# Patient Record
Sex: MALE | Race: WHITE | NOT HISPANIC OR LATINO | Employment: OTHER | URBAN - METROPOLITAN AREA
[De-identification: names, ages, dates, MRNs, and addresses within clinical notes are randomized per-mention and may not be internally consistent; named-entity substitution may affect disease eponyms.]

---

## 2017-01-05 ENCOUNTER — OFFICE VISIT (OUTPATIENT)
Dept: CARDIOLOGY | Facility: CLINIC | Age: 82
End: 2017-01-05
Attending: INTERNAL MEDICINE
Payer: MEDICARE

## 2017-01-05 VITALS
BODY MASS INDEX: 43.55 KG/M2 | WEIGHT: 271 LBS | DIASTOLIC BLOOD PRESSURE: 70 MMHG | HEIGHT: 66 IN | SYSTOLIC BLOOD PRESSURE: 118 MMHG | HEART RATE: 86 BPM

## 2017-01-05 DIAGNOSIS — I48.19 PERSISTENT ATRIAL FIBRILLATION (H): Primary | ICD-10-CM

## 2017-01-05 DIAGNOSIS — I25.10 CORONARY ARTERY DISEASE INVOLVING NATIVE CORONARY ARTERY OF NATIVE HEART WITHOUT ANGINA PECTORIS: ICD-10-CM

## 2017-01-05 DIAGNOSIS — I48.91 ATRIAL FIBRILLATION (H): Primary | ICD-10-CM

## 2017-01-05 PROBLEM — Z76.0 ENCOUNTER FOR MEDICATION REFILL: Status: ACTIVE | Noted: 2017-01-05

## 2017-01-05 PROCEDURE — 99214 OFFICE O/P EST MOD 30 MIN: CPT | Performed by: INTERNAL MEDICINE

## 2017-01-05 RX ORDER — METOPROLOL TARTRATE 25 MG/1
25 TABLET, FILM COATED ORAL 2 TIMES DAILY
Qty: 60 TABLET | Refills: 3 | Status: SHIPPED | OUTPATIENT
Start: 2017-01-05 | End: 2017-01-05

## 2017-01-05 RX ORDER — METOPROLOL TARTRATE 25 MG/1
25 TABLET, FILM COATED ORAL 2 TIMES DAILY
Qty: 180 TABLET | Refills: 3 | Status: SHIPPED | OUTPATIENT
Start: 2017-01-05 | End: 2017-01-13

## 2017-01-05 NOTE — Clinical Note
2017      Heriberto Kim MD   [facility]   [address] [place]  [address]      RE: Vasiliy Corbin   MRN: [id number]   : 1935      Dear Noah:      I had the pleasure of seeing Vasiliy Corbin in Cardiology Clinic in followup.  As you know, he has a history of atrial fibrillation that is longstanding.  He has had 2 cardioversions.  Initially, it was successful for a few weeks and then had recurrence and was put on amiodarone and did well for several years.  In April of last year, he had a laceration and came to the hospital and was noted in atrial fibrillation despite being on amiodarone.  I saw him in May and discussed rate versus rhythm control approach again and since he was feeling reasonable, we decided not to put him back on amiodarone or try cardioversion again.  He was tried on rate control approach and metoprolol was started at 12.5 mg twice daily.  With that, he has felt well except for some fatigue.  On recent evaluation of his echocardiogram, his heart rates were a bit faster.  His EF was stable at low-normal range.  In the past, he has had rate-related cardiomyopathy.      We also did a Holter which showed an average rate of 99, which is somewhat high.  Max rate was 145.  Overall, his symptoms are minimal.  He did question me whether now being in atrial fibrillation longterm will harm him.  I told him that with the AFFIRM trial the rate control approach has been reasonable and comparable to rhythm control approach, especially if he is not that symptomatic.  I did offer him a visit with the electrophysiologist to discuss that, including options of going back on amiodarone versus ablation versus rate control approach.  Given that he is minimally symptomatic, I think we should continue rate control approach but getting an opinion from an electrophysiologist sounds reasonable.  He is interested in that.  I will also refer him to Dr. MATA with that in mind.  He is on  Coumadin for stroke prophylaxis given the high CHADS vascular score.      PHYSICAL EXAMINATION:   VITAL SIGNS:  Blood pressure 118/70, pulse 86 per minute, irregular.   CARDIAC:  Regular S1, S2 with no murmurs.   CHEST:  Clear to auscultation.      IMPRESSION:   1.  Persistent atrial fibrillation, rate control can be more optimized and therefore, I will increase metoprolol to 25 mg twice daily.  The patient and I discussed at length about rate control versus rhythm control approach versus ablation.  Given he is minimally symptomatic, I think rate control approach would be reasonable, but we both decided to get opinion from my Electrophysiology to see if there is any advantage with a rhythm control approach and ablation and whether it will help him with quality of life.  I told him that his success rate for ablation may not be that high.  I told him to see Dr. Beckham to discuss that.  My anticipation is that a rate control approach may be continued and we will have to continue to do that with my nurse practitioner, Laura.  I have increased his metoprolol to 25 mg twice daily.  He will see Dr. Beckham in the next 1 or 2 weeks and then come back and see Laura in 6-8 weeks.  If rate control approach is recommended also by the electrophysiologist, I will ask my nurse practitioner to continue to work on his heart rate control so as to have a slightly better average heart rate, but without any significant fatigue or tiredness.      I would like to see him on least an annual basis.      Thank you for allowing us to participate in the care of this nice patient.      Sincerely,      Diomedes Menendez MD

## 2017-01-05 NOTE — PROGRESS NOTES
HPI and Plan:   See dictation  [id number]    Orders Placed This Encounter   Procedures     Follow-Up with Electrophysiologist     Follow-Up with Cardiac Advanced Practice Provider       Orders Placed This Encounter   Medications     metoprolol (LOPRESSOR) 25 MG tablet     Sig: Take 1 tablet (25 mg) by mouth 2 times daily     Dispense:  60 tablet     Refill:  3       Medications Discontinued During This Encounter   Medication Reason     metoprolol (LOPRESSOR) 25 MG tablet Reorder         Encounter Diagnoses   Name Primary?     Persistent atrial fibrillation (H) Yes     Coronary artery disease involving native coronary artery of native heart without angina pectoris        CURRENT MEDICATIONS:  Current Outpatient Prescriptions   Medication Sig Dispense Refill     metoprolol (LOPRESSOR) 25 MG tablet Take 1 tablet (25 mg) by mouth 2 times daily 60 tablet 3     fluocinonide (LIDEX) 0.05 % ointment as needed Daily to hand rash  0     lisinopril (PRINIVIL,ZESTRIL) 10 MG tablet TAKE 1/2 TABLET BY MOUTH TWICE DAILY 30 tablet 11     allopurinol (ZYLOPRIM) 300 MG tablet TAKE 1 TABLET BY MOUTH DAILY 90 tablet 3     furosemide (LASIX) 40 MG tablet Take 1 tablet (40 mg) by mouth daily 90 tablet 3     ASPIRIN NOT PRESCRIBED (INTENTIONAL) Antiplatelet medication not prescribed intentionally due to Current anticoagulant therapy (warfarin/enoxaparin) 0 each 0     ipratropium (ATROVENT) 0.06 % nasal spray USE 2 SPRAYS IN EACH NOSTRIL FOUR TIMES DAILY AS NEEDED FOR RHINITIS 45 mL 12     simvastatin (ZOCOR) 10 MG tablet Take 1 tablet (10 mg) by mouth At Bedtime 90 tablet 3     warfarin (COUMADIN) 2 MG tablet 1 1/2 pills per day 135 tablet 3     Multiple Vitamin (MULTI-VITAMIN) per tablet Take 1 tablet by mouth daily. 100 tablet 12     [DISCONTINUED] metoprolol (LOPRESSOR) 25 MG tablet Take 0.5 tablets (12.5 mg) by mouth 2 times daily 60 tablet 12       ALLERGIES   No Known Allergies    PAST MEDICAL HISTORY:  Past Medical History   Diagnosis  Date     HTN, goal to be determined      BPH (benign prostatic hyperplasia)      Goiter      Colon polyp 2010     Degenerative arthritis of cervical spine 2013      Rotator cuff tear 2013     Dr Llanos, massive tear     Arthritis of shoulder      Obesity (BMI 35.0-39.9 without comorbidity) (H)      ACP (advance care planning) 5-6-13     form given     SUJEY on CPAP      Renal cyst      benign left      Enlarged prostate with urinary retention 2013     him started Proscar and Flomax in December and saw      HTN (hypertension)      Atrial fibrillation (H)      became chronic afib in 2016,paroxysmal,was on amiodarone but went into persistent afib in april 2106 and amiodrone was d/cd. now on BBLK and considering cardioversion.(5/2106).In Sept 2016 plan is rate control which has been a challenge and Holter in 6 months     Bradycardia, drug induced      while on amiodarone and beta blocker     Cardiomyopathy (H)      CAD (coronary artery disease)      mild     CPAP (continuous positive airway pressure) dependence        PAST SURGICAL HISTORY:  Past Surgical History   Procedure Laterality Date     Ct release       Ulnar reroute       right      Laser ktp transurethral resection (tur) prostate N/A 4/7/2016     Procedure: LASER KTP TRANSURETHRAL RESECTION (TUR) PROSTATE;  Surgeon: Lokesh Lima MD;  Location:  OR     Cystoscopy N/A 4/7/2016     Procedure: CYSTOSCOPY;  Surgeon: Lokesh Lima MD;  Location:  OR      left heart catheterization  3/2010     Mild CAD       FAMILY HISTORY:  Family History   Problem Relation Age of Onset     HEART DISEASE Mother 92     Heart failure     HEART DISEASE Father 92     C.A.D. Brother 70     MI       SOCIAL HISTORY:  Social History     Social History     Marital Status:      Spouse Name: N/A     Number of Children: N/A     Years of Education: N/A     Social History Main Topics     Smoking status: Former Smoker -- 1.00 packs/day for 5 years     Types: Cigarettes  "    Smokeless tobacco: Former User     Quit date: 01/01/1960     Alcohol Use: No     Drug Use: No     Sexual Activity: Yes     Other Topics Concern     Parent/Sibling W/ Cabg, Mi Or Angioplasty Before 65f 55m? No     Caffeine Concern Yes     8 cups of  decaf coffee per day     Sleep Concern Yes     sleep apnea, wears CPAP     Stress Concern No     Weight Concern Yes     Weight gain     Special Diet No     Exercise No     Seat Belt Yes     Social History Narrative       Review of Systems:  Skin:  Negative       Eyes:  Positive for glasses    ENT:  Positive for hearing loss    Respiratory:  Positive for sleep apnea;CPAP     Cardiovascular:    Positive for;fatigue (much improved)    Gastroenterology: Negative      Genitourinary:  Positive for prostate problem;nocturia    Musculoskeletal:  Positive for back pain;joint pain    Neurologic:  Negative      Psychiatric:  Negative      Heme/Lymph/Imm:  Positive for easy bruising    Endocrine:  Negative        Physical Exam:  Vitals: /70 mmHg  Pulse 86  Ht 1.676 m (5' 6\")  Wt 122.925 kg (271 lb)  BMI 43.76 kg/m2    Constitutional:  cooperative obese      Skin:  warm and dry to the touch        Head:  normocephalic        Eyes:  pupils equal and round        ENT:  no pallor or cyanosis        Neck:  carotid pulses are full and equal bilaterally        Chest:  clear to auscultation          Cardiac:   irregularly irregular rhythm distant heart sounds no presence of murmur            Abdomen:  abdomen soft;BS normoactive obese      Vascular: not assessed this visit                                        Extremities and Back:      trace;1+;bilateral LE edema          Neurological:  no gross motor deficits              SHER Menendez MD   [facility]  [address]  [address]  [place] [address]                "

## 2017-01-05 NOTE — MR AVS SNAPSHOT
After Visit Summary   1/5/2017    Vasiliy Corbin    MRN: [id number]           Patient Information     Date Of Birth          1935        Visit Information        Provider Department      1/5/2017 9:45 AM Diomedes Menendez MD [facility]        Today's Diagnoses     Persistent atrial fibrillation (H)    -  1     Coronary artery disease involving native coronary artery of native heart without angina pectoris            Follow-ups after your visit        Additional Services     Follow-Up with Electrophysiologist           Follow-Up with Cardiac Advanced Practice Provider                 Your next 10 appointments already scheduled     Jan 09, 2017  9:00 AM   SHORT with Heriberto Haile MD   [facility] ([facility])    [address] [place] [address] [phone]              Future tests that were ordered for you today     Open Future Orders        Priority Expected Expires Ordered    Follow-Up with Electrophysiologist Routine 1/12/2017 1/5/2018 1/5/2017    Follow-Up with Cardiac Advanced Practice Provider Routine 2/16/2017 1/5/2018 1/5/2017            Who to contact     If you have questions or need follow up information about today's clinic visit or your schedule please contact [facility] directly at [phone].  Normal or non-critical lab and imaging results will be communicated to you by MyChart, letter or phone within 4 business days after the clinic has received the results. If you do not hear from us within 7 days, please contact the clinic through MyChart or phone. If you have a critical or abnormal lab result, we will notify you by phone as soon as possible.  Submit refill requests through [employer] or call your pharmacy and they will forward the refill request to us. Please allow 3 business days for your refill to be completed.          Additional Information  "About Your Visit        [employer] Information     [employer] lets you send messages to your doctor, view your test results, renew your prescriptions, schedule appointments and more. To sign up, go to [email or web address] . Click on \"Log in\" on the left side of the screen, which will take you to the Welcome page. Then click on \"Sign up Now\" on the right side of the page.     You will be asked to enter the access code listed below, as well as some personal information. Please follow the directions to create your username and password.     Your access code is: [email or web address]  Expires: 3/12/2017  9:26 AM     Your access code will  in 90 days. If you need help or a new code, please call your [place] clinic or [phone].        Care EveryWhere ID     This is your Care EveryWhere ID. This could be used by other organizations to access your [facility] medical records  [id number]        Your Vitals Were     Pulse Height BMI (Body Mass Index)             86 1.676 m (5' 6\") 43.76 kg/m2          Blood Pressure from Last 3 Encounters:   17 118/70   16 124/66   10/10/16 118/84    Weight from Last 3 Encounters:   17 122.925 kg (271 lb)   16 122.018 kg (269 lb)   10/10/16 118.389 kg (261 lb)              We Performed the Following     Follow-Up with Cardiologist          Today's Medication Changes          These changes are accurate as of: 17  9:54 AM.  If you have any questions, ask your nurse or doctor.               These medicines have changed or have updated prescriptions.        Dose/Directions    metoprolol 25 MG tablet   Commonly known as:  LOPRESSOR   This may have changed:  how much to take   Changed by:  Diomedes Menendez MD        Dose:  25 mg   Take 1 tablet (25 mg) by mouth 2 times daily   Quantity:  60 tablet   Refills:  3            Where to get your medicines      These medications were sent to [id number] - [place], [place] - [address]  [address] [facility] [address]     Phone:  [phone]    - metoprolol 25 MG tablet             Primary Care Provider Office Phone # Fax #    Heriberto Haile [phone]       [facility] [address]  [facility] [address]        Thank you!     Thank you for choosing [facility] [place]  for your care. Our goal is always to provide you with excellent care. Hearing back from our patients is one way we can continue to improve our services. Please take a few minutes to complete the written survey that you may receive in the mail after your visit with us. Thank you!             Your Updated Medication List - Protect others around you: Learn how to safely use, store and throw away your medicines at www.disposemymeds.org.          This list is accurate as of: 1/5/17  9:54 AM.  Always use your most recent med list.                   Brand Name Dispense Instructions for use    allopurinol 300 MG tablet    ZYLOPRIM    90 tablet    TAKE 1 TABLET BY MOUTH DAILY       ASPIRIN NOT PRESCRIBED    INTENTIONAL    0 each    Antiplatelet medication not prescribed intentionally due to {CHOOSE REASON BEFORE SIGNING!!!:823349}       fluocinonide 0.05 % ointment    LIDEX     as needed Daily to hand rash       furosemide 40 MG tablet    LASIX    90 tablet    Take 1 tablet (40 mg) by mouth daily       ipratropium 0.06 % spray    ATROVENT    45 mL    USE 2 SPRAYS IN EACH NOSTRIL FOUR TIMES DAILY AS NEEDED FOR RHINITIS       lisinopril 10 MG tablet    PRINIVIL/ZESTRIL    30 tablet    TAKE 1/2 TABLET BY MOUTH TWICE DAILY       metoprolol 25 MG tablet    LOPRESSOR    60 tablet    Take 1 tablet (25 mg) by mouth 2 times daily       Multi-vitamin Tabs tablet   Generic drug:  multivitamin, therapeutic with minerals     100 tablet    Take 1 tablet by mouth daily.       simvastatin 10 MG tablet    ZOCOR    90 tablet    Take 1 tablet (10 mg) by mouth At Bedtime        warfarin 2 MG tablet    COUMADIN    135 tablet    1 1/2 pills per day

## 2017-01-05 NOTE — PROGRESS NOTES
2017      Heriberto Kim MD   [facility]   [address] [place]  [address]      RE: Vasiliy Corbin   MRN: [id number]   : 1935      Dear Noah:      I had the pleasure of seeing Vasiliy Corbin in Cardiology Clinic in followup.  As you know, he has a history of atrial fibrillation that is longstanding.  He has had 2 cardioversions.  Initially, it was successful for a few weeks and then had recurrence and was put on amiodarone and did well for several years.  In April of last year, he had a laceration and came to the hospital and was noted in atrial fibrillation despite being on amiodarone.  I saw him in May and discussed rate versus rhythm control approach again and since he was feeling reasonable, we decided not to put him back on amiodarone or try cardioversion again.  He was tried on rate control approach and metoprolol was started at 12.5 mg twice daily.  With that, he has felt well except for some fatigue.  On recent evaluation of his echocardiogram, his heart rates were a bit faster.  His EF was stable at low-normal range.  In the past, he has had rate-related cardiomyopathy.      We also did a Holter which showed an average rate of 99, which is somewhat high.  Max rate was 145.  Overall, his symptoms are minimal.  He did question me whether now being in atrial fibrillation longterm will harm him.  I told him that with the AFFIRM trial the rate control approach has been reasonable and comparable to rhythm control approach, especially if he is not that symptomatic.  I did offer him a visit with the electrophysiologist to discuss that, including options of going back on amiodarone versus ablation versus rate control approach.  Given that he is minimally symptomatic, I think we should continue rate control approach but getting an opinion from an electrophysiologist sounds reasonable.  He is interested in that.  I will also refer him to Dr. MATA with that in mind.  He is on  Coumadin for stroke prophylaxis given the high CHADS vascular score.      PHYSICAL EXAMINATION:   VITAL SIGNS:  Blood pressure 118/70, pulse 86 per minute, irregular.   CARDIAC:  Regular S1, S2 with no murmurs.   CHEST:  Clear to auscultation.      IMPRESSION:   1.  Persistent atrial fibrillation, rate control can be more optimized and therefore, I will increase metoprolol to 25 mg twice daily.  The patient and I discussed at length about rate control versus rhythm control approach versus ablation.  Given he is minimally symptomatic, I think rate control approach would be reasonable, but we both decided to get opinion from my Electrophysiology to see if there is any advantage with a rhythm control approach and ablation and whether it will help him with quality of life.  I told him that his success rate for ablation may not be that high.  I told him to see Dr. Beckham to discuss that.  My anticipation is that a rate control approach may be continued and we will have to continue to do that with my nurse practitioner, Chana.  I have increased his metoprolol to 25 mg twice daily.  He will see Dr. Beckham in the next 1 or 2 weeks and then come back and see Chana in 6-8 weeks.  If rate control approach is recommended also by the electrophysiologist, I will ask my nurse practitioner to continue to work on his heart rate control so as to have a slightly better average heart rate, but without any significant fatigue or tiredness.      I would like to see him on least an annual basis.      Thank you for allowing us to participate in the care of this nice patient.      Sincerely,      MD SIN Silverman MD             D: 2017 10:00   T: 2017 11:51   MT: KATIA      Name:     AVERY FERRER   MRN:      [id number]        Account:      [id number]   :      1935           Service Date: 2017      Document: [id number]

## 2017-01-09 ENCOUNTER — OFFICE VISIT (OUTPATIENT)
Dept: FAMILY MEDICINE | Facility: CLINIC | Age: 82
End: 2017-01-09

## 2017-01-09 VITALS
BODY MASS INDEX: 43.63 KG/M2 | WEIGHT: 270.2 LBS | HEART RATE: 112 BPM | DIASTOLIC BLOOD PRESSURE: 76 MMHG | SYSTOLIC BLOOD PRESSURE: 122 MMHG | OXYGEN SATURATION: 97 %

## 2017-01-09 DIAGNOSIS — Z79.01 LONG TERM CURRENT USE OF ANTICOAGULANT THERAPY: ICD-10-CM

## 2017-01-09 DIAGNOSIS — I48.20 CHRONIC ATRIAL FIBRILLATION (H): ICD-10-CM

## 2017-01-09 LAB — INR PPP: 2.3 (ref 2–3)

## 2017-01-09 PROCEDURE — 99213 OFFICE O/P EST LOW 20 MIN: CPT | Performed by: FAMILY MEDICINE

## 2017-01-09 PROCEDURE — 85610 PROTHROMBIN TIME: CPT | Performed by: FAMILY MEDICINE

## 2017-01-09 PROCEDURE — 36415 COLL VENOUS BLD VENIPUNCTURE: CPT | Performed by: FAMILY MEDICINE

## 2017-01-09 NOTE — MR AVS SNAPSHOT
After Visit Summary   1/9/2017    Vasiliy Corbin    MRN: [id number]           Patient Information     Date Of Birth          1935        Visit Information        Provider Department      1/9/2017 9:00 AM Heriberto Haile MD [facility]        Today's Diagnoses     Long term current use of anticoagulant therapy         Chronic atrial fibrillation (H)           Care Instructions    [facility] Anticoagl[facility] Latest Ref Rng 1/9/2017   INR 2.0 - 3.0 2.3   ASSESS  THER   INR GOAL  2.0-3.0   WEEKLY DOSE  3 mg daily   PT INSTRUCT  same   RECHECK  4 WEEKS     ATRIAL FIB   LOCATION  Clinic   Comments             Follow-ups after your visit        Your next 10 appointments already scheduled     Jan 13, 2017  1:45 PM   EP NEW with Mariana Beckham MD   [facility])    [address] [place] [id number] [phone]            Feb 16, 2017 10:10 AM   Return Visit with KAYLEE Fischer CNP   [facility] ([facility])    [address] [place], [place] [id number]  [facility] [address] [phone]              Who to contact     If you have questions or need follow up information about today's clinic visit or your schedule please contact [facility] directly at [phone].  Normal or non-critical lab and imaging results will be communicated to you by MyChart, letter or phone within 4 business days after the clinic has received the results. If you do not hear from us within 7 days, please contact the clinic through MyChart or phone. If you have a critical or abnormal lab result, we will notify you by phone as soon as possible.  Submit refill requests through [employer] or call your pharmacy and they will forward the refill request to us. Please allow 3 business days for your refill to be completed.          Additional Information About Your Visit        [employer]  "Information     Get[employer] lets you send messages to your doctor, view your test results, renew your prescriptions, schedule appointments and more. To sign up, go to [email or web address] . Click on \"Log in\" on the left side of the screen, which will take you to the Welcome page. Then click on \"Sign up Now\" on the right side of the page.     You will be asked to enter the access code listed below, as well as some personal information. Please follow the directions to create your username and password.     Your access code is: [email or web address]  Expires: 3/12/2017  9:26 AM     Your access code will  in 90 days. If you need help or a new code, please call your [place] clinic or [phone].        Care EveryWhere ID     This is your Care EveryWhere ID. This could be used by other organizations to access your [place] medical records  [id number]        Your Vitals Were     Pulse Pulse Oximetry                112 97%           Blood Pressure from Last 3 Encounters:   17 122/76   17 118/70   16 124/66    Weight from Last 3 Encounters:   17 122.562 kg (270 lb 3.2 oz)   17 122.925 kg (271 lb)   16 122.018 kg (269 lb)              We Performed the Following     Prothrombin - INR ([facility])        Primary Care Provider Office Phone # Fax #    Heriberto Haile [phone]       [place] [address]  [facility] [address]        Thank you!     Thank you for choosing [facility]  for your care. Our goal is always to provide you with excellent care. Hearing back from our patients is one way we can continue to improve our services. Please take a few minutes to complete the written survey that you may receive in the mail after your visit with us. Thank you!             Your Updated Medication List - Protect others around you: Learn how to safely use, store and throw away your medicines at www.disposemymeds.org.          This list is accurate as of: " 1/9/17  9:39 AM.  Always use your most recent med list.                   Brand Name Dispense Instructions for use    allopurinol 300 MG tablet    ZYLOPRIM    90 tablet    TAKE 1 TABLET BY MOUTH DAILY       ASPIRIN NOT PRESCRIBED    INTENTIONAL    0 each    Antiplatelet medication not prescribed intentionally due to {CHOOSE REASON BEFORE SIGNING!!!:803364}       fluocinonide 0.05 % ointment    LIDEX     as needed Daily to hand rash       furosemide 40 MG tablet    LASIX    90 tablet    Take 1 tablet (40 mg) by mouth daily       ipratropium 0.06 % spray    ATROVENT    45 mL    USE 2 SPRAYS IN EACH NOSTRIL FOUR TIMES DAILY AS NEEDED FOR RHINITIS       lisinopril 10 MG tablet    PRINIVIL/ZESTRIL    30 tablet    TAKE 1/2 TABLET BY MOUTH TWICE DAILY       metoprolol 25 MG tablet    LOPRESSOR    180 tablet    Take 1 tablet (25 mg) by mouth 2 times daily       Multi-vitamin Tabs tablet   Generic drug:  multivitamin, therapeutic with minerals     100 tablet    Take 1 tablet by mouth daily.       simvastatin 10 MG tablet    ZOCOR    90 tablet    Take 1 tablet (10 mg) by mouth At Bedtime       warfarin 2 MG tablet    COUMADIN    135 tablet    1 1/2 pills per day

## 2017-01-09 NOTE — PATIENT INSTRUCTIONS
[facility] Anticoaglution Latest Ref Rng 1/9/2017   INR 2.0 - 3.0 2.3   ASSESS  THER   INR GOAL  2.0-3.0   WEEKLY DOSE  3 mg daily   PT INSTRUCT  same   RECHECK  4 WEEKS     ATRIAL FIB   LOCATION  Clinic   Comments

## 2017-01-09 NOTE — PROGRESS NOTES
Subjective:  Here to discuss the status of the following problem(s):(Unless noted the problem(s) is/are stable and if applicable the medicine(s) being used is/are causing no side effects or problems.)    CC: INR Followup; Tachycardia; Weight Problem; and Results        2. Chronic atrial fibrillation (H)  Heart rate fast recent Holter  rate  Echo EF 50-55% recent and in march  Metoprolol in creased recent from 25mg /day to 25mg bid     PAST HIST SYNCOPE in march 2016    ROS:  General and CV completed and negative except as noted above    Past Medical History   Diagnosis Date     HTN, goal to be determined      BPH (benign prostatic hyperplasia)      Goiter      Colon polyp 2010     Degenerative arthritis of cervical spine 2013      Rotator cuff tear 2013     Dr Llanos, massive tear     Arthritis of shoulder      Obesity (BMI 35.0-39.9 without comorbidity) (H)      ACP (advance care planning) 5-6-13     form given     SUJEY on CPAP      Renal cyst      benign left      Enlarged prostate with urinary retention 2013     him started Proscar and Flomax in December and saw      HTN (hypertension)      Atrial fibrillation (H)      became chronic afib in 2016,paroxysmal,was on amiodarone but went into persistent afib in april 2106 and amiodrone was d/cd. now on BBLK and considering cardioversion.(5/2106).In Sept 2016 plan is rate control which has been a challenge and Holter in 6 months     Bradycardia, drug induced      while on amiodarone and beta blocker     Cardiomyopathy (H)      CAD (coronary artery disease)      mild     CPAP (continuous positive airway pressure) dependence      Current Outpatient Prescriptions   Medication     metoprolol (LOPRESSOR) 25 MG tablet     fluocinonide (LIDEX) 0.05 % ointment     lisinopril (PRINIVIL,ZESTRIL) 10 MG tablet     allopurinol (ZYLOPRIM) 300 MG tablet     furosemide (LASIX) 40 MG tablet     ipratropium (ATROVENT) 0.06 % nasal spray     simvastatin (ZOCOR) 10 MG  tablet     warfarin (COUMADIN) 2 MG tablet     Multiple Vitamin (MULTI-VITAMIN) per tablet     ASPIRIN NOT PRESCRIBED (INTENTIONAL)     No current facility-administered medications for this visit.      reports that he has quit smoking. His smoking use included Cigarettes. He has a 5 pack-year smoking history. He quit smokeless tobacco use about 57 years ago. He reports that he does not drink alcohol or use illicit drugs.      EXAM:  BP: 122/76   Pulse: 112      Wt Readings from Last 2 Encounters:   01/09/17 122.562 kg (270 lb 3.2 oz)   01/05/17 122.925 kg (271 lb)       BMI= Body mass index is 43.63 kg/(m^2).    EXAM:  Obese pleasant         Assessment/Plan:  Vasiliy was seen today for inr followup, tachycardia, weight problem and results.    Diagnoses and all orders for this visit:    Chronic atrial fibrillation (H)  He will see Dr. Beckham as planned.  With his previous episode of syncope it  it will be difficult to manage this with medicines I suspect.  I look forward to Dr. Beckham's suggestions    Long term current use of anticoagulant therapy  -     Prothrombin - INR ([facility])  Recheck one month          Heriberto Haile  [facility]

## 2017-01-12 ENCOUNTER — PRE VISIT (OUTPATIENT)
Dept: CARDIOLOGY | Facility: CLINIC | Age: 82
End: 2017-01-12

## 2017-01-12 ASSESSMENT — CHADS2 SCORE
DIABETES: NO
HTN: YES
CHF: NO
STROK/TIA/THROM: NO
AGE: >74
VASCULAR DISEASE: YES
SEX: MALE

## 2017-01-13 ENCOUNTER — OFFICE VISIT (OUTPATIENT)
Dept: CARDIOLOGY | Facility: CLINIC | Age: 82
End: 2017-01-13
Attending: INTERNAL MEDICINE
Payer: MEDICARE

## 2017-01-13 VITALS
HEART RATE: 96 BPM | WEIGHT: 270.5 LBS | DIASTOLIC BLOOD PRESSURE: 70 MMHG | SYSTOLIC BLOOD PRESSURE: 126 MMHG | BODY MASS INDEX: 43.47 KG/M2 | HEIGHT: 66 IN

## 2017-01-13 DIAGNOSIS — I48.19 PERSISTENT ATRIAL FIBRILLATION (H): ICD-10-CM

## 2017-01-13 PROCEDURE — 99204 OFFICE O/P NEW MOD 45 MIN: CPT | Performed by: INTERNAL MEDICINE

## 2017-01-13 PROCEDURE — 93000 ELECTROCARDIOGRAM COMPLETE: CPT | Performed by: INTERNAL MEDICINE

## 2017-01-13 RX ORDER — DILTIAZEM HYDROCHLORIDE 300 MG/1
300 CAPSULE, COATED, EXTENDED RELEASE ORAL DAILY
Qty: 90 CAPSULE | Refills: 3 | Status: SHIPPED | OUTPATIENT
Start: 2017-01-13 | End: 2017-02-16

## 2017-01-13 NOTE — Clinical Note
2017      Heriberto Kim MD    [facility]    [address] [place]  [address]       RE: Vasiliy Corbin   MRN: [id number]   : 1935      Dear Dr. Kim:      I saw Mr. Corbin for evaluation of recurrent atrial fibrillation.  He is an 81-year-old white male with a history of hypertension, severe obesity and obstructive sleep apnea.  The patient has been struggling with recurrent atrial fibrillation for years.  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  His ejection fraction has recovered to about 50% at the present time.  The patient failed to maintain sinus rhythm on amiodarone therapy after cardioversion.  He is now in persistent atrial fibrillation.  His recent Holter monitoring showed average heart rate 99 beats per minute.  The patient complains of fatigue and shortness of breath with exertion.  With exertion, he also feels rapid heartbeat.      The patient had previous coronary angiography that showed only mild coronary stenosis in the distal branches.  He has gout, history of colon polyp and hyperlipidemia.  He is currently on CPAP for sleep apnea.      PHYSICAL EXAMINATION:   VITAL SIGNS:  Blood pressure was 126/70, heart rate 96 beats per minute, body weight 270 pounds.   HEENT:  The eyes and ENT were unremarkable.   LUNGS:  Clear.   CARDIAC:  Rhythm was irregularly irregular.  The heart sounds were normal without murmur.   ABDOMEN:  Showed no hepatomegaly.   EXTREMITIES:  There was no pedal edema.      EKG showed atrial fibrillation with a mildly fast ventricular rate.      ASSESSMENT AND RECOMMENDATIONS:  Mr. Corbin is an 81-year-old white male with severe obesity, hypertension and obstructive sleep apnea.  He has recurrent atrial fibrillation with a past history of tachycardia-induced cardiomyopathy.  He has failed amiodarone therapy and is now in persistent atrial fibrillation.  With the current  medications, he has uncontrolled ventricular rate and he complains of shortness of breath, fatigue, palpitation.  Because of his severe obesity and advanced age, I would like to attempt rate control first.  For that purpose, I have switched his lisinopril and metoprolol to Cardizem- mg once a day.  The patient will have a 24-hour Holter in about 1 week for reassessment of the ventricular rate.  I will see him in about 1 month.  If he does not do well on the above plan, I may recommend AV node ablation with pacemaker implant.      Sincerely,            Mariana Beckham MD      cc:   Diomedes Menendez MD    [facility]    [facility], [address]    [place] [address]

## 2017-01-13 NOTE — MR AVS SNAPSHOT
After Visit Summary   1/13/2017    Vasiliy Corbin    MRN: [id number]           Patient Information     Date Of Birth          1935        Visit Information        Provider Department      1/13/2017 1:45 PM Mariana Beckham MD [facility]        Today's Diagnoses     Persistent atrial fibrillation (H)            Follow-ups after your visit        Additional Services     Follow-Up with Electrophysiologist                 Your next 10 appointments already scheduled     Feb 06, 2017  9:15 AM   SHORT with Heriberto Haile MD   [facility]    [address] [place] [address]   [phone]            Feb 16, 2017 10:10 AM   Return Visit with KAYLEE Fischer CNP   [facility] ([facility])    [address] [place] [id number] [phone]              Future tests that were ordered for you today     Open Future Orders        Priority Expected Expires Ordered    Holter Monitor 24 hour - Adult Routine 1/20/2017 1/13/2018 1/13/2017    Follow-Up with Electrophysiologist Routine 3/1/2017 1/13/2018 1/13/2017            Who to contact     If you have questions or need follow up information about today's clinic visit or your schedule please contact [facility] directly at [phone].  Normal or non-critical lab and imaging results will be communicated to you by MyChart, letter or phone within 4 business days after the clinic has received the results. If you do not hear from us within 7 days, please contact the clinic through MyChart or phone. If you have a critical or abnormal lab result, we will notify you by phone as soon as possible.  Submit refill requests through [employer] or call your pharmacy and they will forward the refill request to us. Please allow 3 business days for your refill to be  "completed.          Additional Information About Your Visit        [place] [employer] Information     [employer] lets you send messages to your doctor, view your test results, renew your prescriptions, schedule appointments and more. To sign up, go to [email or web address] . Click on \"Log in\" on the left side of the screen, which will take you to the Welcome page. Then click on \"Sign up Now\" on the right side of the page.     You will be asked to enter the access code listed below, as well as some personal information. Please follow the directions to create your username and password.     Your access code is: [email or web address]  Expires: 3/12/2017  9:26 AM     Your access code will  in 90 days. If you need help or a new code, please call your [place] clinic or [phone].        Care EveryWhere ID     This is your Care EveryWhere ID. This could be used by other organizations to access your [place] medical records  [id number]        Your Vitals Were     Pulse Height BMI (Body Mass Index)             96 1.676 m (5' 6\") 43.68 kg/m2          Blood Pressure from Last 3 Encounters:   17 126/70   17 122/76   17 118/70    Weight from Last 3 Encounters:   17 122.698 kg (270 lb 8 oz)   17 122.562 kg (270 lb 3.2 oz)   17 122.925 kg (271 lb)              We Performed the Following     EKG 12-lead complete w/read - Clinics (performed today)     Follow-Up with Electrophysiologist          Today's Medication Changes          These changes are accurate as of: 17  2:14 PM.  If you have any questions, ask your nurse or doctor.               Start taking these medicines.        Dose/Directions    diltiazem 300 MG 24 hr capsule   Commonly known as:  CARDIZEM CD   Used for:  Persistent atrial fibrillation (H)   Started by:  Mariana Beckham MD        Dose:  300 mg   Take 1 capsule (300 mg) by mouth daily   Quantity:  90 capsule   Refills:  3         Stop taking these medicines if you haven't already. Please " contact your care team if you have questions.     lisinopril 10 MG tablet   Commonly known as:  PRINIVIL/ZESTRIL   Stopped by:  Mariana Beckham MD           metoprolol 25 MG tablet   Commonly known as:  LOPRESSOR   Stopped by:  Mariana Beckham MD                Where to get your medicines      These medications were sent to [facility] [id number] [place] [address] [address]  [address] [address]     Phone:  [phone]    - diltiazem 300 MG 24 hr capsule             Primary Care Provider Office Phone # Fax #    Heriberto Haile [phone]       [facility] [address] [place] [address]        Thank you!     Thank you for choosing [facility] [place]  for your care. Our goal is always to provide you with excellent care. Hearing back from our patients is one way we can continue to improve our services. Please take a few minutes to complete the written survey that you may receive in the mail after your visit with us. Thank you!             Your Updated Medication List - Protect others around you: Learn how to safely use, store and throw away your medicines at www.disposemymeds.org.          This list is accurate as of: 1/13/17  2:14 PM.  Always use your most recent med list.                   Brand Name Dispense Instructions for use    allopurinol 300 MG tablet    ZYLOPRIM    90 tablet    TAKE 1 TABLET BY MOUTH DAILY       ASPIRIN NOT PRESCRIBED    INTENTIONAL    0 each    Antiplatelet medication not prescribed intentionally due to {CHOOSE REASON BEFORE SIGNING!!!:674300}       diltiazem 300 MG 24 hr capsule    CARDIZEM CD    90 capsule    Take 1 capsule (300 mg) by mouth daily       fluocinonide 0.05 % ointment    LIDEX     as needed Daily to hand rash       furosemide 40 MG tablet    LASIX    90 tablet    Take 1 tablet (40 mg) by mouth daily       ipratropium 0.06 % spray    ATROVENT    45 mL     USE 2 SPRAYS IN EACH NOSTRIL FOUR TIMES DAILY AS NEEDED FOR RHINITIS       Multi-vitamin Tabs tablet   Generic drug:  multivitamin, therapeutic with minerals     100 tablet    Take 1 tablet by mouth daily.       simvastatin 10 MG tablet    ZOCOR    90 tablet    Take 1 tablet (10 mg) by mouth At Bedtime       warfarin 2 MG tablet    COUMADIN    135 tablet    1 1/2 pills per day

## 2017-01-13 NOTE — PROGRESS NOTES
HPI and Plan:   See dictation    Orders Placed This Encounter   Procedures     Follow-Up with Electrophysiologist     EKG 12-lead complete w/read - Clinics (performed today)     Holter Monitor 24 hour - Adult       Orders Placed This Encounter   Medications     diltiazem (CARDIZEM CD) 300 MG 24 hr capsule     Sig: Take 1 capsule (300 mg) by mouth daily     Dispense:  90 capsule     Refill:  3       Medications Discontinued During This Encounter   Medication Reason     metoprolol (LOPRESSOR) 25 MG tablet      lisinopril (PRINIVIL,ZESTRIL) 10 MG tablet          Encounter Diagnosis   Name Primary?     Persistent atrial fibrillation (H)        CURRENT MEDICATIONS:  Current Outpatient Prescriptions   Medication Sig Dispense Refill     diltiazem (CARDIZEM CD) 300 MG 24 hr capsule Take 1 capsule (300 mg) by mouth daily 90 capsule 3     allopurinol (ZYLOPRIM) 300 MG tablet TAKE 1 TABLET BY MOUTH DAILY 90 tablet 3     furosemide (LASIX) 40 MG tablet Take 1 tablet (40 mg) by mouth daily 90 tablet 3     ipratropium (ATROVENT) 0.06 % nasal spray USE 2 SPRAYS IN EACH NOSTRIL FOUR TIMES DAILY AS NEEDED FOR RHINITIS 45 mL 12     simvastatin (ZOCOR) 10 MG tablet Take 1 tablet (10 mg) by mouth At Bedtime 90 tablet 3     warfarin (COUMADIN) 2 MG tablet 1 1/2 pills per day 135 tablet 3     Multiple Vitamin (MULTI-VITAMIN) per tablet Take 1 tablet by mouth daily. 100 tablet 12     fluocinonide (LIDEX) 0.05 % ointment as needed Daily to hand rash  0     ASPIRIN NOT PRESCRIBED (INTENTIONAL) Antiplatelet medication not prescribed intentionally due to Intolerance 0 each 0       ALLERGIES   No Known Allergies    PAST MEDICAL HISTORY:  Past Medical History   Diagnosis Date     Colon polyp 2010     Obesity (BMI 35.0-39.9 without comorbidity) (H)      SUJEY on CPAP      HTN (hypertension)      Atrial fibrillation (H)      became chronic afib in 2016,paroxysmal,was on amiodarone but went into persistent afib in april 2106 and amiodrone was d/cd.  now on BBLK and considering cardioversion.(5/2106).In Sept 2016 plan is rate control which has been a challenge and Holter in 6 months     CAD (coronary artery disease)      mild stenosis per cath     Gout      Hyperlipidemia        PAST SURGICAL HISTORY:  Past Surgical History   Procedure Laterality Date     Ct release       Ulnar reroute       right      Laser ktp transurethral resection (tur) prostate N/A 4/7/2016     Procedure: LASER KTP TRANSURETHRAL RESECTION (TUR) PROSTATE;  Surgeon: Lokesh Lima MD;  Location: [facility] OR     Cystoscopy N/A 4/7/2016     Procedure: CYSTOSCOPY;  Surgeon: Lokesh Lima MD;  Location:  OR     Hc left heart catheterization  3/2010     Mild CAD       FAMILY HISTORY:  Family History   Problem Relation Age of Onset     HEART DISEASE Mother 92     Heart failure     HEART DISEASE Father 92     C.A.D. Brother 70     MI       SOCIAL HISTORY:  Social History     Social History     Marital Status:      Spouse Name: N/A     Number of Children: N/A     Years of Education: N/A     Social History Main Topics     Smoking status: Former Smoker -- 1.00 packs/day for 5 years     Types: Cigarettes     Smokeless tobacco: Former User     Quit date: 01/01/1960     Alcohol Use: No     Drug Use: No     Sexual Activity: Yes     Other Topics Concern     Parent/Sibling W/ Cabg, Mi Or Angioplasty Before 65f 55m? No     Caffeine Concern Yes     8 cups of  decaf coffee per day     Sleep Concern Yes     sleep apnea, wears CPAP     Stress Concern No     Weight Concern Yes     Weight gain     Special Diet No     Exercise No     Seat Belt Yes     Social History Narrative       Review of Systems:  Skin:  Negative       Eyes:  Positive for glasses    ENT:  Positive for hearing loss    Respiratory:  Positive for sleep apnea;CPAP     Cardiovascular:    Positive for;edema;fatigue    Gastroenterology: Negative      Genitourinary:  Positive for prostate problem;nocturia    Musculoskeletal:  Negative     "  Neurologic:  Negative      Psychiatric:  Negative      Heme/Lymph/Imm:  Positive for easy bruising    Endocrine:  Negative        Physical Exam:  Vitals: /70 mmHg  Pulse 96  Ht 1.676 m (5' 6\")  Wt 122.698 kg (270 lb 8 oz)  BMI 43.68 kg/m2    Constitutional:  cooperative obese      Skin:  warm and dry to the touch        Head:  normocephalic        Eyes:  pupils equal and round        ENT:  no pallor or cyanosis        Neck:  carotid pulses are full and equal bilaterally        Chest:  clear to auscultation          Cardiac:   irregularly irregular rhythm distant heart sounds no presence of murmur            Abdomen:  abdomen soft;BS normoactive obese      Vascular: not assessed this visit                                        Extremities and Back:      trace;1+;bilateral LE edema          Neurological:  no gross motor deficits              CC  Diomedes Menendez MD   [facility]  [address]  [place] [address]                "

## 2017-01-13 NOTE — PROGRESS NOTES
HPI and Plan:   See dictation    Orders Placed This Encounter   Procedures     Follow-Up with Electrophysiologist     EKG 12-lead complete w/read - Clinics (performed today)     Holter Monitor 24 hour - Adult       Orders Placed This Encounter   Medications     diltiazem (CARDIZEM CD) 300 MG 24 hr capsule     Sig: Take 1 capsule (300 mg) by mouth daily     Dispense:  90 capsule     Refill:  3       Medications Discontinued During This Encounter   Medication Reason     metoprolol (LOPRESSOR) 25 MG tablet      lisinopril (PRINIVIL,ZESTRIL) 10 MG tablet          Encounter Diagnosis   Name Primary?     Persistent atrial fibrillation (H)        CURRENT MEDICATIONS:  Current Outpatient Prescriptions   Medication Sig Dispense Refill     diltiazem (CARDIZEM CD) 300 MG 24 hr capsule Take 1 capsule (300 mg) by mouth daily 90 capsule 3     allopurinol (ZYLOPRIM) 300 MG tablet TAKE 1 TABLET BY MOUTH DAILY 90 tablet 3     furosemide (LASIX) 40 MG tablet Take 1 tablet (40 mg) by mouth daily 90 tablet 3     ipratropium (ATROVENT) 0.06 % nasal spray USE 2 SPRAYS IN EACH NOSTRIL FOUR TIMES DAILY AS NEEDED FOR RHINITIS 45 mL 12     simvastatin (ZOCOR) 10 MG tablet Take 1 tablet (10 mg) by mouth At Bedtime 90 tablet 3     warfarin (COUMADIN) 2 MG tablet 1 1/2 pills per day 135 tablet 3     Multiple Vitamin (MULTI-VITAMIN) per tablet Take 1 tablet by mouth daily. 100 tablet 12     fluocinonide (LIDEX) 0.05 % ointment as needed Daily to hand rash  0     ASPIRIN NOT PRESCRIBED (INTENTIONAL) Antiplatelet medication not prescribed intentionally due to  0 each 0       ALLERGIES   No Known Allergies    PAST MEDICAL HISTORY:  Past Medical History   Diagnosis Date     Colon polyp 2010     Obesity (BMI 35.0-39.9 without comorbidity) (H)      SUJEY on CPAP      HTN (hypertension)      Atrial fibrillation (H)      became chronic afib in 2016,paroxysmal,was on amiodarone but went into persistent afib in april 2106 and amiodrone was d/cd. now on BBLK  and considering cardioversion.(5/2106).In Sept 2016 plan is rate control which has been a challenge and Holter in 6 months     CAD (coronary artery disease)      mild stenosis per cath     Gout      Hyperlipidemia        PAST SURGICAL HISTORY:  Past Surgical History   Procedure Laterality Date     Ct release       Ulnar reroute       right      Laser ktp transurethral resection (tur) prostate N/A 4/7/2016     Procedure: LASER KTP TRANSURETHRAL RESECTION (TUR) PROSTATE;  Surgeon: Lokesh Lima MD;  Location: [facility] OR     Cystoscopy N/A 4/7/2016     Procedure: CYSTOSCOPY;  Surgeon: Lokesh Lima MD;  Location:  OR     Hc left heart catheterization  3/2010     Mild CAD       FAMILY HISTORY:  Family History   Problem Relation Age of Onset     HEART DISEASE Mother 92     Heart failure     HEART DISEASE Father 92     C.A.D. Brother 70     MI       SOCIAL HISTORY:  Social History     Social History     Marital Status:      Spouse Name: N/A     Number of Children: N/A     Years of Education: N/A     Social History Main Topics     Smoking status: Former Smoker -- 1.00 packs/day for 5 years     Types: Cigarettes     Smokeless tobacco: Former User     Quit date: 01/01/1960     Alcohol Use: No     Drug Use: No     Sexual Activity: Yes     Other Topics Concern     Parent/Sibling W/ Cabg, Mi Or Angioplasty Before 65f 55m? No     Caffeine Concern Yes     8 cups of  decaf coffee per day     Sleep Concern Yes     sleep apnea, wears CPAP     Stress Concern No     Weight Concern Yes     Weight gain     Special Diet No     Exercise No     Seat Belt Yes     Social History Narrative       Review of Systems:  Skin:  Negative       Eyes:  Positive for glasses    ENT:  Positive for hearing loss    Respiratory:  Positive for sleep apnea;CPAP     Cardiovascular:    Positive for;edema;fatigue    Gastroenterology: Negative      Genitourinary:  Positive for prostate problem;nocturia    Musculoskeletal:  Negative      Neurologic:   "Negative      Psychiatric:  Negative      Heme/Lymph/Imm:  Positive for easy bruising    Endocrine:  Negative        Physical Exam:  Vitals: /70 mmHg  Pulse 96  Ht 1.676 m (5' 6\")  Wt 122.698 kg (270 lb 8 oz)  BMI 43.68 kg/m2    Constitutional:  cooperative obese      Skin:  warm and dry to the touch        Head:  normocephalic        Eyes:  pupils equal and round        ENT:  no pallor or cyanosis        Neck:  carotid pulses are full and equal bilaterally        Chest:  clear to auscultation          Cardiac:   irregularly irregular rhythm distant heart sounds no presence of murmur            Abdomen:  abdomen soft;BS normoactive obese      Vascular: not assessed this visit                                        Extremities and Back:      trace;1+;bilateral LE edema          Neurological:  no gross motor deficits              CC  Diomedes Menendez MD   [facility]  [address]  [address]  [place],  "

## 2017-01-14 NOTE — PROGRESS NOTES
2017      Heriberto Kim MD    [facility]    [address] [place]  [address]       RE: Vasiliy Corbin   MRN: [id number]   : 1935      Dear Dr. Kim:      I saw Mr. Corbin for evaluation of recurrent atrial fibrillation.  He is an 81-year-old white male with a history of hypertension, severe obesity and obstructive sleep apnea.  The patient has been struggling with recurrent atrial fibrillation for years.  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  His ejection fraction has recovered to about 50% at the present time.  The patient failed to maintain sinus rhythm on amiodarone therapy after cardioversion.  He is now in persistent atrial fibrillation.  His recent Holter monitoring showed average heart rate 99 beats per minute.  The patient complains of fatigue and shortness of breath with exertion.  With exertion, he also feels rapid heartbeat.      The patient had previous coronary angiography that showed only mild coronary stenosis in the distal branches.  He has gout, history of colon polyp and hyperlipidemia.  He is currently on CPAP for sleep apnea.      PHYSICAL EXAMINATION:   VITAL SIGNS:  Blood pressure was 126/70, heart rate 96 beats per minute, body weight 270 pounds.   HEENT:  The eyes and ENT were unremarkable.   LUNGS:  Clear.   CARDIAC:  Rhythm was irregularly irregular.  The heart sounds were normal without murmur.   ABDOMEN:  Showed no hepatomegaly.   EXTREMITIES:  There was no pedal edema.      EKG showed atrial fibrillation with a mildly fast ventricular rate.      ASSESSMENT AND RECOMMENDATIONS:  Mr. Corbin is an 81-year-old white male with severe obesity, hypertension and obstructive sleep apnea.  He has recurrent atrial fibrillation with a past history of tachycardia-induced cardiomyopathy.  He has failed amiodarone therapy and is now in persistent atrial fibrillation.  With the current  medications, he has uncontrolled ventricular rate and he complains of shortness of breath, fatigue, palpitation.  Because of his severe obesity and advanced age, I would like to attempt rate control first.  For that purpose, I have switched his lisinopril and metoprolol to Cardizem- mg once a day.  The patient will have a 24-hour Holter in about 1 week for reassessment of the ventricular rate.  I will see him in about 1 month.  If he does not do well on the above plan, I may recommend AV node ablation with pacemaker implant.      Sincerely,            Ananda Beckham MD      cc:   Diomedes Menendez MD    [facility]    [address] [place] [address], [address]    [place] [address]         ANANDA BECKHAM MD             D: 2017 14:20   T: 2017 23:40   MT: ISABEL      Name:     AVERY FERRER   MRN:      [id number]        Account:      [id number]   :      1935           Service Date: 2017      Document: [id number]

## 2017-01-20 ENCOUNTER — HOSPITAL ENCOUNTER (OUTPATIENT)
Dept: CARDIOLOGY | Facility: CLINIC | Age: 82
Discharge: HOME | End: 2017-01-20
Attending: INTERNAL MEDICINE | Admitting: INTERNAL MEDICINE
Payer: MEDICARE

## 2017-01-20 DIAGNOSIS — I48.19 PERSISTENT ATRIAL FIBRILLATION (H): ICD-10-CM

## 2017-01-20 PROCEDURE — 93227 XTRNL ECG REC<48 HR R&I: CPT | Performed by: INTERNAL MEDICINE

## 2017-01-20 PROCEDURE — 93225 XTRNL ECG REC<48 HRS REC: CPT

## 2017-02-06 ENCOUNTER — OFFICE VISIT (OUTPATIENT)
Dept: FAMILY MEDICINE | Facility: CLINIC | Age: 82
End: 2017-02-06

## 2017-02-06 VITALS
BODY MASS INDEX: 43.96 KG/M2 | SYSTOLIC BLOOD PRESSURE: 140 MMHG | DIASTOLIC BLOOD PRESSURE: 78 MMHG | WEIGHT: 272.2 LBS | RESPIRATION RATE: 16 BRPM | HEART RATE: 60 BPM | OXYGEN SATURATION: 97 %

## 2017-02-06 DIAGNOSIS — Z79.01 LONG TERM CURRENT USE OF ANTICOAGULANT THERAPY: ICD-10-CM

## 2017-02-06 DIAGNOSIS — I48.20 CHRONIC ATRIAL FIBRILLATION (H): ICD-10-CM

## 2017-02-06 DIAGNOSIS — E66.9 OBESITY (BMI 35.0-39.9 WITHOUT COMORBIDITY): ICD-10-CM

## 2017-02-06 DIAGNOSIS — I48.19 PERSISTENT ATRIAL FIBRILLATION (H): Primary | ICD-10-CM

## 2017-02-06 DIAGNOSIS — R60.0 LOCALIZED EDEMA: ICD-10-CM

## 2017-02-06 DIAGNOSIS — I42.9 CARDIOMYOPATHY (H): ICD-10-CM

## 2017-02-06 DIAGNOSIS — I10 ESSENTIAL HYPERTENSION: ICD-10-CM

## 2017-02-06 LAB — INR PPP: 1.8 (ref 2–3)

## 2017-02-06 PROCEDURE — 85610 PROTHROMBIN TIME: CPT | Performed by: FAMILY MEDICINE

## 2017-02-06 PROCEDURE — 36415 COLL VENOUS BLD VENIPUNCTURE: CPT | Performed by: FAMILY MEDICINE

## 2017-02-06 PROCEDURE — 99214 OFFICE O/P EST MOD 30 MIN: CPT | Performed by: FAMILY MEDICINE

## 2017-02-06 RX ORDER — FUROSEMIDE 40 MG
60 TABLET ORAL DAILY
Qty: 135 TABLET | Refills: 3 | Status: SHIPPED | OUTPATIENT
Start: 2017-02-06 | End: 2017-02-16

## 2017-02-06 RX ORDER — WARFARIN SODIUM 2 MG/1
TABLET ORAL
Qty: 135 TABLET | Refills: 3 | Status: SHIPPED | OUTPATIENT
Start: 2017-02-06 | End: 2017-12-06

## 2017-02-06 NOTE — PATIENT INSTRUCTIONS
[facility] Latest Ref Rng 2/6/2017   INR 2.0 - 3.0 1.8 (A)   ASSESS  SUB   INR GOAL  2.0-3.0   WEEKLY DOSE  3mg daily   PT INSTRUCT  take 6 mg today then take 3 mg daily   RECHECK  2 WEEKS     ATRIAL FIB   LOCATION  Clinic   Comments

## 2017-02-06 NOTE — MR AVS SNAPSHOT
After Visit Summary   2/6/2017    Vasiliy Corbin    MRN: [id number]           Patient Information     Date Of Birth          1935        Visit Information        Provider Department      2/6/2017 9:15 AM Heriberto Haile MD [facility]        Today's Diagnoses     Persistent atrial fibrillation (H)    -  1     Long term current use of anticoagulant therapy         Obesity (BMI 35.0-39.9 without comorbidity) (H)         Cardiomyopathy (H)         Essential hypertension         Localized edema         Chronic atrial fibrillation (H)           Care Instructions    [facility] Latest Ref Rng 2/6/2017   INR 2.0 - 3.0 1.8 (A)   ASSESS  SUB   INR GOAL  2.0-3.0   WEEKLY DOSE  3mg daily   PT INSTRUCT  take 6 mg today then take 3 mg daily   RECHECK  2 WEEKS     ATRIAL FIB   LOCATION  Clinic   Comments             Follow-ups after your visit        Your next 10 appointments already scheduled     Feb 16, 2017 10:10 AM   Return Visit with KAYLEE Fischer CNP   [facility] [place] ([facility])    [id number]  [facility] [id number] [phone]            Mar 15, 2017  8:45 AM   [facility] EP RETURN with Mariana Beckham MD   [facility] [place] ([facility])    [id number]  [facility] [id number] [phone]              Who to contact     If you have questions or need follow up information about today's clinic visit or your schedule please contact [facility] directly at [phone].  Normal or non-critical lab and imaging results will be communicated to you by MyChart, letter or phone within 4 business days after the clinic has received the results. If you do not hear from us within 7 days, please contact the clinic through MyChart or phone. If you have a critical or abnormal lab result, we will notify you by phone as soon as possible.  Submit refill  "requests through [employer] or call your pharmacy and they will forward the refill request to us. Please allow 3 business days for your refill to be completed.          Additional Information About Your Visit        [employer] Information     [employer] lets you send messages to your doctor, view your test results, renew your prescriptions, schedule appointments and more. To sign up, go to [email or web address] . Click on \"Log in\" on the left side of the screen, which will take you to the Welcome page. Then click on \"Sign up Now\" on the right side of the page.     You will be asked to enter the access code listed below, as well as some personal information. Please follow the directions to create your username and password.     Your access code is: [email or web address]  Expires: 3/12/2017  9:26 AM     Your access code will  in 90 days. If you need help or a new code, please call your [place] clinic or [phone].        Care EveryWhere ID     This is your Care EveryWhere ID. This could be used by other organizations to access your [place] medical records  [id number]        Your Vitals Were     Pulse Respirations Pulse Oximetry             60 16 97%          Blood Pressure from Last 3 Encounters:   17 140/78   17 126/70   17 122/76    Weight from Last 3 Encounters:   17 123.469 kg (272 lb 3.2 oz)   17 122.698 kg (270 lb 8 oz)   17 122.562 kg (270 lb 3.2 oz)              We Performed the Following     Prothrombin - INR ([facility])          Today's Medication Changes          These changes are accurate as of: 17  9:41 AM.  If you have any questions, ask your nurse or doctor.               These medicines have changed or have updated prescriptions.        Dose/Directions    furosemide 40 MG tablet   Commonly known as:  LASIX   This may have changed:  how much to take   Used for:  Localized edema   Changed by:  Heriberto Haile MD        Dose:  60 mg   Take 1.5 tablets (60 mg) by mouth daily "   Quantity:  135 tablet   Refills:  3            Where to get your medicines      These medications were sent to [id number] - [place], [place] - [address]  AT [address] [place] [address] & [address]  [address] [facility] [address]     Phone:  [phone]    - furosemide 40 MG tablet  - warfarin 2 MG tablet             Primary Care Provider Office Phone # Fax #    Heriberto Haile [phone]       [facility] [address]  [facility] [address]        Thank you!     Thank you for choosing [facility]  for your care. Our goal is always to provide you with excellent care. Hearing back from our patients is one way we can continue to improve our services. Please take a few minutes to complete the written survey that you may receive in the mail after your visit with us. Thank you!             Your Updated Medication List - Protect others around you: Learn how to safely use, store and throw away your medicines at www.disposemymeds.org.          This list is accurate as of: 2/6/17  9:41 AM.  Always use your most recent med list.                   Brand Name Dispense Instructions for use    allopurinol 300 MG tablet    ZYLOPRIM    90 tablet    TAKE 1 TABLET BY MOUTH DAILY       ASPIRIN NOT PRESCRIBED    INTENTIONAL    0 each    Antiplatelet medication not prescribed intentionally due to {CHOOSE REASON BEFORE SIGNING!!!:481895}       diltiazem 300 MG 24 hr capsule    CARDIZEM CD    90 capsule    Take 1 capsule (300 mg) by mouth daily       fluocinonide 0.05 % ointment    LIDEX     as needed Daily to hand rash       furosemide 40 MG tablet    LASIX    135 tablet    Take 1.5 tablets (60 mg) by mouth daily       ipratropium 0.06 % spray    ATROVENT    45 mL    USE 2 SPRAYS IN EACH NOSTRIL FOUR TIMES DAILY AS NEEDED FOR RHINITIS       Multi-vitamin Tabs tablet   Generic drug:  multivitamin, therapeutic with minerals     100 tablet    Take 1 tablet by mouth daily.        simvastatin 10 MG tablet    ZOCOR    90 tablet    Take 1 tablet (10 mg) by mouth At Bedtime       warfarin 2 MG tablet    COUMADIN    135 tablet    1 1/2 pills per day

## 2017-02-07 NOTE — PROGRESS NOTES
Subjective:  Here to discuss the status of the following problem(s):(Unless noted the problem(s) is/are stable and if applicable the medicine(s) being used is/are causing no side effects or problems.)    CC: INR Followup and Recheck Medication      1. Persistent atrial fibrillation (H)  Medication adjustments recently included discontinuation of lisinopril and metoprolol and starting Cardizem 300 mg daily.  Dr. Beckham suggested these changes.  Average heart rate on both of his previous Holter monitor was about 95   2. Long term current use of anticoagulant therapy  On warfarin, no recent bleeding or bruising    3. Obesity (BMI 35.0-39.9 without comorbidity) (H)  He denies excessive eating    4. Cardiomyopathy (H)  Last echo December 2016 ejection fraction 50-55%  Etiology of this  5. Essential hypertension  BP Readings from Last 3 Encounters:   02/06/17 140/78   01/13/17 126/70   01/09/17 122/76         6. Localized edema  Leg swelling is worse. Weight is up       ROS:  Gen. weight is up over the last six months and since last visit.  COR denies chest pressure  Pulmonary no recent cough positive shortness of breath with exertion  Past Medical History   Diagnosis Date     Colon polyp 2010     Obesity (BMI 35.0-39.9 without comorbidity) (H)      SUJEY on CPAP      HTN (hypertension)      Atrial fibrillation (H)      became chronic afib in 2016,paroxysmal,was on amiodarone but went into persistent afib in april 2106 and amiodrone was d/cd. now on BBLK and considering cardioversion.(5/2106).In Sept 2016 plan is rate control which has been a challenge and Holter in 6 months     CAD (coronary artery disease)      mild stenosis per cath     Gout      Hyperlipidemia      Current Outpatient Prescriptions   Medication     furosemide (LASIX) 40 MG tablet     warfarin (COUMADIN) 2 MG tablet     diltiazem (CARDIZEM CD) 300 MG 24 hr capsule     fluocinonide (LIDEX) 0.05 % ointment     allopurinol (ZYLOPRIM) 300 MG tablet      ipratropium (ATROVENT) 0.06 % nasal spray     simvastatin (ZOCOR) 10 MG tablet     Multiple Vitamin (MULTI-VITAMIN) per tablet     [DISCONTINUED] furosemide (LASIX) 40 MG tablet     ASPIRIN NOT PRESCRIBED (INTENTIONAL)     [DISCONTINUED] warfarin (COUMADIN) 2 MG tablet     No current facility-administered medications for this visit.      reports that he has quit smoking. His smoking use included Cigarettes. He has a 5 pack-year smoking history. He quit smokeless tobacco use about 57 years ago. He reports that he does not drink alcohol or use illicit drugs.      EXAM:  BP: 140/78   Pulse: 60      Wt Readings from Last 2 Encounters:   02/06/17 123.469 kg (272 lb 3.2 oz)   01/13/17 122.698 kg (270 lb 8 oz)       BMI= Body mass index is 43.96 kg/(m^2).    EXAM:  Obese centrally quite rotund, pleasant alert in no distress  Lungs are clear to auscultation cardiac exam shows irregular rhythm rate is reasonable  about 60  Extremities show +2-3 pretibial edema    Assessment/Plan:  Vasiliy was seen today for inr followup and recheck medication.    Diagnoses and all orders for this visit:    Persistent atrial fibrillation (H)  -     Prothrombin - INR ([facility])    Long term current use of anticoagulant therapy  -     Prothrombin - INR ([facility])  Refill warfarin  Obesity (BMI 35.0-39.9 without comorbidity) (H)  Work hard on decreasing caloric intake ,try to increase exercise    Cardiomyopathy (H)  He seems to be accumulating fluid I'm going to have him take 60 mg of Lasix every day recheck here in four weeks    Essential hypertension  Blood pressure seems adequate at this time continue to monitor    Localized edema  -     furosemide (LASIX) 40 MG tablet; Take 1.5 tablets (60 mg) by mouth daily  I encouraged him to cut back on the soups that he's been eating which probably have a high salt load            Heriberto Haile  [facility]

## 2017-02-10 ENCOUNTER — TELEPHONE (OUTPATIENT)
Dept: CARDIOLOGY | Facility: CLINIC | Age: 82
End: 2017-02-10

## 2017-02-10 DIAGNOSIS — I42.9 CARDIOMYOPATHY, UNSPECIFIED (H): Primary | ICD-10-CM

## 2017-02-10 NOTE — TELEPHONE ENCOUNTER
----- Message from KAYLEE Fischer CNP sent at 2/10/2017  1:23 PM CST -----  Regarding: RE: OV 2/16/17  Yes please  ----- Message -----     From: Ellyn Glez RN     Sent: 2/10/2017   9:41 AM       To: KAYLEE Fischer CNP  Subject: OV 2/16/17                                       Chana,     You are seeing this pt on Thursdya 2/16 per Dr. Menendez for 1 month f/u. Last OV 1/5 with Dr. Menendez- Metoprolol increased for rate control. Pt then saw Dr. Beckham on 1/13- that visit he stopped Lisinopril and Metoprolol, started pt on Cardizem instead for rate controll. Holter 1/23 in [facility]- average HR 95 bpm. PMD note 2/6 in [facility]- wt up, increased edema- Lasix increased from 40 to 60mg daily. Most recent BMP 12/12/16. Not scheduled to see PMD again until March. Do you want a BMP prior to your visit?     Thanks!

## 2017-02-16 ENCOUNTER — OFFICE VISIT (OUTPATIENT)
Dept: CARDIOLOGY | Facility: CLINIC | Age: 82
End: 2017-02-16
Attending: INTERNAL MEDICINE
Payer: MEDICARE

## 2017-02-16 VITALS
HEIGHT: 66 IN | DIASTOLIC BLOOD PRESSURE: 72 MMHG | BODY MASS INDEX: 42.83 KG/M2 | SYSTOLIC BLOOD PRESSURE: 130 MMHG | WEIGHT: 266.5 LBS | HEART RATE: 87 BPM

## 2017-02-16 DIAGNOSIS — I42.9 CARDIOMYOPATHY, UNSPECIFIED (H): ICD-10-CM

## 2017-02-16 DIAGNOSIS — I10 ESSENTIAL HYPERTENSION: ICD-10-CM

## 2017-02-16 DIAGNOSIS — I25.10 CORONARY ARTERY DISEASE INVOLVING NATIVE CORONARY ARTERY OF NATIVE HEART WITHOUT ANGINA PECTORIS: ICD-10-CM

## 2017-02-16 DIAGNOSIS — E78.5 HYPERLIPIDEMIA WITH TARGET LDL LESS THAN 100: ICD-10-CM

## 2017-02-16 DIAGNOSIS — I48.19 PERSISTENT ATRIAL FIBRILLATION (H): ICD-10-CM

## 2017-02-16 DIAGNOSIS — I42.9 CARDIOMYOPATHY (H): ICD-10-CM

## 2017-02-16 DIAGNOSIS — I48.20 CHRONIC ATRIAL FIBRILLATION (H): Primary | ICD-10-CM

## 2017-02-16 DIAGNOSIS — R60.0 LOCALIZED EDEMA: ICD-10-CM

## 2017-02-16 LAB
ANION GAP SERPL CALCULATED.3IONS-SCNC: 12 MMOL/L (ref 6–17)
BUN SERPL-MCNC: 19 MG/DL (ref 7–30)
CALCIUM SERPL-MCNC: 9.4 MG/DL (ref 8.5–10.5)
CHLORIDE SERPL-SCNC: 104 MMOL/L (ref 98–107)
CO2 SERPL-SCNC: 30 MMOL/L (ref 23–29)
CREAT SERPL-MCNC: 1.14 MG/DL (ref 0.7–1.3)
GFR SERPL CREATININE-BSD FRML MDRD: 62 ML/MIN/1.7M2
GLUCOSE SERPL-MCNC: 102 MG/DL (ref 70–105)
POTASSIUM SERPL-SCNC: 4 MMOL/L (ref 3.5–5.1)
SODIUM SERPL-SCNC: 142 MMOL/L (ref 136–145)

## 2017-02-16 PROCEDURE — 36415 COLL VENOUS BLD VENIPUNCTURE: CPT | Performed by: NURSE PRACTITIONER

## 2017-02-16 PROCEDURE — 99214 OFFICE O/P EST MOD 30 MIN: CPT | Performed by: NURSE PRACTITIONER

## 2017-02-16 PROCEDURE — 80048 BASIC METABOLIC PNL TOTAL CA: CPT | Performed by: NURSE PRACTITIONER

## 2017-02-16 RX ORDER — FUROSEMIDE 40 MG
40 TABLET ORAL DAILY
Qty: 135 TABLET | Refills: 3 | COMMUNITY
Start: 2017-02-16 | End: 2017-04-19

## 2017-02-16 RX ORDER — METOPROLOL TARTRATE 50 MG
50 TABLET ORAL 2 TIMES DAILY
Qty: 60 TABLET | Refills: 3 | Status: SHIPPED | OUTPATIENT
Start: 2017-02-16 | End: 2017-02-17

## 2017-02-16 RX ORDER — LISINOPRIL 10 MG/1
10 TABLET ORAL DAILY
Qty: 90 TABLET | Refills: 1 | COMMUNITY
Start: 2017-02-16 | End: 2017-02-16

## 2017-02-16 RX ORDER — LISINOPRIL 10 MG/1
5 TABLET ORAL 2 TIMES DAILY
Qty: 90 TABLET | Refills: 1 | COMMUNITY
Start: 2017-02-16 | End: 2017-07-11

## 2017-02-16 NOTE — LETTER
2/16/2017    Heriberto Haile MD  [facility]   [address]  [facility] [address]    RE: Vasiliy Corbin       Dear Colleague,    I had the pleasure of seeing Vasiliy Corbin in the [facility].    Vasiliy Corbin is a delightful 81-year-old gentleman followed here by Dr. Menendez.  He recently saw Dr. Burt Beckham from our Electrophysiology Service and recently saw his primary care physician, Dr. Heriberto Haile.  Vasiliy is a delightful gentleman who has a history of mild coronary artery disease.  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  However, he failed amiodarone therapy and now we are attempting rate control.  His last echocardiogram was performed in 12/2016 showing that the ejection fraction was 50%-55% and stable.  He was in rapid atrial fibrillation at the time.  He has reported on previous occasions that he was fatigued on metoprolol.  He used to take the lisinopril as well and Lasix at 40 mg per day.  Based on a Holter monitor revealing average heart rates of 95, maximum of 156, Dr. Menendez sent the patient to Dr. Burt Beckham for an electrophysiology opinion.      When Dr. Beckham saw him, he again felt the patient was fatigued on metoprolol, although the patient denies ever feeling like that to me today.  I note that he was switched to Cardizem 300 mg a day at that visit and since that time has gradually gained some weight and developed lower extremity edema.  The patient describes his edema as better in the morning, worse at night.  His primary care physician increased his furosemide to 60 mg per day which caused him to lose 6 pounds and his edema has improved.  He is wearing support hose.      Unfortunately, a Holter was done in January ordered by Dr. Beckham on Cardizem 300mg daily.  I am not certain where those results were routed to, but nevertheless his heart rates look worse to me.  His  average is 98 and he is as fast as 170.  During waking hours he is typically over 100 beats per minute.  In fact, sitting in front of me today, his heart rate is 104 beats per minute and he is at rest.      He denies any shortness of breath, orthopnea, PND or activity limitation; however, I am concerned about these heart rates given his history of tachycardia-mediated cardiomyopathy.  His blood pressure today is reasonably controlled.  His basic metabolic panel is normal and he remains on warfarin for stroke prophylaxis.      He has a dyslipidemia and takes simvastatin 10 mg per day with most recent LDL of 63, HDL 42, triglycerides slightly high at 209 as of 12/2016.  He has sleep apnea which I believe is treated with a CPAP mask.  His lungs today are clear.  He has no other complaints.     Outpatient Encounter Prescriptions as of 2/16/2017   Medication Sig Dispense Refill     lisinopril (PRINIVIL/ZESTRIL) 10 MG tablet Take 0.5 tablets (5 mg) by mouth 2 times daily 90 tablet 1     furosemide (LASIX) 40 MG tablet Take 1 tablet (40 mg) by mouth daily 135 tablet 3     [DISCONTINUED] metoprolol (LOPRESSOR) 50 MG tablet Take 1 tablet (50 mg) by mouth 2 times daily 60 tablet 3     simvastatin (ZOCOR) 10 MG tablet TAKE 1 TABLET(10 MG) BY MOUTH AT BEDTIME 90 tablet 3     warfarin (COUMADIN) 2 MG tablet 1 1/2 pills per day 135 tablet 3     allopurinol (ZYLOPRIM) 300 MG tablet TAKE 1 TABLET BY MOUTH DAILY 90 tablet 3     ipratropium (ATROVENT) 0.06 % nasal spray USE 2 SPRAYS IN EACH NOSTRIL FOUR TIMES DAILY AS NEEDED FOR RHINITIS 45 mL 12     Multiple Vitamin (MULTI-VITAMIN) per tablet Take 1 tablet by mouth daily. 100 tablet 12     [DISCONTINUED] lisinopril (PRINIVIL/ZESTRIL) 10 MG tablet Take 1 tablet (10 mg) by mouth daily 90 tablet 1     [DISCONTINUED] furosemide (LASIX) 40 MG tablet Take 1.5 tablets (60 mg) by mouth daily 135 tablet 3     [DISCONTINUED] diltiazem (CARDIZEM CD) 300 MG 24 hr capsule Take 1 capsule (300 mg)  by mouth daily 90 capsule 3     [DISCONTINUED] fluocinonide (LIDEX) 0.05 % ointment as needed Daily to hand rash  0     ASPIRIN NOT PRESCRIBED (INTENTIONAL) Reported on 3/6/2017 0 each 0     No facility-administered encounter medications on file as of 2017.       IMPRESSION:     1.  Permanent atrial fibrillation with suboptimal rate control.  I believe his edema is in part from his high dose Cardizem and he denies a history of fatigue on metoprolol to me today.  I will stop Cardizem.  Tomorrow, start metoprolol 50 mg twice daily, which is an interval dose increased from his old dose.  Restart lisinopril 5 mg in the morning and evening.  Reduce Lasix to 40 mg once a day.  I will do a Holter monitor the last week in February and see him back in early March to review the results.  If his heart rate is still uncontrolled we will try to titrate meds, if he is tolerating the metoprolol. He sees Dr. Beckham for followup mid March.  I will do a basic metabolic panel on return as well with addition of lisinopril.  I have talked at length with the patient about AV node ablation and pacer.  He is quite nervous to do this as he had a brother who was apparently having some type of arterial surgery and it sounds like dissected or hemorrhaged and  in the operating room.  I told Vasiliy this is a lesser invasive procedure and we went through the details, but nevertheless he is not anxious to proceed.   2.  Sleep apnea, treated.   3.  Dyslipidemia, treated to goal.      I have also messaged Dr. Kim with an update and I will see Vasiliy back at the end of the month.  As always, it has been a delight seeing this jeannette gentleman.  All the medication changes were reviewed in detail and typed up for the patient.  Greater than 50% of this 30-minute visit was counseling.     Again, thank you for allowing me to participate in the care of your patient.      Sincerely,    KAYLEE Fischer [facility]  Heart Care

## 2017-02-16 NOTE — MR AVS SNAPSHOT
After Visit Summary   2/16/2017    Vasiliy Corbin    MRN: [id number]           Patient Information     Date Of Birth          1935        Visit Information        Provider Department      2/16/2017 10:10 AM Laura Wooten APRN CNP [facility] AT [place]        Today's Diagnoses     Chronic atrial fibrillation (H)    -  1    Persistent atrial fibrillation (H)        Coronary artery disease involving native coronary artery of native heart without angina pectoris        Hyperlipidemia with target LDL less than 100        Cardiomyopathy (H)        Essential hypertension        Localized edema          Care Instructions    Starting today--stop Cardizem  Starting tomorrow: start Metoprolol 50mg twice daily  Restart Lisinopril 10mg--1/2 pill am and 1/2 pill pm  Reduce your Furosemide(lasix) to one pill per day    Call me if your prescription of Lisinopril tablets is NOT 10mg    Watch out for fatigue    Holter monitor in 2 weeks and see me in 3 weeks with nonfasting labs    Daily weights first thing in am to watch for fluid retention--call if your are up 2-3 pounds overnight or 5 pounds gradually over a week    My number is [phone]        Follow-ups after your visit        Your next 10 appointments already scheduled     Feb 20, 2017  8:45 AM CST   SHORT with Heriberto Haile MD   [facility]    [address] [place] [address] [phone]            Mar 15, 2017  8:45 AM CDT   P EP RETURN with Mariana Beckham MD   [facility] HEART [facility] ([facility])    [address] [place] [address] [phone]              Who to contact     If you have questions or need follow up information about today's clinic visit or your schedule please contact [facility] directly at [phone].  Normal or non-critical lab and  "imaging results will be communicated to you by MyChart, letter or phone within 4 business days after the clinic has received the results. If you do not hear from us within 7 days, please contact the clinic through [employer] or phone. If you have a critical or abnormal lab result, we will notify you by phone as soon as possible.  Submit refill requests through "[employer]" or call your pharmacy and they will forward the refill request to us. Please allow 3 business days for your refill to be completed.          Additional Information About Your Visit        [employer] Information     "[employer] lets you send messages to your doctor, view your test results, renew your prescriptions, schedule appointments and more. To sign up, go to [email or web address]" . Click on \"Log in\" on the left side of the screen, which will take you to the Welcome page. Then click on \"Sign up Now\" on the right side of the page.     You will be asked to enter the access code listed below, as well as some personal information. Please follow the directions to create your username and password.     Your access code is: [email or web address]  Expires: 3/12/2017  9:26 AM     Your access code will  in 90 days. If you need help or a new code, please call your [place] clinic or [phone].        Care EveryWhere ID     This is your Care EveryWhere ID. This could be used by other organizations to access your [place] medical records  [id number]        Your Vitals Were     Pulse Height BMI (Body Mass Index)             87 1.676 m (5' 6\") 43.01 kg/m2          Blood Pressure from Last 3 Encounters:   17 130/72   17 140/78   17 126/70    Weight from Last 3 Encounters:   17 120.9 kg (266 lb 8 oz)   17 123.5 kg (272 lb 3.2 oz)   17 122.7 kg (270 lb 8 oz)              We Performed the Following     Follow-Up with Cardiac Advanced Practice Provider          Today's Medication Changes          These changes are accurate as of: 17 " 11:06 AM.  If you have any questions, ask your nurse or doctor.               Start taking these medicines.        Dose/Directions    metoprolol 50 MG tablet   Commonly known as:  LOPRESSOR   Used for:  Chronic atrial fibrillation (H)   Started by:  Laura Wooten APRN CNP        Dose:  50 mg   Take 1 tablet (50 mg) by mouth 2 times daily   Quantity:  60 tablet   Refills:  3         These medicines have changed or have updated prescriptions.        Dose/Directions    furosemide 40 MG tablet   Commonly known as:  LASIX   This may have changed:  how much to take   Used for:  Localized edema   Changed by:  Laura Wooten APRN CNP        Dose:  40 mg   Take 1 tablet (40 mg) by mouth daily   Quantity:  135 tablet   Refills:  3       lisinopril 10 MG tablet   Commonly known as:  PRINIVIL/ZESTRIL   This may have changed:    - how much to take  - when to take this   Used for:  Cardiomyopathy (H)   Changed by:  Laura Wooten APRN CNP        Dose:  5 mg   Take 0.5 tablets (5 mg) by mouth 2 times daily   Quantity:  90 tablet   Refills:  1         Stop taking these medicines if you haven't already. Please contact your care team if you have questions.     diltiazem 300 MG 24 hr capsule   Commonly known as:  CARDIZEM CD   Stopped by:  Laura Wooten APRN CNP                Where to get your medicines      [email or web address] [address] [place] [address] & [address], [place] [address]     Phone:  [phone]     metoprolol 50 MG tablet                Primary Care Provider Office Phone # Fax #    Heriberto Haile [phone]       [facility] [address] [place] [address]        Thank you!     Thank you for choosing [facility] AT [place]  for your care. Our goal is always to provide you with excellent care. Hearing back from our patients is  one way we can continue to improve our services. Please take a few minutes to complete the written survey that you may receive in the mail after your visit with us. Thank you!             Your Updated Medication List - Protect others around you: Learn how to safely use, store and throw away your medicines at www.disposemymeds.org.          This list is accurate as of: 2/16/17 11:06 AM.  Always use your most recent med list.                   Brand Name Dispense Instructions for use    allopurinol 300 MG tablet    ZYLOPRIM    90 tablet    TAKE 1 TABLET BY MOUTH DAILY       ASPIRIN NOT PRESCRIBED    INTENTIONAL    0 each    Antiplatelet medication not prescribed intentionally due to {CHOOSE REASON BEFORE SIGNING!!!:015428}       furosemide 40 MG tablet    LASIX    135 tablet    Take 1 tablet (40 mg) by mouth daily       ipratropium 0.06 % spray    ATROVENT    45 mL    USE 2 SPRAYS IN EACH NOSTRIL FOUR TIMES DAILY AS NEEDED FOR RHINITIS       lisinopril 10 MG tablet    PRINIVIL/ZESTRIL    90 tablet    Take 0.5 tablets (5 mg) by mouth 2 times daily       metoprolol 50 MG tablet    LOPRESSOR    60 tablet    Take 1 tablet (50 mg) by mouth 2 times daily       Multi-vitamin Tabs tablet   Generic drug:  multivitamin, therapeutic with minerals     100 tablet    Take 1 tablet by mouth daily.       simvastatin 10 MG tablet    ZOCOR    90 tablet    TAKE 1 TABLET(10 MG) BY MOUTH AT BEDTIME       warfarin 2 MG tablet    COUMADIN    135 tablet    1 1/2 pills per day

## 2017-02-16 NOTE — PATIENT INSTRUCTIONS
Starting today--stop Cardizem  Starting tomorrow: start Metoprolol 50mg twice daily  Restart Lisinopril 10mg--1/2 pill am and 1/2 pill pm  Reduce your Furosemide(lasix) to one pill per day    Call me if your prescription of Lisinopril tablets is NOT 10mg    Watch out for fatigue    Holter monitor in 2 weeks and see me in 3 weeks with nonfasting labs    Daily weights first thing in am to watch for fluid retention--call if your are up 2-3 pounds overnight or 5 pounds gradually over a week    My number is [phone]

## 2017-02-16 NOTE — PROGRESS NOTES
HISTORY OF PRESENT ILLNESS:  Vasiliy Corbin is a delightful 81-year-old gentleman followed here by Dr. Menendez.  He recently saw Dr. Burt Beckham from our Electrophysiology Service and recently saw his primary care physician, Dr. Heriberto Kim.  Vasiliy is a delightful gentleman who has a history of mild coronary artery disease.  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  However, he failed amiodarone therapy and now we are attempting rate control.  His last echocardiogram was performed in 12/2016 showing that the ejection fraction was 50%-55% and stable.  He was in rapid atrial fibrillation at the time.  He has reported on previous occasions that he was fatigued on metoprolol.  He used to take the lisinopril as well and Lasix at 40 mg per day.  Based on a Holter monitor revealing average heart rates of 95, maximum of 156, Dr. Menendez sent the patient to Dr. Burt Beckham for an electrophysiology opinion.      When Dr. Beckham saw him, he again felt the patient was fatigued on metoprolol, although the patient denies ever feeling like that to me today.  I note that he was switched to Cardizem 300 mg a day at that visit and since that time has gradually gained some weight and developed lower extremity edema.  The patient describes his edema as better in the morning, worse at night.  His primary care physician increased his furosemide to 60 mg per day which caused him to lose 6 pounds and his edema has improved.  He is wearing support hose.      Unfortunately, a Holter was done in January ordered by Dr. Beckham on Cardizem 300mg daily.  I am not certain where those results were routed to, but nevertheless his heart rates look worse to me.  His average is 98 and he is as fast as 170.  During waking hours he is typically over 100 beats per minute.  In fact, sitting in front of me today, his heart rate is 104 beats per minute and he is at rest.      He denies any shortness of  breath, orthopnea, PND or activity limitation; however, I am concerned about these heart rates given his history of tachycardia-mediated cardiomyopathy.  His blood pressure today is reasonably controlled.  His basic metabolic panel is normal and he remains on warfarin for stroke prophylaxis.      He has a dyslipidemia and takes simvastatin 10 mg per day with most recent LDL of 63, HDL 42, triglycerides slightly high at 209 as of 2016.  He has sleep apnea which I believe is treated with a CPAP mask.  His lungs today are clear.  He has no other complaints.      IMPRESSION:     1.  Permanent atrial fibrillation with suboptimal rate control.  I believe his edema is in part from his high dose Cardizem and he denies a history of fatigue on metoprolol to me today.  I will stop Cardizem.  Tomorrow, start metoprolol 50 mg twice daily, which is an interval dose increased from his old dose.  Restart lisinopril 5 mg in the morning and evening.  Reduce Lasix to 40 mg once a day.  I will do a Holter monitor the last week in February and see him back in early March to review the results.  If his heart rate is still uncontrolled we will try to titrate meds, if he is tolerating the metoprolol. He sees Dr. Beckham for followup mid March.  I will do a basic metabolic panel on return as well with addition of lisinopril.  I have talked at length with the patient about AV node ablation and pacer.  He is quite nervous to do this as he had a brother who was apparently having some type of arterial surgery and it sounds like dissected or hemorrhaged and  in the operating room.  I told Vasiliy this is a lesser invasive procedure and we went through the details, but nevertheless he is not anxious to proceed.   2.  Sleep apnea, treated.   3.  Dyslipidemia, treated to goal.      I have also messaged Dr. Kim with an update and I will see Vasiliy back at the end of the month.  As always, it has been a delight seeing this jeannette gentleman.  All the  medication changes were reviewed in detail and typed up for the patient.  Greater than 50% of this 30-minute visit was counseling.         AZ BROWNING NP             D: 2017 12:07   T: 2017 14:58   MT: LINDSEY      Name:     AVERY FERRER   MRN:      [id number]        Account:      [id number]   :      1935           Service Date: 2017      Document: [id number]

## 2017-02-16 NOTE — PROGRESS NOTES
HPI and Plan:   See dictation  [id number]    Orders Placed This Encounter   Procedures     Basic metabolic panel     Follow-Up with Cardiac Advanced Practice Provider     Holter Monitor 24 hour - Adult       Orders Placed This Encounter   Medications     metoprolol (LOPRESSOR) 50 MG tablet     Sig: Take 1 tablet (50 mg) by mouth 2 times daily     Dispense:  60 tablet     Refill:  3     DISCONTD: lisinopril (PRINIVIL/ZESTRIL) 10 MG tablet     Sig: Take 1 tablet (10 mg) by mouth daily     Dispense:  90 tablet     Refill:  1     lisinopril (PRINIVIL/ZESTRIL) 10 MG tablet     Sig: Take 0.5 tablets (5 mg) by mouth 2 times daily     Dispense:  90 tablet     Refill:  1     furosemide (LASIX) 40 MG tablet     Sig: Take 1 tablet (40 mg) by mouth daily     Dispense:  135 tablet     Refill:  3       Medications Discontinued During This Encounter   Medication Reason     fluocinonide (LIDEX) 0.05 % ointment Discontinued by another Health Care Provider     diltiazem (CARDIZEM CD) 300 MG 24 hr capsule      lisinopril (PRINIVIL/ZESTRIL) 10 MG tablet Reorder     furosemide (LASIX) 40 MG tablet Reorder         Encounter Diagnoses   Name Primary?     Persistent atrial fibrillation (H)      Chronic atrial fibrillation (H) Yes     Coronary artery disease involving native coronary artery of native heart without angina pectoris      Hyperlipidemia with target LDL less than 100      Cardiomyopathy (H)      Essential hypertension      Localized edema        CURRENT MEDICATIONS:    Current Outpatient Prescriptions:      lisinopril (PRINIVIL/ZESTRIL) 10 MG tablet, Take 0.5 tablets (5 mg) by mouth 2 times daily, Disp: 90 tablet, Rfl: 1     furosemide (LASIX) 40 MG tablet, Take 1 tablet (40 mg) by mouth daily, Disp: 135 tablet, Rfl: 3     simvastatin (ZOCOR) 10 MG tablet, TAKE 1 TABLET(10 MG) BY MOUTH AT BEDTIME, Disp: 90 tablet, Rfl: 3     warfarin (COUMADIN) 2 MG tablet, 1 1/2 pills per day, Disp: 135 tablet, Rfl: 3     allopurinol (ZYLOPRIM) 300  MG tablet, TAKE 1 TABLET BY MOUTH DAILY, Disp: 90 tablet, Rfl: 3     ipratropium (ATROVENT) 0.06 % nasal spray, USE 2 SPRAYS IN EACH NOSTRIL FOUR TIMES DAILY AS NEEDED FOR RHINITIS, Disp: 45 mL, Rfl: 12     Multiple Vitamin (MULTI-VITAMIN) per tablet, Take 1 tablet by mouth daily., Disp: 100 tablet, Rfl: 12     metoprolol (LOPRESSOR) 50 MG tablet, Take 1.5 tablets (75 mg) by mouth 2 times daily, Disp: 270 tablet, Rfl: 3     [DISCONTINUED] metoprolol (LOPRESSOR) 50 MG tablet, Take 1 tablet (50 mg) by mouth 2 times daily, Disp: 180 tablet, Rfl: 3     ASPIRIN NOT PRESCRIBED (INTENTIONAL), Reported on 2/20/2017, Disp: 0 each, Rfl: 0    ALLERGIES     Allergies   Allergen Reactions     Cardizem [Diltiazem]      Swelling and poor rate control       PAST MEDICAL HISTORY:  Past Medical History   Diagnosis Date     Atrial fibrillation (H)      became chronic afib in 2016,paroxysmal,was on amiodarone but went into persistent afib in april 2106 and amiodrone was d/cd. now on BBLK and considering cardioversion.(5/2106).In Sept 2016 plan is rate control which has been a challenge and Holter in 6 months     CAD (coronary artery disease)      mild stenosis per cath     Cardiomyopathy (H)      Colon polyp 2010     Gout      HTN (hypertension)      Hyperlipidemia      Obesity (BMI 35.0-39.9 without comorbidity) (H)      SUJEY on CPAP        PAST SURGICAL HISTORY:  Past Surgical History   Procedure Laterality Date     Ct release       Ulnar reroute       right      Laser ktp transurethral resection (tur) prostate N/A 4/7/2016     Procedure: LASER KTP TRANSURETHRAL RESECTION (TUR) PROSTATE;  Surgeon: Lokesh Lima MD;  Location:  OR     Cystoscopy N/A 4/7/2016     Procedure: CYSTOSCOPY;  Surgeon: Lokesh Lima MD;  Location:  OR      left heart catheterization  3/2010     Mild CAD       FAMILY HISTORY:  Family History   Problem Relation Age of Onset     HEART DISEASE Mother 92     Heart failure     HEART DISEASE Father 92      "FELIX Brother 70     MI       SOCIAL HISTORY:  Social History     Social History     Marital status:      Spouse name: N/A     Number of children: N/A     Years of education: N/A     Social History Main Topics     Smoking status: Former Smoker     Packs/day: 1.00     Years: 5.00     Types: Cigarettes     Smokeless tobacco: Former User     Quit date: 1/1/1960     Alcohol use No     Drug use: No     Sexual activity: Yes     Other Topics Concern     Parent/Sibling W/ Cabg, Mi Or Angioplasty Before 65f 55m? No     Caffeine Concern Yes     8 cups of  decaf coffee per day     Sleep Concern Yes     sleep apnea, wears CPAP     Stress Concern No     Weight Concern Yes     Weight gain     Special Diet No     Exercise No     Seat Belt Yes     Social History Narrative       Review of Systems:  Skin:  Negative       Eyes:  Positive for glasses    ENT:  Negative      Respiratory:  Positive for sleep apnea;CPAP     Cardiovascular:  Negative      Gastroenterology: Negative      Genitourinary:  not assessed      Musculoskeletal:  Positive for back pain    Neurologic:  Negative      Psychiatric:  Negative      Heme/Lymph/Imm:  Negative      Endocrine:  Negative        Physical Exam:  Vitals: /72  Pulse 87  Ht 1.676 m (5' 6\")  Wt 120.9 kg (266 lb 8 oz)  BMI 43.01 kg/m2    Constitutional:  cooperative obese      Skin:  warm and dry to the touch        Head:  normocephalic        Eyes:  pupils equal and round        ENT:  no pallor or cyanosis        Neck:  carotid pulses are full and equal bilaterally        Chest:  clear to auscultation          Cardiac:   irregularly irregular rhythm;tachycardic distant heart sounds no presence of murmur       heart rate at rest 104    Abdomen:  abdomen soft;BS normoactive obese      Vascular: not assessed this visit                                        Extremities and Back:      1+;bilateral LE edema;trace          Neurological:  no gross motor deficits              SHER Hercules" Lester Menendez MD   [facility]  [address]  [address]  [place] [address]

## 2017-02-17 DIAGNOSIS — I48.20 CHRONIC ATRIAL FIBRILLATION (H): ICD-10-CM

## 2017-02-17 RX ORDER — METOPROLOL TARTRATE 50 MG
50 TABLET ORAL 2 TIMES DAILY
Qty: 180 TABLET | Refills: 3 | Status: SHIPPED | OUTPATIENT
Start: 2017-02-17 | End: 2017-02-20

## 2017-02-20 ENCOUNTER — OFFICE VISIT (OUTPATIENT)
Dept: FAMILY MEDICINE | Facility: CLINIC | Age: 82
End: 2017-02-20

## 2017-02-20 VITALS
BODY MASS INDEX: 43.19 KG/M2 | WEIGHT: 267.6 LBS | OXYGEN SATURATION: 97 % | HEART RATE: 88 BPM | RESPIRATION RATE: 16 BRPM | DIASTOLIC BLOOD PRESSURE: 74 MMHG | SYSTOLIC BLOOD PRESSURE: 118 MMHG

## 2017-02-20 DIAGNOSIS — Z79.01 LONG TERM CURRENT USE OF ANTICOAGULANT THERAPY: ICD-10-CM

## 2017-02-20 DIAGNOSIS — I42.9 CARDIOMYOPATHY (H): ICD-10-CM

## 2017-02-20 DIAGNOSIS — I48.20 CHRONIC ATRIAL FIBRILLATION (H): Primary | ICD-10-CM

## 2017-02-20 LAB — INR PPP: 1.6 (ref 2–3)

## 2017-02-20 PROCEDURE — 85610 PROTHROMBIN TIME: CPT | Performed by: FAMILY MEDICINE

## 2017-02-20 PROCEDURE — 36415 COLL VENOUS BLD VENIPUNCTURE: CPT | Performed by: FAMILY MEDICINE

## 2017-02-20 PROCEDURE — 99213 OFFICE O/P EST LOW 20 MIN: CPT | Performed by: FAMILY MEDICINE

## 2017-02-20 RX ORDER — METOPROLOL TARTRATE 50 MG
75 TABLET ORAL 2 TIMES DAILY
Qty: 270 TABLET | Refills: 3 | Status: SHIPPED | OUTPATIENT
Start: 2017-02-20 | End: 2017-02-27

## 2017-02-20 NOTE — PATIENT INSTRUCTIONS
[facility] Antico[facility] Latest Ref Rng & Units 2/20/2017   INR 2.0 - 3.0 1.6 (A)   ASSESS  SUB   INR GOAL  2.0-3.0   WEEKLY DOSE  took 6mg on 2/6 then take 3 mg daily   PT INSTRUCT  6 mg 2/20 and 2/21 then take 3 mg 5 days per week and 6 mg on thursday and sundays   RECHECK  1 WEEK     ATRIAL FIB   LOCATION  Clinic   Comments

## 2017-02-20 NOTE — PROGRESS NOTES
Subjective:  Here to discuss the status of the following problem(s):(Unless noted the problem(s) is/are stable and if applicable the medicine(s) being used is/are causing no side effects or problems.)    CC: INR Followup      1. Chronic atrial fibrillation (H)  Here to follow-up INR as well asCardiomyopathy.  2. Cardiomyopathy (H)  Recently seen in cardiology by his nurse practitioner.  Medication adjustments included discontinuation of Cardizem initiation of metoprolol 50 mg twice a day and addition of  5 mg of lisinopril.  Vasiliy feels that he is on a little better with respect to the swelling in his legs he doesn't feel any more less short of breath than he did before    3. Long term current use of anticoagulant therapy    ROS:  General and CV completed and negative except as noted above    Past Medical History   Diagnosis Date     Atrial fibrillation (H)      became chronic afib in 2016,paroxysmal,was on amiodarone but went into persistent afib in april 2106 and amiodrone was d/cd. now on BBLK and considering cardioversion.(5/2106).In Sept 2016 plan is rate control which has been a challenge and Holter in 6 months     CAD (coronary artery disease)      mild stenosis per cath     Cardiomyopathy (H)      Colon polyp 2010     Gout      HTN (hypertension)      Hyperlipidemia      Obesity (BMI 35.0-39.9 without comorbidity) (H)      SUJEY on CPAP      Current Outpatient Prescriptions   Medication     metoprolol (LOPRESSOR) 50 MG tablet     lisinopril (PRINIVIL/ZESTRIL) 10 MG tablet     furosemide (LASIX) 40 MG tablet     simvastatin (ZOCOR) 10 MG tablet     warfarin (COUMADIN) 2 MG tablet     allopurinol (ZYLOPRIM) 300 MG tablet     ipratropium (ATROVENT) 0.06 % nasal spray     Multiple Vitamin (MULTI-VITAMIN) per tablet     [DISCONTINUED] metoprolol (LOPRESSOR) 50 MG tablet     ASPIRIN NOT PRESCRIBED (INTENTIONAL)     No current facility-administered medications for this visit.       reports that he has quit smoking.  His smoking use included Cigarettes. He has a 5.00 pack-year smoking history. He quit smokeless tobacco use about 57 years ago. He reports that he does not drink alcohol or use illicit drugs.      EXAM:  BP: 118/74   Pulse: 88      Wt Readings from Last 2 Encounters:   02/20/17 121.4 kg (267 lb 9.6 oz)   02/16/17 120.9 kg (266 lb 8 oz)       BMI= Body mass index is 43.19 kg/(m^2).    EXAM:   APPEARANCE: = Nrl  Conj/Eyelids = Nrl  Ears/Nose = Nrl  Neck = Nrl  Resp effort = Nrl  Breath Sounds = Nrl  Heart Rate, Rythym, & sounds (no Murm)  = Nrl  Ext (edema) =  1+ bilat  Mood/Affect = Nrl    Assessment/Plan:  Vasiliy was seen today for inr followup.    Diagnoses and all orders for this visit:    Chronic atrial fibrillation (H)  Fast heart rate - increase the Metoprolol xr to 75 mg bidd    -     Prothrombin - INR ([facility])  -     metoprolol (LOPRESSOR) 50 MG tablet; Take 1.5 tablets (75 mg) by mouth 2 times daily    Cardiomyopathy (H)  Follow weight\    Long term current use of anticoagulant therapy  -     Prothrombin - INR ([facility])    Bumped up may be a bit high monday      Noah Haile  [facility]

## 2017-02-20 NOTE — MR AVS SNAPSHOT
After Visit Summary   2/20/2017    Vasiliy Corbin    MRN: [id number]           Patient Information     Date Of Birth          1935        Visit Information        Provider Department      2/20/2017 8:45 AM Heriberto Haile MD [facility]        Today's Diagnoses     Long term current use of anticoagulant therapy        Chronic atrial fibrillation (H)          Care Instructions    [facility] Anticoaglution Latest Ref Rng & Units 2/20/2017   INR 2.0 - 3.0 1.6 (A)   ASSESS  SUB   INR GOAL  2.0-3.0   WEEKLY DOSE  took 6mg on 2/6 then take 3 mg daily   PT INSTRUCT  6 mg 2/20 and 2/21 then take 3 mg 5 days per week and 6 mg on thursday and sundays   RECHECK  1 WEEK     ATRIAL FIB   LOCATION  Clinic   Comments             Follow-ups after your visit        Your next 10 appointments already scheduled     Feb 27, 2017  9:00 AM CST   Holter Monitor with MAXIMUS   [facility] - [facility] ([facility])    [address]  [facility] [address] [phone]            Mar 03, 2017 11:20 AM CST   LAB with [facility]   [facility] ([facility])    [address]  [facility] [address] [phone]           Patient must bring picture ID.  Patient should be prepared to give a urine specimen  Please do not eat 10-12 hours before your appointment if you are coming in fasting for labs on lipids, cholesterol, or glucose (sugar).  Pregnant women should follow their Care Team instructions. Water with medications is okay. Do not drink coffee or other fluids.   If you have concerns about taking  your medications, please ask at office or if scheduling via ByRead, send a message by clicking on Secure Messaging, Message Your Care Team.            Mar 03, 2017 12:30 PM CST   Return Visit with KAYLEE Fischer CNP   [facility] ([facility])     "[address]  [facility] [address]   [phone]            Mar 15, 2017  8:45 AM CDT   [facility] EP RETURN with Mariana Beckham MD   [facility] AT [place] ([facility])    [address]  [facility] [address]   [phone]              Who to contact     If you have questions or need follow up information about today's clinic visit or your schedule please contact [facility] directly at [phone].  Normal or non-critical lab and imaging results will be communicated to you by deviantARThart, letter or phone within 4 business days after the clinic has received the results. If you do not hear from us within 7 days, please contact the clinic through deviantARThart or phone. If you have a critical or abnormal lab result, we will notify you by phone as soon as possible.  Submit refill requests through [employer] or call your pharmacy and they will forward the refill request to us. Please allow 3 business days for your refill to be completed.          Additional Information About Your Visit        MyChart Information     [employer] lets you send messages to your doctor, view your test results, renew your prescriptions, schedule appointments and more. To sign up, go to [email or web address] . Click on \"Log in\" on the left side of the screen, which will take you to the Welcome page. Then click on \"Sign up Now\" on the right side of the page.     You will be asked to enter the access code listed below, as well as some personal information. Please follow the directions to create your username and password.     Your access code is: [email or web address]  Expires: 3/12/2017  9:26 AM     Your access code will  in 90 days. If you need help or a new code, please call your [place] clinic or [phone].        Care EveryWhere ID     This is your Care EveryWhere ID. This could be used by other organizations to access your [place] medical records  [id number]      "   Your Vitals Were     Pulse Respirations Pulse Oximetry BMI (Body Mass Index)          88 16 97% 43.19 kg/m2         Blood Pressure from Last 3 Encounters:   02/20/17 118/74   02/16/17 130/72   02/06/17 140/78    Weight from Last 3 Encounters:   02/20/17 121.4 kg (267 lb 9.6 oz)   02/16/17 120.9 kg (266 lb 8 oz)   02/06/17 123.5 kg (272 lb 3.2 oz)              We Performed the Following     Prothrombin - INR ([facility])          Today's Medication Changes          These changes are accurate as of: 2/20/17  8:58 AM.  If you have any questions, ask your nurse or doctor.               These medicines have changed or have updated prescriptions.        Dose/Directions    metoprolol 50 MG tablet   Commonly known as:  LOPRESSOR   This may have changed:  how much to take   Used for:  Chronic atrial fibrillation (H)   Changed by:  Heriberto Haile MD        Dose:  75 mg   Take 1.5 tablets (75 mg) by mouth 2 times daily   Quantity:  270 tablet   Refills:  3            Where to get your medicines      [facility] [address] [address] & [address], [place] [address]     Phone:  [phone]     metoprolol 50 MG tablet                Primary Care Provider Office Phone # Fax #    Heriberto Haile [phone]       [facility] [address] [place] [address]        Thank you!     Thank you for choosing [facility]  for your care. Our goal is always to provide you with excellent care. Hearing back from our patients is one way we can continue to improve our services. Please take a few minutes to complete the written survey that you may receive in the mail after your visit with us. Thank you!             Your Updated Medication List - Protect others around you: Learn how to safely use, store and throw away your medicines at www.disposemymeds.org.          This list is accurate as of: 2/20/17   8:58 AM.  Always use your most recent med list.                   Brand Name Dispense Instructions for use    allopurinol 300 MG tablet    ZYLOPRIM    90 tablet    TAKE 1 TABLET BY MOUTH DAILY       ASPIRIN NOT PRESCRIBED    INTENTIONAL    0 each    Reported on 2/20/2017       furosemide 40 MG tablet    LASIX    135 tablet    Take 1 tablet (40 mg) by mouth daily       ipratropium 0.06 % spray    ATROVENT    45 mL    USE 2 SPRAYS IN EACH NOSTRIL FOUR TIMES DAILY AS NEEDED FOR RHINITIS       lisinopril 10 MG tablet    PRINIVIL/ZESTRIL    90 tablet    Take 0.5 tablets (5 mg) by mouth 2 times daily       metoprolol 50 MG tablet    LOPRESSOR    270 tablet    Take 1.5 tablets (75 mg) by mouth 2 times daily       Multi-vitamin Tabs tablet   Generic drug:  multivitamin, therapeutic with minerals     100 tablet    Take 1 tablet by mouth daily.       simvastatin 10 MG tablet    ZOCOR    90 tablet    TAKE 1 TABLET(10 MG) BY MOUTH AT BEDTIME       warfarin 2 MG tablet    COUMADIN    135 tablet    1 1/2 pills per day

## 2017-02-27 ENCOUNTER — HOSPITAL ENCOUNTER (OUTPATIENT)
Dept: CARDIOLOGY | Facility: CLINIC | Age: 82
Discharge: HOME | End: 2017-02-27
Attending: NURSE PRACTITIONER | Admitting: NURSE PRACTITIONER
Payer: MEDICARE

## 2017-02-27 ENCOUNTER — OFFICE VISIT (OUTPATIENT)
Dept: FAMILY MEDICINE | Facility: CLINIC | Age: 82
End: 2017-02-27

## 2017-02-27 VITALS
WEIGHT: 271.8 LBS | RESPIRATION RATE: 16 BRPM | OXYGEN SATURATION: 92 % | SYSTOLIC BLOOD PRESSURE: 134 MMHG | DIASTOLIC BLOOD PRESSURE: 78 MMHG | HEART RATE: 96 BPM | BODY MASS INDEX: 43.87 KG/M2

## 2017-02-27 DIAGNOSIS — Z79.01 LONG TERM CURRENT USE OF ANTICOAGULANT THERAPY: ICD-10-CM

## 2017-02-27 DIAGNOSIS — I42.9 CARDIOMYOPATHY (H): Primary | ICD-10-CM

## 2017-02-27 DIAGNOSIS — I48.20 CHRONIC ATRIAL FIBRILLATION (H): ICD-10-CM

## 2017-02-27 DIAGNOSIS — R00.0 TACHYCARDIA: ICD-10-CM

## 2017-02-27 LAB — Lab: 3.1 (ref 2–3)

## 2017-02-27 PROCEDURE — 93227 XTRNL ECG REC<48 HR R&I: CPT | Performed by: INTERNAL MEDICINE

## 2017-02-27 PROCEDURE — 99213 OFFICE O/P EST LOW 20 MIN: CPT | Performed by: FAMILY MEDICINE

## 2017-02-27 PROCEDURE — 85610 PROTHROMBIN TIME: CPT | Performed by: FAMILY MEDICINE

## 2017-02-27 PROCEDURE — 93225 XTRNL ECG REC<48 HRS REC: CPT

## 2017-02-27 PROCEDURE — 36415 COLL VENOUS BLD VENIPUNCTURE: CPT | Performed by: FAMILY MEDICINE

## 2017-02-27 RX ORDER — METOPROLOL TARTRATE 50 MG
100 TABLET ORAL 2 TIMES DAILY
Qty: 360 TABLET | Refills: 3 | Status: SHIPPED | OUTPATIENT
Start: 2017-02-27 | End: 2017-12-06

## 2017-02-27 NOTE — PATIENT INSTRUCTIONS
[facility] Anticoaglution Latest Ref Rng & Units 2/27/2017   INR 2.0 - 3.0    ASSESS  SUPRA   INR GOAL  2.0-3.0   WEEKLY DOSE  6mg on 2/201&2/21  then 3mg 5 day /wk , 6mg on thur , sundays   PT INSTRUCT  take 4.5 mg on sunday and thursday and 3 mg other days   RECHECK  1 WEEK     ATRIAL FIB   LOCATION  Clinic   Comments

## 2017-02-27 NOTE — MR AVS SNAPSHOT
After Visit Summary   2/27/2017    Vasiliy Corbin    MRN: [id number]           Patient Information     Date Of Birth          1935        Visit Information        Provider Department      2/27/2017 1:45 PM Heriberto Haile MD [facility]        Today's Diagnoses     Cardiomyopathy (H)    -  1    Long term current use of anticoagulant therapy        Chronic atrial fibrillation (H)        Tachycardia          Care Instructions    [facility] Anticoaglution Latest Ref Rng & Units 2/27/2017   INR 2.0 - 3.0    ASSESS  SUPRA   INR GOAL  2.0-3.0   WEEKLY DOSE  6mg on 2/201&2/21  then 3mg 5 day /wk , 6mg on thur , sundays   PT INSTRUCT  take 4.5 mg on sunday and thursday and 3 mg other days   RECHECK  1 WEEK     ATRIAL FIB   LOCATION  Clinic   Comments             Follow-ups after your visit        Your next 10 appointments already scheduled     Mar 03, 2017 11:20 AM CST   LAB with [facility]   [facility] ([facility])    [address] [place] [address]   [phone]           Patient must bring picture ID.  Patient should be prepared to give a urine specimen  Please do not eat 10-12 hours before your appointment if you are coming in fasting for labs on lipids, cholesterol, or glucose (sugar).  Pregnant women should follow their Care Team instructions. Water with medications is okay. Do not drink coffee or other fluids.   If you have concerns about taking  your medications, please ask at office or if scheduling via Tradegeckohart, send a message by clicking on Secure Messaging, Message Your Care Team.            Mar 03, 2017 12:30 PM CST   Return Visit with KAYLEE Fischer [facility] AT [place] ([facility])    [address] [place] [address]   [phone]            Mar 15, 2017  8:45 AM CDT   [facility] EP RETURN with Mariana Beckham MD   [facility]  "[facility] AT [place] ([facility])    [address] [place] [address] [phone]              Who to contact     If you have questions or need follow up information about today's clinic visit or your schedule please contact [facility] directly at [phone].  Normal or non-critical lab and imaging results will be communicated to you by MyChart, letter or phone within 4 business days after the clinic has received the results. If you do not hear from us within 7 days, please contact the clinic through MyChart or phone. If you have a critical or abnormal lab result, we will notify you by phone as soon as possible.  Submit refill requests through [employer] or call your pharmacy and they will forward the refill request to us. Please allow 3 business days for your refill to be completed.          Additional Information About Your Visit        OptisortharBranded Reality Information     [employer] lets you send messages to your doctor, view your test results, renew your prescriptions, schedule appointments and more. To sign up, go to [email or web address] . Click on \"Log in\" on the left side of the screen, which will take you to the Welcome page. Then click on \"Sign up Now\" on the right side of the page.     You will be asked to enter the access code listed below, as well as some personal information. Please follow the directions to create your username and password.     Your access code is: [email or web address]  Expires: 3/12/2017  9:26 AM     Your access code will  in 90 days. If you need help or a new code, please call your [place] clinic or [phone].        Care EveryWhere ID     This is your Care EveryWhere ID. This could be used by other organizations to access your [place] medical records  [id number]        Your Vitals Were     Pulse Respirations Pulse Oximetry BMI (Body Mass Index)          96 16 92% 43.87 kg/m2         Blood Pressure from Last 3 Encounters:   17 134/78 "   02/20/17 118/74   02/16/17 130/72    Weight from Last 3 Encounters:   02/27/17 123.3 kg (271 lb 12.8 oz)   02/20/17 121.4 kg (267 lb 9.6 oz)   02/16/17 120.9 kg (266 lb 8 oz)              We Performed the Following     Prothrombin - INR ([facility])          Today's Medication Changes          These changes are accurate as of: 2/27/17  2:08 PM.  If you have any questions, ask your nurse or doctor.               These medicines have changed or have updated prescriptions.        Dose/Directions    metoprolol 50 MG tablet   Commonly known as:  LOPRESSOR   This may have changed:  how much to take   Used for:  Chronic atrial fibrillation (H)        Dose:  100 mg   Take 2 tablets (100 mg) by mouth 2 times daily   Quantity:  360 tablet   Refills:  3            Where to get your medicines      These medications were sent to [facility] [address] [place] [address] & [address], [place] [address]     Phone:  [phone]     metoprolol 50 MG tablet                Primary Care Provider Office Phone # Fax #    Heriberto Haile [phone]       [facility] [address] [place] [address]        Thank you!     Thank you for choosing [facility]  for your care. Our goal is always to provide you with excellent care. Hearing back from our patients is one way we can continue to improve our services. Please take a few minutes to complete the written survey that you may receive in the mail after your visit with us. Thank you!             Your Updated Medication List - Protect others around you: Learn how to safely use, store and throw away your medicines at www.disposemymeds.org.          This list is accurate as of: 2/27/17  2:08 PM.  Always use your most recent med list.                   Brand Name Dispense Instructions for use    allopurinol 300 MG tablet    ZYLOPRIM    90 tablet    TAKE 1 TABLET BY MOUTH DAILY       ASPIRIN NOT  PRESCRIBED    INTENTIONAL    0 each    Reported on 2/27/2017       furosemide 40 MG tablet    LASIX    135 tablet    Take 1 tablet (40 mg) by mouth daily       ipratropium 0.06 % spray    ATROVENT    45 mL    USE 2 SPRAYS IN EACH NOSTRIL FOUR TIMES DAILY AS NEEDED FOR RHINITIS       lisinopril 10 MG tablet    PRINIVIL/ZESTRIL    90 tablet    Take 0.5 tablets (5 mg) by mouth 2 times daily       metoprolol 50 MG tablet    LOPRESSOR    360 tablet    Take 2 tablets (100 mg) by mouth 2 times daily       Multi-vitamin Tabs tablet   Generic drug:  multivitamin, therapeutic with minerals     100 tablet    Take 1 tablet by mouth daily.       simvastatin 10 MG tablet    ZOCOR    90 tablet    TAKE 1 TABLET(10 MG) BY MOUTH AT BEDTIME       warfarin 2 MG tablet    COUMADIN    135 tablet    1 1/2 pills per day

## 2017-02-28 NOTE — PROGRESS NOTES
Subjective:  Here to discuss the status of the following problem(s):(Unless noted the problem(s) is/are stable and if applicable the medicine(s) being used is/are causing no side effects or problems.)    CC: INR Followup and Edema      1. Long term current use of anticoagulant therapy  Needs INR    2. Chronic atrial fibrillation (H)  Attempting rate control    3. Tachycardia mediated cardiomyopathy (H)  Last echo EF 50%  Edema in legs is the same    4. Tachycardia  Had increase in BBLK to 75 mg  Bid  No dizziness no increase sob       ROS:  General and CV completed and negative except as noted above    Past Medical History   Diagnosis Date     Atrial fibrillation (H)      became chronic afib in 2016,paroxysmal,was on amiodarone but went into persistent afib in april 2106 and amiodrone was d/cd. now on BBLK and considering cardioversion.(5/2106).In Sept 2016 plan is rate control which has been a challenge and Holter in 6 months     CAD (coronary artery disease)      mild stenosis per cath     Cardiomyopathy (H)      Colon polyp 2010     Gout      HTN (hypertension)      Hyperlipidemia      Obesity (BMI 35.0-39.9 without comorbidity) (H)      SUJEY on CPAP      Current Outpatient Prescriptions   Medication     metoprolol (LOPRESSOR) 50 MG tablet     lisinopril (PRINIVIL/ZESTRIL) 10 MG tablet     furosemide (LASIX) 40 MG tablet     simvastatin (ZOCOR) 10 MG tablet     warfarin (COUMADIN) 2 MG tablet     allopurinol (ZYLOPRIM) 300 MG tablet     ipratropium (ATROVENT) 0.06 % nasal spray     Multiple Vitamin (MULTI-VITAMIN) per tablet     [DISCONTINUED] metoprolol (LOPRESSOR) 50 MG tablet     ASPIRIN NOT PRESCRIBED (INTENTIONAL)     No current facility-administered medications for this visit.       reports that he has quit smoking. His smoking use included Cigarettes. He has a 5.00 pack-year smoking history. He quit smokeless tobacco use about 57 years ago. He reports that he does not drink alcohol or use illicit  drugs.      EXAM:  BP: 134/78   Pulse: 96[slight irregular - done by hand[      Wt Readings from Last 2 Encounters:   02/27/17 123.3 kg (271 lb 12.8 oz)   02/20/17 121.4 kg (267 lb 9.6 oz)       BMI= Body mass index is 43.87 kg/(m^2).    EXAM:   APPEARANCE: = alert, no distress and over weight  Conj/Eyelids = Nrl  Ears/Nose = Nrl  Neck = Nrl  Resp effort = Nrl  Breath Sounds = Nrl  Heart Rate, Rythym, & sounds (no Murm)  = Nrl  SKIN absent significant rashes or lesions = Nrl  Ext (edema) =  1+mid tib- 2+ankles   Recent/Remote Memory = Nrl  Mood/Affect = Nrl    Assessment/Plan:  Vasiliy was seen today for inr followup and edema.    Diagnoses and all orders for this visit:    Tachycardia mediated cardiomyopathy (H)  Echo in Dec  EF 50%      Long term current use of anticoagulant therapy  -     Prothrombin - INR ([facility])    Chronic atrial fibrillation (H)  -     Prothrombin - INR ([facility])  INCREASE METOPROLOL 100 mg BID   -     metoprolol (LOPRESSOR) 50 MG tablet; Take 2 tablets (100 mg) by mouth 2 times daily    Tachycardia  Still fast   He will be seeing Laura , cardiology this week  Has holter on today   ?role for dig here         Heriberto Haile  [facility]

## 2017-03-03 ENCOUNTER — OFFICE VISIT (OUTPATIENT)
Dept: CARDIOLOGY | Facility: CLINIC | Age: 82
End: 2017-03-03
Attending: NURSE PRACTITIONER
Payer: MEDICARE

## 2017-03-03 VITALS
SYSTOLIC BLOOD PRESSURE: 114 MMHG | DIASTOLIC BLOOD PRESSURE: 76 MMHG | HEART RATE: 104 BPM | WEIGHT: 268 LBS | BODY MASS INDEX: 43.07 KG/M2 | HEIGHT: 66 IN

## 2017-03-03 DIAGNOSIS — I48.20 CHRONIC ATRIAL FIBRILLATION (H): ICD-10-CM

## 2017-03-03 DIAGNOSIS — I48.20 CHRONIC ATRIAL FIBRILLATION (H): Primary | ICD-10-CM

## 2017-03-03 LAB
ANION GAP SERPL CALCULATED.3IONS-SCNC: 11.4 MMOL/L (ref 6–17)
BUN SERPL-MCNC: 24 MG/DL (ref 7–30)
CALCIUM SERPL-MCNC: 9.9 MG/DL (ref 8.5–10.5)
CHLORIDE SERPL-SCNC: 103 MMOL/L (ref 98–107)
CO2 SERPL-SCNC: 28 MMOL/L (ref 23–29)
CREAT SERPL-MCNC: 1.21 MG/DL (ref 0.7–1.3)
GFR SERPL CREATININE-BSD FRML MDRD: 58 ML/MIN/1.7M2
GLUCOSE SERPL-MCNC: 109 MG/DL (ref 70–105)
POTASSIUM SERPL-SCNC: 4.4 MMOL/L (ref 3.5–5.1)
SODIUM SERPL-SCNC: 138 MMOL/L (ref 136–145)

## 2017-03-03 PROCEDURE — 80048 BASIC METABOLIC PNL TOTAL CA: CPT | Performed by: NURSE PRACTITIONER

## 2017-03-03 PROCEDURE — 36415 COLL VENOUS BLD VENIPUNCTURE: CPT | Performed by: NURSE PRACTITIONER

## 2017-03-03 PROCEDURE — 99214 OFFICE O/P EST MOD 30 MIN: CPT | Performed by: NURSE PRACTITIONER

## 2017-03-03 RX ORDER — DIGOXIN 125 MCG
125 TABLET ORAL DAILY
Qty: 90 TABLET | Refills: 3 | Status: SHIPPED | OUTPATIENT
Start: 2017-03-03 | End: 2018-02-22

## 2017-03-03 NOTE — MR AVS SNAPSHOT
After Visit Summary   3/3/2017    Vasiliy Corbin    MRN: [id number]           Patient Information     Date Of Birth          1935        Visit Information        Provider Department      3/3/2017 12:30 PM Laura Wooten APRN CNP [facility] [place]        Today's Diagnoses     Chronic atrial fibrillation (H)    -  1      Care Instructions    Continue Metoprolol 100mg twice daily    Add Lanoxin(digoxin or digitek) 0.125mg daily-any time of the day    Check a lanoxin level in 6 weeks and DO NOT TAKE this med before your labs that day; you may take your other meds that day      see me back in 6 weeks--check kidney tests then to--no fasting for labs          Follow-ups after your visit        Additional Services     Follow-Up with Cardiac Advanced Practice Provider                 Your next 10 appointments already scheduled     Mar 06, 2017 11:30 AM CST   SHORT with Heriberto Haile MD   [facility] ([facility])    [address] [place] [address] [phone]              Future tests that were ordered for you today     Open Future Orders        Priority Expected Expires Ordered    Digoxin level Routine 4/18/2017 3/3/2018 3/3/2017    Basic metabolic panel Routine 4/18/2017 3/3/2018 3/3/2017    Follow-Up with Cardiac Advanced Practice Provider Routine 4/18/2017 3/3/2018 3/3/2017            Who to contact     If you have questions or need follow up information about today's clinic visit or your schedule please contact [facility] directly at [phone].  Normal or non-critical lab and imaging results will be communicated to you by MyChart, letter or phone within 4 business days after the clinic has received the results. If you do not hear from us within 7 days, please contact the clinic through MyChart or phone. If you have a critical or abnormal lab result, we will notify  "you by phone as soon as possible.  Submit refill requests through [employer] or call your pharmacy and they will forward the refill request to us. Please allow 3 business days for your refill to be completed.          Additional Information About Your Visit        [employer] Information     [employer] lets you send messages to your doctor, view your test results, renew your prescriptions, schedule appointments and more. To sign up, go to [email or web address] . Click on \"Log in\" on the left side of the screen, which will take you to the Welcome page. Then click on \"Sign up Now\" on the right side of the page.     You will be asked to enter the access code listed below, as well as some personal information. Please follow the directions to create your username and password.     Your access code is: [email or web address]  Expires: 3/12/2017  9:26 AM     Your access code will  in 90 days. If you need help or a new code, please call your [facility] clinic or [phone].        Care EveryWhere ID     This is your Care EveryWhere ID. This could be used by other organizations to access your [facility] medical records  [id number]        Your Vitals Were     Pulse Height BMI (Body Mass Index)             104 1.676 m (5' 6\") 43.26 kg/m2          Blood Pressure from Last 3 Encounters:   17 114/76   17 134/78   17 118/74    Weight from Last 3 Encounters:   17 121.6 kg (268 lb)   17 123.3 kg (271 lb 12.8 oz)   17 121.4 kg (267 lb 9.6 oz)              We Performed the Following     Follow-Up with Cardiac Advanced Practice Provider          Today's Medication Changes          These changes are accurate as of: 3/3/17  1:12 PM.  If you have any questions, ask your nurse or doctor.               Start taking these medicines.        Dose/Directions    digoxin 125 MCG tablet   Commonly known as:  LANOXIN   Used for:  Chronic atrial fibrillation (H)   Started by:  Laura Wooten APRN CNP        Dose:  " 125 mcg   Take 1 tablet (125 mcg) by mouth daily   Quantity:  90 tablet   Refills:  3            Where to get your medicines      [facility] [address] [facility]ly [facility] [address] AT [address] [place] [address] & [address]  [address] [address]     Phone:  [phone]     digoxin 125 MCG tablet                Primary Care Provider Office Phone # Fax #    Heriberto Haile [phone]       [facility] [address]  [facility] [address]        Thank you!     Thank you for choosing [facility] [place]  for your care. Our goal is always to provide you with excellent care. Hearing back from our patients is one way we can continue to improve our services. Please take a few minutes to complete the written survey that you may receive in the mail after your visit with us. Thank you!             Your Updated Medication List - Protect others around you: Learn how to safely use, store and throw away your medicines at www.disposemymeds.org.          This list is accurate as of: 3/3/17  1:12 PM.  Always use your most recent med list.                   Brand Name Dispense Instructions for use    allopurinol 300 MG tablet    ZYLOPRIM    90 tablet    TAKE 1 TABLET BY MOUTH DAILY       ASPIRIN NOT PRESCRIBED    INTENTIONAL    0 each    Reported on 2/27/2017       digoxin 125 MCG tablet    LANOXIN    90 tablet    Take 1 tablet (125 mcg) by mouth daily       furosemide 40 MG tablet    LASIX    135 tablet    Take 1 tablet (40 mg) by mouth daily       ipratropium 0.06 % spray    ATROVENT    45 mL    USE 2 SPRAYS IN EACH NOSTRIL FOUR TIMES DAILY AS NEEDED FOR RHINITIS       lisinopril 10 MG tablet    PRINIVIL/ZESTRIL    90 tablet    Take 0.5 tablets (5 mg) by mouth 2 times daily       metoprolol 50 MG tablet    LOPRESSOR    360 tablet    Take 2 tablets (100 mg) by mouth 2 times daily       Multi-vitamin Tabs tablet   Generic  drug:  multivitamin, therapeutic with minerals     100 tablet    Take 1 tablet by mouth daily.       simvastatin 10 MG tablet    ZOCOR    90 tablet    TAKE 1 TABLET(10 MG) BY MOUTH AT BEDTIME       warfarin 2 MG tablet    COUMADIN    135 tablet    1 1/2 pills per day

## 2017-03-03 NOTE — PROGRESS NOTES
HPI and Plan:   See dictation  [id number]    Orders Placed This Encounter   Procedures     Basic metabolic panel     Digoxin level     Follow-Up with Cardiac Advanced Practice Provider       Orders Placed This Encounter   Medications     digoxin (LANOXIN) 125 MCG tablet     Sig: Take 1 tablet (125 mcg) by mouth daily     Dispense:  90 tablet     Refill:  3       There are no discontinued medications.      Encounter Diagnosis   Name Primary?     Chronic atrial fibrillation (H) Yes       CURRENT MEDICATIONS:  Current Outpatient Prescriptions   Medication Sig Dispense Refill     digoxin (LANOXIN) 125 MCG tablet Take 1 tablet (125 mcg) by mouth daily 90 tablet 3     metoprolol (LOPRESSOR) 50 MG tablet Take 2 tablets (100 mg) by mouth 2 times daily 360 tablet 3     lisinopril (PRINIVIL/ZESTRIL) 10 MG tablet Take 0.5 tablets (5 mg) by mouth 2 times daily 90 tablet 1     furosemide (LASIX) 40 MG tablet Take 1 tablet (40 mg) by mouth daily 135 tablet 3     simvastatin (ZOCOR) 10 MG tablet TAKE 1 TABLET(10 MG) BY MOUTH AT BEDTIME 90 tablet 3     warfarin (COUMADIN) 2 MG tablet 1 1/2 pills per day 135 tablet 3     allopurinol (ZYLOPRIM) 300 MG tablet TAKE 1 TABLET BY MOUTH DAILY 90 tablet 3     ASPIRIN NOT PRESCRIBED (INTENTIONAL) Reported on 2/27/2017 0 each 0     ipratropium (ATROVENT) 0.06 % nasal spray USE 2 SPRAYS IN EACH NOSTRIL FOUR TIMES DAILY AS NEEDED FOR RHINITIS 45 mL 12     Multiple Vitamin (MULTI-VITAMIN) per tablet Take 1 tablet by mouth daily. 100 tablet 12       ALLERGIES     Allergies   Allergen Reactions     Cardizem [Diltiazem]      Swelling and poor rate control       PAST MEDICAL HISTORY:  Past Medical History   Diagnosis Date     Atrial fibrillation (H)      became chronic afib in 2016,paroxysmal,was on amiodarone but went into persistent afib in april 2106 and amiodrone was d/cd. now on BBLK and considering cardioversion.(5/2106).In Sept 2016 plan is rate control which has been a challenge and Holter in 6  months     CAD (coronary artery disease)      mild stenosis per cath     Cardiomyopathy (H)      Colon polyp 2010     Gout      HTN (hypertension)      Hyperlipidemia      Obesity (BMI 35.0-39.9 without comorbidity) (H)      SUJEY on CPAP        PAST SURGICAL HISTORY:  Past Surgical History   Procedure Laterality Date     Ct release       Ulnar reroute       right      Laser ktp transurethral resection (tur) prostate N/A 4/7/2016     Procedure: LASER KTP TRANSURETHRAL RESECTION (TUR) PROSTATE;  Surgeon: Lokesh Lima MD;  Location: [facility] OR     Cystoscopy N/A 4/7/2016     Procedure: CYSTOSCOPY;  Surgeon: Lokesh Lima MD;  Location:  OR     Hc left heart catheterization  3/2010     Mild CAD       FAMILY HISTORY:  Family History   Problem Relation Age of Onset     HEART DISEASE Mother 92     Heart failure     HEART DISEASE Father 92     C.A.D. Brother 70     MI       SOCIAL HISTORY:  Social History     Social History     Marital status:      Spouse name: N/A     Number of children: N/A     Years of education: N/A     Social History Main Topics     Smoking status: Former Smoker     Packs/day: 1.00     Years: 5.00     Types: Cigarettes     Smokeless tobacco: Former User     Quit date: 1/1/1960     Alcohol use No     Drug use: No     Sexual activity: Yes     Other Topics Concern     Parent/Sibling W/ Cabg, Mi Or Angioplasty Before 65f 55m? No     Caffeine Concern Yes     8 cups of  decaf coffee per day     Sleep Concern Yes     sleep apnea, wears CPAP     Stress Concern No     Weight Concern Yes     Weight gain     Special Diet No     Exercise No     Seat Belt Yes     Social History Narrative       Review of Systems:  Skin:  Negative       Eyes:  Positive for glasses    ENT:  Negative      Respiratory:  Positive for sleep apnea;CPAP     Cardiovascular:  Negative      Gastroenterology: Negative      Genitourinary:  Negative      Musculoskeletal:  Positive for back pain    Neurologic:  Negative      Psychiatric:   "Negative      Heme/Lymph/Imm:  Negative      Endocrine:  Negative        Physical Exam:  Vitals: /76  Pulse 104  Ht 1.676 m (5' 6\")  Wt 121.6 kg (268 lb)  BMI 43.26 kg/m2    Constitutional:  cooperative obese      Skin:  warm and dry to the touch        Head:  normocephalic        Eyes:  pupils equal and round        ENT:  no pallor or cyanosis        Neck:  carotid pulses are full and equal bilaterally        Chest:  clear to auscultation          Cardiac: regular rhythm;normal S1 and S2 irregularly irregular rhythm;tachycardic distant heart sounds no presence of murmur       resting heart rate 82; hallway walk 104    Abdomen:  abdomen soft;BS normoactive obese      Vascular: not assessed this visit                                        Extremities and Back:      bilateral LE edema;trace          Neurological:  no gross motor deficits              SHER Wooten, APRN CNP   [facility]  [address]  [place] [id number]              "

## 2017-03-03 NOTE — LETTER
3/3/2017    Heriberto Haile MD  [facility]   [address]  [facility] [address]    RE: Vasiliy Corbin       Dear Colleague,    I had the pleasure of seeing Vasiliy Corbin in the [facility].    Vasiliy Corbin is a very pleasant 81-year-old gentleman who looks younger than his stated age.  He follows here with Dr. Menendez and recently saw Dr. Beckham from our Electrophysiology Service.  He follows closely with his primary care physician, Dr. Heriberto Haile.      Vasiliy is a delightful gentleman who has a history of mild coronary artery disease.  He has a history of tachycardia-mediated cardiomyopathy which improved following converting him back from paroxysmal atrial fibrillation to sinus rhythm.  He has now failed amiodarone therapy, and we are once again attempting rate control.  Last echocardiogram in 12/2016 revealed an ejection fraction of 50%-55% when he was in rapid atrial fibrillation at the time.  He has previously reported some fatigue on metoprolol according to the chart, but he denies that to me.  He recently saw Dr. Beckham after a Holter monitor result revealed maximum heart rates as high as 156 on his metoprolol.  At that point, his lisinopril and metoprolol were discontinued, and he was put on Cardizem 300 mg a day.  Unfortunately, he went into mild congestive heart failure with this, requiring an increase in his furosemide dosing.  This has resolved now with a relatively stable renal function.  I have put him back on lisinopril and metoprolol.  Between myself and his primary care, we have titrated the metoprolol to 100 mg twice daily, and he seems to be tolerating this dose well without fatigue at the current time.      A repeat Holter monitor was just performed prior to the increase to 100 mg twice daily in metoprolol.  His average heart rates are 94, his maximum is down to 116 with a low of 58.  Today in the clinic, his heart rate is in the low 80s with  resting; with a hallway walk, as high as 104, which represents reasonable control.      His blood pressure is well controlled.  His baseline creatinines typically run 1.0-1.14.  1.21 is the result today, and I will follow this closely.  He remains on warfarin for stroke prophylaxis.      Since stopping amiodarone his INR does look like it has drifted down a bit.      He is on simvastatin 10 mg a day for dyslipidemia.  His most recent LDL is 63, HDL 42.  He has treated sleep apnea with a CPAP mask.  He states that he is active in his garage and is living a reasonable lifestyle, and he notes no limitation of shortness breath, orthopnea or PND.     Outpatient Encounter Prescriptions as of 3/3/2017   Medication Sig Dispense Refill     digoxin (LANOXIN) 125 MCG tablet Take 1 tablet (125 mcg) by mouth daily 90 tablet 3     metoprolol (LOPRESSOR) 50 MG tablet Take 2 tablets (100 mg) by mouth 2 times daily 360 tablet 3     lisinopril (PRINIVIL/ZESTRIL) 10 MG tablet Take 0.5 tablets (5 mg) by mouth 2 times daily 90 tablet 1     furosemide (LASIX) 40 MG tablet Take 1 tablet (40 mg) by mouth daily 135 tablet 3     simvastatin (ZOCOR) 10 MG tablet TAKE 1 TABLET(10 MG) BY MOUTH AT BEDTIME 90 tablet 3     warfarin (COUMADIN) 2 MG tablet 1 1/2 pills per day 135 tablet 3     allopurinol (ZYLOPRIM) 300 MG tablet TAKE 1 TABLET BY MOUTH DAILY 90 tablet 3     ASPIRIN NOT PRESCRIBED (INTENTIONAL) Reported on 3/6/2017 0 each 0     [DISCONTINUED] ipratropium (ATROVENT) 0.06 % nasal spray USE 2 SPRAYS IN EACH NOSTRIL FOUR TIMES DAILY AS NEEDED FOR RHINITIS 45 mL 12     Multiple Vitamin (MULTI-VITAMIN) per tablet Take 1 tablet by mouth daily. 100 tablet 12     No facility-administered encounter medications on file as of 3/3/2017.       IMPRESSION AND PLAN:   1.  Permanent atrial fibrillation with improved rate control.  His last Holter was done on 75 mg of metoprolol twice a day.  He is now on 100 mg twice per day.  I have talked with   Gemma, as Dr. Kim had a suggestion of adding digoxin to this regimen.  We will go ahead and do that.  We will start 0.125 mg daily and have him see me back in 6 weeks with a digoxin level.  I have instructed him to hold the digoxin the day of the lab draw.  I will also repeat his basic metabolic panel at that point.  I have canceled the appointment with Dr. Beckham, as the patient seems to be responding to our attempts at rate control.  He is not anxious to pursue an AV node ablation and pacemaker anyway, as he had a brother who apparently had some type of arterial surgery and it sounds like either dissected or hemorrhaged in the operating room and did not survive.  We certainly could refer back to EP if it is necessary.   2.  Sleep apnea, treated.   3.  Hypertension, at goal.   4.  History of tachycardia-mediated cardiomyopathy.  Echo ejection fraction was 50%-55% last this past December.   5.  Dyslipidemia, treated to goal.        I will see him back in approximately 6 weeks' time.  He will be seeing Dr. Kim in the interim.  As always, it has been delightful seeing Mr. Corbin today.  I have messaged Dr. Kim with an update as well.        Greater than 50% of this 30-minute visit today was spent in counseling and collaboration.      Again, thank you for allowing me to participate in the care of your patient.      Sincerely,    KAYLEE Fischer CNP     [facility]

## 2017-03-03 NOTE — PATIENT INSTRUCTIONS
Continue Metoprolol 100mg twice daily    Add Lanoxin(digoxin or digitek) 0.125mg daily-any time of the day    Check a lanoxin level in 6 weeks and DO NOT TAKE this med before your labs that day; you may take your other meds that day      see me back in 6 weeks--check kidney tests then to--no fasting for labs

## 2017-03-04 NOTE — PROGRESS NOTES
HISTORY OF PRESENT ILLNESS:  Vasiliy Corbin is a very pleasant 81-year-old gentleman who looks younger than his stated age.  He follows here with Dr. Menendez and recently saw Dr. Beckham from our Electrophysiology Service.  He follows closely with his primary care physician, Dr. Heriberto Kim.      Vasiliy is a delightful gentleman who has a history of mild coronary artery disease.  He has a history of tachycardia-mediated cardiomyopathy which improved following converting him back from paroxysmal atrial fibrillation to sinus rhythm.  He has now failed amiodarone therapy, and we are once again attempting rate control.  Last echocardiogram in 12/2016 revealed an ejection fraction of 50%-55% when he was in rapid atrial fibrillation at the time.  He has previously reported some fatigue on metoprolol according to the chart, but he denies that to me.  He recently saw Dr. Beckham after a Holter monitor result revealed maximum heart rates as high as 156 on his metoprolol.  At that point, his lisinopril and metoprolol were discontinued, and he was put on Cardizem 300 mg a day.  Unfortunately, he went into mild congestive heart failure with this, requiring an increase in his furosemide dosing.  This has resolved now with a relatively stable renal function.  I have put him back on lisinopril and metoprolol.  Between myself and his primary care, we have titrated the metoprolol to 100 mg twice daily, and he seems to be tolerating this dose well without fatigue at the current time.      A repeat Holter monitor was just performed prior to the increase to 100 mg twice daily in metoprolol.  His average heart rates are 94, his maximum is down to 116 with a low of 58.  Today in the clinic, his heart rate is in the low 80s with resting; with a hallway walk, as high as 104, which represents reasonable control.      His blood pressure is well controlled.  His baseline creatinines typically run 1.0-1.14.  1.21 is the result today, and I will follow this  closely.  He remains on warfarin for stroke prophylaxis.      Since stopping amiodarone his INR does look like it has drifted down a bit.      He is on simvastatin 10 mg a day for dyslipidemia.  His most recent LDL is 63, HDL 42.  He has treated sleep apnea with a CPAP mask.  He states that he is active in his garage and is living a reasonable lifestyle, and he notes no limitation of shortness breath, orthopnea or PND.      IMPRESSION AND PLAN:   1.  Permanent atrial fibrillation with improved rate control.  His last Holter was done on 75 mg of metoprolol twice a day.  He is now on 100 mg twice per day.  I have talked with Dr. Menendez, as Dr. Kim had a suggestion of adding digoxin to this regimen.  We will go ahead and do that.  We will start 0.125 mg daily and have him see me back in 6 weeks with a digoxin level.  I have instructed him to hold the digoxin the day of the lab draw.  I will also repeat his basic metabolic panel at that point.  I have canceled the appointment with Dr. Beckham, as the patient seems to be responding to our attempts at rate control.  He is not anxious to pursue an AV node ablation and pacemaker anyway, as he had a brother who apparently had some type of arterial surgery and it sounds like either dissected or hemorrhaged in the operating room and did not survive.  We certainly could refer back to EP if it is necessary.   2.  Sleep apnea, treated.   3.  Hypertension, at goal.   4.  History of tachycardia-mediated cardiomyopathy.  Echo ejection fraction was 50%-55% last this past December.   5.  Dyslipidemia, treated to goal.        I will see him back in approximately 6 weeks' time.  He will be seeing Dr. Kim in the interim.  As always, it has been delightful seeing Mr. Corbin today.  I have messaged Dr. Kim with an update as well.        Greater than 50% of this 30-minute visit today was spent in counseling and collaboration.         AZ BROWNING NP             D: 03/03/2017  13:20   T: 2017 23:31   MT: ISABEL      Name:     AVERY FERRER   MRN:      [id number]        Account:      [id number]   :      1935           Service Date: 2017      Document: [id number]

## 2017-03-06 ENCOUNTER — OFFICE VISIT (OUTPATIENT)
Dept: FAMILY MEDICINE | Facility: CLINIC | Age: 82
End: 2017-03-06

## 2017-03-06 VITALS
RESPIRATION RATE: 16 BRPM | OXYGEN SATURATION: 97 % | SYSTOLIC BLOOD PRESSURE: 112 MMHG | DIASTOLIC BLOOD PRESSURE: 76 MMHG | BODY MASS INDEX: 43.48 KG/M2 | HEART RATE: 74 BPM | WEIGHT: 269.4 LBS

## 2017-03-06 DIAGNOSIS — I10 ESSENTIAL HYPERTENSION: ICD-10-CM

## 2017-03-06 DIAGNOSIS — I42.9 CARDIOMYOPATHY (H): ICD-10-CM

## 2017-03-06 DIAGNOSIS — Z79.01 LONG TERM CURRENT USE OF ANTICOAGULANT THERAPY: Primary | ICD-10-CM

## 2017-03-06 DIAGNOSIS — I48.20 CHRONIC ATRIAL FIBRILLATION (H): ICD-10-CM

## 2017-03-06 PROBLEM — I48.19 PERSISTENT ATRIAL FIBRILLATION (H): Status: RESOLVED | Noted: 2017-01-05 | Resolved: 2017-03-06

## 2017-03-06 LAB — INR PPP: 3.3 (ref 2–3)

## 2017-03-06 PROCEDURE — 85610 PROTHROMBIN TIME: CPT | Performed by: FAMILY MEDICINE

## 2017-03-06 PROCEDURE — 99213 OFFICE O/P EST LOW 20 MIN: CPT | Performed by: FAMILY MEDICINE

## 2017-03-06 PROCEDURE — 36415 COLL VENOUS BLD VENIPUNCTURE: CPT | Performed by: FAMILY MEDICINE

## 2017-03-06 NOTE — PATIENT INSTRUCTIONS
[facility] Anticoagl[facility] Latest Ref Rng & Units 3/6/2017   INR 2.0 - 3.0 3.3 (A)   ASSESS  SUPRA   INR GOAL  2.0-3.0   WEEKLY DOSE  4.5mg Sundays & Thursdays, 3 mg other days   PT INSTRUCT  skip 3/6 dose then take 4.5 mg sunday and 3mg on all other days   RECHECK  2 WEEKS     ATRIAL FIB   LOCATION  Clinic   Comments  new on digoxin started 3/2/17

## 2017-03-06 NOTE — MR AVS SNAPSHOT
After Visit Summary   3/6/2017    Vasiliy Corbin    MRN: [id number]           Patient Information     Date Of Birth          1935        Visit Information        Provider Department      3/6/2017 11:30 AM Heriberto Haile MD [facility]        Today's Diagnoses     Long term current use of anticoagulant therapy        Chronic atrial fibrillation (H)          Care Instructions    [facility] Anticoaglution Latest Ref Rng & Units 3/6/2017   INR 2.0 - 3.0 3.3 (A)   ASSESS  SUPRA   INR GOAL  2.0-3.0   WEEKLY DOSE  4.5mg Sundays & Thursdays, 3 mg other days   PT INSTRUCT  skip 3/6 dose then take 4.5 mg sunday and 3mg on all other days   RECHECK  2 WEEKS     ATRIAL FIB   LOCATION  Clinic   Comments  new on digoxin started 3/2/17           Follow-ups after your visit        Your next 10 appointments already scheduled     Apr 19, 2017 12:10 PM CDT   LAB with [facility]   [facility] ([facility])    [address] [place] [address] [address]  [facility] [id number] [phone]           Patient must bring picture ID.  Patient should be prepared to give a urine specimen  Please do not eat 10-12 hours before your appointment if you are coming in fasting for labs on lipids, cholesterol, or glucose (sugar).  Pregnant women should follow their Care Team instructions. Water with medications is okay. Do not drink coffee or other fluids.   If you have concerns about taking  your medications, please ask at office or if scheduling via Acrisurehart, send a message by clicking on Secure Messaging, Message Your Care Team.            Apr 19, 2017  1:10 PM CDT   Return Visit with KAYLEE Fischer CNP   [facility] ([facility])    [address] [place], [place] [id number]  [facility] [id number] [phone]              Who to contact     If you have questions or need follow up information about today's clinic visit or your  "schedule please contact [facility] directly at [phone].  Normal or non-critical lab and imaging results will be communicated to you by MyChart, letter or phone within 4 business days after the clinic has received the results. If you do not hear from us within 7 days, please contact the clinic through Wobeekhart or phone. If you have a critical or abnormal lab result, we will notify you by phone as soon as possible.  Submit refill requests through [email or web address] or call your pharmacy and they will forward the refill request to us. Please allow 3 business days for your refill to be completed.          Additional Information About Your Visit        WobeekharAM[employer]ICAN PET [employer]ORT Information     EyeSee360 lets you send messages to your doctor, view your test results, renew your prescriptions, schedule appointments and more. To sign up, go to [email or web address] . Click on \"Log in\" on the left side of the screen, which will take you to the Welcome page. Then click on \"Sign up Now\" on the right side of the page.     You will be asked to enter the access code listed below, as well as some personal information. Please follow the directions to create your username and password.     Your access code is: [email or web address]  Expires: 3/12/2017  9:26 AM     Your access code will  in 90 days. If you need help or a new code, please call your [place] clinic or [phone].        Care EveryWhere ID     This is your Care EveryWhere ID. This could be used by other organizations to access your [place] medical records  [id number]        Your Vitals Were     Pulse Respirations Pulse Oximetry BMI (Body Mass Index)          74 16 97% 43.48 kg/m2         Blood Pressure from Last 3 Encounters:   17 112/76   17 114/76   17 134/78    Weight from Last 3 Encounters:   17 122.2 kg (269 lb 6.4 oz)   17 121.6 kg (268 lb)   17 123.3 kg (271 lb 12.8 oz)              We Performed the Following     Prothrombin - INR ([facility])  "       Primary Care Provider Office Phone # Fax #    Heriberto Haile [phone]       [facility] [address]  [facility] [id number]        Thank you!     Thank you for choosing [facility]  for your care. Our goal is always to provide you with excellent care. Hearing back from our patients is one way we can continue to improve our services. Please take a few minutes to complete the written survey that you may receive in the mail after your visit with us. Thank you!             Your Updated Medication List - Protect others around you: Learn how to safely use, store and throw away your medicines at www.disposemymeds.org.          This list is accurate as of: 3/6/17 12:02 PM.  Always use your most recent med list.                   Brand Name Dispense Instructions for use    allopurinol 300 MG tablet    ZYLOPRIM    90 tablet    TAKE 1 TABLET BY MOUTH DAILY       ASPIRIN NOT PRESCRIBED    INTENTIONAL    0 each    Reported on 3/6/2017       digoxin 125 MCG tablet    LANOXIN    90 tablet    Take 1 tablet (125 mcg) by mouth daily       furosemide 40 MG tablet    LASIX    135 tablet    Take 1 tablet (40 mg) by mouth daily       ipratropium 0.06 % spray    ATROVENT    45 mL    USE 2 SPRAYS IN EACH NOSTRIL FOUR TIMES DAILY AS NEEDED FOR RHINITIS       lisinopril 10 MG tablet    PRINIVIL/ZESTRIL    90 tablet    Take 0.5 tablets (5 mg) by mouth 2 times daily       metoprolol 50 MG tablet    LOPRESSOR    360 tablet    Take 2 tablets (100 mg) by mouth 2 times daily       Multi-vitamin Tabs tablet   Generic drug:  multivitamin, therapeutic with minerals     100 tablet    Take 1 tablet by mouth daily.       simvastatin 10 MG tablet    ZOCOR    90 tablet    TAKE 1 TABLET(10 MG) BY MOUTH AT BEDTIME       warfarin 2 MG tablet    COUMADIN    135 tablet    1 1/2 pills per day

## 2017-03-06 NOTE — PROGRESS NOTES
Subjective:  Here to discuss the status of the following problem(s):(Unless noted the problem(s) is/are stable and if applicable the medicine(s) being used is/are causing no side effects or problems.)    CC: INR Followup      1. Long term current use of anticoagulant therapy    2. Chronic atrial fibrillation (H)      3. Cardiomyopathy (H)  Edema seems better to him  Weight down  Denies SOB    4. Essential hypertension  No problems with dizziness.      Tachycardia   now on digoxin And metoprolol is 100 mg twice a day    ROS:  General and CV completed and negative except as noted above 10 point ROS completed and negative except noted above, including Gen, HEENT, CV, Resp, GI, , MS, Neurologic, Psych    Past Medical History   Diagnosis Date     Atrial fibrillation (H)      became chronic afib in 2016,paroxysmal,was on amiodarone but went into persistent afib in april 2106 and amiodrone was d/cd. now on BBLK and considering cardioversion.(5/2106).In Sept 2016 plan is rate control which has been a challenge and Holter in 6 months     CAD (coronary artery disease)      mild stenosis per cath     Cardiomyopathy (H)      Colon polyp 2010     Gout      HTN (hypertension)      Hyperlipidemia      Obesity (BMI 35.0-39.9 without comorbidity) (H)      SUJEY on CPAP      Current Outpatient Prescriptions   Medication     digoxin (LANOXIN) 125 MCG tablet     metoprolol (LOPRESSOR) 50 MG tablet     lisinopril (PRINIVIL/ZESTRIL) 10 MG tablet     furosemide (LASIX) 40 MG tablet     simvastatin (ZOCOR) 10 MG tablet     warfarin (COUMADIN) 2 MG tablet     allopurinol (ZYLOPRIM) 300 MG tablet     ipratropium (ATROVENT) 0.06 % nasal spray     Multiple Vitamin (MULTI-VITAMIN) per tablet     ASPIRIN NOT PRESCRIBED (INTENTIONAL)     No current facility-administered medications for this visit.       reports that he has quit smoking. His smoking use included Cigarettes. He has a 5.00 pack-year smoking history. He quit smokeless tobacco use  about 57 years ago. He reports that he does not drink alcohol or use illicit drugs.      EXAM:  BP: 112/76   Pulse: 74      Wt Readings from Last 2 Encounters:   03/06/17 122.2 kg (269 lb 6.4 oz)   03/03/17 121.6 kg (268 lb)       BMI= Body mass index is 43.48 kg/(m^2).    EXAM:   APPEARANCE: = alert, no distress and over weight  Conj/Eyelids = Nrl  Ears/Nose = Nrl  Neck = Nrl  Resp effort = Nrl  Breath Sounds = Nrl  Heart Rate, Rythym, & sounds (no Murm)  = Nrl  Ext (edema) =  1+ jobst mid tib  Mood/Affect = Nrl    Assessment/Plan:  Vasiliy was seen today for inr followup.    Diagnoses and all orders for this visit:    Long term current use of anticoagulant therapy  -     Prothrombin - INR ([facility])    Chronic atrial fibrillation (H) with tachycardia. Improved on metoprolol and dig   Dig level at Card in 4 weeks or so  -     Prothrombin - INR ([facility])    Cardiomyopathy (H)  Recheck bmp in 2 weeks, edema is better    Essential hypertension  theresa Haile  [place] [facility]

## 2017-03-20 ENCOUNTER — OFFICE VISIT (OUTPATIENT)
Dept: FAMILY MEDICINE | Facility: CLINIC | Age: 82
End: 2017-03-20

## 2017-03-20 VITALS
DIASTOLIC BLOOD PRESSURE: 74 MMHG | RESPIRATION RATE: 16 BRPM | HEART RATE: 65 BPM | OXYGEN SATURATION: 99 % | BODY MASS INDEX: 44.13 KG/M2 | SYSTOLIC BLOOD PRESSURE: 118 MMHG | WEIGHT: 273.4 LBS

## 2017-03-20 DIAGNOSIS — I42.9 CARDIOMYOPATHY (H): Primary | ICD-10-CM

## 2017-03-20 DIAGNOSIS — R00.0 TACHYCARDIA: ICD-10-CM

## 2017-03-20 DIAGNOSIS — I48.20 CHRONIC ATRIAL FIBRILLATION (H): ICD-10-CM

## 2017-03-20 DIAGNOSIS — Z79.01 LONG TERM CURRENT USE OF ANTICOAGULANT THERAPY: ICD-10-CM

## 2017-03-20 LAB — INR PPP: 2.8 (ref 2–3)

## 2017-03-20 PROCEDURE — 85610 PROTHROMBIN TIME: CPT | Performed by: FAMILY MEDICINE

## 2017-03-20 PROCEDURE — 99213 OFFICE O/P EST LOW 20 MIN: CPT | Performed by: FAMILY MEDICINE

## 2017-03-20 PROCEDURE — 36415 COLL VENOUS BLD VENIPUNCTURE: CPT | Performed by: FAMILY MEDICINE

## 2017-03-20 NOTE — MR AVS SNAPSHOT
After Visit Summary   3/20/2017    Vasiliy Corbin    MRN: [id number]           Patient Information     Date Of Birth          1935        Visit Information        Provider Department      3/20/2017 10:30 AM Heriberto Haile MD [facility]        Today's Diagnoses     Long term current use of anticoagulant therapy        Chronic atrial fibrillation (H)          Care Instructions    [facility] Antico[facility] Latest Ref Rng & Units 3/20/2017   INR 2.0 - 3.0 2.8   ASSESS  THER   INR GOAL  2.0-3.0   WEEKLY DOSE  held 3/6, then 4.5mg Sun and 3mg all other days   PT INSTRUCT  4.5 mg on monday then 3 mg all other days   RECHECK  2 WEEKS     ATRIAL FIB   LOCATION  Clinic   Comments             Follow-ups after your visit        Your next 10 appointments already scheduled     Apr 19, 2017 12:10 PM CDT   LAB with [facility]   [facility] ([facility])    [address] [place], [place] [address]  [facility] [id number] [phone]           Patient must bring picture ID.  Patient should be prepared to give a urine specimen  Please do not eat 10-12 hours before your appointment if you are coming in fasting for labs on lipids, cholesterol, or glucose (sugar).  Pregnant women should follow their Care Team instructions. Water with medications is okay. Do not drink coffee or other fluids.   If you have concerns about taking  your medications, please ask at office or if scheduling via [employer], send a message by clicking on Secure Messaging, Message Your Care Team.            Apr 19, 2017  1:10 PM CDT   Return Visit with KAYLEE Fischer CNP   [facility] ([facility])    [address] [place], [place] [address]  [facility] [id number] [phone]              Who to contact     If you have questions or need follow up information about today's clinic visit or your schedule please contact [facility]  "directly at [phone].  Normal or non-critical lab and imaging results will be communicated to you by QingKehart, letter or phone within 4 business days after the clinic has received the results. If you do not hear from us within 7 days, please contact the clinic through QingKehart or phone. If you have a critical or abnormal lab result, we will notify you by phone as soon as possible.  Submit refill requests through MyScienceWork or call your pharmacy and they will forward the refill request to us. Please allow 3 business days for your refill to be completed.          Additional Information About Your Visit        QingKeharGamervision Information     MyScienceWork lets you send messages to your doctor, view your test results, renew your prescriptions, schedule appointments and more. To sign up, go to [email or web address] . Click on \"Log in\" on the left side of the screen, which will take you to the Welcome page. Then click on \"Sign up Now\" on the right side of the page.     You will be asked to enter the access code listed below, as well as some personal information. Please follow the directions to create your username and password.     Your access code is: [email or web address]  Expires: 2017 10:42 AM     Your access code will  in 90 days. If you need help or a new code, please call your [place] clinic or [phone].        Care EveryWhere ID     This is your Care EveryWhere ID. This could be used by other organizations to access your [place] medical records  [id number]        Your Vitals Were     Pulse Respirations Pulse Oximetry BMI (Body Mass Index)          65 16 99% 44.13 kg/m2         Blood Pressure from Last 3 Encounters:   17 118/74   17 112/76   17 114/76    Weight from Last 3 Encounters:   17 124 kg (273 lb 6.4 oz)   17 122.2 kg (269 lb 6.4 oz)   17 121.6 kg (268 lb)              We Performed the Following     Prothrombin - INR ([facility])        Primary Care Provider Office Phone # Fax #    " Heriberto Haile -[phone]       [facility] [address]  [facility] [address]        Thank you!     Thank you for choosing [facility]  for your care. Our goal is always to provide you with excellent care. Hearing back from our patients is one way we can continue to improve our services. Please take a few minutes to complete the written survey that you may receive in the mail after your visit with us. Thank you!             Your Updated Medication List - Protect others around you: Learn how to safely use, store and throw away your medicines at www.disposemymeds.org.          This list is accurate as of: 3/20/17 10:48 AM.  Always use your most recent med list.                   Brand Name Dispense Instructions for use    allopurinol 300 MG tablet    ZYLOPRIM    90 tablet    TAKE 1 TABLET BY MOUTH DAILY       ASPIRIN NOT PRESCRIBED    INTENTIONAL    0 each    Reported on 3/6/2017       digoxin 125 MCG tablet    LANOXIN    90 tablet    Take 1 tablet (125 mcg) by mouth daily       furosemide 40 MG tablet    LASIX    135 tablet    Take 1 tablet (40 mg) by mouth daily       ipratropium 0.06 % spray    ATROVENT    45 mL    USE 2 SPRAYS IN EACH NOSTRIL FOUR TIMES DAILY AS NEEDED FOR RHINITIS       lisinopril 10 MG tablet    PRINIVIL/ZESTRIL    90 tablet    Take 0.5 tablets (5 mg) by mouth 2 times daily       metoprolol 50 MG tablet    LOPRESSOR    360 tablet    Take 2 tablets (100 mg) by mouth 2 times daily       Multi-vitamin Tabs tablet   Generic drug:  multivitamin, therapeutic with minerals     100 tablet    Take 1 tablet by mouth daily.       simvastatin 10 MG tablet    ZOCOR    90 tablet    TAKE 1 TABLET(10 MG) BY MOUTH AT BEDTIME       warfarin 2 MG tablet    COUMADIN    135 tablet    1 1/2 pills per day

## 2017-03-20 NOTE — PATIENT INSTRUCTIONS
[facility] Antico[facility] Latest Ref Rng & Units 3/20/2017   INR 2.0 - 3.0 2.8   ASSESS  THER   INR GOAL  2.0-3.0   WEEKLY DOSE  held 3/6, then 4.5mg Sun and 3mg all other days   PT INSTRUCT  4.5 mg on monday then 3 mg all other days   RECHECK  2 WEEKS     ATRIAL FIB   LOCATION  Clinic   Comments

## 2017-03-21 NOTE — PROGRESS NOTES
Subjective:  Here to discuss the status of the following problem(s):(Unless noted the problem(s) is/are stable and if applicable the medicine(s) being used is/are causing no side effects or problems.)    CC: INR Followup      1. Cardiomyopathy (H)  No new edema or sob    2. Tachycardia  Now on DIG and and BBLK  Rate is slower and notices less palpitation    3. Chronic atrial fibrillation (H)    4. Long term current use of anticoagulant therapy  Needs INR    ROS:  General and CV completed and negative except as noted above 5 poinR    Past Medical History   Diagnosis Date     Atrial fibrillation (H)      became chronic afib in 2016,paroxysmal,was on amiodarone but went into persistent afib in april 2106 and amiodrone was d/cd. now on BBLK and considering cardioversion.(5/2106).In Sept 2016 plan is rate control which has been a challenge and Holter in 6 months     CAD (coronary artery disease)      mild stenosis per cath     Cardiomyopathy (H)      Colon polyp 2010     Gout      HTN (hypertension)      Hyperlipidemia      Obesity (BMI 35.0-39.9 without comorbidity) (H)      SUJEY on CPAP      Current Outpatient Prescriptions   Medication     digoxin (LANOXIN) 125 MCG tablet     metoprolol (LOPRESSOR) 50 MG tablet     lisinopril (PRINIVIL/ZESTRIL) 10 MG tablet     furosemide (LASIX) 40 MG tablet     simvastatin (ZOCOR) 10 MG tablet     warfarin (COUMADIN) 2 MG tablet     allopurinol (ZYLOPRIM) 300 MG tablet     ASPIRIN NOT PRESCRIBED (INTENTIONAL)     ipratropium (ATROVENT) 0.06 % nasal spray     Multiple Vitamin (MULTI-VITAMIN) per tablet     No current facility-administered medications for this visit.       reports that he has quit smoking. His smoking use included Cigarettes. He has a 5.00 pack-year smoking history. He quit smokeless tobacco use about 57 years ago. He reports that he does not drink alcohol or use illicit drugs.      EXAM:  BP: 118/74   Pulse: 65      Wt Readings from Last 2 Encounters:   03/20/17 124  kg (273 lb 6.4 oz)   03/06/17 122.2 kg (269 lb 6.4 oz)       BMI= Body mass index is 44.13 kg/(m^2).    EXAM:    VS noted   EYES = NL  NECK = NL  LUNGS=NL  COR=NL  EXT= +1 edema  MOOD AFFECT =NL      Assessment/Plan:  Vasiliy was seen today for inr followup.    Diagnoses and all orders for this visit:    Cardiomyopathy (H)  Stable at present    Tachycardia  Dig, and BBLK is better    Chronic atrial fibrillation (H)  -     Prothrombin - INR ([facility])    Long term current use of anticoagulant therapy  -     Prothrombin - INR ([facility])      Need BMP in the future  Dig is to be checked at cards      Heriberto Haile  [facility]

## 2017-03-31 DIAGNOSIS — Z76.0 ENCOUNTER FOR MEDICATION REFILL: ICD-10-CM

## 2017-03-31 DIAGNOSIS — J30.0 VMR (VASOMOTOR RHINITIS): Primary | ICD-10-CM

## 2017-04-01 RX ORDER — IPRATROPIUM BROMIDE 42 UG/1
SPRAY, METERED NASAL
Qty: 45 ML | Refills: 0 | Status: SHIPPED | OUTPATIENT
Start: 2017-04-01 | End: 2017-05-31

## 2017-04-03 ENCOUNTER — OFFICE VISIT (OUTPATIENT)
Dept: FAMILY MEDICINE | Facility: CLINIC | Age: 82
End: 2017-04-03

## 2017-04-03 VITALS
WEIGHT: 272.8 LBS | RESPIRATION RATE: 16 BRPM | SYSTOLIC BLOOD PRESSURE: 122 MMHG | OXYGEN SATURATION: 96 % | DIASTOLIC BLOOD PRESSURE: 76 MMHG | HEART RATE: 80 BPM | BODY MASS INDEX: 44.03 KG/M2

## 2017-04-03 DIAGNOSIS — Z79.01 LONG TERM CURRENT USE OF ANTICOAGULANT THERAPY: ICD-10-CM

## 2017-04-03 DIAGNOSIS — I48.20 CHRONIC ATRIAL FIBRILLATION (H): ICD-10-CM

## 2017-04-03 DIAGNOSIS — I10 BENIGN ESSENTIAL HYPERTENSION: Primary | ICD-10-CM

## 2017-04-03 LAB — INR PPP: 2.7 (ref 2–3)

## 2017-04-03 PROCEDURE — 99213 OFFICE O/P EST LOW 20 MIN: CPT | Performed by: FAMILY MEDICINE

## 2017-04-03 PROCEDURE — 85610 PROTHROMBIN TIME: CPT | Performed by: FAMILY MEDICINE

## 2017-04-03 PROCEDURE — 36415 COLL VENOUS BLD VENIPUNCTURE: CPT | Performed by: FAMILY MEDICINE

## 2017-04-03 NOTE — PROGRESS NOTES
Subjective:  Here to discuss the status of the following problem(s):(Unless noted the problem(s) is/are stable and if applicable the medicine(s) being used is/are causing no side effects or problems.)    CC: INR Followup and Heart Problem      1. Long term current use of anticoagulant therapy  Needs INR+  No bleeding or bruising    2. Chronic atrial fibrillation (H)  RATE CONTROL BETTER NOW  His weight is gradually up he denies increase sob  Cough or CP       ROS:  General and CV completed and negative except as noted above    Past Medical History:   Diagnosis Date     Atrial fibrillation (H)     became chronic afib in 2016,paroxysmal,was on amiodarone but went into persistent afib in april 2106 and amiodrone was d/cd. now on BBLK and considering cardioversion.(5/2106).In Sept 2016 plan is rate control which has been a challenge and Holter in 6 months     CAD (coronary artery disease)     mild stenosis per cath     Cardiomyopathy (H)      Colon polyp 2010     Gout      HTN (hypertension)      Hyperlipidemia      Obesity (BMI 35.0-39.9 without comorbidity) (H)      SUJEY on CPAP      Current Outpatient Prescriptions   Medication     ipratropium (ATROVENT) 0.06 % spray     digoxin (LANOXIN) 125 MCG tablet     metoprolol (LOPRESSOR) 50 MG tablet     lisinopril (PRINIVIL/ZESTRIL) 10 MG tablet     furosemide (LASIX) 40 MG tablet     simvastatin (ZOCOR) 10 MG tablet     warfarin (COUMADIN) 2 MG tablet     allopurinol (ZYLOPRIM) 300 MG tablet     ASPIRIN NOT PRESCRIBED (INTENTIONAL)     Multiple Vitamin (MULTI-VITAMIN) per tablet     No current facility-administered medications for this visit.       reports that he quit smoking about 40 years ago. His smoking use included Cigarettes. He has a 5.00 pack-year smoking history. He quit smokeless tobacco use about 57 years ago. He reports that he does not drink alcohol or use illicit drugs.      EXAM:  BP: 122/76   Pulse: 80      Wt Readings from Last 2 Encounters:   04/03/17  123.7 kg (272 lb 12.8 oz)   03/20/17 124 kg (273 lb 6.4 oz)       BMI= Body mass index is 44.03 kg/(m^2).    EXAM:  Obese man who appears his usual pleasant self.  Lungs are clear with slightly decreased breath sounds no rhonchi rales or wheezing.  Cardiac exam is r irregular without any significant murmur heard.  Extremities show +1 edema with Jobst stockings on.      Assessment/Plan:  Vasiliy was seen today for inr followup and heart problem.    Diagnoses and all orders for this visit:    Long term current use of anticoagulant therapy  -     Prothrombin - INR ([facility])  GOOD RTC 4 weeks    Chronic atrial fibrillation (H)  RATE IS BETTER  Will see Chana at Cards and get Dig level and I will have her check his BMP at that visit as well    -     Prothrombin - INR ([facility])              Heriberto Haile  [facility]

## 2017-04-03 NOTE — MR AVS SNAPSHOT
After Visit Summary   4/3/2017    Vasiliy Corbin    MRN: [id number]           Patient Information     Date Of Birth          1935        Visit Information        Provider Department      4/3/2017 10:15 AM Heriberto Haile MD [facility]        Today's Diagnoses     Long term current use of anticoagulant therapy        Chronic atrial fibrillation (H)          Care Instructions    [facility] Antico[facility] Latest Ref Rng & Units 4/3/2017   INR 2.0 - 3.0 2.7   ASSESS  THER   INR GOAL  2.0-3.0   WEEKLY DOSE  4mg Sundays, then 3 mg other days   PT INSTRUCT  same   RECHECK  4 WEEKS     ATRIAL FIB   LOCATION  Clinic   Comments             Follow-ups after your visit        Your next 10 appointments already scheduled     Apr 19, 2017 12:10 PM CDT   LAB with [facility]   [facility] ([facility])    [address] [place] [id number] [phone]           Patient must bring picture ID.  Patient should be prepared to give a urine specimen  Please do not eat 10-12 hours before your appointment if you are coming in fasting for labs on lipids, cholesterol, or glucose (sugar).  Pregnant women should follow their Care Team instructions. Water with medications is okay. Do not drink coffee or other fluids.   If you have concerns about taking  your medications, please ask at office or if scheduling via [employer], send a message by clicking on Secure Messaging, Message Your Care Team.            Apr 19, 2017  1:10 PM CDT   Return Visit with KAYLEE Fischer CNP   [facility] ([facility])    [address] [place] [phone]              Who to contact     If you have questions or need follow up information about today's clinic visit or your schedule please contact [facility] directly at [phone].  Normal or non-critical lab and  "imaging results will be communicated to you by MyChart, letter or phone within 4 business days after the clinic has received the results. If you do not hear from us within 7 days, please contact the clinic through [employer] or phone. If you have a critical or abnormal lab result, we will notify you by phone as soon as possible.  Submit refill requests through [employer] or call your pharmacy and they will forward the refill request to us. Please allow 3 business days for your refill to be completed.          Additional Information About Your Visit        [employer] Information     [employer] lets you send messages to your doctor, view your test results, renew your prescriptions, schedule appointments and more. To sign up, go to [email or web address] . Click on \"Log in\" on the left side of the screen, which will take you to the Welcome page. Then click on \"Sign up Now\" on the right side of the page.     You will be asked to enter the access code listed below, as well as some personal information. Please follow the directions to create your username and password.     Your access code is: [email or web address]  Expires: 2017 10:42 AM     Your access code will  in 90 days. If you need help or a new code, please call your [facility] clinic or [phone].        Care EveryWhere ID     This is your Care EveryWhere ID. This could be used by other organizations to access your [facility] medical records  [id number]        Your Vitals Were     Pulse Respirations Pulse Oximetry BMI (Body Mass Index)          80 16 96% 44.03 kg/m2         Blood Pressure from Last 3 Encounters:   17 122/76   17 118/74   17 112/76    Weight from Last 3 Encounters:   17 123.7 kg (272 lb 12.8 oz)   17 124 kg (273 lb 6.4 oz)   17 122.2 kg (269 lb 6.4 oz)              We Performed the Following     Prothrombin - INR ([facility])        Primary Care Provider Office Phone # Fax #    Heriberto Haile [phone]       " [facility] [address]  [facility] [phone]        Thank you!     Thank you for choosing [facility]  for your care. Our goal is always to provide you with excellent care. Hearing back from our patients is one way we can continue to improve our services. Please take a few minutes to complete the written survey that you may receive in the mail after your visit with us. Thank you!             Your Updated Medication List - Protect others around you: Learn how to safely use, store and throw away your medicines at www.disposemymeds.org.          This list is accurate as of: 4/3/17 10:32 AM.  Always use your most recent med list.                   Brand Name Dispense Instructions for use    allopurinol 300 MG tablet    ZYLOPRIM    90 tablet    TAKE 1 TABLET BY MOUTH DAILY       ASPIRIN NOT PRESCRIBED    INTENTIONAL    0 each    Reported on 3/6/2017       digoxin 125 MCG tablet    LANOXIN    90 tablet    Take 1 tablet (125 mcg) by mouth daily       furosemide 40 MG tablet    LASIX    135 tablet    Take 1 tablet (40 mg) by mouth daily       ipratropium 0.06 % spray    ATROVENT    45 mL    USE 2 SPRAYS IN EACH NOSTRIL FOUR TIMES DAILY AS NEEDED FOR RHINITIS       lisinopril 10 MG tablet    PRINIVIL/ZESTRIL    90 tablet    Take 0.5 tablets (5 mg) by mouth 2 times daily       metoprolol 50 MG tablet    LOPRESSOR    360 tablet    Take 2 tablets (100 mg) by mouth 2 times daily       Multi-vitamin Tabs tablet   Generic drug:  multivitamin, therapeutic with minerals     100 tablet    Take 1 tablet by mouth daily.       simvastatin 10 MG tablet    ZOCOR    90 tablet    TAKE 1 TABLET(10 MG) BY MOUTH AT BEDTIME       warfarin 2 MG tablet    COUMADIN    135 tablet    1 1/2 pills per day

## 2017-04-03 NOTE — PATIENT INSTRUCTIONS
[facility] Antico[facility] Latest Ref Rng & Units 4/3/2017   INR 2.0 - 3.0 2.7   ASSESS  THER   INR GOAL  2.0-3.0   WEEKLY DOSE  4mg Sundays, then 3 mg other days   PT INSTRUCT  same   RECHECK  4 WEEKS     ATRIAL FIB   LOCATION  Clinic   Comments

## 2017-04-19 ENCOUNTER — OFFICE VISIT (OUTPATIENT)
Dept: CARDIOLOGY | Facility: CLINIC | Age: 82
End: 2017-04-19
Attending: NURSE PRACTITIONER
Payer: MEDICARE

## 2017-04-19 VITALS
DIASTOLIC BLOOD PRESSURE: 76 MMHG | SYSTOLIC BLOOD PRESSURE: 140 MMHG | HEIGHT: 66 IN | WEIGHT: 275 LBS | BODY MASS INDEX: 44.2 KG/M2 | HEART RATE: 64 BPM

## 2017-04-19 DIAGNOSIS — I48.20 CHRONIC ATRIAL FIBRILLATION (H): ICD-10-CM

## 2017-04-19 DIAGNOSIS — R60.0 LOCALIZED EDEMA: ICD-10-CM

## 2017-04-19 DIAGNOSIS — I10 BENIGN ESSENTIAL HYPERTENSION: ICD-10-CM

## 2017-04-19 DIAGNOSIS — I48.20 CHRONIC ATRIAL FIBRILLATION (H): Primary | ICD-10-CM

## 2017-04-19 LAB
ANION GAP SERPL CALCULATED.3IONS-SCNC: 11.3 MMOL/L (ref 6–17)
BUN SERPL-MCNC: 21 MG/DL (ref 7–30)
CALCIUM SERPL-MCNC: 9.8 MG/DL (ref 8.5–10.5)
CHLORIDE SERPL-SCNC: 103 MMOL/L (ref 98–107)
CO2 SERPL-SCNC: 28 MMOL/L (ref 23–29)
CREAT SERPL-MCNC: 1.01 MG/DL (ref 0.7–1.3)
DIGOXIN SERPL-MCNC: 0.4 UG/L (ref 0.5–2)
GFR SERPL CREATININE-BSD FRML MDRD: 71 ML/MIN/1.7M2
GLUCOSE SERPL-MCNC: 115 MG/DL (ref 70–105)
POTASSIUM SERPL-SCNC: 4.3 MMOL/L (ref 3.5–5.1)
SODIUM SERPL-SCNC: 138 MMOL/L (ref 136–145)

## 2017-04-19 PROCEDURE — 80162 ASSAY OF DIGOXIN TOTAL: CPT | Performed by: NURSE PRACTITIONER

## 2017-04-19 PROCEDURE — 36415 COLL VENOUS BLD VENIPUNCTURE: CPT | Performed by: NURSE PRACTITIONER

## 2017-04-19 PROCEDURE — 99215 OFFICE O/P EST HI 40 MIN: CPT | Performed by: NURSE PRACTITIONER

## 2017-04-19 PROCEDURE — 80048 BASIC METABOLIC PNL TOTAL CA: CPT | Performed by: NURSE PRACTITIONER

## 2017-04-19 RX ORDER — FUROSEMIDE 40 MG
TABLET ORAL
Qty: 135 TABLET | Refills: 3 | Status: SHIPPED | OUTPATIENT
Start: 2017-04-19 | End: 2017-04-27

## 2017-04-19 NOTE — PATIENT INSTRUCTIONS
Increase Furosemide to 40mg in am--full pill and 1/2 pill=20mg at noon    Schedule an echocardiogram and follow up with labs with me    Set up appointment with Dr. Herbert if we need it

## 2017-04-19 NOTE — LETTER
4/19/2017    Heriberto Haile MD  [facility]   [address]  [facility] [address]    RE: Vasiliy Corbin       Dear Colleague,    I had the pleasure of seeing Vasiliy Corbin in the [facility].    Vasiliy Corbin is a very pleasant 82-year-old gentleman who sees Dr. Menendez.  He is followed by Dr. Haile for primary care and has been seen here with Dr. Beckham from our Electrophysiology Service.  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  With conversion to sinus rhythm and amiodarone therapy his LV function rebounded to 60%-65%.  He has now reverted out of rhythm on amiodarone therapy.  He has been seeing the EP Service as well as myself.  We have tried to maximize rate control medications and once again he is feeling more fatigued on high-dose metoprolol.  We have added digoxin.  He tried Cardizem before, suggested by Dr. Beckham and his lisinopril was stopped at that time.  He had a decompensation with fluid weight gain requiring increasing his furosemide at that time.  He had a Holter monitor done on 50 mg twice daily of metoprolol and at that point his heart rates were averaging 94, maximum 116  (previous 156) with a low of 58.  I increased the metoprolol further to 100 mg twice daily and added digoxin after that.      Today, Vasiliy returns for followup.  His wife states she notes more dyspnea when he walks.  His weight continues to climb and now is up approximately 8-10 pounds in the past 2 months.  He has very thick legs but does have more edema today than I have seen before.  His JVP is higher.  He denies orthopnea or PND.  He wears his CPAP routinely.  His creatinine today is stable on Lasix 40 mg per day.  Dr. Menendez had increased his Lasix in February for some weight gain and he did lose 6  pounds when taking 60 mg daily.  Vasiliy remains on warfarin for his atrial fibrillation.  He has no chest discomfort.      I feel that he at times downplays his symptoms but he is very clear that he is more tired and falls asleep quite easily when he comes in from his garage and sits in a chair which is quite new for him.      He is on simvastatin for dyslipidemia at 10 mg a day.  His most recent LDL 63, HDL 42.      PHYSICAL EXAM:  On exam today, he has at least 1+ edema bilaterally.  His JVP is 12, if not slightly higher with positive HJR.  Lungs are diminished but clear.     Outpatient Encounter Prescriptions as of 4/19/2017   Medication Sig Dispense Refill     [DISCONTINUED] furosemide (LASIX) 40 MG tablet One tablet in am; 1/2 tablet at noon 135 tablet 3     [DISCONTINUED] ipratropium (ATROVENT) 0.06 % spray USE 2 SPRAYS IN EACH NOSTRIL FOUR TIMES DAILY AS NEEDED FOR RHINITIS 45 mL 0     digoxin (LANOXIN) 125 MCG tablet Take 1 tablet (125 mcg) by mouth daily 90 tablet 3     metoprolol (LOPRESSOR) 50 MG tablet Take 2 tablets (100 mg) by mouth 2 times daily 360 tablet 3     [DISCONTINUED] lisinopril (PRINIVIL/ZESTRIL) 10 MG tablet Take 0.5 tablets (5 mg) by mouth 2 times daily 90 tablet 1     warfarin (COUMADIN) 2 MG tablet 1 1/2 pills per day (Patient taking differently: Take 2 mg by mouth See Admin Instructions ) 135 tablet 3     allopurinol (ZYLOPRIM) 300 MG tablet TAKE 1 TABLET BY MOUTH DAILY 90 tablet 3     Multiple Vitamin (MULTI-VITAMIN) per tablet Take 1 tablet by mouth daily. 100 tablet 12     [DISCONTINUED] furosemide (LASIX) 40 MG tablet Take 1 tablet (40 mg) by mouth daily 135 tablet 3     simvastatin (ZOCOR) 10 MG tablet TAKE 1 TABLET(10 MG) BY MOUTH AT BEDTIME 90 tablet 3     ASPIRIN NOT PRESCRIBED (INTENTIONAL) Reported on 4/19/2017  On warfarin 0 each 0     No facility-administered encounter medications on file as of 4/19/2017.       IMPRESSION AND PLAN:   1.  Persistent atrial fibrillation with  improved rate control but I believe he has diastolic dysfunction and may not be tolerating this rhythm well in spite of rate improvement.  His weight is up and he has edema.  His digoxin level is pending.  His blood pressure is controlled but after exercise it is up to 140 just walking in the oliveros.  He remains on warfarin for stroke prophylaxis.   2.  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  Last echo was 50%-55% which was still down somewhat from his previous 60%-65% when he was in sinus rhythm.  I have reviewed this with Dr. Menendez and contacted Dr. Kim.  We will check an echocardiogram in the next few days to assess his LV function.  I will increase his furosemide to 60 mg per day, do a basic metabolic panel and see him back next week.  His labs today are very stable with creatinine at 1.01.  If his LV function has changed and/or he is still having functional impairment; I will schedule a Dr. Herbert visit to review other options which have been in briefly discussed including AV node ablation and pacemaker or rhythm restoration.   3.  Sleep apnea, treated.   4.  No evidence of flow-limiting coronary artery disease on an angiogram in 2010.   5.  Hypertension, at goal.   6.  Dyslipidemia, treated to goal.            It has been a pleasure as always seeing this jeannette gentleman.  Greater than 50% of this 45-minute visit today was spent in counseling and collaboration.      Sincerely,    KAYLEE Fischer CNP     [facility]

## 2017-04-20 PROBLEM — I51.89 DIASTOLIC DYSFUNCTION: Status: ACTIVE | Noted: 2017-04-20

## 2017-04-20 NOTE — PROGRESS NOTES
HISTORY OF PRESENT ILLNESS:  Vasiliy Corbin is a very pleasant 82-year-old gentleman who sees Dr. Menendez.  He is followed by Dr. Kim for primary care and has been seen here with Dr. Beckham from our Electrophysiology Service.  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  With conversion to sinus rhythm and amiodarone therapy his LV function rebounded to 60%-65%.  He has now reverted out of rhythm on amiodarone therapy.  He has been seeing the EP Service as well as myself.  We have tried to maximize rate control medications and once again he is feeling more fatigued on high-dose metoprolol.  We have added digoxin.  He tried Cardizem before, suggested by Dr. Beckham and his lisinopril was stopped at that time.  He had a decompensation with fluid weight gain requiring increasing his furosemide at that time.  He had a Holter monitor done on 50 mg twice daily of metoprolol and at that point his heart rates were averaging 94, maximum 116  (previous 156) with a low of 58.  I increased the metoprolol further to 100 mg twice daily and added digoxin after that.      Today, Vasiliy returns for followup.  His wife states she notes more dyspnea when he walks.  His weight continues to climb and now is up approximately 8-10 pounds in the past 2 months.  He has very thick legs but does have more edema today than I have seen before.  His JVP is higher.  He denies orthopnea or PND.  He wears his CPAP routinely.  His creatinine today is stable on Lasix 40 mg per day.  Dr. Menendez had increased his Lasix in February for some weight gain and he did lose 6 pounds when taking 60 mg daily.  Vasiliy remains on warfarin for his atrial fibrillation.  He has no chest discomfort.      I feel that he at times downplays his symptoms but he is very clear that he is more tired and falls asleep  quite easily when he comes in from his garage and sits in a chair which is quite new for him.      He is on simvastatin for dyslipidemia at 10 mg a day.  His most recent LDL 63, HDL 42.      PHYSICAL EXAM:  On exam today, he has at least 1+ edema bilaterally.  His JVP is 12, if not slightly higher with positive HJR.  Lungs are diminished but clear.      IMPRESSION AND PLAN:   1.  Persistent atrial fibrillation with improved rate control but I believe he has diastolic dysfunction and may not be tolerating this rhythm well in spite of rate improvement.  His weight is up and he has edema.  His digoxin level is pending.  His blood pressure is controlled but after exercise it is up to 140 just walking in the oliveros.  He remains on warfarin for stroke prophylaxis.   2.  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  Last echo was 50%-55% which was still down somewhat from his previous 60%-65% when he was in sinus rhythm.  I have reviewed this with Dr. Menendez and contacted Dr. Kim.  We will check an echocardiogram in the next few days to assess his LV function.  I will increase his furosemide to 60 mg per day, do a basic metabolic panel and see him back next week.  His labs today are very stable with creatinine at 1.01.  If his LV function has changed and/or he is still having functional impairment; I will schedule a Dr. Herbert visit to review other options which have been in briefly discussed including AV node ablation and pacemaker or rhythm restoration.   3.  Sleep apnea, treated.   4.  No evidence of flow-limiting coronary artery disease on an angiogram in 2010.   5.  Hypertension, at goal.   6.  Dyslipidemia, treated to goal.      It has been a pleasure as always seeing this jeannette gentleman.  Greater than 50% of this 45-minute visit today was spent in counseling and collaboration.         AZ BROWNING NP             D: 04/19/2017 14:10   T: 04/20/2017 07:57   MT: ALEKSANDR      Name:      AVERY FERRER   MRN:      [id number]        Account:      [id number]   :      1935           Service Date: 2017      Document: [id number]

## 2017-04-25 ENCOUNTER — HOSPITAL ENCOUNTER (OUTPATIENT)
Dept: CARDIOLOGY | Facility: CLINIC | Age: 82
Discharge: HOME | End: 2017-04-25
Attending: NURSE PRACTITIONER | Admitting: NURSE PRACTITIONER
Payer: MEDICARE

## 2017-04-25 DIAGNOSIS — I48.20 CHRONIC ATRIAL FIBRILLATION (H): ICD-10-CM

## 2017-04-25 PROCEDURE — 25500064 ZZH RX 255 OP 636: Performed by: NURSE PRACTITIONER

## 2017-04-25 PROCEDURE — 93306 TTE W/DOPPLER COMPLETE: CPT | Mod: 26 | Performed by: INTERNAL MEDICINE

## 2017-04-25 PROCEDURE — 40000264 ECHO COMPLETE WITH LUMASON

## 2017-04-25 RX ADMIN — SULFUR HEXAFLUORIDE 5 ML: KIT at 11:00

## 2017-04-26 ENCOUNTER — OFFICE VISIT (OUTPATIENT)
Dept: CARDIOLOGY | Facility: CLINIC | Age: 82
End: 2017-04-26
Attending: NURSE PRACTITIONER
Payer: MEDICARE

## 2017-04-26 VITALS
DIASTOLIC BLOOD PRESSURE: 66 MMHG | BODY MASS INDEX: 44.29 KG/M2 | HEART RATE: 74 BPM | HEIGHT: 66 IN | WEIGHT: 275.6 LBS | SYSTOLIC BLOOD PRESSURE: 110 MMHG

## 2017-04-26 DIAGNOSIS — I48.20 CHRONIC ATRIAL FIBRILLATION (H): ICD-10-CM

## 2017-04-26 DIAGNOSIS — R60.0 LOCALIZED EDEMA: ICD-10-CM

## 2017-04-26 DIAGNOSIS — I42.9 CARDIOMYOPATHY, UNSPECIFIED (H): ICD-10-CM

## 2017-04-26 DIAGNOSIS — I50.30 DIASTOLIC CONGESTIVE HEART FAILURE, UNSPECIFIED CONGESTIVE HEART FAILURE CHRONICITY: ICD-10-CM

## 2017-04-26 DIAGNOSIS — I50.30 DIASTOLIC CONGESTIVE HEART FAILURE, UNSPECIFIED CONGESTIVE HEART FAILURE CHRONICITY: Primary | ICD-10-CM

## 2017-04-26 LAB
ANION GAP SERPL CALCULATED.3IONS-SCNC: 4 MMOL/L (ref 6–17)
BUN SERPL-MCNC: 25 MG/DL (ref 7–30)
CALCIUM SERPL-MCNC: 9.8 MG/DL (ref 8.5–10.5)
CHLORIDE SERPL-SCNC: 103 MMOL/L (ref 98–107)
CO2 SERPL-SCNC: 33 MMOL/L (ref 23–29)
CREAT SERPL-MCNC: 1.2 MG/DL (ref 0.7–1.3)
GFR SERPL CREATININE-BSD FRML MDRD: 58 ML/MIN/1.7M2
GLUCOSE SERPL-MCNC: 133 MG/DL (ref 70–105)
NT-PROBNP SERPL-MCNC: 2477 PG/ML (ref 0–1800)
POTASSIUM SERPL-SCNC: 4 MMOL/L (ref 3.5–5.1)
SODIUM SERPL-SCNC: 142 MMOL/L (ref 133–144)

## 2017-04-26 PROCEDURE — 80048 BASIC METABOLIC PNL TOTAL CA: CPT | Performed by: NURSE PRACTITIONER

## 2017-04-26 PROCEDURE — 99214 OFFICE O/P EST MOD 30 MIN: CPT | Performed by: NURSE PRACTITIONER

## 2017-04-26 PROCEDURE — 36415 COLL VENOUS BLD VENIPUNCTURE: CPT | Performed by: NURSE PRACTITIONER

## 2017-04-26 PROCEDURE — 83880 ASSAY OF NATRIURETIC PEPTIDE: CPT | Performed by: NURSE PRACTITIONER

## 2017-04-26 NOTE — MR AVS SNAPSHOT
After Visit Summary   4/26/2017    Vasiliy Corbin    MRN: [id number]           Patient Information     Date Of Birth          1935        Visit Information        Provider Department      4/26/2017 9:30 AM Laura Wooten APRN CNP [facility] [place]        Today's Diagnoses     Chronic atrial fibrillation (H)    -  1    Localized edema        Cardiomyopathy, unspecified (H)        Diastolic congestive heart failure, unspecified congestive heart failure chronicity (H)           Care Instructions    For now, keep furosemide at same dose    I am going to check another lab test and will let you know that result and if we are going to change any meds    See Dr. Herbert in May as scheduled        Follow-ups after your visit        Additional Services     Follow-Up with Cardiologist                 Your next 10 appointments already scheduled     May 01, 2017 10:15 AM CDT   LAB with RF LAB   [facility] ([facility])    [address] [place] [address] [phone]           Patient must bring picture ID.  Patient should be prepared to give a urine specimen  Please do not eat 10-12 hours before your appointment if you are coming in fasting for labs on lipids, cholesterol, or glucose (sugar).  Pregnant women should follow their Care Team instructions. Water with medications is okay. Do not drink coffee or other fluids.   If you have concerns about taking  your medications, please ask at office or if scheduling via [employer], send a message by clicking on Secure Messaging, Message Your Care Team.            May 10, 2017  2:45 PM CDT   [facility] EP RETURN with Neo Herbert MD   [facility] ([facility])    [address] [place] [id number] [phone]              Future tests that were ordered for you today     Open Future Orders        Priority  "Expected Expires Ordered    Follow-Up with Cardiologist Routine 2017    Basic metabolic panel Routine 5/10/2017 2018 2017    N terminal pro BNP outpatient Routine 2017    Echo Complete with Lumason Routine 2017            Who to contact     If you have questions or need follow up information about today's clinic visit or your schedule please contact [facility] [place] directly at [phone].  Normal or non-critical lab and imaging results will be communicated to you by [employer], letter or phone within 4 business days after the clinic has received the results. If you do not hear from us within 7 days, please contact the clinic through [employer] or phone. If you have a critical or abnormal lab result, we will notify you by phone as soon as possible.  Submit refill requests through [employer] or call your pharmacy and they will forward the refill request to us. Please allow 3 business days for your refill to be completed.          Additional Information About Your Visit        MyChart Information     [employer] lets you send messages to your doctor, view your test results, renew your prescriptions, schedule appointments and more. To sign up, go to [email or web address] . Click on \"Log in\" on the left side of the screen, which will take you to the Welcome page. Then click on \"Sign up Now\" on the right side of the page.     You will be asked to enter the access code listed below, as well as some personal information. Please follow the directions to create your username and password.     Your access code is: [email or web address]  Expires: 2017 10:42 AM     Your access code will  in 90 days. If you need help or a new code, please call your [place] clinic or [phone].        Care EveryWhere ID     This is your Care EveryWhere ID. This could be used by other organizations to access your [place] medical " "records  [id number]        Your Vitals Were     Pulse Height BMI (Body Mass Index)             74 1.676 m (5' 6\") 44.48 kg/m2          Blood Pressure from Last 3 Encounters:   04/26/17 110/66   04/19/17 140/76   04/03/17 122/76    Weight from Last 3 Encounters:   04/26/17 125 kg (275 lb 9.6 oz)   04/19/17 124.7 kg (275 lb)   04/03/17 123.7 kg (272 lb 12.8 oz)              We Performed the Following     Follow-Up with Cardiac Advanced Practice Provider        Primary Care Provider Office Phone # Fax #    Heriberto Haile [phone]       [facility] [address]  [facility] [address]        Thank you!     Thank you for choosing [facility] [place]  for your care. Our goal is always to provide you with excellent care. Hearing back from our patients is one way we can continue to improve our services. Please take a few minutes to complete the written survey that you may receive in the mail after your visit with us. Thank you!             Your Updated Medication List - Protect others around you: Learn how to safely use, store and throw away your medicines at www.disposemymeds.org.          This list is accurate as of: 4/26/17 10:14 AM.  Always use your most recent med list.                   Brand Name Dispense Instructions for use    allopurinol 300 MG tablet    ZYLOPRIM    90 tablet    TAKE 1 TABLET BY MOUTH DAILY       ASPIRIN NOT PRESCRIBED    INTENTIONAL    0 each    Reported on 4/19/2017       digoxin 125 MCG tablet    LANOXIN    90 tablet    Take 1 tablet (125 mcg) by mouth daily       furosemide 40 MG tablet    LASIX    135 tablet    One tablet in am; 1/2 tablet at noon       ipratropium 0.06 % spray    ATROVENT    45 mL    USE 2 SPRAYS IN EACH NOSTRIL FOUR TIMES DAILY AS NEEDED FOR RHINITIS       lisinopril 10 MG tablet    PRINIVIL/ZESTRIL    90 tablet    Take 0.5 tablets (5 mg) by mouth 2 times daily       metoprolol 50 MG tablet    " LOPRESSOR    360 tablet    Take 2 tablets (100 mg) by mouth 2 times daily       Multi-vitamin Tabs tablet   Generic drug:  multivitamin, therapeutic with minerals     100 tablet    Take 1 tablet by mouth daily.       simvastatin 10 MG tablet    ZOCOR    90 tablet    TAKE 1 TABLET(10 MG) BY MOUTH AT BEDTIME       warfarin 2 MG tablet    COUMADIN    135 tablet    1 1/2 pills per day

## 2017-04-26 NOTE — PATIENT INSTRUCTIONS
For now, keep furosemide at same dose    I am going to check another lab test and will let you know that result and if we are going to change any meds    See Dr. Herbert in May as scheduled

## 2017-04-26 NOTE — PROGRESS NOTES
HPI and Plan:   See dictation  [id number]  Greater than 50% of this 30 minute visit was spent in counseling    Orders Placed This Encounter   Procedures     US Venous Competency Bilateral     N terminal pro BNP outpatient     Basic metabolic panel     Follow-Up with Cardiologist       No orders of the defined types were placed in this encounter.      There are no discontinued medications.      Encounter Diagnoses   Name Primary?     Localized edema      Chronic atrial fibrillation (H) Yes     Cardiomyopathy, unspecified (H)      Diastolic congestive heart failure, unspecified congestive heart failure chronicity (H)         CURRENT MEDICATIONS:  Current Outpatient Prescriptions   Medication Sig Dispense Refill     furosemide (LASIX) 40 MG tablet One tablet in am; 1/2 tablet at noon 135 tablet 3     ipratropium (ATROVENT) 0.06 % spray USE 2 SPRAYS IN EACH NOSTRIL FOUR TIMES DAILY AS NEEDED FOR RHINITIS 45 mL 0     digoxin (LANOXIN) 125 MCG tablet Take 1 tablet (125 mcg) by mouth daily 90 tablet 3     metoprolol (LOPRESSOR) 50 MG tablet Take 2 tablets (100 mg) by mouth 2 times daily 360 tablet 3     lisinopril (PRINIVIL/ZESTRIL) 10 MG tablet Take 0.5 tablets (5 mg) by mouth 2 times daily 90 tablet 1     simvastatin (ZOCOR) 10 MG tablet TAKE 1 TABLET(10 MG) BY MOUTH AT BEDTIME 90 tablet 3     warfarin (COUMADIN) 2 MG tablet 1 1/2 pills per day 135 tablet 3     allopurinol (ZYLOPRIM) 300 MG tablet TAKE 1 TABLET BY MOUTH DAILY 90 tablet 3     Multiple Vitamin (MULTI-VITAMIN) per tablet Take 1 tablet by mouth daily. 100 tablet 12     ASPIRIN NOT PRESCRIBED (INTENTIONAL) Reported on 4/19/2017 0 each 0       ALLERGIES     Allergies   Allergen Reactions     Cardizem [Diltiazem]      Swelling and poor rate control       PAST MEDICAL HISTORY:  Past Medical History:   Diagnosis Date     Atrial fibrillation (H)     became chronic afib in 2016,paroxysmal,was on amiodarone but went into persistent afib in april 2106 and amiodrone was  d/cd. now on BBLK and considering cardioversion.(5/2106).In Sept 2016 plan is rate control which has been a challenge and Holter in 6 months     CAD (coronary artery disease)     mild stenosis per cath     Cardiomyopathy (H)      Colon polyp 2010     Gout      HTN (hypertension)      Hyperlipidemia      Obesity (BMI 35.0-39.9 without comorbidity) (H)      SUJEY on CPAP        PAST SURGICAL HISTORY:  Past Surgical History:   Procedure Laterality Date     CT release       CYSTOSCOPY N/A 4/7/2016    Procedure: CYSTOSCOPY;  Surgeon: Lokesh Lima MD;  Location:  OR     HC LEFT HEART CATHETERIZATION  3/2010    Mild CAD     LASER KTP TRANSURETHRAL RESECTION (TUR) PROSTATE N/A 4/7/2016    Procedure: LASER KTP TRANSURETHRAL RESECTION (TUR) PROSTATE;  Surgeon: Lokesh Lima MD;  Location: [facility] OR     ulnar reroute      right        FAMILY HISTORY:  Family History   Problem Relation Age of Onset     HEART DISEASE Mother 92     Heart failure     HEART DISEASE Father 92     C.A.D. Brother 70     MI       SOCIAL HISTORY:  Social History     Social History     Marital status:      Spouse name: N/A     Number of children: N/A     Years of education: N/A     Social History Main Topics     Smoking status: Former Smoker     Packs/day: 1.00     Years: 5.00     Types: Cigarettes     Quit date: 4/3/1977     Smokeless tobacco: Former User     Quit date: 1/1/1960     Alcohol use No     Drug use: No     Sexual activity: Yes     Other Topics Concern     Parent/Sibling W/ Cabg, Mi Or Angioplasty Before 65f 55m? No     Caffeine Concern Yes     8 cups of  decaf coffee per day     Sleep Concern Yes     sleep apnea, wears CPAP     Stress Concern No     Weight Concern Yes     Weight gain     Special Diet No     Exercise No     Seat Belt Yes     Social History Narrative       Review of Systems:  Skin:  Negative       Eyes:  Positive for glasses    ENT:  Negative      Respiratory:  Positive for sleep apnea;CPAP     Cardiovascular:   "Negative      Gastroenterology: Negative      Genitourinary:  not assessed      Musculoskeletal:  Positive for arthritis;back pain    Neurologic:  Negative      Psychiatric:  Negative      Heme/Lymph/Imm:  Negative      Endocrine:  Negative        Physical Exam:  Vitals: /66  Pulse 74  Ht 1.676 m (5' 6\")  Wt 125 kg (275 lb 9.6 oz)  BMI 44.48 kg/m2    Constitutional:  cooperative obese      Skin:  warm and dry to the touch        Head:  normocephalic        Eyes:  pupils equal and round        ENT:  no pallor or cyanosis        Neck:  carotid pulses are full and equal bilaterally JVP 10-12      Chest:  clear to auscultation          Cardiac: regular rhythm;normal S1 and S2 irregularly irregular rhythm;tachycardic distant heart sounds no presence of murmur       heart rate 78 with resting; 110- hallway walk    Abdomen:  abdomen soft;BS normoactive obese      Vascular: not assessed this visit                                        Extremities and Back:        1+;RLE edema;pitting LLE edema;1+;pitting      Neurological:  no gross motor deficits              CC  Laura Wooten, APRN CNP   [facility]  [address]  [place] [address]              "

## 2017-04-26 NOTE — LETTER
4/26/2017    Heriberto Haile MD  [facility]   [address]  [facility] [address]    RE: Vasiliy Corbin       Dear Colleague,    I had the pleasure of seeing Vasiliy Corbin in the [facility].    Vasiliy Corbin is a delightful 82-year-old gentleman followed by Dr. Menendez.  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      With rhythm restoration and the use of amiodarone therapy, his LV function rebounded to 60%-65%.  Over this past year, he has reverted out of rhythm on amiodarone therapy, and we have been attempting rate control.  He saw Dr. Beckham from our Electrophysiology Service, who stopped the beta blockers and lisinopril and put him on Cardizem.  Unfortunately, he had a decompensation with fluid weight gain, requiring increased furosemide.      I put him back on metoprolol and increased the dose to 50 mg twice a day; a Holter monitor at that point showed heart rates that averaged 94, maximum 116 (previous 156), with a low of 58.  I increased his metoprolol further to 100 mg twice daily and added digoxin.  In the past, he has had some level of fatigue on high-dose metoprolol, but he seems to be tolerating this reasonably well.      He has had an 8-10 pound weight gain in the past couple of months.  He has very thick legs with some edema.  I trialed an increased dose of furosemide from 40 to 60 mg a day since his last office visit, and he actually had no reduction in his weight.  His edema does seem a bit better.  I will draw a BNP today and see how that is looking and a venous ultrasound to assess for venous insufficiency.  He does wear support hose.      He had an echocardiogram done to reassess for a tachycardia-mediated cardiomyopathy, and his ejection fraction is 50%-55%, which is stable, comparatively.  His  right heart looks slightly more dilated.  He is wearing his CPAP every night all night long.  He has no significant valvular disease; however, Dr. Menendez did feel that the right heart was a bit worse.  He remains on warfarin for stroke prophylaxis.  Today, his heart rate at rest is 74 with a hallway walk around 110.  He would like another opinion from an electrophysiologist, as the visit with Dr. Beckham was not optimal for him.  He has a visit set up with Dr. Herbert 05/10 for further discussion.      Vasiliy thinks today that some of his weight gain is inactivity.  He is limited in walking and being active by low back pain which radiates into his hips.  For a time, he was getting injections done, which helped but now he has exceeded the limit of the number of those that he can do.  He has a dyslipidemia treated with simvastatin.  LDL 63, HDL 42.      He denies orthopnea, PND or chest pain.      PHYSICAL EXAMINATION:  On exam today, he has trace or 1+ edema bilaterally.  His JVP is down slightly to 10.  His creatinine is up to 1.2, but still within normal limits, as is his BUN and his potassium is normal.  Lungs are diminished but clear.     Outpatient Encounter Prescriptions as of 4/26/2017   Medication Sig Dispense Refill     [DISCONTINUED] furosemide (LASIX) 40 MG tablet One tablet in am; 1/2 tablet at noon 135 tablet 3     [DISCONTINUED] ipratropium (ATROVENT) 0.06 % spray USE 2 SPRAYS IN EACH NOSTRIL FOUR TIMES DAILY AS NEEDED FOR RHINITIS 45 mL 0     digoxin (LANOXIN) 125 MCG tablet Take 1 tablet (125 mcg) by mouth daily 90 tablet 3     metoprolol (LOPRESSOR) 50 MG tablet Take 2 tablets (100 mg) by mouth 2 times daily 360 tablet 3     [DISCONTINUED] lisinopril (PRINIVIL/ZESTRIL) 10 MG tablet Take 0.5 tablets (5 mg) by mouth 2 times daily 90 tablet 1     simvastatin (ZOCOR) 10 MG tablet TAKE 1 TABLET(10 MG) BY MOUTH AT BEDTIME 90 tablet 3     warfarin (COUMADIN) 2 MG tablet 1 1/2 pills per day (Patient taking differently:  Take 2 mg by mouth See Admin Instructions ) 135 tablet 3     allopurinol (ZYLOPRIM) 300 MG tablet TAKE 1 TABLET BY MOUTH DAILY 90 tablet 3     Multiple Vitamin (MULTI-VITAMIN) per tablet Take 1 tablet by mouth daily. 100 tablet 12     ASPIRIN NOT PRESCRIBED (INTENTIONAL) Reported on 4/19/2017  On warfarin 0 each 0     No facility-administered encounter medications on file as of 4/26/2017.       IMPRESSION AND PLAN:   1.  Persistent, if not permanent atrial fibrillation.  His rate control has improved.  He likely has diastolic dysfunction and he seems to downplay symptoms a bit.  In spite of heart rates being controlled, he still has some level of dyspnea on exertion that his wife notices periodically, but heart rates appear controlled.  We will send him to Electrophysiology for a final opinion.  He will remain on warfarin for stroke prophylaxis.  He seems to tolerate the higher dose of metoprolol now without as much fatigue as in the past.   2.  Last visit, he seemed to have mild congestive heart failure, likely from diastolic dysfunction.  His echo still reveals some level of LV dysfunction, perhaps down 10%-15% from his best when he was in rhythm.  His right heart is a little worse as well.  I will do a BNP today.  Depending on that result, I may leave his furosemide at the higher dose or reduce it.  He will have a BMP when he returns on 05/10.  I will also do a venous ultrasound to assess her venous insufficiency as a cause for his edema.   3.  Sleep apnea, treated.   4.  No flow-limiting coronary artery disease on angiogram in 2010.   5.  Hypertension, treated to goal.   6.  Dyslipidemia, treated to goal.      It has been a pleasure seeing him in followup today.  Again, I reminded him to limit the sodium in his diet.  We will check his lab work on 05/10 when he sees Dr. Herbert, the venous ultrasound and he will see Dr. Menendez back in July.      BNP mildly elevated; IVC reactive on echo--reduce furosemide to 40mg  daily-pt notified. dtk  D: 04/26/2017 10:25   T: 04/26/2017 23:06   MT: ISABEL      Again, thank you for allowing me to participate in the care of your patient.      Sincerely,    KAYLEE Fischer [facility]

## 2017-04-27 ENCOUNTER — DOCUMENTATION ONLY (OUTPATIENT)
Dept: CARDIOLOGY | Facility: CLINIC | Age: 82
End: 2017-04-27

## 2017-04-27 DIAGNOSIS — R60.0 LOCALIZED EDEMA: ICD-10-CM

## 2017-04-27 RX ORDER — FUROSEMIDE 40 MG
TABLET ORAL
Qty: 135 TABLET | Refills: 3 | COMMUNITY
Start: 2017-04-27 | End: 2017-06-06

## 2017-04-27 NOTE — PROGRESS NOTES
Called patient to tell him to reduce furosemide to 40mg daily as BNP is only slightly high--not home  Will have nurses call him later to discuss    He sees Keon soon to discuss if his afib is adequately controlled    We set up venous ultrasound of legs

## 2017-04-27 NOTE — PROGRESS NOTES
Called pt, reviewed BNP results only slightly high, reviewed recommendation to decrease furosemide to 40mg daily. Reviewed to keep f/u with Dr. Herbert and venous US of legs. Pt stated understanding. ELHAM Ogden

## 2017-04-27 NOTE — PROGRESS NOTES
HISTORY OF PRESENT ILLNESS:  Vasiliy Corbin is a delightful 82-year-old gentleman followed by Dr. Menendez.  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      With rhythm restoration and the use of amiodarone therapy, his LV function rebounded to 60%-65%.  Over this past year, he has reverted out of rhythm on amiodarone therapy, and we have been attempting rate control.  He saw Dr. Beckham from our Electrophysiology Service, who stopped the beta blockers and lisinopril and put him on Cardizem.  Unfortunately, he had a decompensation with fluid weight gain, requiring increased furosemide.      I put him back on metoprolol and increased the dose to 50 mg twice a day; a Holter monitor at that point showed heart rates that averaged 94, maximum 116 (previous 156), with a low of 58.  I increased his metoprolol further to 100 mg twice daily and added digoxin.  In the past, he has had some level of fatigue on high-dose metoprolol, but he seems to be tolerating this reasonably well.      He has had an 8-10 pound weight gain in the past couple of months.  He has very thick legs with some edema.  I trialed an increased dose of furosemide from 40 to 60 mg a day since his last office visit, and he actually had no reduction in his weight.  His edema does seem a bit better.  I will draw a BNP today and see how that is looking and a venous ultrasound to assess for venous insufficiency.  He does wear support hose.      He had an echocardiogram done to reassess for a tachycardia-mediated cardiomyopathy, and his ejection fraction is 50%-55%, which is stable, comparatively.  His right heart looks slightly more dilated.  He is wearing his CPAP every night all night long.  He has no significant valvular disease; however, Dr. Menendez did feel that the right heart was a bit worse.  He remains on warfarin for  stroke prophylaxis.  Today, his heart rate at rest is 74 with a hallway walk around 110.  He would like another opinion from an electrophysiologist, as the visit with Dr. Beckham was not optimal for him.  He has a visit set up with Dr. Herbert 05/10 for further discussion.      Vasiliy thinks today that some of his weight gain is inactivity.  He is limited in walking and being active by low back pain which radiates into his hips.  For a time, he was getting injections done, which helped but now he has exceeded the limit of the number of those that he can do.  He has a dyslipidemia treated with simvastatin.  LDL 63, HDL 42.      He denies orthopnea, PND or chest pain.      PHYSICAL EXAMINATION:  On exam today, he has trace or 1+ edema bilaterally.  His JVP is down slightly to 10.  His creatinine is up to 1.2, but still within normal limits, as is his BUN and his potassium is normal.  Lungs are diminished but clear.      IMPRESSION AND PLAN:   1.  Persistent, if not permanent atrial fibrillation.  His rate control has improved.  He likely has diastolic dysfunction and he seems to downplay symptoms a bit.  In spite of heart rates being controlled, he still has some level of dyspnea on exertion that his wife notices periodically, but heart rates appear controlled.  We will send him to Electrophysiology for a final opinion.  He will remain on warfarin for stroke prophylaxis.  He seems to tolerate the higher dose of metoprolol now without as much fatigue as in the past.   2.  Last visit, he seemed to have mild congestive heart failure, likely from diastolic dysfunction.  His echo still reveals some level of LV dysfunction, perhaps down 10%-15% from his best when he was in rhythm.  His right heart is a little worse as well.  I will do a BNP today.  Depending on that result, I may leave his furosemide at the higher dose or reduce it.  He will have a BMP when he returns on 05/10.  I will also do a venous ultrasound to assess her  venous insufficiency as a cause for his edema.   3.  Sleep apnea, treated.   4.  No flow-limiting coronary artery disease on angiogram in .   5.  Hypertension, treated to goal.   6.  Dyslipidemia, treated to goal.      It has been a pleasure seeing him in followup today.  Again, I reminded him to limit the sodium in his diet.  We will check his lab work on 05/10 when he sees Dr. Herbert, the venous ultrasound and he will see Dr. Menendez back in July.         AZ BROWNING, DK           BNP mildly elevated; IVC reactive on echo--reduce furosemide to 40mg daily-pt notified. stephanie  D: 2017 10:25   T: 2017 23:06   MT: ISABEL      Name:     AVERY FERRER   MRN:      [id number]        Account:      [id number]   :      1935           Service Date: 2017      Document: [id number]

## 2017-05-01 ENCOUNTER — RADIANT APPOINTMENT (OUTPATIENT)
Dept: VASCULAR ULTRASOUND | Facility: CLINIC | Age: 82
End: 2017-05-01
Attending: NURSE PRACTITIONER
Payer: MEDICARE

## 2017-05-01 DIAGNOSIS — Z79.01 LONG TERM CURRENT USE OF ANTICOAGULANT THERAPY: ICD-10-CM

## 2017-05-01 DIAGNOSIS — I48.20 CHRONIC ATRIAL FIBRILLATION (H): ICD-10-CM

## 2017-05-01 DIAGNOSIS — R60.0 LOCALIZED EDEMA: ICD-10-CM

## 2017-05-01 LAB — INR PPP: 2.6

## 2017-05-01 PROCEDURE — 93970 EXTREMITY STUDY: CPT | Performed by: INTERNAL MEDICINE

## 2017-05-01 PROCEDURE — 36415 COLL VENOUS BLD VENIPUNCTURE: CPT | Performed by: FAMILY MEDICINE

## 2017-05-01 PROCEDURE — 85610 PROTHROMBIN TIME: CPT | Performed by: FAMILY MEDICINE

## 2017-05-15 ENCOUNTER — CARE COORDINATION (OUTPATIENT)
Dept: CARDIOLOGY | Facility: CLINIC | Age: 82
End: 2017-05-15

## 2017-05-15 DIAGNOSIS — I87.2 VENOUS (PERIPHERAL) INSUFFICIENCY: Primary | ICD-10-CM

## 2017-05-15 NOTE — PROGRESS NOTES
Received return call from pt.  Reviewed results of LE venous US with pt and DTK's recommendations to see Dr. Reyes, if interested.  Pt reports he would like to see Dr. Reyes.  Reviewed with pt that referral to Vein Clinic will be placed and scheduling will call pt to get appt scheduled.  Pt verbalized understanding and agrees with this plan.  ELHAM Chong 11:30 AM 5/15/2017

## 2017-05-22 ENCOUNTER — OFFICE VISIT (OUTPATIENT)
Dept: CARDIOLOGY | Facility: CLINIC | Age: 82
End: 2017-05-22
Attending: NURSE PRACTITIONER
Payer: MEDICARE

## 2017-05-22 VITALS
WEIGHT: 277 LBS | BODY MASS INDEX: 44.52 KG/M2 | HEART RATE: 84 BPM | DIASTOLIC BLOOD PRESSURE: 80 MMHG | SYSTOLIC BLOOD PRESSURE: 122 MMHG | HEIGHT: 66 IN

## 2017-05-22 DIAGNOSIS — I50.30 DIASTOLIC CONGESTIVE HEART FAILURE, UNSPECIFIED CONGESTIVE HEART FAILURE CHRONICITY: Primary | ICD-10-CM

## 2017-05-22 DIAGNOSIS — I48.20 CHRONIC ATRIAL FIBRILLATION (H): ICD-10-CM

## 2017-05-22 DIAGNOSIS — R60.0 LOCALIZED EDEMA: ICD-10-CM

## 2017-05-22 DIAGNOSIS — I42.9 CARDIOMYOPATHY (H): ICD-10-CM

## 2017-05-22 LAB
ANION GAP SERPL CALCULATED.3IONS-SCNC: 11.3 MMOL/L (ref 6–17)
BUN SERPL-MCNC: 19 MG/DL (ref 7–30)
CALCIUM SERPL-MCNC: 9.4 MG/DL (ref 8.5–10.5)
CHLORIDE SERPL-SCNC: 104 MMOL/L (ref 98–107)
CO2 SERPL-SCNC: 29 MMOL/L (ref 23–29)
CREAT SERPL-MCNC: 1.18 MG/DL (ref 0.7–1.3)
GFR SERPL CREATININE-BSD FRML MDRD: 59 ML/MIN/1.7M2
GLUCOSE SERPL-MCNC: 112 MG/DL (ref 70–105)
POTASSIUM SERPL-SCNC: 4.4 MMOL/L (ref 3.4–5.3)
SODIUM SERPL-SCNC: 140 MMOL/L (ref 136–145)

## 2017-05-22 PROCEDURE — 80048 BASIC METABOLIC PNL TOTAL CA: CPT | Performed by: NURSE PRACTITIONER

## 2017-05-22 PROCEDURE — 93000 ELECTROCARDIOGRAM COMPLETE: CPT | Performed by: INTERNAL MEDICINE

## 2017-05-22 PROCEDURE — 99214 OFFICE O/P EST MOD 30 MIN: CPT | Mod: 25 | Performed by: INTERNAL MEDICINE

## 2017-05-22 PROCEDURE — 36415 COLL VENOUS BLD VENIPUNCTURE: CPT | Performed by: NURSE PRACTITIONER

## 2017-05-22 NOTE — MR AVS SNAPSHOT
After Visit Summary   5/22/2017    Vasiliy Corbin    MRN: [id number]           Patient Information     Date Of Birth          1935        Visit Information        Provider Department      5/22/2017 1:30 PM Neo Herbert MD [facility]        Today's Diagnoses     Diastolic congestive heart failure, unspecified congestive heart failure chronicity (H)     -  1    Chronic atrial fibrillation (H)        Cardiomyopathy (H)           Follow-ups after your visit        Your next 10 appointments already scheduled     May 30, 2017  9:15 AM CDT   LAB with RF LAB   [facility] ([facility])    [address] [place] [address] [phone]           Patient must bring picture ID.  Patient should be prepared to give a urine specimen  Please do not eat 10-12 hours before your appointment if you are coming in fasting for labs on lipids, cholesterol, or glucose (sugar).  Pregnant women should follow their Care Team instructions. Water with medications is okay. Do not drink coffee or other fluids.   If you have concerns about taking  your medications, please ask at office or if scheduling via [employer], send a message by clicking on Secure Messaging, Message Your Care Team.            May 30, 2017  3:50 PM CDT   Return Visit with KAYLEE Kraus CNP   [facility] ([facility])    [address] [place] [id number] [phone]            Jul 11, 2017  9:15 AM CDT   Return Visit with Diomedes Menendez MD   [facility] ([facility])    [address] [place] [address] [phone]              Who to contact     If you have questions or need follow up information about today's clinic visit or your schedule please contact [facility]  "directly at [phone].  Normal or non-critical lab and imaging results will be communicated to you by [employer]anner[employer], letter or phone within 4 business days after the clinic has received the results. If you do not hear from us within 7 days, please contact the clinic through [employer] or phone. If you have a critical or abnormal lab result, we will notify you by phone as soon as possible.  Submit refill requests through [employer] or call your pharmacy and they will forward the refill request to us. Please allow 3 business days for your refill to be completed.          Additional Information About Your Visit        [employer] Information     [employer]la lets you send messages to your doctor, view your test results, renew your prescriptions, schedule appointments and more. To sign up, go to [email or web address] . Click on \"Log in\" on the left side of the screen, which will take you to the Welcome page. Then click on \"Sign up Now\" on the right side of the page.     You will be asked to enter the access code listed below, as well as some personal information. Please follow the directions to create your username and password.     Your access code is: [email or web address]  Expires: 2017 10:42 AM     Your access code will  in 90 days. If you need help or a new code, please call your [facility] clinic or [phone].        Care EveryWhere ID     This is your Care EveryWhere ID. This could be used by other organizations to access your [facility] medical records  [id number]        Your Vitals Were     Pulse Height BMI (Body Mass Index)             84 1.676 m (5' 6\") 44.71 kg/m2          Blood Pressure from Last 3 Encounters:   17 122/80   17 110/66   17 140/76    Weight from Last 3 Encounters:   17 125.6 kg (277 lb)   17 125 kg (275 lb 9.6 oz)   17 124.7 kg (275 lb)              We Performed the Following     EKG 12-lead complete w/read - Clinics (performed today)     Follow-Up with " Electrophysiologist        Primary Care Provider Office Phone # Fax #    Heriberto Haile [phone]       [facility] [address]  [facility] [address]        Thank you!     Thank you for choosing [facility] [place]  for your care. Our goal is always to provide you with excellent care. Hearing back from our patients is one way we can continue to improve our services. Please take a few minutes to complete the written survey that you may receive in the mail after your visit with us. Thank you!             Your Updated Medication List - Protect others around you: Learn how to safely use, store and throw away your medicines at www.disposemymeds.org.          This list is accurate as of: 5/22/17  2:10 PM.  Always use your most recent med list.                   Brand Name Dispense Instructions for use    allopurinol 300 MG tablet    ZYLOPRIM    90 tablet    TAKE 1 TABLET BY MOUTH DAILY       ASPIRIN NOT PRESCRIBED    INTENTIONAL    0 each    Reported on 4/19/2017       digoxin 125 MCG tablet    LANOXIN    90 tablet    Take 1 tablet (125 mcg) by mouth daily       furosemide 40 MG tablet    LASIX    135 tablet    One tablet in am       ipratropium 0.06 % spray    ATROVENT    45 mL    USE 2 SPRAYS IN EACH NOSTRIL FOUR TIMES DAILY AS NEEDED FOR RHINITIS       lisinopril 10 MG tablet    PRINIVIL/ZESTRIL    90 tablet    Take 0.5 tablets (5 mg) by mouth 2 times daily       metoprolol 50 MG tablet    LOPRESSOR    360 tablet    Take 2 tablets (100 mg) by mouth 2 times daily       Multi-vitamin Tabs tablet   Generic drug:  multivitamin, therapeutic with minerals     100 tablet    Take 1 tablet by mouth daily.       simvastatin 10 MG tablet    ZOCOR    90 tablet    TAKE 1 TABLET(10 MG) BY MOUTH AT BEDTIME       warfarin 2 MG tablet    COUMADIN    135 tablet    1 1/2 pills per day

## 2017-05-22 NOTE — LETTER
5/22/2017    Heriberto Haile MD  [facility]   [address]  [facility] [address]    RE: Vasiliy Corbin       Dear Colleague,    It was my pleasure seeing . Vasiliy Corbin, a very pleasant 82-year-old male, who has been referred by Dr. Menendez and Laura Wooten for followup of atrial fibrillation.  The patient has previously seen my colleague, Dr. Beckham.  The patient has a history of mild coronary artery disease and tachycardia-induced cardiomyopathy with AF as low as 40% when first diagnosed with AF in 2010.      The patient was initially treated with rhythm control strategy using amiodarone, and his ejection fraction normalized.  About a year ago he developed atrial fibrillation despite amiodarone, and at that time, Dr. Beckham discontinued the amiodarone and focused on rate control.  On treatment with diltiazem, the patient developed significant worsening of his chronic lower extremity edema.  The patient has done better with metoprolol and digoxin as adjusted by Laura.      In coming in today, Mr. Corbin states he feels quite well.  He is unaware of any heart rate irregularity.  A recent Holter monitor showed good heart rate control of his atrial fibrillation.  He is on warfarin for anticoagulation.  He does have some breathlessness with exertion, but this is chronic.  No chest pain.      PHYSICAL EXAMINATION:   VITAL SIGNS:  Blood pressure 122/80, pulse 84 and irregular, weight 125 kg, height 167 cm.    GENERAL:  He is a significantly overweight, pleasant man in no distress.  He is accompanied by his wife.   NECK:  Thick, supple, no bruits.   LUNGS:  Distant breath sounds.  No apparent crackles or wheezes.   CARDIOVASCULAR:  Irregular rhythm, no apparent gallop or murmur.  Heart sounds are distant.   ABDOMEN:  Very obese, soft, nontender.   EXTREMITIES:  At least 1+ pitting edema bilaterally.   SKIN:  No rash.   BACK:  No CVA tenderness.      DIAGNOSTIC STUDIES:  Most recent  echocardiogram on 04/25/2017 showed EF of 50%-55% with no regional wall motion abnormalities.  Mild RV dilatation.  Mild AI, mild biatrial enlargement.  Estimated RVSP was 26 mmHg plus right atrial pressure.      The patient had a recent lower extremity venous ultrasound that showed no evidence of DVT and significant bilateral great saphenous vein reflux.     Outpatient Encounter Prescriptions as of 5/22/2017   Medication Sig Dispense Refill     furosemide (LASIX) 40 MG tablet One tablet in am 135 tablet 3     ipratropium (ATROVENT) 0.06 % spray USE 2 SPRAYS IN EACH NOSTRIL FOUR TIMES DAILY AS NEEDED FOR RHINITIS 45 mL 0     digoxin (LANOXIN) 125 MCG tablet Take 1 tablet (125 mcg) by mouth daily 90 tablet 3     metoprolol (LOPRESSOR) 50 MG tablet Take 2 tablets (100 mg) by mouth 2 times daily 360 tablet 3     lisinopril (PRINIVIL/ZESTRIL) 10 MG tablet Take 0.5 tablets (5 mg) by mouth 2 times daily 90 tablet 1     simvastatin (ZOCOR) 10 MG tablet TAKE 1 TABLET(10 MG) BY MOUTH AT BEDTIME 90 tablet 3     warfarin (COUMADIN) 2 MG tablet 1 1/2 pills per day 135 tablet 3     allopurinol (ZYLOPRIM) 300 MG tablet TAKE 1 TABLET BY MOUTH DAILY 90 tablet 3     ASPIRIN NOT PRESCRIBED (INTENTIONAL) Reported on 4/19/2017 0 each 0     Multiple Vitamin (MULTI-VITAMIN) per tablet Take 1 tablet by mouth daily. 100 tablet 12     No facility-administered encounter medications on file as of 5/22/2017.       IMPRESSION:   1.  Permanent atrial fibrillation.  Mr. Corbin is currently doing well in atrial fibrillation.  His rate is well controlled on digoxin and metoprolol.  He is on warfarin.  His ECG today showed controlled ventricular rate.  There has been no reason to make changes.   2.  Low normal LV function.   3.  Lower extremity edema with bilateral great saphenous vein reflux.  He has been referred to Vascular, and I will expedite an appointment with one of our vascular cardiologists.      RECOMMENDATIONS:   A.  See Dr. Menendez as  scheduled in July.   B.  I do not see a reason to follow up with EP, but we will be happy to see him again in the future should the need arise.   C.  Appointment with a vascular cardiologist.      Thank you for the opportunity to be involved in Mr. Corbin's care.  Time spent was 25 minutes, with greater than 50% of the time spent in discussion.      Sincerely,    Neo Herbert MD     [facility]

## 2017-05-23 NOTE — PROGRESS NOTES
HISTORY OF PRESENT ILLNESS:    It was my pleasure seeing Mr. Vasiliy Corbin, a very pleasant 82-year-old male, who has been referred by Dr. Menendez and Laura Wooten for atrial fibrillation.  The patient has previously seen Dr. Beckham.  The patient had mild coronary artery disease and tachycardia-induced cardiomyopathy with EF of 40% when first diagnosed with AF in 2010.      The patient was initially treated with rhythm control strategy using amiodarone.  His ejection fraction normalized.  About one year ago he developed recurrent atrial fibrillation despite amiodarone.  At that time, Dr. Beckham discontinued the amiodarone and focused on rate control with diltiazem.  On diltiazem, the patient developed significant worsening of his chronic lower extremity edema.  The patient has done better with metoprolol and digoxin as adjusted by Laura.      In coming in today, Mr. Corbin states he feels quite well.  He is unaware of heart rate irregularity.  A recent Holter monitor showed good ventricular rate control.  He is on warfarin for anticoagulation.  He does have breathlessness with exertion, but this is chronic.  No chest pain.      PHYSICAL EXAMINATION:   VITAL SIGNS:  Blood pressure 122/80, pulse 84 and irregular, weight 125 kg, height 167 cm.    GENERAL:  He is a significantly overweight, pleasant man in no distress.  He is accompanied by his wife.   NECK:  Thick, supple, no bruits.   LUNGS:  Distant breath sounds.  No apparent crackles or wheezes.   CARDIOVASCULAR:  Irregular rhythm, no apparent gallop or murmur.  Heart sounds are distant.   ABDOMEN:  Very obese, soft, nontender.   EXTREMITIES:  At least 1+ pitting edema bilaterally.   SKIN:  No rash.   BACK:  No CVA tenderness.      DIAGNOSTIC STUDIES:    Most recent echocardiogram on 04/25/2017 showed EF of 50%-55% with no regional wall motion abnormalities.  Mild RV dilatation.  Mild AI, mild biatrial enlargement.  Estimated RVSP was 26 mmHg plus right atrial  pressure.      The patient had a recent lower extremity venous ultrasound that showed no evidence of DVT and significant bilateral great saphenous vein reflux.      IMPRESSION:   1.  Permanent atrial fibrillation.  Mr. Corbin is currently doing well in atrial fibrillation.  His rate is well controlled on digoxin and metoprolol.  He is on warfarin.  His ECG today showed controlled ventricular rate.  There is no reason to make changes.   2.  Low normal LV function.   3.  Lower extremity edema with bilateral great saphenous vein reflux.  I will try to expedite an appointment to see one of our vascular cardiologists for his symptomatic venous disease.      RECOMMENDATIONS:   A.  See Dr. Menendez as scheduled in July.   B.  Follow-up with EP as needed.   C.  Appointment with a vascular cardiologist.      Thank you for the opportunity to be involved in Mr. Corbin's care.  Time was 25 minutes, with >50% spent in discussion.         MARIUSZ BLANKENSHIP MD, FACC         cc:   Heriberto Kim MD    [facility] [address]  [address]          D: 2017 14:08   T: 2017 00:53   MT: SIABEL      Name:     AVERY CORBIN   MRN:      [id number]        Account:      [id number]   :      1935           Service Date: 2017      Document: [id number]

## 2017-05-30 ENCOUNTER — OFFICE VISIT (OUTPATIENT)
Dept: CARDIOLOGY | Facility: CLINIC | Age: 82
End: 2017-05-30
Attending: NURSE PRACTITIONER
Payer: MEDICARE

## 2017-05-30 VITALS
DIASTOLIC BLOOD PRESSURE: 83 MMHG | BODY MASS INDEX: 44.36 KG/M2 | HEIGHT: 66 IN | SYSTOLIC BLOOD PRESSURE: 123 MMHG | WEIGHT: 276 LBS | HEART RATE: 89 BPM

## 2017-05-30 DIAGNOSIS — I48.20 CHRONIC ATRIAL FIBRILLATION (H): ICD-10-CM

## 2017-05-30 DIAGNOSIS — Z79.01 LONG TERM CURRENT USE OF ANTICOAGULANT THERAPY: ICD-10-CM

## 2017-05-30 DIAGNOSIS — I87.2 VENOUS (PERIPHERAL) INSUFFICIENCY: ICD-10-CM

## 2017-05-30 LAB — INR PPP: 2.3

## 2017-05-30 PROCEDURE — 85610 PROTHROMBIN TIME: CPT | Performed by: FAMILY MEDICINE

## 2017-05-30 PROCEDURE — 99213 OFFICE O/P EST LOW 20 MIN: CPT | Performed by: NURSE PRACTITIONER

## 2017-05-30 PROCEDURE — 36415 COLL VENOUS BLD VENIPUNCTURE: CPT | Performed by: FAMILY MEDICINE

## 2017-05-30 NOTE — MR AVS SNAPSHOT
After Visit Summary   5/30/2017    Vasiliy Corbin    MRN: [id number]           Patient Information     Date Of Birth          1935        Visit Information        Provider Department      5/30/2017 3:50 PM Katlin Lara APRN CNP [facility] [place]        Today's Diagnoses     Venous (peripheral) insufficiency          Care Instructions    Follow up as needed with vein clinic          Follow-ups after your visit        Your next 10 appointments already scheduled     Jun 26, 2017  9:00 AM CDT   LAB with RF LAB   [facility]    [address] [place] [address] [phone]           Patient must bring picture ID.  Patient should be prepared to give a urine specimen  Please do not eat 10-12 hours before your appointment if you are coming in fasting for labs on lipids, cholesterol, or glucose (sugar).  Pregnant women should follow their Care Team instructions. Water with medications is okay. Do not drink coffee or other fluids.   If you have concerns about taking  your medications, please ask at office or if scheduling via [employer], send a message by clicking on Secure Messaging, Message Your Care Team.            Jul 11, 2017  9:15 AM CDT   Return Visit with Diomedes Menendez MD   [facility] [place] ([facility])    [address] [place] [address] [phone]              Who to contact     If you have questions or need follow up information about today's clinic visit or your schedule please contact [facility] directly at [phone].  Normal or non-critical lab and imaging results will be communicated to you by MyChart, letter or phone within 4 business days after the clinic has received the results. If you do not hear from us within 7 days, please contact the clinic through MyChart or phone. If  "you have a critical or abnormal lab result, we will notify you by phone as soon as possible.  Submit refill requests through [employer] or call your pharmacy and they will forward the refill request to us. Please allow 3 business days for your refill to be completed.          Additional Information About Your Visit        [employer] Services[employer] Information     [employer] lets you send messages to your doctor, view your test results, renew your prescriptions, schedule appointments and more. To sign up, go to [email or web address] . Click on \"Log in\" on the left side of the screen, which will take you to the Welcome page. Then click on \"Sign up Now\" on the right side of the page.     You will be asked to enter the access code listed below, as well as some personal information. Please follow the directions to create your username and password.     Your access code is: [email or web address]  Expires: 2017 10:42 AM     Your access code will  in 90 days. If you need help or a new code, please call your [facility] clinic or [phone].        Care EveryWhere ID     This is your Care EveryWhere ID. This could be used by other organizations to access your [facility] medical records  [id number]        Your Vitals Were     Pulse Height BMI (Body Mass Index)             89 1.676 m (5' 6\") 44.55 kg/m2          Blood Pressure from Last 3 Encounters:   17 123/83   17 122/80   17 110/66    Weight from Last 3 Encounters:   17 125.2 kg (276 lb)   17 125.6 kg (277 lb)   17 125 kg (275 lb 9.6 oz)              We Performed the Following     Follow-Up with Vascular Cardiologist        Primary Care Provider Office Phone # Fax #    Heriberto Haile [phone]       [facility] [address]  [facility] [address]        Thank you!     Thank you for choosing [facility] [place]  for your care. Our goal is always to provide you with excellent care. Hearing " back from our patients is one way we can continue to improve our services. Please take a few minutes to complete the written survey that you may receive in the mail after your visit with us. Thank you!             Your Updated Medication List - Protect others around you: Learn how to safely use, store and throw away your medicines at www.disposemymeds.org.          This list is accurate as of: 5/30/17  4:10 PM.  Always use your most recent med list.                   Brand Name Dispense Instructions for use    allopurinol 300 MG tablet    ZYLOPRIM    90 tablet    TAKE 1 TABLET BY MOUTH DAILY       ASPIRIN NOT PRESCRIBED    INTENTIONAL    0 each    Reported on 4/19/2017       digoxin 125 MCG tablet    LANOXIN    90 tablet    Take 1 tablet (125 mcg) by mouth daily       furosemide 40 MG tablet    LASIX    135 tablet    One tablet in am       ipratropium 0.06 % spray    ATROVENT    45 mL    USE 2 SPRAYS IN EACH NOSTRIL FOUR TIMES DAILY AS NEEDED FOR RHINITIS       lisinopril 10 MG tablet    PRINIVIL/ZESTRIL    90 tablet    Take 0.5 tablets (5 mg) by mouth 2 times daily       metoprolol 50 MG tablet    LOPRESSOR    360 tablet    Take 2 tablets (100 mg) by mouth 2 times daily       Multi-vitamin Tabs tablet   Generic drug:  multivitamin, therapeutic with minerals     100 tablet    Take 1 tablet by mouth daily.       simvastatin 10 MG tablet    ZOCOR    90 tablet    TAKE 1 TABLET(10 MG) BY MOUTH AT BEDTIME       warfarin 2 MG tablet    COUMADIN    135 tablet    1 1/2 pills per day

## 2017-05-30 NOTE — LETTER
5/30/2017    Heriberto Haile MD  [facility]   [address]  [facility] [address]    RE: Vasiliy Corbin       Dear Colleague,    I had the pleasure of seeing Vasiliy Corbin in the [facility].    Vein and Edema Clinic Progress Note  Vasiliy Corbin MRN# [id number]   YOB: 1935 Age: 82 year old     Reason for visit: Venous insufficiency           Assessment and Plan:     1. Venous insufficiency    Significant reflux in bilateral great saphenous veins right great than left with right GSV amenable to ablation    Patient currently on diuretic therapy and compliant with compression stockings    Will continue supportive care at this time as patient not interested in venous ablation    If becomes increasingly symptomatic or develops ulcerations would consider ablation           History of Presenting Illness:    Vasiliy Corbin is a very pleasant 82 year old patient of Dr. Menendez and Laura Wooten who presents today for a follow up to review a venous competency study that revealed significant bilateral GSV reflux. Reviewed study with Dr. Zapata and the right GSV would be amenable to ablation. Left GSV reflux is near the sapheno-femoral junction and likely will not improve symptoms. He has a past medical history significant for atrial fibrillation and mild coronary artery disease with tachycardia induced cardiomyopathy with and EF of 40%.     Patient wear compression stockings daily and takes Lasix 40 mg PO daily. He wears suspenders while at home. He does not have venous ulcerations or visible varicosities. Discussed venous ablation with patient and his wife, but at this time he would like to continue conservative measures.           This note was completed in part using Dragon voice recognition software. Although reviewed after completion, some word and grammatical errors may occur       Review of Systems:   Review of Systems:  Skin:   "Negative     Eyes:  Positive for glasses  ENT:  Negative    Respiratory:  Positive for sleep apnea;CPAP;cough  Cardiovascular:  Negative    Gastroenterology: Negative    Genitourinary:  Negative    Musculoskeletal:  Positive for arthritis  Neurologic:  Negative    Psychiatric:  Negative    Heme/Lymph/Imm:  Negative    Endocrine:  Negative                Physical Exam:     Vitals: /83  Pulse 89  Ht 1.676 m (5' 6\")  Wt 125.2 kg (276 lb)  BMI 44.55 kg/m2  Constitutional:  cooperative obese      Skin:  warm and dry to the touch        Head:  normocephalic        Eyes:  pupils equal and round        ENT:  no pallor or cyanosis        Neck:           Chest:  clear to auscultation        Cardiac: regular rhythm;normal S1 and S2 irregularly irregular rhythm;tachycardic distant heart sounds no presence of murmur            Abdomen:    obese      Vascular:                                        Extremities and Back:      No venous stasis changes, telangiectasia or ulcerations    Neurological:  no gross motor deficits;affect appropriate        CEAP classification for chronic venous disorders   Clinical classification   C0 No visible or palpable signs of venous disease   C1 Telangiectasias, reticular veins, malleolar flares   C2 Varicose veins   C3 Edema without skin changes   C4 Skin changes ascribed to venous disease (eg, pigmentation, venous eczema, lipodermatosclerosis)   C4a Pigmentation or eczema   C4b Lipodermatosclerosis or atrophie venu   C5 Skin changes as defined above with healed ulceration   C6 Skin changes as defined above with active ulceration   S Symptomatic, including ache, pain, tightness, skin irritation, heaviness, and muscle cramps, and other complaints attributable to venous dysfunction   A Asymptomatic   Etiologic classification   Ec Congenital   Ep Primary   Es Secondary (postthrombotic)   En No venous cause identified   Anatomic classification   As Superficial veins   Ap  veins "   Ad Deep veins   An No venous location identified   Pathophysiologic classification   Pr Reflux   Po Obstruction   Pr,o Reflux and obstruction   Pn No venous pathophysiology identifiable          Medications:     Current Outpatient Prescriptions   Medication Sig Dispense Refill     furosemide (LASIX) 40 MG tablet One tablet in am 135 tablet 3     ipratropium (ATROVENT) 0.06 % spray USE 2 SPRAYS IN EACH NOSTRIL FOUR TIMES DAILY AS NEEDED FOR RHINITIS 45 mL 0     digoxin (LANOXIN) 125 MCG tablet Take 1 tablet (125 mcg) by mouth daily 90 tablet 3     metoprolol (LOPRESSOR) 50 MG tablet Take 2 tablets (100 mg) by mouth 2 times daily 360 tablet 3     lisinopril (PRINIVIL/ZESTRIL) 10 MG tablet Take 0.5 tablets (5 mg) by mouth 2 times daily 90 tablet 1     simvastatin (ZOCOR) 10 MG tablet TAKE 1 TABLET(10 MG) BY MOUTH AT BEDTIME 90 tablet 3     warfarin (COUMADIN) 2 MG tablet 1 1/2 pills per day 135 tablet 3     allopurinol (ZYLOPRIM) 300 MG tablet TAKE 1 TABLET BY MOUTH DAILY 90 tablet 3     ASPIRIN NOT PRESCRIBED (INTENTIONAL) Reported on 4/19/2017 0 each 0     Multiple Vitamin (MULTI-VITAMIN) per tablet Take 1 tablet by mouth daily. 100 tablet 12           Family History   Problem Relation Age of Onset     HEART DISEASE Mother 92     Heart failure     HEART DISEASE Father 92     C.A.D. Brother 70     MI     Myocardial Infarction Brother      Family History Negative Sister      Family History Negative Brother      Family History Negative Brother      Family History Negative Brother      Family History Negative Sister        Social History     Social History     Marital status:      Spouse name: N/A     Number of children: N/A     Years of education: N/A     Occupational History     Not on file.     Social History Main Topics     Smoking status: Former Smoker     Packs/day: 1.00     Years: 5.00     Types: Cigarettes     Quit date: 4/3/1977     Smokeless tobacco: Former User     Quit date: 1/1/1960     Alcohol use  No     Drug use: No     Sexual activity: Yes     Other Topics Concern     Parent/Sibling W/ Cabg, Mi Or Angioplasty Before 65f 55m? No     Caffeine Concern Yes     8 cups of  decaf coffee per day     Sleep Concern Yes     sleep apnea, wears CPAP     Stress Concern No     Weight Concern Yes     Weight gain     Special Diet No     Exercise No     Seat Belt Yes     Social History Narrative            Past Medical History:     Past Medical History:   Diagnosis Date     Atrial fibrillation (H)     became chronic afib in 2016,paroxysmal,was on amiodarone but went into persistent afib in april 2106 and amiodrone was d/cd. now on BBLK and considering cardioversion.(5/2106).In Sept 2016 plan is rate control which has been a challenge and Holter in 6 months     CAD (coronary artery disease)     mild stenosis per cath     Cardiomyopathy (H)      Colon polyp 2010     Gout      HTN (hypertension)      Hyperlipidemia      Obesity (BMI 35.0-39.9 without comorbidity) (H)      SUJEY on CPAP               Past Surgical History:     Past Surgical History:   Procedure Laterality Date     CT release       CYSTOSCOPY N/A 4/7/2016    Procedure: CYSTOSCOPY;  Surgeon: Lokesh Lima MD;  Location:  OR     HC LEFT HEART CATHETERIZATION  3/2010    Mild CAD     LASER KTP TRANSURETHRAL RESECTION (TUR) PROSTATE N/A 4/7/2016    Procedure: LASER KTP TRANSURETHRAL RESECTION (TUR) PROSTATE;  Surgeon: Lokesh Lima MD;  Location: [facility] OR     ulnar reroute      right               Allergies:   Cardizem [diltiazem]       Data:   All laboratory data reviewed:    LAST CHOLESTEROL:  Lab Results   Component Value Date    CHOL 147 12/28/2016     Lab Results   Component Value Date    HDL 42 12/28/2016     Lab Results   Component Value Date    LDL 63 12/28/2016     Lab Results   Component Value Date    TRIG 209 12/28/2016     Lab Results   Component Value Date    CHOLHDLRATIO 3.4 02/01/2012       LAST BMP:  Lab Results   Component Value Date      05/22/2017      Lab Results   Component Value Date    POTASSIUM 4.4 05/22/2017     Lab Results   Component Value Date    CHLORIDE 104 05/22/2017     Lab Results   Component Value Date    EDUARD 9.4 05/22/2017     Lab Results   Component Value Date    CO2 29 05/22/2017     Lab Results   Component Value Date    BUN 19 05/22/2017     Lab Results   Component Value Date    CR 1.18 05/22/2017     Lab Results   Component Value Date     05/22/2017       LAST CBC:  Lab Results   Component Value Date    WBC 6.5 12/12/2016    WBC 10.8 03/27/2016     Lab Results   Component Value Date    RBC 4.96 12/12/2016    RBC 4.26 03/27/2016     Lab Results   Component Value Date    HGB 15.6 12/12/2016     Lab Results   Component Value Date    HCT 47.7 12/12/2016     Lab Results   Component Value Date    MCV 96.2 12/12/2016     Lab Results   Component Value Date    MCH 31.4 12/12/2016     Lab Results   Component Value Date    MCHC 32.7 12/12/2016     Lab Results   Component Value Date    RDW 14.6 03/27/2016     Lab Results   Component Value Date     12/12/2016     Thank you for allowing me to participate in the care of your patient.    Sincerely,     KAYLEE ROBERTS [facility]

## 2017-05-30 NOTE — PROGRESS NOTES
Vein and Edema Clinic Progress Note  Vasiliy Corbin MRN# [id number]   YOB: 1935 Age: 82 year old     Reason for visit: Venous insufficiency           Assessment and Plan:     1. Venous insufficiency    Significant reflux in bilateral great saphenous veins right great than left with right GSV amenable to ablation    Patient currently on diuretic therapy and compliant with compression stockings    Will continue supportive care at this time as patient not interested in venous ablation    If becomes increasingly symptomatic or develops ulcerations would consider ablation           History of Presenting Illness:    Vasiliy Corbin is a very pleasant 82 year old patient of Dr. Menendez and Laura Wooten who presents today for a follow up to review a venous competency study that revealed significant bilateral GSV reflux. Reviewed study with Dr. Zapata and the right GSV would be amenable to ablation. Left GSV reflux is near the sapheno-femoral junction and likely will not improve symptoms. He has a past medical history significant for atrial fibrillation and mild coronary artery disease with tachycardia induced cardiomyopathy with and EF of 40%.     Patient wear compression stockings daily and takes Lasix 40 mg PO daily. He wears suspenders while at home. He does not have venous ulcerations or visible varicosities. Discussed venous ablation with patient and his wife, but at this time he would like to continue conservative measures.           This note was completed in part using Dragon voice recognition software. Although reviewed after completion, some word and grammatical errors may occur       Review of Systems:   Review of Systems:  Skin:  Negative     Eyes:  Positive for glasses  ENT:  Negative    Respiratory:  Positive for sleep apnea;CPAP;cough  Cardiovascular:  Negative    Gastroenterology: Negative    Genitourinary:  Negative    Musculoskeletal:  Positive for arthritis  Neurologic:  Negative  "   Psychiatric:  Negative    Heme/Lymph/Imm:  Negative    Endocrine:  Negative                Physical Exam:     Vitals: /83  Pulse 89  Ht 1.676 m (5' 6\")  Wt 125.2 kg (276 lb)  BMI 44.55 kg/m2  Constitutional:  cooperative obese      Skin:  warm and dry to the touch        Head:  normocephalic        Eyes:  pupils equal and round        ENT:  no pallor or cyanosis        Neck:           Chest:  clear to auscultation        Cardiac: regular rhythm;normal S1 and S2 irregularly irregular rhythm;tachycardic distant heart sounds no presence of murmur            Abdomen:    obese      Vascular:                                        Extremities and Back:      No venous stasis changes, telangiectasia or ulcerations    Neurological:  no gross motor deficits;affect appropriate        CEAP classification for chronic venous disorders   Clinical classification   C0 No visible or palpable signs of venous disease   C1 Telangiectasias, reticular veins, malleolar flares   C2 Varicose veins   C3 Edema without skin changes   C4 Skin changes ascribed to venous disease (eg, pigmentation, venous eczema, lipodermatosclerosis)   C4a Pigmentation or eczema   C4b Lipodermatosclerosis or atrophie venu   C5 Skin changes as defined above with healed ulceration   C6 Skin changes as defined above with active ulceration   S Symptomatic, including ache, pain, tightness, skin irritation, heaviness, and muscle cramps, and other complaints attributable to venous dysfunction   A Asymptomatic   Etiologic classification   Ec Congenital   Ep Primary   Es Secondary (postthrombotic)   En No venous cause identified   Anatomic classification   As Superficial veins   Ap  veins   Ad Deep veins   An No venous location identified   Pathophysiologic classification   Pr Reflux   Po Obstruction   Pr,o Reflux and obstruction   Pn No venous pathophysiology identifiable          Medications:     Current Outpatient Prescriptions   Medication Sig " Dispense Refill     furosemide (LASIX) 40 MG tablet One tablet in am 135 tablet 3     ipratropium (ATROVENT) 0.06 % spray USE 2 SPRAYS IN EACH NOSTRIL FOUR TIMES DAILY AS NEEDED FOR RHINITIS 45 mL 0     digoxin (LANOXIN) 125 MCG tablet Take 1 tablet (125 mcg) by mouth daily 90 tablet 3     metoprolol (LOPRESSOR) 50 MG tablet Take 2 tablets (100 mg) by mouth 2 times daily 360 tablet 3     lisinopril (PRINIVIL/ZESTRIL) 10 MG tablet Take 0.5 tablets (5 mg) by mouth 2 times daily 90 tablet 1     simvastatin (ZOCOR) 10 MG tablet TAKE 1 TABLET(10 MG) BY MOUTH AT BEDTIME 90 tablet 3     warfarin (COUMADIN) 2 MG tablet 1 1/2 pills per day 135 tablet 3     allopurinol (ZYLOPRIM) 300 MG tablet TAKE 1 TABLET BY MOUTH DAILY 90 tablet 3     ASPIRIN NOT PRESCRIBED (INTENTIONAL) Reported on 4/19/2017 0 each 0     Multiple Vitamin (MULTI-VITAMIN) per tablet Take 1 tablet by mouth daily. 100 tablet 12           Family History   Problem Relation Age of Onset     HEART DISEASE Mother 92     Heart failure     HEART DISEASE Father 92     C.A.D. Brother 70     MI     Myocardial Infarction Brother      Family History Negative Sister      Family History Negative Brother      Family History Negative Brother      Family History Negative Brother      Family History Negative Sister        Social History     Social History     Marital status:      Spouse name: N/A     Number of children: N/A     Years of education: N/A     Occupational History     Not on file.     Social History Main Topics     Smoking status: Former Smoker     Packs/day: 1.00     Years: 5.00     Types: Cigarettes     Quit date: 4/3/1977     Smokeless tobacco: Former User     Quit date: 1/1/1960     Alcohol use No     Drug use: No     Sexual activity: Yes     Other Topics Concern     Parent/Sibling W/ Cabg, Mi Or Angioplasty Before 65f 55m? No     Caffeine Concern Yes     8 cups of  decaf coffee per day     Sleep Concern Yes     sleep apnea, wears CPAP     Stress Concern  No     Weight Concern Yes     Weight gain     Special Diet No     Exercise No     Seat Belt Yes     Social History Narrative            Past Medical History:     Past Medical History:   Diagnosis Date     Atrial fibrillation (H)     became chronic afib in 2016,paroxysmal,was on amiodarone but went into persistent afib in april 2106 and amiodrone was d/cd. now on BBLK and considering cardioversion.(5/2106).In Sept 2016 plan is rate control which has been a challenge and Holter in 6 months     CAD (coronary artery disease)     mild stenosis per cath     Cardiomyopathy (H)      Colon polyp 2010     Gout      HTN (hypertension)      Hyperlipidemia      Obesity (BMI 35.0-39.9 without comorbidity) (H)      SUJEY on CPAP               Past Surgical History:     Past Surgical History:   Procedure Laterality Date     CT release       CYSTOSCOPY N/A 4/7/2016    Procedure: CYSTOSCOPY;  Surgeon: Lokesh Lima MD;  Location:  OR     HC LEFT HEART CATHETERIZATION  3/2010    Mild CAD     LASER KTP TRANSURETHRAL RESECTION (TUR) PROSTATE N/A 4/7/2016    Procedure: LASER KTP TRANSURETHRAL RESECTION (TUR) PROSTATE;  Surgeon: Lokesh Lima MD;  Location:  OR     ulnar reroute      right               Allergies:   Cardizem [diltiazem]       Data:   All laboratory data reviewed:    LAST CHOLESTEROL:  Lab Results   Component Value Date    CHOL 147 12/28/2016     Lab Results   Component Value Date    HDL 42 12/28/2016     Lab Results   Component Value Date    LDL 63 12/28/2016     Lab Results   Component Value Date    TRIG 209 12/28/2016     Lab Results   Component Value Date    CHOLHDLRATIO 3.4 02/01/2012       LAST BMP:  Lab Results   Component Value Date     05/22/2017      Lab Results   Component Value Date    POTASSIUM 4.4 05/22/2017     Lab Results   Component Value Date    CHLORIDE 104 05/22/2017     Lab Results   Component Value Date    EDUARD 9.4 05/22/2017     Lab Results   Component Value Date    CO2 29 05/22/2017      Lab Results   Component Value Date    BUN 19 05/22/2017     Lab Results   Component Value Date    CR 1.18 05/22/2017     Lab Results   Component Value Date     05/22/2017       LAST CBC:  Lab Results   Component Value Date    WBC 6.5 12/12/2016    WBC 10.8 03/27/2016     Lab Results   Component Value Date    RBC 4.96 12/12/2016    RBC 4.26 03/27/2016     Lab Results   Component Value Date    HGB 15.6 12/12/2016     Lab Results   Component Value Date    HCT 47.7 12/12/2016     Lab Results   Component Value Date    MCV 96.2 12/12/2016     Lab Results   Component Value Date    MCH 31.4 12/12/2016     Lab Results   Component Value Date    MCHC 32.7 12/12/2016     Lab Results   Component Value Date    RDW 14.6 03/27/2016     Lab Results   Component Value Date     12/12/2016         MALGORZATA BUSTILLO, APRN, CNP

## 2017-06-06 ENCOUNTER — OFFICE VISIT (OUTPATIENT)
Dept: FAMILY MEDICINE | Facility: CLINIC | Age: 82
End: 2017-06-06

## 2017-06-06 ENCOUNTER — TRANSFERRED RECORDS (OUTPATIENT)
Dept: FAMILY MEDICINE | Facility: CLINIC | Age: 82
End: 2017-06-06

## 2017-06-06 VITALS
DIASTOLIC BLOOD PRESSURE: 70 MMHG | WEIGHT: 275 LBS | HEIGHT: 66 IN | OXYGEN SATURATION: 97 % | HEART RATE: 69 BPM | SYSTOLIC BLOOD PRESSURE: 115 MMHG | TEMPERATURE: 98.1 F | BODY MASS INDEX: 44.2 KG/M2

## 2017-06-06 DIAGNOSIS — I25.10 CORONARY ARTERY DISEASE INVOLVING NATIVE CORONARY ARTERY OF NATIVE HEART WITHOUT ANGINA PECTORIS: ICD-10-CM

## 2017-06-06 DIAGNOSIS — R05.9 COUGH: Primary | ICD-10-CM

## 2017-06-06 DIAGNOSIS — I51.89 DIASTOLIC DYSFUNCTION: ICD-10-CM

## 2017-06-06 DIAGNOSIS — I42.9 CARDIOMYOPATHY (H): ICD-10-CM

## 2017-06-06 DIAGNOSIS — R60.0 LOCALIZED EDEMA: ICD-10-CM

## 2017-06-06 PROCEDURE — 99214 OFFICE O/P EST MOD 30 MIN: CPT | Performed by: FAMILY MEDICINE

## 2017-06-06 RX ORDER — FUROSEMIDE 40 MG
60 TABLET ORAL DAILY
Qty: 135 TABLET | Refills: 3 | Status: ON HOLD | OUTPATIENT
Start: 2017-06-06 | End: 2018-01-16

## 2017-06-06 NOTE — MR AVS SNAPSHOT
After Visit Summary   6/6/2017    Vasiliy Corbin    MRN: [id number]           Patient Information     Date Of Birth          1935        Visit Information        Provider Department      6/6/2017 10:15 AM Heriberto Haile MD [facility]        Today's Diagnoses     Cough    -  1    Cardiomyopathy (H)        Diastolic dysfunction        Coronary artery disease involving native coronary artery of native heart without angina pectoris        Localized edema           Follow-ups after your visit        Additional Services     Referral to [facility]       Referral to [facility]  Phone: [phone]  Fax: [phone]  Reason for referral: needs CXR PA and Lat                  Your next 10 appointments already scheduled     Jun 26, 2017  9:00 AM CDT   LAB with RF LAB   [facility] ([facility])    [address] [place] [address] [phone]           Patient must bring picture ID.  Patient should be prepared to give a urine specimen  Please do not eat 10-12 hours before your appointment if you are coming in fasting for labs on lipids, cholesterol, or glucose (sugar).  Pregnant women should follow their Care Team instructions. Water with medications is okay. Do not drink coffee or other fluids.   If you have concerns about taking  your medications, please ask at office or if scheduling via [employer], send a message by clicking on Secure Messaging, Message Your Care Team.            Jul 11, 2017  9:15 AM CDT   Return Visit with Diomedes Menendez MD   [facility] AT [place] ([facility])    [address] [place] [address] [phone]              Who to contact     If you have questions or need follow up information about today's clinic visit or your schedule please contact [facility] directly at [phone].  Normal or non-critical lab and imaging results will be  "communicated to you by MyChart, letter or phone within 4 business days after the clinic has received the results. If you do not hear from us within 7 days, please contact the clinic through "[employer] or phone. If you have a critical or abnormal lab result, we will notify you by phone as soon as possible.  Submit refill requests through "[employer] or call your pharmacy and they will forward the refill request to us. Please allow 3 business days for your refill to be completed.          Additional Information About Your Visit        "[employer] Information     "[employer] lets you send messages to your doctor, view your test results, renew your prescriptions, schedule appointments and more. To sign up, go to [email or web address] . Click on \"Log in\" on the left side of the screen, which will take you to the Welcome page. Then click on \"Sign up Now\" on the right side of the page.     You will be asked to enter the access code listed below, as well as some personal information. Please follow the directions to create your username and password.     Your access code is: [email or web address]  Expires: 2017 10:42 AM     Your access code will  in 90 days. If you need help or a new code, please call your [place] clinic or [phone].        Care EveryWhere ID     This is your Care EveryWhere ID. This could be used by other organizations to access your [place] medical records  [id number]        Your Vitals Were     Pulse Temperature Height Pulse Oximetry BMI (Body Mass Index)       69 98.1  F (36.7  C) (Oral) 1.676 m (5' 6\") 97% 44.39 kg/m2        Blood Pressure from Last 3 Encounters:   17 115/70   17 123/83   17 122/80    Weight from Last 3 Encounters:   17 124.7 kg (275 lb)   17 125.2 kg (276 lb)   17 125.6 kg (277 lb)              We Performed the Following     [facility]          Today's Medication Changes          These changes are accurate as of: 17 10:54 AM.  If " you have any questions, ask your nurse or doctor.               These medicines have changed or have updated prescriptions.        Dose/Directions    furosemide 40 MG tablet   Commonly known as:  LASIX   This may have changed:    - how much to take  - how to take this  - when to take this   Used for:  Localized edema   Changed by:  Heriberto Haile MD        Dose:  60 mg   Take 1.5 tablets (60 mg) by mouth daily One tablet in am   Quantity:  135 tablet   Refills:  3            Where to get your medicines      These medications were sent to [facility] [address] [place] [address] & [address], [place] [address]     Phone:  [phone]     furosemide 40 MG tablet                Primary Care Provider Office Phone # Fax #    Heriberto Haile [phone]       [facility] [address] [place] [address]        Thank you!     Thank you for choosing [facility]  for your care. Our goal is always to provide you with excellent care. Hearing back from our patients is one way we can continue to improve our services. Please take a few minutes to complete the written survey that you may receive in the mail after your visit with us. Thank you!             Your Updated Medication List - Protect others around you: Learn how to safely use, store and throw away your medicines at www.disposemymeds.org.          This list is accurate as of: 6/6/17 10:54 AM.  Always use your most recent med list.                   Brand Name Dispense Instructions for use    allopurinol 300 MG tablet    ZYLOPRIM    90 tablet    TAKE 1 TABLET BY MOUTH DAILY       ASPIRIN NOT PRESCRIBED    INTENTIONAL    0 each    Reported on 4/19/2017       digoxin 125 MCG tablet    LANOXIN    90 tablet    Take 1 tablet (125 mcg) by mouth daily       furosemide 40 MG tablet    LASIX    135 tablet    Take 1.5 tablets (60 mg) by mouth daily One tablet in am        ipratropium 0.06 % spray    ATROVENT    45 mL    USE 2 SPRAYS IN EACH NOSTRIL FOUR TIMES DAILY AS NEEDED FOR RHINITIS       lisinopril 10 MG tablet    PRINIVIL/ZESTRIL    90 tablet    Take 0.5 tablets (5 mg) by mouth 2 times daily       metoprolol 50 MG tablet    LOPRESSOR    360 tablet    Take 2 tablets (100 mg) by mouth 2 times daily       Multi-vitamin Tabs tablet   Generic drug:  multivitamin, therapeutic with minerals     100 tablet    Take 1 tablet by mouth daily.       simvastatin 10 MG tablet    ZOCOR    90 tablet    TAKE 1 TABLET(10 MG) BY MOUTH AT BEDTIME       warfarin 2 MG tablet    COUMADIN    135 tablet    1 1/2 pills per day

## 2017-06-06 NOTE — PROGRESS NOTES
"    Venous insuff deep veins   in legs, he is not interested in treatment      Cough   1 month  Non productive but feels mucous  Cough at night is worse  Not sob   Mild webb with long walk , \" 2 blks\"  gerd not noted  No noted PNDrip    Maybe had cold 1 month ago    SUJEY on CPAP mask   Last check 1 year or more   New mask 2 months go    Ex smoker only 4 years   50 years ago         Past Medical History:   Diagnosis Date     Atrial fibrillation (H)     became chronic afib in 2016,paroxysmal,was on amiodarone but went into persistent afib in april 2106 and amiodrone was d/cd. now on BBLK and considering cardioversion.(5/2106).In Sept 2016 plan is rate control which has been a challenge and Holter in 6 months     CAD (coronary artery disease)     mild stenosis per cath     Cardiomyopathy (H)      Colon polyp 2010     Gout      HTN (hypertension)      Hyperlipidemia      Obesity (BMI 35.0-39.9 without comorbidity) (H)      SUJEY on CPAP      Past Surgical History:   Procedure Laterality Date     CT release       CYSTOSCOPY N/A 4/7/2016    Procedure: CYSTOSCOPY;  Surgeon: Lokesh Lima MD;  Location:  OR     HC LEFT HEART CATHETERIZATION  3/2010    Mild CAD     LASER KTP TRANSURETHRAL RESECTION (TUR) PROSTATE N/A 4/7/2016    Procedure: LASER KTP TRANSURETHRAL RESECTION (TUR) PROSTATE;  Surgeon: Lokesh Lima MD;  Location:  OR     ulnar reroute      right      Current Outpatient Prescriptions   Medication     furosemide (LASIX) 40 MG tablet     ipratropium (ATROVENT) 0.06 % spray     digoxin (LANOXIN) 125 MCG tablet     metoprolol (LOPRESSOR) 50 MG tablet     lisinopril (PRINIVIL/ZESTRIL) 10 MG tablet     simvastatin (ZOCOR) 10 MG tablet     warfarin (COUMADIN) 2 MG tablet     allopurinol (ZYLOPRIM) 300 MG tablet     ASPIRIN NOT PRESCRIBED (INTENTIONAL)     Multiple Vitamin (MULTI-VITAMIN) per tablet     [DISCONTINUED] furosemide (LASIX) 40 MG tablet     No current facility-administered medications for this visit.  " "    Ros   weigh up over the last 6 months quite a bit   Sob with exertion    /70  Pulse 69  Temp 98.1  F (36.7  C) (Oral)  Ht 1.676 m (5' 6\")  Wt 124.7 kg (275 lb)  SpO2 97%  BMI 44.39 kg/m2  Vasiliy is obese and in NAD  Lungs decrease BS mild  Cor reg  Edema in legs +2 with stockings    cxr ordered and pending      ASSESSMENT / PLAN  (R05) Cough  (primary encounter diagnosis)  Needs CXR given his size xray at [facility] will be helpful   [facility]: [facility]    He had PFTS in past that were normal  Consider emperic abx ? X 1    (I42.9) Cardiomyopathy (H)  Last ef 50- 55% April 2017  Increase lasix to 60 mg /day    Diastolic DYS      (I25.10) Coronary artery disease involving native coronary artery of native heart without angina pectoris  No CP    (R60.0) Localized edema  Plan: furosemide (LASIX) 40 MG tablet  He has venous insuff that may benefit from treatment     rtc 1 week   Noah Kim MD                  "

## 2017-06-12 ENCOUNTER — OFFICE VISIT (OUTPATIENT)
Dept: FAMILY MEDICINE | Facility: CLINIC | Age: 82
End: 2017-06-12

## 2017-06-12 VITALS
SYSTOLIC BLOOD PRESSURE: 124 MMHG | HEART RATE: 72 BPM | RESPIRATION RATE: 16 BRPM | OXYGEN SATURATION: 98 % | BODY MASS INDEX: 44.26 KG/M2 | DIASTOLIC BLOOD PRESSURE: 72 MMHG | WEIGHT: 274.2 LBS

## 2017-06-12 DIAGNOSIS — R05.9 COUGH: Primary | ICD-10-CM

## 2017-06-12 DIAGNOSIS — I48.20 CHRONIC ATRIAL FIBRILLATION (H): ICD-10-CM

## 2017-06-12 DIAGNOSIS — I42.9 CARDIOMYOPATHY (H): ICD-10-CM

## 2017-06-12 DIAGNOSIS — I10 ESSENTIAL HYPERTENSION: ICD-10-CM

## 2017-06-12 DIAGNOSIS — Z79.01 LONG TERM CURRENT USE OF ANTICOAGULANT THERAPY: ICD-10-CM

## 2017-06-12 DIAGNOSIS — E66.01 MORBID OBESITY DUE TO EXCESS CALORIES (H): ICD-10-CM

## 2017-06-12 LAB — INR PPP: 2.3 (ref 2–3)

## 2017-06-12 PROCEDURE — 99213 OFFICE O/P EST LOW 20 MIN: CPT | Performed by: FAMILY MEDICINE

## 2017-06-12 PROCEDURE — 36415 COLL VENOUS BLD VENIPUNCTURE: CPT | Performed by: FAMILY MEDICINE

## 2017-06-12 PROCEDURE — 85610 PROTHROMBIN TIME: CPT | Performed by: FAMILY MEDICINE

## 2017-06-12 NOTE — MR AVS SNAPSHOT
After Visit Summary   6/12/2017    Vasiliy Corbin    MRN: [id number]           Patient Information     Date Of Birth          1935        Visit Information        Provider Department      6/12/2017 1:30 PM Heriberto Haile MD [facility]        Today's Diagnoses     Cough    -  1    Essential hypertension        Long term current use of anticoagulant therapy        Chronic atrial fibrillation (H)        Morbid obesity due to excess calories (H)        Cardiomyopathy (H)           Follow-ups after your visit        Your next 10 appointments already scheduled     Jun 26, 2017  9:00 AM CDT   LAB with RF LAB   [facility]    [address] [place] [address] [phone]           Patient must bring picture ID.  Patient should be prepared to give a urine specimen  Please do not eat 10-12 hours before your appointment if you are coming in fasting for labs on lipids, cholesterol, or glucose (sugar).  Pregnant women should follow their Care Team instructions. Water with medications is okay. Do not drink coffee or other fluids.   If you have concerns about taking  your medications, please ask at office or if scheduling via [employer], send a message by clicking on Secure Messaging, Message Your Care Team.            Jul 11, 2017  9:15 AM CDT   Return Visit with Diomedes Menendez MD   [facility] [place] ([facility])    [address] [place] [address] [phone]              Who to contact     If you have questions or need follow up information about today's clinic visit or your schedule please contact [facility] directly at [phone].  Normal or non-critical lab and imaging results will be communicated to you by MyChart, letter or phone within 4 business days after the clinic has received the results. If you do not hear from us within 7 days, please contact  "the clinic through [employer] or phone. If you have a critical or abnormal lab result, we will notify you by phone as soon as possible.  Submit refill requests through [employer] or call your pharmacy and they will forward the refill request to us. Please allow 3 business days for your refill to be completed.          Additional Information About Your Visit        [employer] Information     [employer] lets you send messages to your doctor, view your test results, renew your prescriptions, schedule appointments and more. To sign up, go to [email or web address] . Click on \"Log in\" on the left side of the screen, which will take you to the Welcome page. Then click on \"Sign up Now\" on the right side of the page.     You will be asked to enter the access code listed below, as well as some personal information. Please follow the directions to create your username and password.     Your access code is: [email or web address]  Expires: 2017 10:42 AM     Your access code will  in 90 days. If you need help or a new code, please call your [place] clinic or [phone].        Care EveryWhere ID     This is your Care EveryWhere ID. This could be used by other organizations to access your [place] medical records  [id number]        Your Vitals Were     Pulse Respirations Pulse Oximetry BMI (Body Mass Index)          72 16 98% 44.26 kg/m2         Blood Pressure from Last 3 Encounters:   17 124/72   17 115/70   17 123/83    Weight from Last 3 Encounters:   17 124.4 kg (274 lb 3.2 oz)   17 124.7 kg (275 lb)   17 125.2 kg (276 lb)              We Performed the Following     Prothrombin - INR ([facility])        Primary Care Provider Office Phone # Fax #    Heriberto Haile [phone]       [facility] [address]  [facility] [address]        Thank you!     Thank you for choosing [facility]  for your care. Our goal is always to provide you with excellent care. " Hearing back from our patients is one way we can continue to improve our services. Please take a few minutes to complete the written survey that you may receive in the mail after your visit with us. Thank you!             Your Updated Medication List - Protect others around you: Learn how to safely use, store and throw away your medicines at www.disposemymeds.org.          This list is accurate as of: 6/12/17  8:29 PM.  Always use your most recent med list.                   Brand Name Dispense Instructions for use    allopurinol 300 MG tablet    ZYLOPRIM    90 tablet    TAKE 1 TABLET BY MOUTH DAILY       ASPIRIN NOT PRESCRIBED    INTENTIONAL    0 each    Reported on 4/19/2017       digoxin 125 MCG tablet    LANOXIN    90 tablet    Take 1 tablet (125 mcg) by mouth daily       furosemide 40 MG tablet    LASIX    135 tablet    Take 1.5 tablets (60 mg) by mouth daily One tablet in am       ipratropium 0.06 % spray    ATROVENT    45 mL    USE 2 SPRAYS IN EACH NOSTRIL FOUR TIMES DAILY AS NEEDED FOR RHINITIS       lisinopril 10 MG tablet    PRINIVIL/ZESTRIL    90 tablet    Take 0.5 tablets (5 mg) by mouth 2 times daily       metoprolol 50 MG tablet    LOPRESSOR    360 tablet    Take 2 tablets (100 mg) by mouth 2 times daily       Multi-vitamin Tabs tablet   Generic drug:  multivitamin, therapeutic with minerals     100 tablet    Take 1 tablet by mouth daily.       simvastatin 10 MG tablet    ZOCOR    90 tablet    TAKE 1 TABLET(10 MG) BY MOUTH AT BEDTIME       warfarin 2 MG tablet    COUMADIN    135 tablet    1 1/2 pills per day

## 2017-06-13 NOTE — PROGRESS NOTES
Subjective:  Here to discuss the status of the following problem(s):(Unless noted the problem(s) is/are stable and if applicable the medicine(s) being used is/are causing no side effects or problems.)    CC: INR Followup and Recheck Medication (increase of furosemide)    \    Cough- CXR at last visit = normal     1. Essential hypertension  BP Readings from Last 3 Encounters:   06/12/17 124/72   06/06/17 115/70   05/30/17 123/83       2. Long term current use of anticoagulant therapy  No bruising    3. Chronic atrial fibrillation (H)  Rate okay , is not feeling palpitations    4. Morbid obesity due to excess calories (H)  He is aware he is severely over weight and is not interested in consultation with dietician    5. Cardiomyopathy (H)  Low normal EF  Most recent echocardiogram on 04/25/2017 showed EF of 50%-55% with no regional wall motion abnormalities.  Mild RV dilatation.  Mild AI, mild biatrial enlargement.  Estimated RVSP was 26 mmHg plus right atrial pressure.      EDEMA - has venosus insuff ,using stockings  I increased his lasix to 60mg with no change in edema or webb.     ROS:  General:  NEGATIVE for fever, chills, change in weight CV:  Edema the same    Past Medical History:   Diagnosis Date     Atrial fibrillation (H)     became chronic afib in 2016,paroxysmal,was on amiodarone but went into persistent afib in april 2106 and amiodrone was d/cd. now on BBLK and considering cardioversion.(5/2106).In Sept 2016 plan is rate control which has been a challenge and Holter in 6 months     CAD (coronary artery disease)     mild stenosis per cath     Cardiomyopathy (H)      Colon polyp 2010     Gout      HTN (hypertension)      Hyperlipidemia      Obesity (BMI 35.0-39.9 without comorbidity) (H)      SUJEY on CPAP      Current Outpatient Prescriptions   Medication     furosemide (LASIX) 40 MG tablet     ipratropium (ATROVENT) 0.06 % spray     digoxin (LANOXIN) 125 MCG tablet     metoprolol (LOPRESSOR) 50 MG tablet      lisinopril (PRINIVIL/ZESTRIL) 10 MG tablet     simvastatin (ZOCOR) 10 MG tablet     warfarin (COUMADIN) 2 MG tablet     allopurinol (ZYLOPRIM) 300 MG tablet     ASPIRIN NOT PRESCRIBED (INTENTIONAL)     Multiple Vitamin (MULTI-VITAMIN) per tablet     No current facility-administered medications for this visit.       reports that he quit smoking about 40 years ago. His smoking use included Cigarettes. He has a 5.00 pack-year smoking history. He quit smokeless tobacco use about 57 years ago. He reports that he does not drink alcohol or use illicit drugs.      EXAM:  BP: 124/72   Pulse: 72      Wt Readings from Last 2 Encounters:   06/12/17 124.4 kg (274 lb 3.2 oz)   06/06/17 124.7 kg (275 lb)       BMI= Body mass index is 44.26 kg/(m^2).    EXAM:  Vasiliy is obese and in NAD  Lungs decrease BS mild  Cor irreg   Edema in legs +2 with stockings     Assessment/Plan:  Vasiliy was seen today for inr followup and recheck medication.    Diagnoses and all orders for this visit:  Cough- seems unchanged , cxr okay not really bothering at this time.      Essential hypertension  BP OK  Long term current use of anticoagulant therapy  -     Prothrombin - INR ([facility])    Chronic atrial fibrillation (H)  -     Prothrombin - INR ([facility])    Morbid obesity due to excess calories (H)  Really nothing to do unless Vasiliy is willing to change and he is not.  Cardiomyopathy (H)/  Return lasix to 40 mg as increase no help            Heriberto Haile  [facility]

## 2017-06-26 ENCOUNTER — OFFICE VISIT (OUTPATIENT)
Dept: FAMILY MEDICINE | Facility: CLINIC | Age: 82
End: 2017-06-26

## 2017-06-26 VITALS
DIASTOLIC BLOOD PRESSURE: 62 MMHG | WEIGHT: 270.8 LBS | RESPIRATION RATE: 16 BRPM | TEMPERATURE: 97.8 F | OXYGEN SATURATION: 99 % | SYSTOLIC BLOOD PRESSURE: 112 MMHG | HEART RATE: 66 BPM | BODY MASS INDEX: 43.71 KG/M2

## 2017-06-26 DIAGNOSIS — I42.9 CARDIOMYOPATHY, UNSPECIFIED TYPE (H): ICD-10-CM

## 2017-06-26 DIAGNOSIS — K21.9 GASTROESOPHAGEAL REFLUX DISEASE, ESOPHAGITIS PRESENCE NOT SPECIFIED: ICD-10-CM

## 2017-06-26 DIAGNOSIS — R05.9 COUGH: Primary | ICD-10-CM

## 2017-06-26 PROCEDURE — 99214 OFFICE O/P EST MOD 30 MIN: CPT | Performed by: FAMILY MEDICINE

## 2017-06-26 PROCEDURE — 36415 COLL VENOUS BLD VENIPUNCTURE: CPT | Performed by: FAMILY MEDICINE

## 2017-06-26 RX ORDER — LEVOFLOXACIN 500 MG/1
500 TABLET, FILM COATED ORAL DAILY
Qty: 10 TABLET | Refills: 0 | Status: SHIPPED | OUTPATIENT
Start: 2017-06-26 | End: 2017-07-06

## 2017-06-26 NOTE — PROGRESS NOTES
Cough-   CXR at last visit = normal    2 months  Has wheezing at times  Has rtattling  has  webb   Cough at night   Had PFT on amiodarone= nl   No reflux  Had abx in march and April     1. Essential hypertension  BP Readings from Last 3 Encounters:   06/26/17 112/62   06/12/17 124/72   06/06/17 115/70       2. Long term current use of anticoagulant therapy  No bruising     3. Chronic atrial fibrillation (H)  Rate okay , is not feeling palpitations     5. Cardiomyopathy (H)  Low normal EF  Most recent echocardiogram on 04/25/2017 showed EF of 50%-55% with no regional wall motion abnormalities.  Mild RV dilatation.  Mild AI, mild biatrial enlargement.  Estimated RVSP was 26 mmHg plus right atrial pressure.       EDEMA - now back on 40 mg lasix no change in edema     Ex smker only three years    Past Medical History:   Diagnosis Date     Atrial fibrillation (H)     became chronic afib in 2016,paroxysmal,was on amiodarone but went into persistent afib in april 2106 and amiodrone was d/cd. now on BBLK and considering cardioversion.(5/2106).In Sept 2016 plan is rate control which has been a challenge and Holter in 6 months     CAD (coronary artery disease)     mild stenosis per cath     Cardiomyopathy (H)      Colon polyp 2010     Gout      HTN (hypertension)      Hyperlipidemia      Obesity (BMI 35.0-39.9 without comorbidity)      SUJEY on CPAP      Past Surgical History:   Procedure Laterality Date     CT release       CYSTOSCOPY N/A 4/7/2016    Procedure: CYSTOSCOPY;  Surgeon: Lokesh Lima MD;  Location:  OR      LEFT HEART CATHETERIZATION  3/2010    Mild CAD     LASER KTP TRANSURETHRAL RESECTION (TUR) PROSTATE N/A 4/7/2016    Procedure: LASER KTP TRANSURETHRAL RESECTION (TUR) PROSTATE;  Surgeon: Lokesh Lima MD;  Location:  OR     ulnar reroute      right      Current Outpatient Prescriptions   Medication     omeprazole (PRILOSEC) 20 MG CR capsule     umeclidinium-vilanterol (ANORO ELLIPTA) 62.5-25 MCG/INH  oral inhaler     levofloxacin (LEVAQUIN) 500 MG tablet     furosemide (LASIX) 40 MG tablet     ipratropium (ATROVENT) 0.06 % spray     digoxin (LANOXIN) 125 MCG tablet     metoprolol (LOPRESSOR) 50 MG tablet     lisinopril (PRINIVIL/ZESTRIL) 10 MG tablet     simvastatin (ZOCOR) 10 MG tablet     warfarin (COUMADIN) 2 MG tablet     allopurinol (ZYLOPRIM) 300 MG tablet     Multiple Vitamin (MULTI-VITAMIN) per tablet     ASPIRIN NOT PRESCRIBED (INTENTIONAL)     No current facility-administered medications for this visit.      Ros  pulm as above  Cor - no noted palitations  Edema same    /62  Pulse 66  Temp 97.8  F (36.6  C) (Oral)  Resp 16  Wt 122.8 kg (270 lb 12.8 oz)  SpO2 99%  BMI 43.71 kg/m2    VS noted   Morbid obese  Has freq minimal cough  EYES = NL  EARS= NL  MOUTH =NL  NECK = NL  LUNGS=clear, no wheezing  COR=NL  ABD=NL  EXT=NL   MOOD AFFECT =NL      ASSESSMENT / PLAN  (R05) Cough  (primary encounter diagnosis)  Comment:   Several months, with abx x2   cxr okay  Denies reflux or PND  Prior PFTS NL    Plan: trial of Omeprazole 20 mg /d  B-Type Natriuretic Peptide ([facility]),   Levofloxacin (LEVAQUIN) 500 MG tablet,      For 10 days.  If persists with cough see pulm vs ct chest    (K21.9) Gastroesophageal reflux disease ?, esophagitis presence not specified  Plan: omeprazole (PRILOSEC) 20 MG CR capsule, VENOUS         COLLECTION      (I42.9) Cardiomyopathy, unspecified type (H)  Check BNP     Noah Kim MD

## 2017-06-26 NOTE — MR AVS SNAPSHOT
After Visit Summary   6/26/2017    Vasiliy Corbin    MRN: [id number]           Patient Information     Date Of Birth          1935        Visit Information        Provider Department      6/26/2017 1:45 PM Heriberto Haile MD [facility]        Today's Diagnoses     Gastroesophageal reflux disease, esophagitis presence not specified    -  1    Cough           Follow-ups after your visit        Your next 10 appointments already scheduled     Jul 11, 2017  9:15 AM CDT   Return Visit with Diomedes Menendez MD   [facility] ([facility])    [address] [place] [address] [address]  [facility] [id number] [phone]            Jul 17, 2017 10:30 AM CDT   LAB with RF LAB   [facility] ([facility])    [address] [place] [address] [phone]           Patient must bring picture ID.  Patient should be prepared to give a urine specimen  Please do not eat 10-12 hours before your appointment if you are coming in fasting for labs on lipids, cholesterol, or glucose (sugar).  Pregnant women should follow their Care Team instructions. Water with medications is okay. Do not drink coffee or other fluids.   If you have concerns about taking  your medications, please ask at office or if scheduling via StreetfaireHD, send a message by clicking on Secure Messaging, Message Your Care Team.              Who to contact     If you have questions or need follow up information about today's clinic visit or your schedule please contact [facility] directly at [phone].  Normal or non-critical lab and imaging results will be communicated to you by MyChart, letter or phone within 4 business days after the clinic has received the results. If you do not hear from us within 7 days, please contact the clinic through MyChart or phone. If you have a critical or abnormal lab result, we will notify you by phone as soon as  "possible.  Submit refill requests through ImmunotEGG or call your pharmacy and they will forward the refill request to us. Please allow 3 business days for your refill to be completed.          Additional Information About Your Visit        ImmunotEGG Information     ImmunotEGG lets you send messages to your doctor, view your test results, renew your prescriptions, schedule appointments and more. To sign up, go to [email or web address] . Click on \"Log in\" on the left side of the screen, which will take you to the Welcome page. Then click on \"Sign up Now\" on the right side of the page.     You will be asked to enter the access code listed below, as well as some personal information. Please follow the directions to create your username and password.     Your access code is: [email or web address]  Expires: 2017  2:07 PM     Your access code will  in 90 days. If you need help or a new code, please call your [facility] clinic or [phone].        Care EveryWhere ID     This is your Care EveryWhere ID. This could be used by other organizations to access your [facility] medical records  [id number]        Your Vitals Were     Pulse Temperature Respirations Pulse Oximetry BMI (Body Mass Index)       66 97.8  F (36.6  C) (Oral) 16 99% 43.71 kg/m2        Blood Pressure from Last 3 Encounters:   17 112/62   17 124/72   17 115/70    Weight from Last 3 Encounters:   17 122.8 kg (270 lb 12.8 oz)   17 124.4 kg (274 lb 3.2 oz)   17 124.7 kg (275 lb)              We Performed the Following     B-Type Natriuretic Peptide ([facility])          Today's Medication Changes          These changes are accurate as of: 17  2:07 PM.  If you have any questions, ask your nurse or doctor.               Start taking these medicines.        Dose/Directions    levofloxacin 500 MG tablet   Commonly known as:  LEVAQUIN   Used for:  Cough   Started by:  Heriberto Haile MD        Dose:  500 mg   Take 1 tablet (500 mg) " by mouth daily for 10 days   Quantity:  10 tablet   Refills:  0       omeprazole 20 MG CR capsule   Commonly known as:  priLOSEC   Used for:  Gastroesophageal reflux disease, esophagitis presence not specified   Started by:  Heriberto Haile MD        Dose:  20 mg   Take 1 capsule (20 mg) by mouth daily   Quantity:  90 capsule   Refills:  1            Where to get your medicines      [facility] [address] [place] [address] & [address], [place] [address]     Phone:  [phone]     levofloxacin 500 MG tablet    omeprazole 20 MG CR capsule                Primary Care Provider Office Phone # Fax #    Heriberto Haile [phone]       [facility] [address] [place] [address]        Equal Access to Services     [facility]: Hadii abimbola wallace hadasho Soomaali, waaxda luqadaha, qaybta kaalmada adeegyada, sol sommersin hayaan flavio dejesus . So [facility] [phone].    ATENCIÓN: Si habla español, tiene a camacho disposición servicios gratuitos de asistencia lingüística. Llame al [phone].    We comply with applicable federal civil rights laws and [place] laws. We do not discriminate on the basis of race, color, national origin, age, disability sex, sexual orientation or gender identity.            Thank you!     Thank you for choosing [facility]  for your care. Our goal is always to provide you with excellent care. Hearing back from our patients is one way we can continue to improve our services. Please take a few minutes to complete the written survey that you may receive in the mail after your visit with us. Thank you!             Your Updated Medication List - Protect others around you: Learn how to safely use, store and throw away your medicines at www.disposemymeds.org.          This list is accurate as of: 6/26/17  2:07 PM.  Always use your most recent med list.                    Brand Name Dispense Instructions for use Diagnosis    allopurinol 300 MG tablet    ZYLOPRIM    90 tablet    TAKE 1 TABLET BY MOUTH DAILY    Encounter for medication refill       ANORO ELLIPTA 62.5-25 MCG/INH oral inhaler   Generic drug:  umeclidinium-vilanterol      Inhale 1 puff into the lungs daily        ASPIRIN NOT PRESCRIBED    INTENTIONAL    0 each    Reported on 4/19/2017    Chronic atrial fibrillation (H)       digoxin 125 MCG tablet    LANOXIN    90 tablet    Take 1 tablet (125 mcg) by mouth daily    Chronic atrial fibrillation (H)       furosemide 40 MG tablet    LASIX    135 tablet    Take 1.5 tablets (60 mg) by mouth daily One tablet in am    Localized edema       ipratropium 0.06 % spray    ATROVENT    45 mL    USE 2 SPRAYS IN EACH NOSTRIL FOUR TIMES DAILY AS NEEDED FOR RHINITIS    Encounter for medication refill, VMR (vasomotor rhinitis)       levofloxacin 500 MG tablet    LEVAQUIN    10 tablet    Take 1 tablet (500 mg) by mouth daily for 10 days    Cough       lisinopril 10 MG tablet    PRINIVIL/ZESTRIL    90 tablet    Take 0.5 tablets (5 mg) by mouth 2 times daily    Cardiomyopathy (H)       metoprolol 50 MG tablet    LOPRESSOR    360 tablet    Take 2 tablets (100 mg) by mouth 2 times daily    Chronic atrial fibrillation (H)       Multi-vitamin Tabs tablet   Generic drug:  multivitamin, therapeutic with minerals     100 tablet    Take 1 tablet by mouth daily.        omeprazole 20 MG CR capsule    priLOSEC    90 capsule    Take 1 capsule (20 mg) by mouth daily    Gastroesophageal reflux disease, esophagitis presence not specified       simvastatin 10 MG tablet    ZOCOR    90 tablet    TAKE 1 TABLET(10 MG) BY MOUTH AT BEDTIME    Encounter for medication refill       warfarin 2 MG tablet    COUMADIN    135 tablet    1 1/2 pills per day    Chronic atrial fibrillation (H)

## 2017-06-27 LAB — BNP SERPL-MCNC: NORMAL PG/ML

## 2017-06-28 DIAGNOSIS — R05.9 COUGH: Primary | ICD-10-CM

## 2017-06-28 NOTE — LETTER
[facility]  [address] [place]  [address]  Phone: [phone]    June 29, 2017      Vasiliy Corbin  [address] [facility] [address]              Dear Vasiliy,    The results from your recent visit showed blood test is mildly abnormal but not a problem.   Please  see me next week as planned.      Sincerely,     Heriberto Kim M.D.    Results for orders placed or performed in visit on 06/28/17   B-Type Natriuretic Peptide ([facility])   Result Value Ref Range    B-Type Natriuretic Peptide 238.7 (H) 0.0 - 100.0 pg/mL    Narrative    Performed at:  01 - [facility] [address], [place], [place]  [id number]  : Blayne Colin MD, Phone:  [phone]

## 2017-06-29 ENCOUNTER — TELEPHONE (OUTPATIENT)
Dept: FAMILY MEDICINE | Facility: CLINIC | Age: 82
End: 2017-06-29

## 2017-06-29 LAB — BNP SERPL-MCNC: 238.7 PG/ML (ref 0–100)

## 2017-06-29 NOTE — TELEPHONE ENCOUNTER
please call Vasiliy.tell him the blood test is mildly abnormal but not a problem. Have him see me next week as planned. Noah

## 2017-06-29 NOTE — TELEPHONE ENCOUNTER
Patient informed and agrees  He will keep his appt to f/up next wk 7/5/17  Sweetie Diaz MA June 29, 2017 5:38 PM

## 2017-07-03 ENCOUNTER — OFFICE VISIT (OUTPATIENT)
Dept: FAMILY MEDICINE | Facility: CLINIC | Age: 82
End: 2017-07-03

## 2017-07-03 VITALS
OXYGEN SATURATION: 97 % | SYSTOLIC BLOOD PRESSURE: 102 MMHG | DIASTOLIC BLOOD PRESSURE: 54 MMHG | WEIGHT: 270.2 LBS | TEMPERATURE: 98.3 F | BODY MASS INDEX: 43.61 KG/M2 | HEART RATE: 89 BPM | RESPIRATION RATE: 16 BRPM

## 2017-07-03 DIAGNOSIS — I48.20 CHRONIC ATRIAL FIBRILLATION (H): ICD-10-CM

## 2017-07-03 DIAGNOSIS — Z79.01 LONG TERM CURRENT USE OF ANTICOAGULANT THERAPY: ICD-10-CM

## 2017-07-03 LAB — INR PPP: 2.2 (ref 2–3)

## 2017-07-03 PROCEDURE — 99213 OFFICE O/P EST LOW 20 MIN: CPT | Performed by: FAMILY MEDICINE

## 2017-07-03 PROCEDURE — 36415 COLL VENOUS BLD VENIPUNCTURE: CPT | Performed by: FAMILY MEDICINE

## 2017-07-03 PROCEDURE — 85610 PROTHROMBIN TIME: CPT | Performed by: FAMILY MEDICINE

## 2017-07-03 NOTE — PROGRESS NOTES
Cough-   CXR at last visit = normal    2 months  Has wheezing at times  Has rtattling  has  webb   Cough at night   Had PFT on amiodarone= nl   No reflux  Had abx in march and April   Third dose of antibiotic given at his last visit with an Levaquin.  And additionally he was given proton pump inhibitors 20 mg of omeprazole to take daily.  At this time he feels that his cough is less he can lay down and sleep before he had to sit in a chair he denies any fever he's not had significant mucus production.  There is no change in the swelling in his legs    BNP was borderline abnormal probably not significant    5. Cardiomyopathy (H)  Low normal EF  Most recent echocardiogram on 04/25/2017 showed EF of 50%-55% with no regional wall motion abnormalities.  Mild RV dilatation.  Mild AI, mild biatrial enlargement.  Estimated RVSP was 26 mmHg plus right atrial pressure.     Past Medical History:   Diagnosis Date     Atrial fibrillation (H)     became chronic afib in 2016,paroxysmal,was on amiodarone but went into persistent afib in april 2106 and amiodrone was d/cd. now on BBLK and considering cardioversion.(5/2106).In Sept 2016 plan is rate control which has been a challenge and Holter in 6 months     CAD (coronary artery disease)     mild stenosis per cath     Cardiomyopathy (H)      Colon polyp 2010     Gout      HTN (hypertension)      Hyperlipidemia      Obesity (BMI 35.0-39.9 without comorbidity)      SUJEY on CPAP      Past Surgical History:   Procedure Laterality Date     CT release       CYSTOSCOPY N/A 4/7/2016    Procedure: CYSTOSCOPY;  Surgeon: Lokesh Lima MD;  Location:  OR     HC LEFT HEART CATHETERIZATION  3/2010    Mild CAD     LASER KTP TRANSURETHRAL RESECTION (TUR) PROSTATE N/A 4/7/2016    Procedure: LASER KTP TRANSURETHRAL RESECTION (TUR) PROSTATE;  Surgeon: Lokesh Lima MD;  Location:  OR     ulnar reroute      right      Current Outpatient Prescriptions   Medication     omeprazole (PRILOSEC) 20 MG  CR capsule     umeclidinium-vilanterol (ANORO ELLIPTA) 62.5-25 MCG/INH oral inhaler     levofloxacin (LEVAQUIN) 500 MG tablet     furosemide (LASIX) 40 MG tablet     ipratropium (ATROVENT) 0.06 % spray     digoxin (LANOXIN) 125 MCG tablet     metoprolol (LOPRESSOR) 50 MG tablet     lisinopril (PRINIVIL/ZESTRIL) 10 MG tablet     simvastatin (ZOCOR) 10 MG tablet     warfarin (COUMADIN) 2 MG tablet     allopurinol (ZYLOPRIM) 300 MG tablet     Multiple Vitamin (MULTI-VITAMIN) per tablet     ASPIRIN NOT PRESCRIBED (INTENTIONAL)     No current facility-administered medications for this visit.        VS noted   morbuid obesity  EYES = NL  EARS= NL  MOUTH =NL  NECK = NL  LUNGS=NL  COR=NL  ABD=NL  EXT= firm edema +1-2 mid tib and +1 prox   MOOD AFFECT =NL      ASSESSMENT / PLAN      (R05) Cough  (primary encounter diagnosis)  Comment:   Several months, with abx x3  cxr okay  Denies reflux or PND  Prior PFTS NL     Seems to be slightly better on Omeprazole 20 mg daily.  He will remain on this  If persists with cough see pulm vs ct chest     (K21.9) Gastroesophageal reflux disease ????  Seems better CPM     INR ADJUSTED    RTC in 2 weeks if cough returns in 4 weeks if continues to be better

## 2017-07-03 NOTE — MR AVS SNAPSHOT
After Visit Summary   7/3/2017    Vasiliy Corbin    MRN: [id number]           Patient Information     Date Of Birth          1935        Visit Information        Provider Department      7/3/2017 2:00 PM Heriberto Haile MD [facility]        Today's Diagnoses     Long term current use of anticoagulant therapy        Chronic atrial fibrillation (H)          Care Instructions    [facility] An[facility] Latest Ref Rng & Units 7/3/2017   INR 2.0 - 3.0 2.2   ASSESS  THER   INR GOAL  2.0-3.0   WEEKLY DOSE  4mg Sun, 3 mg otherwise   PT INSTRUCT  same   RECHECK  4 WEEKS     ATRIAL FIB   LOCATION  Clinic   Comments               Follow-ups after your visit        Your next 10 appointments already scheduled     Jul 11, 2017  9:15 AM CDT   Return Visit with Diomedes Menendez MD   [facility] PHYSICIANS [facility] [place] ([facility])    [address] [place] [address] [phone]              Who to contact     If you have questions or need follow up information about today's clinic visit or your schedule please contact [facility] directly at [phone].  Normal or non-critical lab and imaging results will be communicated to you by SportsManiashart, letter or phone within 4 business days after the clinic has received the results. If you do not hear from us within 7 days, please contact the clinic through SOMA Barcelonat or phone. If you have a critical or abnormal lab result, we will notify you by phone as soon as possible.  Submit refill requests through Zaplee or call your pharmacy and they will forward the refill request to us. Please allow 3 business days for your refill to be completed.          Additional Information About Your Visit        MyChart Information     Zaplee lets you send messages to your doctor, view your test results, renew your prescriptions, schedule appointments and more. To sign up, go to [email or web address] . Click on  "\"Log in\" on the left side of the screen, which will take you to the Welcome page. Then click on \"Sign up Now\" on the right side of the page.     You will be asked to enter the access code listed below, as well as some personal information. Please follow the directions to create your username and password.     Your access code is: [email or web address]  Expires: 2017  2:07 PM     Your access code will  in 90 days. If you need help or a new code, please call your [facility] clinic or [phone].        Care EveryWhere ID     This is your Care EveryWhere ID. This could be used by other organizations to access your [facility] medical records  [id number]        Your Vitals Were     Pulse Temperature Respirations Pulse Oximetry BMI (Body Mass Index)       89 98.3  F (36.8  C) (Oral) 16 97% 43.61 kg/m2        Blood Pressure from Last 3 Encounters:   17 102/54   17 112/62   17 124/72    Weight from Last 3 Encounters:   17 122.6 kg (270 lb 3.2 oz)   17 122.8 kg (270 lb 12.8 oz)   17 124.4 kg (274 lb 3.2 oz)              We Performed the Following     Prothrombin - INR ([facility])          Today's Medication Changes          These changes are accurate as of: 7/3/17  2:30 PM.  If you have any questions, ask your nurse or doctor.               These medicines have changed or have updated prescriptions.        Dose/Directions    warfarin 2 MG tablet   Commonly known as:  COUMADIN   This may have changed:    - how much to take  - how to take this  - when to take this  - additional instructions   Used for:  Chronic atrial fibrillation (H)        1 1/2 pills per day   Quantity:  135 tablet   Refills:  3                Primary Care Provider Office Phone # Fax #    Heriberto Haile [phone]       [place] [facility] [address]  [facility] [address]        Equal Access to Services     ABIOLA DOMINGUEZ AH: Daniel Younger, makayla bowman, jean-claude koch, " sol samuels charity bermudez'aan ah. So [facility] [phone].    ATENCIÓN: Si christinala america, tiene a camacho disposición servicios gratuitos de asistencia lingüística. Noel mishra [phone].    We comply with applicable federal civil rights laws and [place] laws. We do not discriminate on the basis of race, color, national origin, age, disability sex, sexual orientation or gender identity.            Thank you!     Thank you for choosing [facility]  for your care. Our goal is always to provide you with excellent care. Hearing back from our patients is one way we can continue to improve our services. Please take a few minutes to complete the written survey that you may receive in the mail after your visit with us. Thank you!             Your Updated Medication List - Protect others around you: Learn how to safely use, store and throw away your medicines at www.disposemymeds.org.          This list is accurate as of: 7/3/17  2:30 PM.  Always use your most recent med list.                   Brand Name Dispense Instructions for use Diagnosis    allopurinol 300 MG tablet    ZYLOPRIM    90 tablet    TAKE 1 TABLET BY MOUTH DAILY    Encounter for medication refill       ANORO ELLIPTA 62.5-25 MCG/INH oral inhaler   Generic drug:  umeclidinium-vilanterol      Inhale 1 puff into the lungs daily        ASPIRIN NOT PRESCRIBED    INTENTIONAL    0 each    Reported on 4/19/2017 On warfarin    Chronic atrial fibrillation (H)       digoxin 125 MCG tablet    LANOXIN    90 tablet    Take 1 tablet (125 mcg) by mouth daily    Chronic atrial fibrillation (H)       furosemide 40 MG tablet    LASIX    135 tablet    Take 1.5 tablets (60 mg) by mouth daily One tablet in am    Localized edema       ipratropium 0.06 % spray    ATROVENT    45 mL    USE 2 SPRAYS IN EACH NOSTRIL FOUR TIMES DAILY AS NEEDED FOR RHINITIS    Encounter for medication refill, VMR (vasomotor rhinitis)       levofloxacin 500 MG tablet    LEVAQUIN    10 tablet     Take 1 tablet (500 mg) by mouth daily for 10 days    Cough       lisinopril 10 MG tablet    PRINIVIL/ZESTRIL    90 tablet    Take 0.5 tablets (5 mg) by mouth 2 times daily    Cardiomyopathy (H)       metoprolol 50 MG tablet    LOPRESSOR    360 tablet    Take 2 tablets (100 mg) by mouth 2 times daily    Chronic atrial fibrillation (H)       Multi-vitamin Tabs tablet   Generic drug:  multivitamin, therapeutic with minerals     100 tablet    Take 1 tablet by mouth daily.        omeprazole 20 MG CR capsule    priLOSEC    90 capsule    Take 1 capsule (20 mg) by mouth daily    Gastroesophageal reflux disease, esophagitis presence not specified       simvastatin 10 MG tablet    ZOCOR    90 tablet    TAKE 1 TABLET(10 MG) BY MOUTH AT BEDTIME    Encounter for medication refill       warfarin 2 MG tablet    COUMADIN    135 tablet    1 1/2 pills per day    Chronic atrial fibrillation (H)

## 2017-07-03 NOTE — PATIENT INSTRUCTIONS
[facility] Antico[facility] Latest Ref Rng & Units 7/3/2017   INR 2.0 - 3.0 2.2   ASSESS  THER   INR GOAL  2.0-3.0   WEEKLY DOSE  4mg Sun, 3 mg otherwise   PT INSTRUCT  same   RECHECK  4 WEEKS     ATRIAL FIB   LOCATION  Clinic   Comments

## 2017-07-11 ENCOUNTER — OFFICE VISIT (OUTPATIENT)
Dept: CARDIOLOGY | Facility: CLINIC | Age: 82
End: 2017-07-11
Attending: NURSE PRACTITIONER
Payer: MEDICARE

## 2017-07-11 VITALS
WEIGHT: 269 LBS | HEART RATE: 84 BPM | SYSTOLIC BLOOD PRESSURE: 106 MMHG | DIASTOLIC BLOOD PRESSURE: 74 MMHG | BODY MASS INDEX: 43.23 KG/M2 | HEIGHT: 66 IN

## 2017-07-11 DIAGNOSIS — E78.5 HYPERLIPIDEMIA WITH TARGET LDL LESS THAN 100: ICD-10-CM

## 2017-07-11 DIAGNOSIS — I87.2 VENOUS (PERIPHERAL) INSUFFICIENCY: ICD-10-CM

## 2017-07-11 DIAGNOSIS — I42.9 CARDIOMYOPATHY, UNSPECIFIED (H): ICD-10-CM

## 2017-07-11 DIAGNOSIS — I25.10 CORONARY ARTERY DISEASE INVOLVING NATIVE CORONARY ARTERY OF NATIVE HEART WITHOUT ANGINA PECTORIS: Primary | ICD-10-CM

## 2017-07-11 DIAGNOSIS — I48.20 CHRONIC ATRIAL FIBRILLATION (H): ICD-10-CM

## 2017-07-11 DIAGNOSIS — I10 ESSENTIAL HYPERTENSION: ICD-10-CM

## 2017-07-11 PROCEDURE — 99214 OFFICE O/P EST MOD 30 MIN: CPT | Performed by: INTERNAL MEDICINE

## 2017-07-11 RX ORDER — LOSARTAN POTASSIUM 50 MG/1
50 TABLET ORAL DAILY
Qty: 90 TABLET | Refills: 3 | Status: SHIPPED | OUTPATIENT
Start: 2017-07-11 | End: 2018-07-10

## 2017-07-11 NOTE — MR AVS SNAPSHOT
After Visit Summary   7/11/2017    Vasiliy Cobrin    MRN: [id number]           Patient Information     Date Of Birth          1935        Visit Information        Provider Department      7/11/2017 9:15 AM Diomedes Menendez MD [facility] [place]        Today's Diagnoses     Coronary artery disease involving native coronary artery of native heart without angina pectoris    -  1    Cardiomyopathy, unspecified (H)        Essential hypertension        Hyperlipidemia with target LDL less than 100        Chronic atrial fibrillation (H)        Venous (peripheral) insufficiency           Follow-ups after your visit        Additional Services     Follow-Up with Vascular Cardiologist           Follow-Up with Cardiac Advanced Practice Provider                 Your next 10 appointments already scheduled     Aug 07, 2017  9:00 AM CDT   SHORT with Heriberto Haile MD   [facility] ([facility]    [address] [place] [address] [phone]              Future tests that were ordered for you today     Open Future Orders        Priority Expected Expires Ordered    Echocardiogram Routine 1/7/2018 7/11/2018 7/11/2017    Follow-Up with Cardiac Advanced Practice Provider Routine 1/7/2018 7/11/2018 7/11/2017    Follow-Up with Vascular Cardiologist Routine 8/11/2017 7/11/2018 7/11/2017            Who to contact     If you have questions or need follow up information about today's clinic visit or your schedule please contact [facility] directly at [phone].  Normal or non-critical lab and imaging results will be communicated to you by MyChart, letter or phone within 4 business days after the clinic has received the results. If you do not hear from us within 7 days, please contact the clinic through MyChart or phone. If you have a critical or abnormal lab result, we will notify you by phone as  "soon as possible.  Submit refill requests through [employer] or call your pharmacy and they will forward the refill request to us. Please allow 3 business days for your refill to be completed.          Additional Information About Your Visit        [employer] Information     [employer] lets you send messages to your doctor, view your test results, renew your prescriptions, schedule appointments and more. To sign up, go to [email or web address] . Click on \"Log in\" on the left side of the screen, which will take you to the Welcome page. Then click on \"Sign up Now\" on the right side of the page.     You will be asked to enter the access code listed below, as well as some personal information. Please follow the directions to create your username and password.     Your access code is: [email or web address]  Expires: 2017  2:07 PM     Your access code will  in 90 days. If you need help or a new code, please call your [place] clinic or [phone].        Care EveryWhere ID     This is your Care EveryWhere ID. This could be used by other organizations to access your [place] medical records  [id number]        Your Vitals Were     Pulse Height BMI (Body Mass Index)             84 1.676 m (5' 6\") 43.42 kg/m2          Blood Pressure from Last 3 Encounters:   17 106/74   17 102/54   17 112/62    Weight from Last 3 Encounters:   17 122 kg (269 lb)   17 122.6 kg (270 lb 3.2 oz)   17 122.8 kg (270 lb 12.8 oz)              We Performed the Following     Follow-Up with Cardiologist          Today's Medication Changes          These changes are accurate as of: 17  9:35 AM.  If you have any questions, ask your nurse or doctor.               Start taking these medicines.        Dose/Directions    losartan 50 MG tablet   Commonly known as:  COZAAR   Used for:  Essential hypertension   Started by:  Diomedes Menendez MD        Dose:  50 mg   Take 1 tablet (50 mg) by mouth daily   Quantity:  90 " tablet   Refills:  3         These medicines have changed or have updated prescriptions.        Dose/Directions    warfarin 2 MG tablet   Commonly known as:  COUMADIN   This may have changed:    - how much to take  - how to take this  - when to take this  - additional instructions   Used for:  Chronic atrial fibrillation (H)        1 1/2 pills per day   Quantity:  135 tablet   Refills:  3         Stop taking these medicines if you haven't already. Please contact your care team if you have questions.     lisinopril 10 MG tablet   Commonly known as:  PRINIVIL/ZESTRIL   Stopped by:  Diomedes Menendez MD                Where to get your medicines      These medications were sent to [facility] [address] [place] [address] & [address], [place] [address]     Phone:  [phone]     losartan 50 MG tablet                Primary Care Provider Office Phone # Fax #    Heriberto Haile [phone]       [facility] [address] [place] [address]        Equal Access to Services     [facility]: Hadii aad ku hadasho Soomaali, waaxda luqadaha, qaybta kaalmada adeegyada, waxlucita dejesus . So [facility] [phone].    ATENCIÓN: Si habla español, tiene a camacho disposición servicios gratuitos de asistencia lingüística. [facility] [phone].    We comply with applicable federal civil rights laws and [place] laws. We do not discriminate on the basis of race, color, national origin, age, disability sex, sexual orientation or gender identity.            Thank you!     Thank you for choosing [facility] [place]  for your care. Our goal is always to provide you with excellent care. Hearing back from our patients is one way we can continue to improve our services. Please take a few minutes to complete the written survey that you may receive in the mail after your visit with us. Thank  you!             Your Updated Medication List - Protect others around you: Learn how to safely use, store and throw away your medicines at www.disposemymeds.org.          This list is accurate as of: 7/11/17  9:35 AM.  Always use your most recent med list.                   Brand Name Dispense Instructions for use Diagnosis    allopurinol 300 MG tablet    ZYLOPRIM    90 tablet    TAKE 1 TABLET BY MOUTH DAILY    Encounter for medication refill       ASPIRIN NOT PRESCRIBED    INTENTIONAL    0 each    Reported on 4/19/2017 On warfarin    Chronic atrial fibrillation (H)       digoxin 125 MCG tablet    LANOXIN    90 tablet    Take 1 tablet (125 mcg) by mouth daily    Chronic atrial fibrillation (H)       furosemide 40 MG tablet    LASIX    135 tablet    Take 1.5 tablets (60 mg) by mouth daily One tablet in am    Localized edema       ipratropium 0.06 % spray    ATROVENT    45 mL    USE 2 SPRAYS IN EACH NOSTRIL FOUR TIMES DAILY AS NEEDED FOR RHINITIS    Encounter for medication refill, VMR (vasomotor rhinitis)       losartan 50 MG tablet    COZAAR    90 tablet    Take 1 tablet (50 mg) by mouth daily    Essential hypertension       metoprolol 50 MG tablet    LOPRESSOR    360 tablet    Take 2 tablets (100 mg) by mouth 2 times daily    Chronic atrial fibrillation (H)       Multi-vitamin Tabs tablet   Generic drug:  multivitamin, therapeutic with minerals     100 tablet    Take 1 tablet by mouth daily.        omeprazole 20 MG CR capsule    priLOSEC    90 capsule    Take 1 capsule (20 mg) by mouth daily    Gastroesophageal reflux disease, esophagitis presence not specified       simvastatin 10 MG tablet    ZOCOR    90 tablet    TAKE 1 TABLET(10 MG) BY MOUTH AT BEDTIME    Encounter for medication refill       warfarin 2 MG tablet    COUMADIN    135 tablet    1 1/2 pills per day    Chronic atrial fibrillation (H)

## 2017-07-11 NOTE — LETTER
7/11/2017    Heriberto Haile MD  [facility]   [address]  [facility] [address]    RE: Vasiliy Corbin       Dear Colleague,    I had the pleasure of seeing Vasiliy Corbin in the [facility].    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       With rhythm restoration and the use of amiodarone therapy, his LV function rebounded to 60%-65%.  Over this past one and half year, he has reverted out of rhythm on amiodarone therapy, and we have been attempting rate control.       He is on metoprolol 50 mg twice a day; a Holter monitor at that point showed heart rates that averaged 94, maximum 116 (previous 156), with a low of 58.  We increased his metoprolol further to 100 mg twice daily and added digoxin.  In the past, he has had some level of fatigue on high-dose metoprolol, but he seems to be tolerating this reasonably well.       He has lost some weight since May of this year. He remains on 60 mg of oral furosemide daily. Since we last saw him, he has been tested for venous insufficiency which has shown reflux particularly in the right greater saphenous vein. He was seen in the venous clinic by the nurse practitioner and compression stockings have been continued. He still has some leg edema and some discoloration of the skin and is interested in ablation and therefore I will refer him to the vascular cardiologist.         His last echocardiogram has shown ejection fraction is 50%-55%, which is stable, comparatively.  His right heart looks slightly more dilated.  He is wearing his CPAP every night all night long.  He has no significant valvular disease;       He he had a visit with Dr.'s Herbert in  for second opinion  and rate control was advised for his persistent atrial fibrillation.          He has a dyslipidemia treated with simvastatin.  LDL 63, HDL 42.       He denies orthopnea, PND or chest pain. His main concern this month has been dry cough which has been bothering him. He was started on omeprazole for possible reflux disease but that has not seemed to help. Is also concerned about the reports on omeprazole that can cause increased depth as it was published recently. Omeprazole started by Dr. Kim. He still on lisinopril 5 mgs twice daily and has been taking it for several years but it can occasionally cause dry cough late.      PHYSICAL EXAMINATION:  On exam today, he has trace or 1+ edema bilaterally.  His JVP is down slightly to 10.  His creatinine is up to 1.2, but still within normal limits, as is his BUN and his potassium is normal.  Lungs are diminished but clear.       IMPRESSION AND PLAN:   1.  Persistent, if not permanent atrial fibrillation.     Rate control is adequate. He was seen by electrophysiology and rate control was advised again. He remains on warfarin on stroke prophylaxis. We'll continue metoprolol and digoxin at the same dose.      2.   Congestive heart failure, diastolic dysfunction   On Lasix 60 mg per day. Part of the edema is related to venous insufficiency that'll be addressed as noted above.     3.  Sleep apnea, treated.   Continue C Pap    4. Dry cough, could be related to lisinopril. I will stop lisinopril and switched to losartan 50 mg per day. If cough improves, omeprazole can be discontinued.      5.  No flow-limiting coronary artery disease on angiogram in 2010.     6.  Hypertension, treated to goal.     7.  Dyslipidemia, treated to goal.       8. Venous insufficiency seen on venous ultrasound   Patient is interested in ablation and will be referred to Dr. Zapata.     It has been a pleasure seeing him in followup today.   He will see my nurse practitioner in follow-up in 6 months with a  repeat echo by them prior to visit with her.   Orders Placed This Encounter   Procedures     Follow-Up with Vascular Cardiologist     Follow-Up with Cardiac Advanced Practice Provider     Echocardiogram       Orders Placed This Encounter   Medications     losartan (COZAAR) 50 MG tablet     Sig: Take 1 tablet (50 mg) by mouth daily     Dispense:  90 tablet     Refill:  3       Medications Discontinued During This Encounter   Medication Reason     umeclidinium-vilanterol (ANORO ELLIPTA) 62.5-25 MCG/INH oral inhaler Therapy completed     lisinopril (PRINIVIL/ZESTRIL) 10 MG tablet          Encounter Diagnoses   Name Primary?     Cardiomyopathy, unspecified (H)      Coronary artery disease involving native coronary artery of native heart without angina pectoris Yes     Essential hypertension      Hyperlipidemia with target LDL less than 100      Chronic atrial fibrillation (H)      Venous (peripheral) insufficiency        CURRENT MEDICATIONS:  Current Outpatient Prescriptions   Medication Sig Dispense Refill     losartan (COZAAR) 50 MG tablet Take 1 tablet (50 mg) by mouth daily 90 tablet 3     omeprazole (PRILOSEC) 20 MG CR capsule Take 1 capsule (20 mg) by mouth daily 90 capsule 1     furosemide (LASIX) 40 MG tablet Take 1.5 tablets (60 mg) by mouth daily One tablet in am 135 tablet 3     ipratropium (ATROVENT) 0.06 % spray USE 2 SPRAYS IN EACH NOSTRIL FOUR TIMES DAILY AS NEEDED FOR RHINITIS 45 mL 3     digoxin (LANOXIN) 125 MCG tablet Take 1 tablet (125 mcg) by mouth daily 90 tablet 3     metoprolol (LOPRESSOR) 50 MG tablet Take 2 tablets (100 mg) by mouth 2 times daily 360 tablet 3     simvastatin (ZOCOR) 10 MG tablet TAKE 1 TABLET(10 MG) BY MOUTH AT BEDTIME 90 tablet 3     warfarin (COUMADIN) 2 MG tablet 1 1/2 pills per day (Patient taking differently: Take 2 mg by mouth See Admin Instructions ) 135 tablet 3     allopurinol (ZYLOPRIM) 300 MG tablet TAKE 1 TABLET BY MOUTH DAILY 90 tablet 3     Multiple Vitamin  (MULTI-VITAMIN) per tablet Take 1 tablet by mouth daily. 100 tablet 12     ASPIRIN NOT PRESCRIBED (INTENTIONAL) Reported on 4/19/2017  On warfarin 0 each 0       ALLERGIES     Allergies   Allergen Reactions     Cardizem [Diltiazem]      Swelling and poor rate control       PAST MEDICAL HISTORY:  Past Medical History:   Diagnosis Date     Atrial fibrillation (H)     became chronic afib in 2016,paroxysmal,was on amiodarone but went into persistent afib in april 2106 and amiodrone was d/cd. now on BBLK and considering cardioversion.(5/2106).In Sept 2016 plan is rate control which has been a challenge and Holter in 6 months     CAD (coronary artery disease)     mild stenosis per cath     Cardiomyopathy (H)      Colon polyp 2010     Gout      HTN (hypertension)      Hyperlipidemia      Obesity (BMI 35.0-39.9 without comorbidity)      SUJEY on CPAP        PAST SURGICAL HISTORY:  Past Surgical History:   Procedure Laterality Date     CT release       CYSTOSCOPY N/A 4/7/2016    Procedure: CYSTOSCOPY;  Surgeon: Lokesh Lima MD;  Location: [facility] OR     HC LEFT HEART CATHETERIZATION  3/2010    Mild CAD     LASER KTP TRANSURETHRAL RESECTION (TUR) PROSTATE N/A 4/7/2016    Procedure: LASER KTP TRANSURETHRAL RESECTION (TUR) PROSTATE;  Surgeon: Lokesh Lima MD;  Location: [facility] OR     ulnar reroute      right        FAMILY HISTORY:  Family History   Problem Relation Age of Onset     HEART DISEASE Mother 92     Heart failure     HEART DISEASE Father 92     C.A.D. Brother 70     MI     Myocardial Infarction Brother      Family History Negative Sister      Family History Negative Brother      Family History Negative Brother      Family History Negative Brother      Family History Negative Sister        SOCIAL HISTORY:  Social History     Social History     Marital status:      Spouse name: N/A     Number of children: N/A     Years of education: N/A     Social History Main Topics     Smoking status: Former Smoker     Packs/day:  "1.00     Years: 5.00     Types: Cigarettes     Quit date: 4/3/1977     Smokeless tobacco: Former User     Quit date: 1/1/1960     Alcohol use No     Drug use: No     Sexual activity: Yes     Other Topics Concern     Parent/Sibling W/ Cabg, Mi Or Angioplasty Before 65f 55m? No     Caffeine Concern Yes     8 cups of  decaf coffee per day     Sleep Concern Yes     sleep apnea, wears CPAP     Stress Concern No     Weight Concern Yes     Weight gain     Special Diet No     Exercise No     Seat Belt Yes     Social History Narrative       Review of Systems:  Skin:  Negative       Eyes:  Positive for glasses    ENT:  Positive for hearing loss    Respiratory:  Positive for sleep apnea;CPAP;cough     Cardiovascular:  Negative;palpitations;chest pain;syncope or near-syncope;cyanosis;lightheadedness;dizziness Positive for;fatigue;exercise intolerance energy level down, edema in ankles during the day  Gastroenterology: Negative      Genitourinary:  Negative      Musculoskeletal:  Positive for back pain;joint pain    Neurologic:  Negative      Psychiatric:  Negative      Heme/Lymph/Imm:  Negative      Endocrine:  Negative        Physical Exam:  Vitals: /74 (BP Location: Right arm, Cuff Size: Adult Large)  Pulse 84  Ht 1.676 m (5' 6\")  Wt 122 kg (269 lb)  BMI 43.42 kg/m2    Constitutional:  cooperative obese      Skin:  warm and dry to the touch        Head:  normocephalic        Eyes:  pupils equal and round        ENT:  no pallor or cyanosis        Neck:           Chest:  clear to auscultation          Cardiac: regular rhythm;normal S1 and S2 irregularly irregular rhythm;tachycardic distant heart sounds no presence of murmur            Abdomen:    obese      Vascular:                                          Extremities and Back:        RLE edema;pitting;1+ LLE edema;pitting;1+ No venous stasis changes, telangiectasia or ulcerations    Neurological:  no gross motor deficits;affect appropriate        Thank you for " allowing me to participate in the care of your patient.    Sincerely,     Diomedes Menendez MD     [facility]

## 2017-07-11 NOTE — PROGRESS NOTES
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       With rhythm restoration and the use of amiodarone therapy, his LV function rebounded to 60%-65%.  Over this past one and half year, he has reverted out of rhythm on amiodarone therapy, and we have been attempting rate control.       He is on metoprolol 50 mg twice a day; a Holter monitor at that point showed heart rates that averaged 94, maximum 116 (previous 156), with a low of 58.  We increased his metoprolol further to 100 mg twice daily and added digoxin.  In the past, he has had some level of fatigue on high-dose metoprolol, but he seems to be tolerating this reasonably well.       He has lost some weight since May of this year. He remains on 60 mg of oral furosemide daily. Since we last saw him, he has been tested for venous insufficiency which has shown reflux particularly in the right greater saphenous vein. He was seen in the venous clinic by the nurse practitioner and compression stockings have been continued. He still has some leg edema and some discoloration of the skin and is interested in ablation and therefore I will refer him to the vascular cardiologist.         His last echocardiogram has shown ejection fraction is 50%-55%, which is stable, comparatively.  His right heart looks slightly more dilated.  He is wearing his CPAP every night all night long.  He has no significant valvular disease;       Hehe had a visit with Dr.'s Herbert in  for second opinion and rate control was advised for his persistent atrial fibrillation.          He has a dyslipidemia treated with simvastatin.  LDL 63, HDL 42.       He denies orthopnea, PND or chest pain. His main concern this month has been dry cough which has been  bothering him. He was started on omeprazole for possible reflux disease but that has not seemed to help. Is also concerned about the reports on omeprazole that can cause increased depth as it was published recently. Omeprazole started by Dr. Kim. He still on lisinopril 5 mgs twice daily and has been taking it for several years but it can occasionally cause dry cough late.      PHYSICAL EXAMINATION:  On exam today, he has trace or 1+ edema bilaterally.  His JVP is down slightly to 10.  His creatinine is up to 1.2, but still within normal limits, as is his BUN and his potassium is normal.  Lungs are diminished but clear.       IMPRESSION AND PLAN:   1.  Persistent, if not permanent atrial fibrillation.     Rate control is adequate. He was seen by electrophysiology and rate control was advised again. He remains on warfarin on stroke prophylaxis. We'll continue metoprolol and digoxin at the same dose.      2.   Congestive heart failure, diastolic dysfunction   On Lasix 60 mg per day. Part of the edema is related to venous insufficiency that'll be addressed as noted above.     3.  Sleep apnea, treated.   Continue C Pap    4. Dry cough, could be related to lisinopril. I will stop lisinopril and switched to losartan 50 mg per day. If cough improves, omeprazole can be discontinued.      5.  No flow-limiting coronary artery disease on angiogram in 2010.     6.  Hypertension, treated to goal.     7.  Dyslipidemia, treated to goal.       8. Venous insufficiency seen on venous ultrasound   Patient is interested in ablation and will be referred to Dr. Zapata.     It has been a pleasure seeing him in followup today.   He will see my nurse practitioner in follow-up in 6 months with a repeat echo by them prior to visit with her.   Orders Placed This Encounter   Procedures     Follow-Up with Vascular Cardiologist     Follow-Up with Cardiac Advanced Practice Provider     Echocardiogram       Orders Placed This Encounter   Medications      losartan (COZAAR) 50 MG tablet     Sig: Take 1 tablet (50 mg) by mouth daily     Dispense:  90 tablet     Refill:  3       Medications Discontinued During This Encounter   Medication Reason     umeclidinium-vilanterol (ANORO ELLIPTA) 62.5-25 MCG/INH oral inhaler Therapy completed     lisinopril (PRINIVIL/ZESTRIL) 10 MG tablet          Encounter Diagnoses   Name Primary?     Cardiomyopathy, unspecified (H)      Coronary artery disease involving native coronary artery of native heart without angina pectoris Yes     Essential hypertension      Hyperlipidemia with target LDL less than 100      Chronic atrial fibrillation (H)      Venous (peripheral) insufficiency        CURRENT MEDICATIONS:  Current Outpatient Prescriptions   Medication Sig Dispense Refill     losartan (COZAAR) 50 MG tablet Take 1 tablet (50 mg) by mouth daily 90 tablet 3     omeprazole (PRILOSEC) 20 MG CR capsule Take 1 capsule (20 mg) by mouth daily 90 capsule 1     furosemide (LASIX) 40 MG tablet Take 1.5 tablets (60 mg) by mouth daily One tablet in am 135 tablet 3     ipratropium (ATROVENT) 0.06 % spray USE 2 SPRAYS IN EACH NOSTRIL FOUR TIMES DAILY AS NEEDED FOR RHINITIS 45 mL 3     digoxin (LANOXIN) 125 MCG tablet Take 1 tablet (125 mcg) by mouth daily 90 tablet 3     metoprolol (LOPRESSOR) 50 MG tablet Take 2 tablets (100 mg) by mouth 2 times daily 360 tablet 3     simvastatin (ZOCOR) 10 MG tablet TAKE 1 TABLET(10 MG) BY MOUTH AT BEDTIME 90 tablet 3     warfarin (COUMADIN) 2 MG tablet 1 1/2 pills per day (Patient taking differently: Take 2 mg by mouth See Admin Instructions ) 135 tablet 3     allopurinol (ZYLOPRIM) 300 MG tablet TAKE 1 TABLET BY MOUTH DAILY 90 tablet 3     Multiple Vitamin (MULTI-VITAMIN) per tablet Take 1 tablet by mouth daily. 100 tablet 12     ASPIRIN NOT PRESCRIBED (INTENTIONAL) Reported on 4/19/2017  On warfarin 0 each 0       ALLERGIES     Allergies   Allergen Reactions     Cardizem [Diltiazem]      Swelling and poor  rate control       PAST MEDICAL HISTORY:  Past Medical History:   Diagnosis Date     Atrial fibrillation (H)     became chronic afib in 2016,paroxysmal,was on amiodarone but went into persistent afib in april 2106 and amiodrone was d/cd. now on BBLK and considering cardioversion.(5/2106).In Sept 2016 plan is rate control which has been a challenge and Holter in 6 months     CAD (coronary artery disease)     mild stenosis per cath     Cardiomyopathy (H)      Colon polyp 2010     Gout      HTN (hypertension)      Hyperlipidemia      Obesity (BMI 35.0-39.9 without comorbidity)      SUJEY on CPAP        PAST SURGICAL HISTORY:  Past Surgical History:   Procedure Laterality Date     CT release       CYSTOSCOPY N/A 4/7/2016    Procedure: CYSTOSCOPY;  Surgeon: Lokesh Lima MD;  Location: [facility] OR     HC LEFT HEART CATHETERIZATION  3/2010    Mild CAD     LASER KTP TRANSURETHRAL RESECTION (TUR) PROSTATE N/A 4/7/2016    Procedure: LASER KTP TRANSURETHRAL RESECTION (TUR) PROSTATE;  Surgeon: Lokesh Lima MD;  Location: [facility] OR     ulnar reroute      right        FAMILY HISTORY:  Family History   Problem Relation Age of Onset     HEART DISEASE Mother 92     Heart failure     HEART DISEASE Father 92     C.A.D. Brother 70     MI     Myocardial Infarction Brother      Family History Negative Sister      Family History Negative Brother      Family History Negative Brother      Family History Negative Brother      Family History Negative Sister        SOCIAL HISTORY:  Social History     Social History     Marital status:      Spouse name: N/A     Number of children: N/A     Years of education: N/A     Social History Main Topics     Smoking status: Former Smoker     Packs/day: 1.00     Years: 5.00     Types: Cigarettes     Quit date: 4/3/1977     Smokeless tobacco: Former User     Quit date: 1/1/1960     Alcohol use No     Drug use: No     Sexual activity: Yes     Other Topics Concern     Parent/Sibling W/ Cabg, Mi Or Angioplasty  "Before 65f 55m? No     Caffeine Concern Yes     8 cups of  decaf coffee per day     Sleep Concern Yes     sleep apnea, wears CPAP     Stress Concern No     Weight Concern Yes     Weight gain     Special Diet No     Exercise No     Seat Belt Yes     Social History Narrative       Review of Systems:  Skin:  Negative       Eyes:  Positive for glasses    ENT:  Positive for hearing loss    Respiratory:  Positive for sleep apnea;CPAP;cough     Cardiovascular:  Negative;palpitations;chest pain;syncope or near-syncope;cyanosis;lightheadedness;dizziness Positive for;fatigue;exercise intolerance energy level down, edema in ankles during the day  Gastroenterology: Negative      Genitourinary:  Negative      Musculoskeletal:  Positive for back pain;joint pain    Neurologic:  Negative      Psychiatric:  Negative      Heme/Lymph/Imm:  Negative      Endocrine:  Negative        Physical Exam:  Vitals: /74 (BP Location: Right arm, Cuff Size: Adult Large)  Pulse 84  Ht 1.676 m (5' 6\")  Wt 122 kg (269 lb)  BMI 43.42 kg/m2    Constitutional:  cooperative obese      Skin:  warm and dry to the touch        Head:  normocephalic        Eyes:  pupils equal and round        ENT:  no pallor or cyanosis        Neck:           Chest:  clear to auscultation          Cardiac: regular rhythm;normal S1 and S2 irregularly irregular rhythm;tachycardic distant heart sounds no presence of murmur            Abdomen:    obese      Vascular:                                          Extremities and Back:        RLE edema;pitting;1+ LLE edema;pitting;1+ No venous stasis changes, telangiectasia or ulcerations    Neurological:  no gross motor deficits;affect appropriate              CC  Laura Wooten, APRN CNP   [facility]  [address]  [place] [id number]              "

## 2017-08-07 ENCOUNTER — OFFICE VISIT (OUTPATIENT)
Dept: FAMILY MEDICINE | Facility: CLINIC | Age: 82
End: 2017-08-07

## 2017-08-07 VITALS
OXYGEN SATURATION: 97 % | SYSTOLIC BLOOD PRESSURE: 114 MMHG | BODY MASS INDEX: 43.64 KG/M2 | RESPIRATION RATE: 20 BRPM | WEIGHT: 270.4 LBS | DIASTOLIC BLOOD PRESSURE: 60 MMHG | HEART RATE: 64 BPM

## 2017-08-07 DIAGNOSIS — R05.9 COUGH: ICD-10-CM

## 2017-08-07 DIAGNOSIS — I10 ESSENTIAL HYPERTENSION: ICD-10-CM

## 2017-08-07 DIAGNOSIS — E66.01 MORBID OBESITY DUE TO EXCESS CALORIES (H): ICD-10-CM

## 2017-08-07 DIAGNOSIS — I48.20 CHRONIC ATRIAL FIBRILLATION (H): ICD-10-CM

## 2017-08-07 DIAGNOSIS — E66.9 OBESITY (BMI 35.0-39.9 WITHOUT COMORBIDITY): ICD-10-CM

## 2017-08-07 DIAGNOSIS — I42.9 CARDIOMYOPATHY, UNSPECIFIED TYPE (H): ICD-10-CM

## 2017-08-07 DIAGNOSIS — Z79.01 LONG TERM CURRENT USE OF ANTICOAGULANT THERAPY: Primary | ICD-10-CM

## 2017-08-07 LAB — INR PPP: 2.3 (ref 2–3)

## 2017-08-07 PROCEDURE — 36415 COLL VENOUS BLD VENIPUNCTURE: CPT | Performed by: FAMILY MEDICINE

## 2017-08-07 PROCEDURE — 99213 OFFICE O/P EST LOW 20 MIN: CPT | Performed by: FAMILY MEDICINE

## 2017-08-07 PROCEDURE — 85610 PROTHROMBIN TIME: CPT | Performed by: FAMILY MEDICINE

## 2017-08-07 NOTE — PROGRESS NOTES
Subjective:  Here to discuss the status of the following problem(s):(Unless noted the problem(s) is/are stable and if applicable the medicine(s) being used is/are causing no side effects or problems.)    CC: INR Followup; Hypertension; and Recheck Medication      1. Long term current use of anticoagulant therapy  Here for INR     3. Cough  Has  been for > 10 years he thinks  Saw Dr Menendez who switched to Losartan , still coughing  Bothers him quite a bit    4. Chronic atrial fibrillation (H)  - Prothrombin - INR ([facility])     5. Essential hypertension  BP Readings from Last 3 Encounters:   08/07/17 114/60   07/11/17 106/74   07/03/17 102/54         7. Morbid obesity due to excess calories (H)  Not working hard       ROS:  5 point ROS noted above, including Gen, CV,, GI, MS  Cough lisin - losart no change   Phlegm  Has more than he feels he should have    Smoked for 5 years   Cough for 10 years - is irritating     Past Medical History:   Diagnosis Date     Atrial fibrillation (H)     became chronic afib in 2016,paroxysmal,was on amiodarone but went into persistent afib in april 2106 and amiodrone was d/cd. now on BBLK and considering cardioversion.(5/2106).In Sept 2016 plan is rate control which has been a challenge and Holter in 6 months     CAD (coronary artery disease)     mild stenosis per cath     Cardiomyopathy (H)      Colon polyp 2010     Gout      HTN (hypertension)      Hyperlipidemia      Obesity (BMI 35.0-39.9 without comorbidity)      SUJEY on CPAP      Current Outpatient Prescriptions   Medication     losartan (COZAAR) 50 MG tablet     omeprazole (PRILOSEC) 20 MG CR capsule     furosemide (LASIX) 40 MG tablet     ipratropium (ATROVENT) 0.06 % spray     digoxin (LANOXIN) 125 MCG tablet     metoprolol (LOPRESSOR) 50 MG tablet     simvastatin (ZOCOR) 10 MG tablet     warfarin (COUMADIN) 2 MG tablet     allopurinol (ZYLOPRIM) 300 MG tablet     Multiple Vitamin (MULTI-VITAMIN) per tablet     ASPIRIN NOT  PRESCRIBED (INTENTIONAL)     No current facility-administered medications for this visit.       reports that he quit smoking about 40 years ago. His smoking use included Cigarettes. He has a 5.00 pack-year smoking history. He quit smokeless tobacco use about 57 years ago. He reports that he does not drink alcohol or use illicit drugs.      EXAM:  BP: 114/60   Pulse: 64[slightly irregular - manual[      Wt Readings from Last 2 Encounters:   08/07/17 122.7 kg (270 lb 6.4 oz)   07/11/17 122 kg (269 lb)       BMI= Body mass index is 43.64 kg/(m^2).    EXAM:   APPEARANCE: = alert, no distress and over weight  Conj/Eyelids = Nrl  Ears/Nose = Nrl  Neck = Nrl  Resp effort = Nrl  Breath Sounds = Nrl  Heart Rate, Rythym, & sounds (no Murm)  = Nrl  Carotid Art's = Nrl  Digits and Nails =Nrl  SKIN absent significant rashes or lesions = Nrl  Ext (edema) =  1+ mid tibia and 2+ankle   Recent/Remote Memory = Nrl  Mood/Affect = Nrl    Assessment/Plan:  Vasiliy was seen today for inr followup, hypertension and recheck medication.    Diagnoses and all orders for this visit:    Long term current use of anticoagulant therapy  -     Prothrombin - INR ([facility])    Cardiomyopathy, unspecified type (H)  Seems stable seeing cards    Cough  -     PULMONARY MEDICINE REFERRAL  Long standing issue PFTs were OK on amiodarone  CXR are OK   Ask Dr Moon or partner to see    Chronic atrial fibrillation (H)  -     Prothrombin - INR ([facility])    Essential hypertension  okay      Morbid obesity due to excess calories (H)              Heriberto Haile  [facility]

## 2017-08-07 NOTE — MR AVS SNAPSHOT
After Visit Summary   8/7/2017    Vasiliy Corbin    MRN: [id number]           Patient Information     Date Of Birth          1935        Visit Information        Provider Department      8/7/2017 9:00 AM Heriberto Haile MD [facility]        Today's Diagnoses     Cardiomyopathy, unspecified type (H)    -  1    Long term current use of anticoagulant therapy        Chronic atrial fibrillation (H)        Essential hypertension        Cough           Follow-ups after your visit        Additional Services     PULMONARY MEDICINE REFERRAL       Your provider has referred you to: OTHER PROVIDERS:Dr Moon or partner at [facility]     Please be aware that coverage of these services is subject to the terms and limitations of your health insurance plan.  Call member services at your health plan with any benefit or coverage questions.      Please bring the following with you to your appointment:    (1) Any X-Rays, CTs or MRIs which have been performed.  Contact the facility where they were done to arrange for  prior to your scheduled appointment.    (2) List of current medications   (3) This referral request   (4) Any documents/labs given to you for this referral                  Your next 10 appointments already scheduled     Sep 29, 2017 10:15 AM CDT   New Vascular Visit with Real Zapata MD   [facility] [place] ([facility])    [address] [place] [id number] [phone]              Who to contact     If you have questions or need follow up information about today's clinic visit or your schedule please contact [facility] directly at [phone].  Normal or non-critical lab and imaging results will be communicated to you by MyChart, letter or phone within 4 business days after the clinic has received the results. If you do not hear from us within 7 days, please contact the clinic  "through [employer] or phone. If you have a critical or abnormal lab result, we will notify you by phone as soon as possible.  Submit refill requests through [employer] or call your pharmacy and they will forward the refill request to us. Please allow 3 business days for your refill to be completed.          Additional Information About Your Visit        [employer] Information     [employer] lets you send messages to your doctor, view your test results, renew your prescriptions, schedule appointments and more. To sign up, go to [email or web address] . Click on \"Log in\" on the left side of the screen, which will take you to the Welcome page. Then click on \"Sign up Now\" on the right side of the page.     You will be asked to enter the access code listed below, as well as some personal information. Please follow the directions to create your username and password.     Your access code is: [email or web address]  Expires: 2017  2:07 PM     Your access code will  in 90 days. If you need help or a new code, please call your [place] clinic or [phone].        Care EveryWhere ID     This is your Care EveryWhere ID. This could be used by other organizations to access your [place] medical records  [id number]        Your Vitals Were     Pulse Respirations Pulse Oximetry BMI (Body Mass Index)          64 20 97% 43.64 kg/m2         Blood Pressure from Last 3 Encounters:   17 114/60   17 106/74   17 102/54    Weight from Last 3 Encounters:   17 122.7 kg (270 lb 6.4 oz)   17 122 kg (269 lb)   17 122.6 kg (270 lb 3.2 oz)              We Performed the Following     Prothrombin - INR ([facility])     PULMONARY MEDICINE REFERRAL          Today's Medication Changes          These changes are accurate as of: 17  9:33 AM.  If you have any questions, ask your nurse or doctor.               These medicines have changed or have updated prescriptions.        Dose/Directions    furosemide 40 MG tablet   Commonly " known as:  LASIX   This may have changed:    - how much to take  - additional instructions   Used for:  Localized edema        Dose:  60 mg   Take 1.5 tablets (60 mg) by mouth daily One tablet in am   Quantity:  135 tablet   Refills:  3       warfarin 2 MG tablet   Commonly known as:  COUMADIN   This may have changed:    - how much to take  - how to take this  - when to take this  - additional instructions   Used for:  Chronic atrial fibrillation (H)        1 1/2 pills per day   Quantity:  135 tablet   Refills:  3                Primary Care Provider Office Phone # Fax #    Heriberto Haile [phone]       [facility] [address]  [facility] [address]        Equal Access to Services     ABIOLA DOMINGUEZ : Hadii abimbola wallace hadagustíno [place], waaxda luqadaha, qaybta kaalmada adeegyada, sol dejesus . So [facility] [phone].    ATENCIÓN: Si habla español, tiene a camacho disposición servicios gratuitos de asistencia lingüística. Llame al [phone].    We comply with applicable federal civil rights laws and [place] laws. We do not discriminate on the basis of race, color, national origin, age, disability sex, sexual orientation or gender identity.            Thank you!     Thank you for choosing [facility]  for your care. Our goal is always to provide you with excellent care. Hearing back from our patients is one way we can continue to improve our services. Please take a few minutes to complete the written survey that you may receive in the mail after your visit with us. Thank you!             Your Updated Medication List - Protect others around you: Learn how to safely use, store and throw away your medicines at www.disposemymeds.org.          This list is accurate as of: 8/7/17  9:33 AM.  Always use your most recent med list.                   Brand Name Dispense Instructions for use Diagnosis    allopurinol 300 MG tablet    ZYLOPRIM    90 tablet     TAKE 1 TABLET BY MOUTH DAILY    Encounter for medication refill       ASPIRIN NOT PRESCRIBED    INTENTIONAL    0 each    Reported on 4/19/2017 On warfarin    Chronic atrial fibrillation (H)       digoxin 125 MCG tablet    LANOXIN    90 tablet    Take 1 tablet (125 mcg) by mouth daily    Chronic atrial fibrillation (H)       furosemide 40 MG tablet    LASIX    135 tablet    Take 1.5 tablets (60 mg) by mouth daily One tablet in am    Localized edema       ipratropium 0.06 % spray    ATROVENT    45 mL    USE 2 SPRAYS IN EACH NOSTRIL FOUR TIMES DAILY AS NEEDED FOR RHINITIS    Encounter for medication refill, VMR (vasomotor rhinitis)       losartan 50 MG tablet    COZAAR    90 tablet    Take 1 tablet (50 mg) by mouth daily    Essential hypertension       metoprolol 50 MG tablet    LOPRESSOR    360 tablet    Take 2 tablets (100 mg) by mouth 2 times daily    Chronic atrial fibrillation (H)       Multi-vitamin Tabs tablet   Generic drug:  multivitamin, therapeutic with minerals     100 tablet    Take 1 tablet by mouth daily.        omeprazole 20 MG CR capsule    priLOSEC    90 capsule    Take 1 capsule (20 mg) by mouth daily    Gastroesophageal reflux disease, esophagitis presence not specified       simvastatin 10 MG tablet    ZOCOR    90 tablet    TAKE 1 TABLET(10 MG) BY MOUTH AT BEDTIME    Encounter for medication refill       warfarin 2 MG tablet    COUMADIN    135 tablet    1 1/2 pills per day    Chronic atrial fibrillation (H)

## 2017-09-05 ENCOUNTER — OFFICE VISIT (OUTPATIENT)
Dept: FAMILY MEDICINE | Facility: CLINIC | Age: 82
End: 2017-09-05

## 2017-09-05 VITALS
HEART RATE: 60 BPM | DIASTOLIC BLOOD PRESSURE: 70 MMHG | SYSTOLIC BLOOD PRESSURE: 118 MMHG | BODY MASS INDEX: 43 KG/M2 | OXYGEN SATURATION: 97 % | RESPIRATION RATE: 18 BRPM | WEIGHT: 266.4 LBS

## 2017-09-05 DIAGNOSIS — R06.00 DYSPNEA, UNSPECIFIED TYPE: ICD-10-CM

## 2017-09-05 DIAGNOSIS — I51.89 DIASTOLIC DYSFUNCTION: ICD-10-CM

## 2017-09-05 DIAGNOSIS — I48.20 CHRONIC ATRIAL FIBRILLATION (H): Primary | ICD-10-CM

## 2017-09-05 DIAGNOSIS — Z79.01 LONG TERM CURRENT USE OF ANTICOAGULANT THERAPY: ICD-10-CM

## 2017-09-05 DIAGNOSIS — E66.01 MORBID OBESITY DUE TO EXCESS CALORIES (H): ICD-10-CM

## 2017-09-05 LAB — INR PPP: 2.6 (ref 2–3)

## 2017-09-05 PROCEDURE — 99214 OFFICE O/P EST MOD 30 MIN: CPT | Performed by: FAMILY MEDICINE

## 2017-09-05 PROCEDURE — 36415 COLL VENOUS BLD VENIPUNCTURE: CPT | Performed by: FAMILY MEDICINE

## 2017-09-05 PROCEDURE — 85610 PROTHROMBIN TIME: CPT | Performed by: FAMILY MEDICINE

## 2017-09-05 NOTE — MR AVS SNAPSHOT
"              After Visit Summary   9/5/2017    Vasiliy Corbin    MRN: [id number]           Patient Information     Date Of Birth          1935        Visit Information        Provider Department      9/5/2017 12:15 PM Heriberto Haile MD [facility]        Today's Diagnoses     Long term current use of anticoagulant therapy        Chronic atrial fibrillation (H)          Care Instructions    [facility] Anticoaglution Latest Ref Rng & Units 9/5/2017   ASSESS  THER   INR GOAL  2.0-3.0   WEEKLY DOSE  4mg Sun, 3 mg otherwise   PT INSTRUCT  same   RECHECK  4 WEEKS     ATRIAL FIB   LOCATION  Clinic   Comments               Follow-ups after your visit        Your next 10 appointments already scheduled     Sep 29, 2017 10:15 AM CDT   New Vascular Visit with Real Zapata MD   [facility] [place] ([facility])    [address] [place] [address] [phone]              Who to contact     If you have questions or need follow up information about today's clinic visit or your schedule please contact [facility] directly at [phone].  Normal or non-critical lab and imaging results will be communicated to you by Pocket Conciergehart, letter or phone within 4 business days after the clinic has received the results. If you do not hear from us within 7 days, please contact the clinic through G3t or phone. If you have a critical or abnormal lab result, we will notify you by phone as soon as possible.  Submit refill requests through Chubbies Shorts or call your pharmacy and they will forward the refill request to us. Please allow 3 business days for your refill to be completed.          Additional Information About Your Visit        MyChart Information     Chubbies Shorts lets you send messages to your doctor, view your test results, renew your prescriptions, schedule appointments and more. To sign up, go to [email or web address] . Click on \"Log in\" on " "the left side of the screen, which will take you to the Welcome page. Then click on \"Sign up Now\" on the right side of the page.     You will be asked to enter the access code listed below, as well as some personal information. Please follow the directions to create your username and password.     Your access code is: [email or web address]  Expires: 2017  2:07 PM     Your access code will  in 90 days. If you need help or a new code, please call your [facility] or [phone].        Care EveryWhere ID     This is your Care EveryWhere ID. This could be used by other organizations to access your [facility] medical records  [id number]        Your Vitals Were     Pulse Respirations Pulse Oximetry BMI (Body Mass Index)          60 18 97% 43 kg/m2         Blood Pressure from Last 3 Encounters:   17 118/70   17 114/60   17 106/74    Weight from Last 3 Encounters:   17 120.8 kg (266 lb 6.4 oz)   17 122.7 kg (270 lb 6.4 oz)   17 122 kg (269 lb)              We Performed the Following     Prothrombin - INR ([facility])          Today's Medication Changes          These changes are accurate as of: 17 12:50 PM.  If you have any questions, ask your nurse or doctor.               These medicines have changed or have updated prescriptions.        Dose/Directions    furosemide 40 MG tablet   Commonly known as:  LASIX   This may have changed:    - how much to take  - additional instructions   Used for:  Localized edema        Dose:  60 mg   Take 1.5 tablets (60 mg) by mouth daily One tablet in am   Quantity:  135 tablet   Refills:  3       warfarin 2 MG tablet   Commonly known as:  COUMADIN   This may have changed:    - how much to take  - how to take this  - when to take this  - additional instructions   Used for:  Chronic atrial fibrillation (H)        1 1/2 pills per day   Quantity:  135 tablet   Refills:  3                Primary Care Provider Office Phone # Fax #    Heriberto Haile MD " [phone]       [address]  [facility] [address]        Equal Access to Services     ABIOLA DOMINGUEZ : Hadii aad ku hadcapri Younger, raheemda alizesalima, hienta kasharon koch, sol carrmir barnhart. So [facility] [phone].    ATENCIÓN: Si habla español, tiene a camacho disposición servicios gratuitos de asistencia lingüística. Llame al [phone].    We comply with applicable federal civil rights laws and [place] laws. We do not discriminate on the basis of race, color, national origin, age, disability sex, sexual orientation or gender identity.            Thank you!     Thank you for choosing [facility]  for your care. Our goal is always to provide you with excellent care. Hearing back from our patients is one way we can continue to improve our services. Please take a few minutes to complete the written survey that you may receive in the mail after your visit with us. Thank you!             Your Updated Medication List - Protect others around you: Learn how to safely use, store and throw away your medicines at www.disposemymeds.org.          This list is accurate as of: 9/5/17 12:50 PM.  Always use your most recent med list.                   Brand Name Dispense Instructions for use Diagnosis    allopurinol 300 MG tablet    ZYLOPRIM    90 tablet    TAKE 1 TABLET BY MOUTH DAILY    Encounter for medication refill       ASPIRIN NOT PRESCRIBED    INTENTIONAL    0 each    Reported on 4/19/2017 On warfarin    Chronic atrial fibrillation (H)       digoxin 125 MCG tablet    LANOXIN    90 tablet    Take 1 tablet (125 mcg) by mouth daily    Chronic atrial fibrillation (H)       furosemide 40 MG tablet    LASIX    135 tablet    Take 1.5 tablets (60 mg) by mouth daily One tablet in am    Localized edema       ipratropium 0.06 % spray    ATROVENT    45 mL    USE 2 SPRAYS IN EACH NOSTRIL FOUR TIMES DAILY AS NEEDED FOR RHINITIS    Encounter for medication refill, VMR  (vasomotor rhinitis)       losartan 50 MG tablet    COZAAR    90 tablet    Take 1 tablet (50 mg) by mouth daily    Essential hypertension       metoprolol 50 MG tablet    LOPRESSOR    360 tablet    Take 2 tablets (100 mg) by mouth 2 times daily    Chronic atrial fibrillation (H)       Multi-vitamin Tabs tablet   Generic drug:  multivitamin, therapeutic with minerals     100 tablet    Take 1 tablet by mouth daily.        omeprazole 20 MG CR capsule    priLOSEC    90 capsule    Take 1 capsule (20 mg) by mouth daily    Gastroesophageal reflux disease, esophagitis presence not specified       simvastatin 10 MG tablet    ZOCOR    90 tablet    TAKE 1 TABLET(10 MG) BY MOUTH AT BEDTIME    Encounter for medication refill       warfarin 2 MG tablet    COUMADIN    135 tablet    1 1/2 pills per day    Chronic atrial fibrillation (H)

## 2017-09-05 NOTE — PATIENT INSTRUCTIONS
[facility] Anticoaglution Latest Ref Rng & Units 9/5/2017   ASSESS  THER   INR GOAL  2.0-3.0   WEEKLY DOSE  4mg Sun, 3 mg otherwise   PT INSTRUCT  same   RECHECK  4 WEEKS     ATRIAL FIB   LOCATION  Clinic   Comments

## 2017-09-05 NOTE — PROGRESS NOTES
Lisinopril to cozaar for cough no darwin penaloza has not seen dr elisha Gilib,     chf lasix 60 mg   No new symptoms      Subjective:  Here to discuss the status of the following problem(s):(Unless noted the problem(s) is/are stable and if applicable the medicine(s) being used is/are causing no side effects or problems.)    CC: RECHECK and INR Followup      1. Chronic atrial fibrillation (H)  Rate controlled, is on BBLK    2. Long term current use of anticoagulant therapy  Needs INR    3. Morbid obesity due to excess calories (H)  He has not lost weight despite my persistent suggests for this    4. Diastolic dysfunction  Not significant on recent ECHO , 4/2017    5. Dyspnea, unspecified type  Has many possible reasons , I asked him to see Dr Moon at [facility]. Hasn't seen her yet.       ROS:  General:  NEGATIVE for fever, chills, change in weight   CV:  NEGATIVE for chest pain, palpitations    Resp: Gordillo weith exertion    Past Medical History:   Diagnosis Date     Atrial fibrillation (H)     became chronic afib in 2016,paroxysmal,was on amiodarone but went into persistent afib in april 2106 and amiodrone was d/cd. now on BBLK and considering cardioversion.(5/2106).In Sept 2016 plan is rate control which has been a challenge and Holter in 6 months     CAD (coronary artery disease)     mild stenosis per cath     Cardiomyopathy (H)      Colon polyp 2010     Gout      HTN (hypertension)      Hyperlipidemia      Obesity (BMI 35.0-39.9 without comorbidity)      SUJEY on CPAP      Current Outpatient Prescriptions   Medication     losartan (COZAAR) 50 MG tablet     furosemide (LASIX) 40 MG tablet     ipratropium (ATROVENT) 0.06 % spray     digoxin (LANOXIN) 125 MCG tablet     metoprolol (LOPRESSOR) 50 MG tablet     simvastatin (ZOCOR) 10 MG tablet     warfarin (COUMADIN) 2 MG tablet     allopurinol (ZYLOPRIM) 300 MG tablet     Multiple Vitamin (MULTI-VITAMIN) per tablet     omeprazole (PRILOSEC) 20 MG CR capsule      ASPIRIN NOT PRESCRIBED (INTENTIONAL)     No current facility-administered medications for this visit.       reports that he quit smoking about 40 years ago. His smoking use included Cigarettes. He has a 5.00 pack-year smoking history. He quit smokeless tobacco use about 57 years ago. He reports that he does not drink alcohol or use illicit drugs.      EXAM:  BP: 118/70   Pulse: 60      Wt Readings from Last 2 Encounters:   09/05/17 120.8 kg (266 lb 6.4 oz)   08/07/17 122.7 kg (270 lb 6.4 oz)       BMI= Body mass index is 43 kg/(m^2).    EXAM:    VS noted   Round/obese man  EYES = NL  EARS= NL  MOUTH =NL  NECK = NL  LUNGS=NL  COR=NL  ABD=NL  EXT= +1 pretib  MOOD AFFECT =NL      Assessment/Plan:  Vasiliy was seen today for recheck and inr followup.    Diagnoses and all orders for this visit:    Chronic atrial fibrillation (H)  -     Prothrombin - INR ([facility])    Long term current use of anticoagulant therapy  -     Prothrombin - INR ([facility])    Morbid obesity due to excess calories (H)  Really needs to lose weight    Diastolic dysfunction  Okay on recent  Echo    Dyspnea, unspecified type  ? Lung source seems unlikely but as it is his main complaint  He will see Dr Moon.          Heriberto Haile  [facility]

## 2017-09-20 ENCOUNTER — TRANSFERRED RECORDS (OUTPATIENT)
Dept: FAMILY MEDICINE | Facility: CLINIC | Age: 82
End: 2017-09-20

## 2017-09-20 NOTE — PROGRESS NOTES
9/13/17 we faxed 6/6/17, 6/12/17, 7/3/17 and 8/17/17 office notes with 6/6/17 chest xray results to [facility], Atten: Dr. Moon @ [phone]    Luis Antonio Blanco,   [facility]  [phone]

## 2017-09-20 NOTE — PROGRESS NOTES
9/13/17 we faxed 6/6/17, 6/12/17, 7/3/17 and 8/17/17 office notes with 6/6/17 chest xray results to [facility], Atten: Dr. Moon @ [phone]    Luis Antonio Blanco,   [facility]  [phone]

## 2017-09-20 NOTE — PROGRESS NOTES
9/13/17 we faxed 6/6/17, 6/12/17, 7/3/17 and 8/17/17 office notes with 6/6/17 chest xray results to [facility], Atten: Dr. Moon @ [phone]    Luis Antonio Blanco,   [facility]  [phone]

## 2017-09-20 NOTE — PROGRESS NOTES
9/13/17 we faxed 6/6/17, 6/12/17, 7/3/17 and 8/17/17 office notes with 6/6/17 chest xray results to [facility], Atten: Dr. Moon @ [phone]    Luis Antonio Blanco,   [facility]  [phone]

## 2017-09-29 ENCOUNTER — OFFICE VISIT (OUTPATIENT)
Dept: CARDIOLOGY | Facility: CLINIC | Age: 82
End: 2017-09-29
Attending: INTERNAL MEDICINE
Payer: MEDICARE

## 2017-09-29 VITALS
BODY MASS INDEX: 43.23 KG/M2 | DIASTOLIC BLOOD PRESSURE: 90 MMHG | HEART RATE: 76 BPM | SYSTOLIC BLOOD PRESSURE: 128 MMHG | WEIGHT: 269 LBS | HEIGHT: 66 IN

## 2017-09-29 DIAGNOSIS — I87.2 VENOUS (PERIPHERAL) INSUFFICIENCY: Primary | ICD-10-CM

## 2017-09-29 DIAGNOSIS — I42.9 CARDIOMYOPATHY, UNSPECIFIED TYPE (H): ICD-10-CM

## 2017-09-29 PROCEDURE — 99213 OFFICE O/P EST LOW 20 MIN: CPT | Performed by: INTERNAL MEDICINE

## 2017-09-29 NOTE — MR AVS SNAPSHOT
After Visit Summary   9/29/2017    Vasiliy Corbin    MRN: [id number]           Patient Information     Date Of Birth          1935        Visit Information        Provider Department      9/29/2017 10:15 AM Real Zapata MD [facility]        Today's Diagnoses     Venous (peripheral) insufficiency    -  1    Cardiomyopathy, unspecified type (H)           Follow-ups after your visit        Your next 10 appointments already scheduled     Oct 09, 2017  9:00 AM CDT   SHORT with Heriberto Haile MD   [facility] ([facility])    [address] [place] [address]   [phone]            Nov 27, 2017  9:30 AM CST   US ENDOVENOUS ABLATION THERAPY INCOMPETENT VEIN RT with SUVUS1   [facility] ([facility])    [address] [place] [address]   [phone]            Nov 29, 2017 11:00 AM CST   US LOWER EXTREMITY VENOUS DUPLEX RIGHT with SUVUS1   [facility] ([facility])    [address] [place] [address]   [phone]           Please bring a list of your medicines (including vitamins, minerals and over-the-counter drugs). Also, tell your doctor about any allergies you may have. Wear comfortable clothes and leave your valuables at home.  You do not need to do anything special to prepare for your exam.  Please call the Imaging Department at your exam site with any questions.              Future tests that were ordered for you today     Open Future Orders        Priority Expected Expires Ordered    US Endovenous Ablat Thpy Incmpnt 1Vein R1Vn Routine 10/6/2017 9/29/2018 9/29/2017    US Lower Extremity Venous Duplex Right Routine 10/8/2017 9/29/2018 9/29/2017            Who to contact     If you have questions or need follow up information about today's clinic visit or your schedule  "please contact [facility] [place] directly at [phone].  Normal or non-critical lab and imaging results will be communicated to you by MyChart, letter or phone within 4 business days after the clinic has received the results. If you do not hear from us within 7 days, please contact the clinic through MyChart or phone. If you have a critical or abnormal lab result, we will notify you by phone as soon as possible.  Submit refill requests through Nichewith or call your pharmacy and they will forward the refill request to us. Please allow 3 business days for your refill to be completed.          Additional Information About Your Visit        G[employer] Pharm[employer]art Information     Nichewith lets you send messages to your doctor, view your test results, renew your prescriptions, schedule appointments and more. To sign up, go to [email or web address] . Click on \"Log in\" on the left side of the screen, which will take you to the Welcome page. Then click on \"Sign up Now\" on the right side of the page.     You will be asked to enter the access code listed below, as well as some personal information. Please follow the directions to create your username and password.     Your access code is: [email or web address]  Expires: 2017 10:43 AM     Your access code will  in 90 days. If you need help or a new code, please call your [place] clinic or [phone].        Care EveryWhere ID     This is your Care EveryWhere ID. This could be used by other organizations to access your [place] medical records  [id number]        Your Vitals Were     Pulse Height BMI (Body Mass Index)             76 1.676 m (5' 6\") 43.42 kg/m2          Blood Pressure from Last 3 Encounters:   17 128/90   17 118/70   17 114/60    Weight from Last 3 Encounters:   17 122 kg (269 lb)   17 120.8 kg (266 lb 6.4 oz)   17 122.7 kg (270 lb 6.4 oz)              We Performed the Following     Follow-Up with " Vascular Cardiologist          Today's Medication Changes          These changes are accurate as of: 9/29/17 10:43 AM.  If you have any questions, ask your nurse or doctor.               These medicines have changed or have updated prescriptions.        Dose/Directions    furosemide 40 MG tablet   Commonly known as:  LASIX   This may have changed:    - how much to take  - additional instructions   Used for:  Localized edema        Dose:  60 mg   Take 1.5 tablets (60 mg) by mouth daily One tablet in am   Quantity:  135 tablet   Refills:  3       warfarin 2 MG tablet   Commonly known as:  COUMADIN   This may have changed:    - how much to take  - how to take this  - when to take this  - additional instructions   Used for:  Chronic atrial fibrillation (H)        1 1/2 pills per day   Quantity:  135 tablet   Refills:  3                Primary Care Provider Office Phone # Fax #    Heriberto Haile [phone] [address]  [place] [address]        Equal Access to Services     [facility] Hospital[facility]E : Hadii abimbola ku hadasho Soomaali, waaxda luqadaha, qaybta kaalmada adeegyada, waxay matthieuin haynanyn flavio dejesus . So [facility] [phone].    ATENCIÓN: Si habla español, tiene a camacho disposición servicios gratuitos de asistencia lingüística. Noel al [phone].    We comply with applicable federal civil rights laws and [place] laws. We do not discriminate on the basis of race, color, national origin, age, disability sex, sexual orientation or gender identity.            Thank you!     Thank you for choosing [facility] [place]  for your care. Our goal is always to provide you with excellent care. Hearing back from our patients is one way we can continue to improve our services. Please take a few minutes to complete the written survey that you may receive in the mail after your visit with us. Thank you!             Your Updated Medication List - Protect others around  you: Learn how to safely use, store and throw away your medicines at www.disposemymeds.org.          This list is accurate as of: 9/29/17 10:43 AM.  Always use your most recent med list.                   Brand Name Dispense Instructions for use Diagnosis    allopurinol 300 MG tablet    ZYLOPRIM    90 tablet    TAKE 1 TABLET BY MOUTH DAILY    Encounter for medication refill       ASPIRIN NOT PRESCRIBED    INTENTIONAL    0 each    Reported on 4/19/2017 On warfarin    Chronic atrial fibrillation (H)       digoxin 125 MCG tablet    LANOXIN    90 tablet    Take 1 tablet (125 mcg) by mouth daily    Chronic atrial fibrillation (H)       furosemide 40 MG tablet    LASIX    135 tablet    Take 1.5 tablets (60 mg) by mouth daily One tablet in am    Localized edema       ipratropium 0.06 % spray    ATROVENT    45 mL    USE 2 SPRAYS IN EACH NOSTRIL FOUR TIMES DAILY AS NEEDED FOR RHINITIS    Encounter for medication refill, VMR (vasomotor rhinitis)       losartan 50 MG tablet    COZAAR    90 tablet    Take 1 tablet (50 mg) by mouth daily    Essential hypertension       metoprolol 50 MG tablet    LOPRESSOR    360 tablet    Take 2 tablets (100 mg) by mouth 2 times daily    Chronic atrial fibrillation (H)       Multi-vitamin Tabs tablet   Generic drug:  multivitamin, therapeutic with minerals     100 tablet    Take 1 tablet by mouth daily.        simvastatin 10 MG tablet    ZOCOR    90 tablet    TAKE 1 TABLET(10 MG) BY MOUTH AT BEDTIME    Encounter for medication refill       warfarin 2 MG tablet    COUMADIN    135 tablet    1 1/2 pills per day    Chronic atrial fibrillation (H)

## 2017-09-29 NOTE — LETTER
9/29/2017    Heriberto Haile MD  [address]  [facility] [address]    RE: Vasiliy Corbin       Dear Colleague,    I had the pleasure of seeing Vasiliy Corbin in the [facility].    Vascular Cardiology Progress Note          Assessment and Plan:     1. Lower extremity edema, symptomatic with right saphenous vein reflux    We discussed that he may or may not have symptomatic improvement with right saphenous vein radiofrequency ablation.  There is significant reflux and dilatation in that vein and ablating it should improve lower extremity pressures.  However, he still has obesity and atrial fibrillation contributing to his edema.  Patient is very reasonable with his expectations and due to his discomfort with the edema, he would like to try ablation if it is possible.  We discussed risks of the procedure including potential blood clots, nerve and skin damage.  He wishes to proceed.    This note was transcribed using electronic voice recognition software and there may be typographical errors present.                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  He comes in to discuss potential right lower extremity saphenous vein ablation.  He had venous competency testing done on 5/1/2017.  He did have significant incompetence of the right greater saphenous vein with up to 5.4 seconds of reflux in the thigh and vein diameter ranging from 5.4-6.2 mm in that area.  The patient would like improvement in his lower extremity edema.  He states they are fairly similar between the two legs.  They're worse at the end the day and are uncomfortable.  He has not had venous ulcers.  He has worn compression.  He tries to elevate his legs.  They do improve overnight.                       Review of Systems:   Review of Systems:  Skin:  Negative     Eyes:  Positive for glasses  ENT:  Positive for  "hearing loss  Respiratory:  Positive for sleep apnea;CPAP;cough  Cardiovascular:    Positive for;lower extremity symptoms;edema  Gastroenterology: Negative    Genitourinary:  not assessed    Musculoskeletal:  Positive for joint pain  Neurologic:  Negative    Psychiatric:  Negative    Heme/Lymph/Imm:  Negative    Endocrine:  Negative                Physical Exam:     Vitals: /90  Pulse 76  Ht 1.676 m (5' 6\")  Wt 122 kg (269 lb)  BMI 43.42 kg/m2  Constitutional:  cooperative obese      Skin:  warm and dry to the touch        Head:  normocephalic        Eyes:  pupils equal and round        ENT:  no pallor or cyanosis        Chest:  clear to auscultation        Abdomen:    obese      Extremities and Back:      No venous stasis changes, telangiectasia or ulcerations, 1+ pitting edema bilaterally    Neurological:  no gross motor deficits;affect appropriate                 Medications:     Current Outpatient Prescriptions   Medication Sig Dispense Refill     allopurinol (ZYLOPRIM) 300 MG tablet TAKE 1 TABLET BY MOUTH DAILY 90 tablet 3     losartan (COZAAR) 50 MG tablet Take 1 tablet (50 mg) by mouth daily 90 tablet 3     furosemide (LASIX) 40 MG tablet Take 1.5 tablets (60 mg) by mouth daily One tablet in am (Patient taking differently: Take 40 mg by mouth daily One tablet in am) 135 tablet 3     ipratropium (ATROVENT) 0.06 % spray USE 2 SPRAYS IN EACH NOSTRIL FOUR TIMES DAILY AS NEEDED FOR RHINITIS 45 mL 3     digoxin (LANOXIN) 125 MCG tablet Take 1 tablet (125 mcg) by mouth daily 90 tablet 3     metoprolol (LOPRESSOR) 50 MG tablet Take 2 tablets (100 mg) by mouth 2 times daily 360 tablet 3     simvastatin (ZOCOR) 10 MG tablet TAKE 1 TABLET(10 MG) BY MOUTH AT BEDTIME 90 tablet 3     warfarin (COUMADIN) 2 MG tablet 1 1/2 pills per day (Patient taking differently: Take 2 mg by mouth See Admin Instructions ) 135 tablet 3     Multiple Vitamin (MULTI-VITAMIN) per tablet Take 1 tablet by mouth daily. 100 tablet 12     " ASPIRIN NOT PRESCRIBED (INTENTIONAL) Reported on 4/19/2017  On warfarin 0 each 0                Data:   All laboratory data reviewed  No results found for this or any previous visit (from the past 24 hour(s)).    All laboratory data reviewed  Lab Results   Component Value Date    CHOL 147 12/28/2016     Lab Results   Component Value Date    HDL 42 12/28/2016     Lab Results   Component Value Date    LDL 63 12/28/2016     Lab Results   Component Value Date    TRIG 209 12/28/2016     Lab Results   Component Value Date    CHOLHDLRATIO 3.4 02/01/2012     TSH   Date Value Ref Range Status   12/28/2016 1.81 0.40 - 4.00 mU/L Final     Last Basic Metabolic Panel:  Lab Results   Component Value Date     05/22/2017      Lab Results   Component Value Date    POTASSIUM 4.4 05/22/2017     Lab Results   Component Value Date    CHLORIDE 104 05/22/2017     Lab Results   Component Value Date    EDUARD 9.4 05/22/2017     Lab Results   Component Value Date    CO2 29 05/22/2017     Lab Results   Component Value Date    BUN 19 05/22/2017     Lab Results   Component Value Date    CR 1.18 05/22/2017     Lab Results   Component Value Date     05/22/2017     Lab Results   Component Value Date    WBC 6.5 12/12/2016    WBC 10.8 03/27/2016     Lab Results   Component Value Date    RBC 4.96 12/12/2016    RBC 4.26 03/27/2016     Lab Results   Component Value Date    HGB 15.6 12/12/2016     Lab Results   Component Value Date    HCT 47.7 12/12/2016     Lab Results   Component Value Date    MCV 96.2 12/12/2016     Lab Results   Component Value Date    MCH 31.4 12/12/2016     Lab Results   Component Value Date    MCHC 32.7 12/12/2016     Lab Results   Component Value Date    RDW 14.6 03/27/2016     Lab Results   Component Value Date     12/12/2016     Thank you for allowing me to participate in the care of your patient.    Sincerely,     Real Zapata MD     [facility]

## 2017-09-29 NOTE — PROGRESS NOTES
Vascular Cardiology Progress Note          Assessment and Plan:     1. Lower extremity edema, symptomatic with right saphenous vein reflux    We discussed that he may or may not have symptomatic improvement with right saphenous vein radiofrequency ablation.  There is significant reflux and dilatation in that vein and ablating it should improve lower extremity pressures.  However, he still has obesity and atrial fibrillation contributing to his edema.  Patient is very reasonable with his expectations and due to his discomfort with the edema, he would like to try ablation if it is possible.  We discussed risks of the procedure including potential blood clots, nerve and skin damage.  He wishes to proceed.    This note was transcribed using electronic voice recognition software and there may be typographical errors present.                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  He comes in to discuss potential right lower extremity saphenous vein ablation.  He had venous competency testing done on 5/1/2017.  He did have significant incompetence of the right greater saphenous vein with up to 5.4 seconds of reflux in the thigh and vein diameter ranging from 5.4-6.2 mm in that area.  The patient would like improvement in his lower extremity edema.  He states they are fairly similar between the two legs.  They're worse at the end the day and are uncomfortable.  He has not had venous ulcers.  He has worn compression.  He tries to elevate his legs.  They do improve overnight.                       Review of Systems:   Review of Systems:  Skin:  Negative     Eyes:  Positive for glasses  ENT:  Positive for hearing loss  Respiratory:  Positive for sleep apnea;CPAP;cough  Cardiovascular:    Positive for;lower extremity symptoms;edema  Gastroenterology: Negative    Genitourinary:  not assessed    Musculoskeletal:  Positive for joint  "pain  Neurologic:  Negative    Psychiatric:  Negative    Heme/Lymph/Imm:  Negative    Endocrine:  Negative                Physical Exam:     Vitals: /90  Pulse 76  Ht 1.676 m (5' 6\")  Wt 122 kg (269 lb)  BMI 43.42 kg/m2  Constitutional:  cooperative obese      Skin:  warm and dry to the touch        Head:  normocephalic        Eyes:  pupils equal and round        ENT:  no pallor or cyanosis        Chest:  clear to auscultation        Abdomen:    obese      Extremities and Back:      No venous stasis changes, telangiectasia or ulcerations, 1+ pitting edema bilaterally    Neurological:  no gross motor deficits;affect appropriate                 Medications:     Current Outpatient Prescriptions   Medication Sig Dispense Refill     allopurinol (ZYLOPRIM) 300 MG tablet TAKE 1 TABLET BY MOUTH DAILY 90 tablet 3     losartan (COZAAR) 50 MG tablet Take 1 tablet (50 mg) by mouth daily 90 tablet 3     furosemide (LASIX) 40 MG tablet Take 1.5 tablets (60 mg) by mouth daily One tablet in am (Patient taking differently: Take 40 mg by mouth daily One tablet in am) 135 tablet 3     ipratropium (ATROVENT) 0.06 % spray USE 2 SPRAYS IN EACH NOSTRIL FOUR TIMES DAILY AS NEEDED FOR RHINITIS 45 mL 3     digoxin (LANOXIN) 125 MCG tablet Take 1 tablet (125 mcg) by mouth daily 90 tablet 3     metoprolol (LOPRESSOR) 50 MG tablet Take 2 tablets (100 mg) by mouth 2 times daily 360 tablet 3     simvastatin (ZOCOR) 10 MG tablet TAKE 1 TABLET(10 MG) BY MOUTH AT BEDTIME 90 tablet 3     warfarin (COUMADIN) 2 MG tablet 1 1/2 pills per day (Patient taking differently: Take 2 mg by mouth See Admin Instructions ) 135 tablet 3     Multiple Vitamin (MULTI-VITAMIN) per tablet Take 1 tablet by mouth daily. 100 tablet 12     ASPIRIN NOT PRESCRIBED (INTENTIONAL) Reported on 4/19/2017  On warfarin 0 each 0                Data:   All laboratory data reviewed  No results found for this or any previous visit (from the past 24 hour(s)).    All laboratory " data reviewed  Lab Results   Component Value Date    CHOL 147 12/28/2016     Lab Results   Component Value Date    HDL 42 12/28/2016     Lab Results   Component Value Date    LDL 63 12/28/2016     Lab Results   Component Value Date    TRIG 209 12/28/2016     Lab Results   Component Value Date    CHOLHDLRATIO 3.4 02/01/2012     TSH   Date Value Ref Range Status   12/28/2016 1.81 0.40 - 4.00 mU/L Final     Last Basic Metabolic Panel:  Lab Results   Component Value Date     05/22/2017      Lab Results   Component Value Date    POTASSIUM 4.4 05/22/2017     Lab Results   Component Value Date    CHLORIDE 104 05/22/2017     Lab Results   Component Value Date    EDUARD 9.4 05/22/2017     Lab Results   Component Value Date    CO2 29 05/22/2017     Lab Results   Component Value Date    BUN 19 05/22/2017     Lab Results   Component Value Date    CR 1.18 05/22/2017     Lab Results   Component Value Date     05/22/2017     Lab Results   Component Value Date    WBC 6.5 12/12/2016    WBC 10.8 03/27/2016     Lab Results   Component Value Date    RBC 4.96 12/12/2016    RBC 4.26 03/27/2016     Lab Results   Component Value Date    HGB 15.6 12/12/2016     Lab Results   Component Value Date    HCT 47.7 12/12/2016     Lab Results   Component Value Date    MCV 96.2 12/12/2016     Lab Results   Component Value Date    MCH 31.4 12/12/2016     Lab Results   Component Value Date    MCHC 32.7 12/12/2016     Lab Results   Component Value Date    RDW 14.6 03/27/2016     Lab Results   Component Value Date     12/12/2016

## 2017-10-09 ENCOUNTER — OFFICE VISIT (OUTPATIENT)
Dept: FAMILY MEDICINE | Facility: CLINIC | Age: 82
End: 2017-10-09

## 2017-10-09 VITALS
HEART RATE: 60 BPM | WEIGHT: 269 LBS | RESPIRATION RATE: 12 BRPM | DIASTOLIC BLOOD PRESSURE: 68 MMHG | SYSTOLIC BLOOD PRESSURE: 112 MMHG | BODY MASS INDEX: 43.42 KG/M2

## 2017-10-09 DIAGNOSIS — I48.20 CHRONIC ATRIAL FIBRILLATION (H): Primary | ICD-10-CM

## 2017-10-09 DIAGNOSIS — I10 ESSENTIAL HYPERTENSION: ICD-10-CM

## 2017-10-09 DIAGNOSIS — Z23 NEED FOR PROPHYLACTIC VACCINATION AND INOCULATION AGAINST INFLUENZA: ICD-10-CM

## 2017-10-09 DIAGNOSIS — R05.9 COUGH: ICD-10-CM

## 2017-10-09 LAB — INR PPP: 2.2

## 2017-10-09 PROCEDURE — 99213 OFFICE O/P EST LOW 20 MIN: CPT | Mod: 25 | Performed by: FAMILY MEDICINE

## 2017-10-09 PROCEDURE — G0008 ADMIN INFLUENZA VIRUS VAC: HCPCS | Performed by: FAMILY MEDICINE

## 2017-10-09 PROCEDURE — 85610 PROTHROMBIN TIME: CPT | Performed by: FAMILY MEDICINE

## 2017-10-09 PROCEDURE — 90662 IIV NO PRSV INCREASED AG IM: CPT | Performed by: FAMILY MEDICINE

## 2017-10-09 PROCEDURE — 36415 COLL VENOUS BLD VENIPUNCTURE: CPT | Performed by: FAMILY MEDICINE

## 2017-10-09 NOTE — PROGRESS NOTES
Injectable Influenza Immunization Documentation    1.  Is the person to be vaccinated sick today?   No    2. Does the person to be vaccinated have an allergy to a component   of the vaccine?   No    3. Has the person to be vaccinated ever had a serious reaction   to influenza vaccine in the past?   No    4. Has the person to be vaccinated ever had Guillain-Barré syndrome?   No    Form completed by Lexi Salas CMA

## 2017-10-09 NOTE — PATIENT INSTRUCTIONS
[facility] 10/9/2017   INR 2.2   ASSESS THER   INR GOAL 2.0-3.0   WEEKLY DOSE 4mg Sun, 3 mg otherwise   PT INSTRUCT same   RECHECK 4 WEEKS    ATRIAL FIB   LOCATION Clinic   Comments

## 2017-10-09 NOTE — MR AVS SNAPSHOT
After Visit Summary   10/9/2017    Vasiliy Corbin    MRN: [id number]           Patient Information     Date Of Birth          1935        Visit Information        Provider Department      10/9/2017 9:00 AM Heriberto Haile MD [facility]        Today's Diagnoses     Chronic atrial fibrillation (H)    -  1    Need for prophylactic vaccination and inoculation against influenza        Cough        Essential hypertension          Care Instructions    [facility] 10/9/2017   INR 2.2   ASSESS THER   INR GOAL 2.0-3.0   WEEKLY DOSE 4mg Sun, 3 mg otherwise   PT INSTRUCT same   RECHECK 4 WEEKS    ATRIAL FIB   LOCATION Clinic   Comments              Follow-ups after your visit        Your next 10 appointments already scheduled     Nov 27, 2017  9:30 AM CST   US ENDOVENOUS ABLATION THERAPY INCOMPETENT VEIN RT with SUVUS1   [facility] ([facility])    [address] [place], [place] [address]  [facility] [address]   [phone]            Nov 29, 2017 11:00 AM CST   US LOWER EXTREMITY VENOUS DUPLEX RIGHT with SUVUS1   [facility])    [address] [place], [place] [address]  [facility] [address]   [phone]           Please bring a list of your medicines (including vitamins, minerals and over-the-counter drugs). Also, tell your doctor about any allergies you may have. Wear comfortable clothes and leave your valuables at home.  You do not need to do anything special to prepare for your exam.  Please call the Imaging Department at your exam site with any questions.              Who to contact     If you have questions or need follow up information about today's clinic visit or your schedule please contact [facility] directly at [phone].  Normal or non-critical lab and imaging results will be communicated to you by MyChart, letter or phone within 4 business days after the  "clinic has received the results. If you do not hear from us within 7 days, please contact the clinic through [employer] or phone. If you have a critical or abnormal lab result, we will notify you by phone as soon as possible.  Submit refill requests through [employer] or call your pharmacy and they will forward the refill request to us. Please allow 3 business days for your refill to be completed.          Additional Information About Your Visit        [employer] Information     [employer] lets you send messages to your doctor, view your test results, renew your prescriptions, schedule appointments and more. To sign up, go to [email or web address] . Click on \"Log in\" on the left side of the screen, which will take you to the Welcome page. Then click on \"Sign up Now\" on the right side of the page.     You will be asked to enter the access code listed below, as well as some personal information. Please follow the directions to create your username and password.     Your access code is: [email or web address]  Expires: 2017 10:43 AM     Your access code will  in 90 days. If you need help or a new code, please call your [place] clinic or [phone].        Care EveryWhere ID     This is your Care EveryWhere ID. This could be used by other organizations to access your [place] medical records  [id number]        Your Vitals Were     Pulse Respirations BMI (Body Mass Index)             60 12 43.42 kg/m2          Blood Pressure from Last 3 Encounters:   10/09/17 112/68   17 128/90   17 118/70    Weight from Last 3 Encounters:   10/09/17 122 kg (269 lb)   17 122 kg (269 lb)   17 120.8 kg (266 lb 6.4 oz)              We Performed the Following     ADMIN INFLUENZA (For MEDICARE Patients ONLY) []     FLU VACCINE, INCREASED ANTIGEN, PRESV FREE, AGE 65+ [00702]     Prothrombin - INR ([facility])          Today's Medication Changes          These changes are accurate as of: 10/9/17  9:29 AM.  If you have any " questions, ask your nurse or doctor.               These medicines have changed or have updated prescriptions.        Dose/Directions    furosemide 40 MG tablet   Commonly known as:  LASIX   This may have changed:    - how much to take  - additional instructions   Used for:  Localized edema        Dose:  60 mg   Take 1.5 tablets (60 mg) by mouth daily One tablet in am   Quantity:  135 tablet   Refills:  3       warfarin 2 MG tablet   Commonly known as:  COUMADIN   This may have changed:    - how much to take  - how to take this  - when to take this  - additional instructions   Used for:  Chronic atrial fibrillation (H)        1 1/2 pills per day   Quantity:  135 tablet   Refills:  3                Primary Care Provider Office Phone # Fax #    Heriberto Haile [phone] [address]  [facility] [address]        Equal Access to Services     ABIOLA DOMINGUEZ : Hadii aad ku hadasho Soomaali, waaxda luqadaha, qaybta kaalmada adeegyada, sol dejesus . So [facility] [phone].    ATENCIÓN: Si habla español, tiene a camacho disposición servicios gratuitos de asistencia lingüística. Ll[facility] [phone].    We comply with applicable federal civil rights laws and [place] laws. We do not discriminate on the basis of race, color, national origin, age, disability, sex, sexual orientation, or gender identity.            Thank you!     Thank you for choosing [facility]  for your care. Our goal is always to provide you with excellent care. Hearing back from our patients is one way we can continue to improve our services. Please take a few minutes to complete the written survey that you may receive in the mail after your visit with us. Thank you!             Your Updated Medication List - Protect others around you: Learn how to safely use, store and throw away your medicines at www.disposemymeds.org.          This list is accurate as of: 10/9/17  9:29 AM.  Always use  your most recent med list.                   Brand Name Dispense Instructions for use Diagnosis    allopurinol 300 MG tablet    ZYLOPRIM    90 tablet    TAKE 1 TABLET BY MOUTH DAILY    Encounter for medication refill       ASPIRIN NOT PRESCRIBED    INTENTIONAL    0 each    Reported on 4/19/2017 On warfarin    Chronic atrial fibrillation (H)       digoxin 125 MCG tablet    LANOXIN    90 tablet    Take 1 tablet (125 mcg) by mouth daily    Chronic atrial fibrillation (H)       furosemide 40 MG tablet    LASIX    135 tablet    Take 1.5 tablets (60 mg) by mouth daily One tablet in am    Localized edema       ipratropium 0.06 % spray    ATROVENT    45 mL    USE 2 SPRAYS IN EACH NOSTRIL FOUR TIMES DAILY AS NEEDED FOR RHINITIS    Encounter for medication refill, VMR (vasomotor rhinitis)       losartan 50 MG tablet    COZAAR    90 tablet    Take 1 tablet (50 mg) by mouth daily    Essential hypertension       metoprolol 50 MG tablet    LOPRESSOR    360 tablet    Take 2 tablets (100 mg) by mouth 2 times daily    Chronic atrial fibrillation (H)       Multi-vitamin Tabs tablet   Generic drug:  multivitamin, therapeutic with minerals     100 tablet    Take 1 tablet by mouth daily.        simvastatin 10 MG tablet    ZOCOR    90 tablet    TAKE 1 TABLET(10 MG) BY MOUTH AT BEDTIME    Encounter for medication refill       warfarin 2 MG tablet    COUMADIN    135 tablet    1 1/2 pills per day    Chronic atrial fibrillation (H)

## 2017-10-10 NOTE — PROGRESS NOTES
Subjective:  Here to discuss the status of the following problem(s):(Unless noted the problem(s) is/are stable and if applicable the medicine(s) being used is/are causing no side effects or problems.)    CC: INR Followup and Flu Shot      1. Chronic atrial fibrillation (H)    Needs INR    3. Cough  Had thorough evaluation at [facility]  Plan is to get speech therapy   ? Multifactorial      4. Essential hypertension  BP Readings from Last 3 Encounters:   10/09/17 112/68   09/29/17 128/90   09/05/17 118/70            ROS:  5 point ROS completed and negative except noted above, including Gen, CV, Resp, GI, MS    Past Medical History:   Diagnosis Date     Atrial fibrillation (H)     became chronic afib in 2016,paroxysmal,was on amiodarone but went into persistent afib in april 2106 and amiodrone was d/cd. now on BBLK and considering cardioversion.(5/2106).In Sept 2016 plan is rate control which has been a challenge and Holter in 6 months     CAD (coronary artery disease)     mild stenosis per cath     Cardiomyopathy (H)      Chronic cough 2017    Eval with Dr Sarai Oro - [facility] - rec speech therapy, ct for eval of crackles No obstruction or copd     Colon polyp 2010     Gout      HTN (hypertension)      Hyperlipidemia      Obesity (BMI 35.0-39.9 without comorbidity)      SUJEY on CPAP      Venous insufficiency of right leg     no trteatment recommended by Judy - jamel to treat is there Dr Zapata     Current Outpatient Prescriptions   Medication     allopurinol (ZYLOPRIM) 300 MG tablet     losartan (COZAAR) 50 MG tablet     furosemide (LASIX) 40 MG tablet     ipratropium (ATROVENT) 0.06 % spray     digoxin (LANOXIN) 125 MCG tablet     metoprolol (LOPRESSOR) 50 MG tablet     simvastatin (ZOCOR) 10 MG tablet     warfarin (COUMADIN) 2 MG tablet     ASPIRIN NOT PRESCRIBED (INTENTIONAL)     Multiple Vitamin (MULTI-VITAMIN) per tablet     No current facility-administered medications for this visit.       reports that  he quit smoking about 40 years ago. His smoking use included Cigarettes. He has a 5.00 pack-year smoking history. He quit smokeless tobacco use about 57 years ago. He reports that he does not drink alcohol or use illicit drugs.      EXAM:  BP: 112/68   Pulse: 60      Wt Readings from Last 2 Encounters:   10/09/17 122 kg (269 lb)   09/29/17 122 kg (269 lb)       BMI= Body mass index is 43.42 kg/(m^2).    EXAM:   APPEARANCE: = Nrl  Resp effort = Nrl  Breath Sounds = Nrl  Heart Rate, Rythym, & sounds (no Murm)  = Nrl  Digits and Nails =Nrl  SKIN absent significant rashes or lesions = Nrl  Recent/Remote Memory = Nrl  Mood/Affect = Nrl    Assessment/Plan:  Vasiliy was seen today for inr followup and flu shot.    Diagnoses and all orders for this visit:    Cough  He will do speech and follow up with Pulmonology    Essential hypertension  okay    Chronic atrial fibrillation (H)  -     Prothrombin - INR ([facility])    Need for prophylactic vaccination and inoculation against influenza  -     ADMIN INFLUENZA (For MEDICARE Patients ONLY) []  -     FLU VACCINE, INCREASED ANTIGEN, PRESV FREE, AGE 65+ [70444]        Heriberto Haile  [facility]

## 2017-11-08 ENCOUNTER — OFFICE VISIT (OUTPATIENT)
Dept: FAMILY MEDICINE | Facility: CLINIC | Age: 82
End: 2017-11-08

## 2017-11-08 VITALS
HEART RATE: 74 BPM | OXYGEN SATURATION: 94 % | BODY MASS INDEX: 43.19 KG/M2 | SYSTOLIC BLOOD PRESSURE: 120 MMHG | RESPIRATION RATE: 20 BRPM | WEIGHT: 267.6 LBS | DIASTOLIC BLOOD PRESSURE: 64 MMHG

## 2017-11-08 DIAGNOSIS — Z79.01 LONG TERM CURRENT USE OF ANTICOAGULANT THERAPY: Primary | ICD-10-CM

## 2017-11-08 DIAGNOSIS — R31.0 GROSS HEMATURIA: ICD-10-CM

## 2017-11-08 DIAGNOSIS — I48.20 CHRONIC ATRIAL FIBRILLATION (H): ICD-10-CM

## 2017-11-08 LAB
% GRANULOCYTES: 68.1 % (ref 42.2–75.2)
HCT VFR BLD AUTO: 44.8 % (ref 39–51)
HEMOGLOBIN: 14.4 G/DL (ref 13.4–17.5)
INR PPP: 2.3 (ref 2–3)
LYMPHOCYTES NFR BLD AUTO: 23.9 % (ref 20.5–51.1)
MCH RBC QN AUTO: 29.7 PG (ref 27–31)
MCHC RBC AUTO-ENTMCNC: 32.2 G/DL (ref 33–37)
MCV RBC AUTO: 92.3 FL (ref 80–100)
MONOCYTES NFR BLD AUTO: 8 % (ref 1.7–9.3)
PLATELET # BLD AUTO: 202 K/UL (ref 140–450)
RBC # BLD AUTO: 4.86 X10/CMM (ref 4.2–5.9)
WBC # BLD AUTO: 6.5 X10/CMM (ref 3.8–11)

## 2017-11-08 PROCEDURE — 36415 COLL VENOUS BLD VENIPUNCTURE: CPT | Performed by: FAMILY MEDICINE

## 2017-11-08 PROCEDURE — 85025 COMPLETE CBC W/AUTO DIFF WBC: CPT | Performed by: FAMILY MEDICINE

## 2017-11-08 PROCEDURE — 99213 OFFICE O/P EST LOW 20 MIN: CPT | Performed by: FAMILY MEDICINE

## 2017-11-08 PROCEDURE — 85610 PROTHROMBIN TIME: CPT | Performed by: FAMILY MEDICINE

## 2017-11-08 NOTE — PATIENT INSTRUCTIONS
[facility] Latest Ref Rng & Units 11/8/2017   INR 2.0 - 3.0 2.3   ASSESS  THER   INR GOAL  2.0-3.0   WEEKLY DOSE  4mg Sun, 3 mg otherwise   PT INSTRUCT  same   RECHECK  4 WEEKS     ATRIAL FIB   LOCATION  Clinic   Comments

## 2017-11-08 NOTE — PROGRESS NOTES
Subjective:  Here to discuss the status of the following problem(s):(Unless noted the problem(s) is/are stable and if applicable the medicine(s) being used is/are causing no side effects or problems.)    CC: INR Followup; Recheck Medication; Hypertension; and Groin Swelling      1. Long term current use of anticoagulant therapy  No bruising, but  had blood in urine once in the last week   No dysuria    2. Chronic atrial fibrillation (H)  No palpitations  No SOB or CP  3. Gross hematuria  Hist of Laser TURP   Las t year         ROS:  5 point ROS completed and negative except noted above, including Gen, CV, Resp, GI, MS    Past Medical History:   Diagnosis Date     Atrial fibrillation (H)     became chronic afib in 2016,paroxysmal,was on amiodarone but went into persistent afib in april 2106 and amiodrone was d/cd. now on BBLK and considering cardioversion.(5/2106).In Sept 2016 plan is rate control which has been a challenge and Holter in 6 months     CAD (coronary artery disease)     mild stenosis per cath     Cardiomyopathy (H)      Chronic cough 2017    Eval with Dr Sarai Oro [facility] - rec speech therapy, ct for eval of crackles No obstruction or copd     Colon polyp 2010     Gout      HTN (hypertension)      Hyperlipidemia      Obesity (BMI 35.0-39.9 without comorbidity)      SUJEY on CPAP      Venous insufficiency of right leg     no trteatment recommended by Judy - jamel to treat is there Dr Zapata     Current Outpatient Prescriptions   Medication     omeprazole (PRILOSEC) 20 MG CR capsule     allopurinol (ZYLOPRIM) 300 MG tablet     losartan (COZAAR) 50 MG tablet     furosemide (LASIX) 40 MG tablet     ipratropium (ATROVENT) 0.06 % spray     digoxin (LANOXIN) 125 MCG tablet     metoprolol (LOPRESSOR) 50 MG tablet     simvastatin (ZOCOR) 10 MG tablet     warfarin (COUMADIN) 2 MG tablet     ASPIRIN NOT PRESCRIBED (INTENTIONAL)     Multiple Vitamin (MULTI-VITAMIN) per tablet     No current  facility-administered medications for this visit.       reports that he quit smoking about 40 years ago. His smoking use included Cigarettes. He has a 5.00 pack-year smoking history. He quit smokeless tobacco use about 57 years ago. He reports that he does not drink alcohol or use illicit drugs.      EXAM:  BP: 120/64   Pulse: 74      Wt Readings from Last 2 Encounters:   11/08/17 121.4 kg (267 lb 9.6 oz)   10/09/17 122 kg (269 lb)       BMI= Body mass index is 43.19 kg/(m^2).    EXAM:   APPEARANCE: = Nrl  Conj/Eyelids = Nrl  Ears/Nose = Nrl  Neck = Nrl  Resp effort = Nrl   nl penis testes and no hernia, prominent pubic fat  SKIN absent significant rashes or lesions = Nrl  Mood/Affect = Nrl    Assessment/Plan:  Vasiliy was seen today for inr followup, recheck medication, hypertension and groin swelling.    Diagnoses and all orders for this visit:    Long term current use of anticoagulant therapy  -     Prothrombin - INR ([facility]); Standing  -     Prothrombin - INR ([facility])    Chronic atrial fibrillation (H)  -     Prothrombin - INR ([facility]); Standing  -     Prothrombin - INR ([facility])  -     Basic Metabolic Panel (8) ([facility])    Gross hematuria  -     Urinalysis w/reflex protein, bili ([facility])  -     [facility]  -     CBC with Diff/Plt ([facility])  No masses in pubic area   Needs to see Dr Lima. Likely is just blood from prostate. But needs recheck r/o raul Haile  [facility]

## 2017-11-08 NOTE — LETTER
[facility]  [address] [place]  [address]  Phone: [phone]    November 9, 2017      Vasiliy Corbin  [address]  [facility] [address]              Dear Vasiliy,    The results from your recent visit showed the tests look fine. The blood sugar is a tiny bit up.         Sincerely,     Heriberto Kim M.D.    Results for orders placed or performed in visit on 11/08/17   Prothrombin - INR ([facility])   Result Value Ref Range    INR 2.3 2.0 - 3.0   CBC with Diff/Plt ([facility])   Result Value Ref Range    WBC x10/cmm 6.5 3.8 - 11.0 x10/cmm    % Lymphocytes 23.9 20.5 - 51.1 %    % Monocytes 8.0 1.7 - 9.3 %    % Granulocytes 68.1 42.2 - 75.2 %    RBC x10/cmm 4.86 4.2 - 5.9 x10/cmm    Hemoglobin 14.4 13.4 - 17.5 g/dl    Hematocrit 44.8 39 - 51 %    MCV 92.3 80 - 100 fL    MCH 29.7 27.0 - 31.0 pg    MCHC 32.2 (A) 33.0 - 37.0 g/dL    Platelet Count 202 140 - 450 K/uL   Basic Metabolic Panel (8) ([facility])   Result Value Ref Range    Glucose 102 (H) 65 - 99 mg/dL    Urea Nitrogen 18 8 - 27 mg/dL    Creatinine 1.08 0.76 - 1.27 mg/dL    eGFR If NonAfricn Am 64 >59 mL/min/1.73    eGFR If Africn Am 74 >59 mL/min/1.73    BUN/Creatinine Ratio 17 10 - 24    Sodium 141 134 - 144 mmol/L    Potassium 4.4 3.5 - 5.2 mmol/L    Chloride 101 96 - 106 mmol/L    Total CO2 27 18 - 28 mmol/L    Calcium 9.6 8.6 - 10.2 mg/dL    Narrative    Performed at:  01 - [facility] [address], [place], [place]  [id number]  : Blayne Colin MD, Phone:  [phone]

## 2017-11-08 NOTE — MR AVS SNAPSHOT
After Visit Summary   11/8/2017    Vasiliy Corbin    MRN: [id number]           Patient Information     Date Of Birth          1935        Visit Information        Provider Department      11/8/2017 9:00 AM Heriberto Haile MD [facility]        Today's Diagnoses     Long term current use of anticoagulant therapy    -  1    Chronic atrial fibrillation (H)        Gross hematuria          Care Instructions    [facility] Anticoaglution Latest Ref Rng & Units 11/8/2017   INR 2.0 - 3.0 2.3   ASSESS  THER   INR GOAL  2.0-3.0   WEEKLY DOSE  4mg Sun, 3 mg otherwise   PT INSTRUCT  same   RECHECK  4 WEEKS     ATRIAL FIB   LOCATION  Clinic   Comments               Follow-ups after your visit        Additional Services     [facility] to [facility]       Referral to [facility] Dr. Amaro  Phone:  [phone]  Reason for Referral:  DR Lima blood in urine     Please be aware that coverage of these services is subject to the terms and limitations of your health insurance plan.  Call member services at your health plan with any benefit or coverage questions.                  Future tests that were ordered for you today     Open Standing Orders        Priority Remaining Interval Expires Ordered    Prothrombin - INR ([facility]) Routine 11/12 11/8/2018 11/8/2017            Who to contact     If you have questions or need follow up information about today's clinic visit or your schedule please contact [facility] directly at [phone].  Normal or non-critical lab and imaging results will be communicated to you by MyChart, letter or phone within 4 business days after the clinic has received the results. If you do not hear from us within 7 days, please contact the clinic through MyChart or phone. If you have a critical or abnormal lab result, we will notify you by phone as soon as possible.  Submit refill requests through [email or web address] or call your pharmacy and they will  "forward the refill request to us. Please allow 3 business days for your refill to be completed.          Additional Information About Your Visit        [employer] Information     [employer] lets you send messages to your doctor, view your test results, renew your prescriptions, schedule appointments and more. To sign up, go to [email or web address] . Click on \"Log in\" on the left side of the screen, which will take you to the Welcome page. Then click on \"Sign up Now\" on the right side of the page.     You will be asked to enter the access code listed below, as well as some personal information. Please follow the directions to create your username and password.     Your access code is: [email or web address]  Expires: 2017  9:43 AM     Your access code will  in 90 days. If you need help or a new code, please call your [place] clinic or [phone].        Care EveryWhere ID     This is your Care EveryWhere ID. This could be used by other organizations to access your [place] medical records  [id number]        Your Vitals Were     Pulse Respirations Pulse Oximetry BMI (Body Mass Index)          74 20 94% 43.19 kg/m2         Blood Pressure from Last 3 Encounters:   17 120/64   10/09/17 112/68   17 128/90    Weight from Last 3 Encounters:   17 121.4 kg (267 lb 9.6 oz)   10/09/17 122 kg (269 lb)   17 122 kg (269 lb)              We Performed the Following     Basic Metabolic Panel (8) ([facility])     CBC with Diff/Plt ([facility])     Prothrombin - INR ([facility])     [facility] to [facility]     Urinalysis w/reflex protein, bili ([facility])          Today's Medication Changes          These changes are accurate as of: 17  9:17 AM.  If you have any questions, ask your nurse or doctor.               These medicines have changed or have updated prescriptions.        Dose/Directions    furosemide 40 MG tablet   Commonly known as:  LASIX   This may have changed:    - how much to take  - additional " instructions   Used for:  Localized edema        Dose:  60 mg   Take 1.5 tablets (60 mg) by mouth daily One tablet in am   Quantity:  135 tablet   Refills:  3       warfarin 2 MG tablet   Commonly known as:  COUMADIN   This may have changed:    - how much to take  - how to take this  - when to take this  - additional instructions   Used for:  Chronic atrial fibrillation (H)        1 1/2 pills per day   Quantity:  135 tablet   Refills:  3                Primary Care Provider Office Phone # Fax #    Heriberto Haile [phone] [address]  [facility] [address]        Equal Access to Services     [facility]: Hadii aad ku hadasho Soomaali, waaxda luqadaha, qaybta kaalmada adeegyada, sol dejesus . So [facility] [phone].    ATENCIÓN: Si habla español, tiene a camacho disposición servicios gratuitos de asistencia lingüística. [facility] [phone].    We comply with applicable federal civil rights laws and [place] laws. We do not discriminate on the basis of race, color, national origin, age, disability, sex, sexual orientation, or gender identity.            Thank you!     Thank you for choosing [facility]  for your care. Our goal is always to provide you with excellent care. Hearing back from our patients is one way we can continue to improve our services. Please take a few minutes to complete the written survey that you may receive in the mail after your visit with us. Thank you!             Your Updated Medication List - Protect others around you: Learn how to safely use, store and throw away your medicines at www.disposemymeds.org.          This list is accurate as of: 11/8/17  9:17 AM.  Always use your most recent med list.                   Brand Name Dispense Instructions for use Diagnosis    allopurinol 300 MG tablet    ZYLOPRIM    90 tablet    TAKE 1 TABLET BY MOUTH DAILY    Encounter for medication refill       ASPIRIN NOT PRESCRIBED     INTENTIONAL    0 each    Reported on 4/19/2017 On warfarin    Chronic atrial fibrillation (H)       digoxin 125 MCG tablet    LANOXIN    90 tablet    Take 1 tablet (125 mcg) by mouth daily    Chronic atrial fibrillation (H)       furosemide 40 MG tablet    LASIX    135 tablet    Take 1.5 tablets (60 mg) by mouth daily One tablet in am    Localized edema       ipratropium 0.06 % spray    ATROVENT    45 mL    USE 2 SPRAYS IN EACH NOSTRIL FOUR TIMES DAILY AS NEEDED FOR RHINITIS    Encounter for medication refill, VMR (vasomotor rhinitis)       losartan 50 MG tablet    COZAAR    90 tablet    Take 1 tablet (50 mg) by mouth daily    Essential hypertension       metoprolol 50 MG tablet    LOPRESSOR    360 tablet    Take 2 tablets (100 mg) by mouth 2 times daily    Chronic atrial fibrillation (H)       Multi-vitamin Tabs tablet   Generic drug:  multivitamin, therapeutic with minerals     100 tablet    Take 1 tablet by mouth daily.        omeprazole 20 MG CR capsule    priLOSEC          simvastatin 10 MG tablet    ZOCOR    90 tablet    TAKE 1 TABLET(10 MG) BY MOUTH AT BEDTIME    Encounter for medication refill       warfarin 2 MG tablet    COUMADIN    135 tablet    1 1/2 pills per day    Chronic atrial fibrillation (H)

## 2017-11-09 LAB
BUN SERPL-MCNC: 18 MG/DL (ref 8–27)
BUN/CREATININE RATIO: 17 (ref 10–24)
CALCIUM SERPL-MCNC: 9.6 MG/DL (ref 8.6–10.2)
CHLORIDE SERPLBLD-SCNC: 101 MMOL/L (ref 96–106)
CREAT SERPL-MCNC: 1.08 MG/DL (ref 0.76–1.27)
EGFR IF AFRICN AM: 74 ML/MIN/1.73
EGFR IF NONAFRICN AM: 64 ML/MIN/1.73
GLUCOSE SERPL-MCNC: 102 MG/DL (ref 65–99)
POTASSIUM SERPL-SCNC: 4.4 MMOL/L (ref 3.5–5.2)
SODIUM SERPL-SCNC: 141 MMOL/L (ref 134–144)
TOTAL CO2: 27 MMOL/L (ref 18–28)

## 2017-12-04 ENCOUNTER — TRANSFERRED RECORDS (OUTPATIENT)
Dept: FAMILY MEDICINE | Facility: CLINIC | Age: 82
End: 2017-12-04

## 2017-12-06 ENCOUNTER — OFFICE VISIT (OUTPATIENT)
Dept: FAMILY MEDICINE | Facility: CLINIC | Age: 82
End: 2017-12-06

## 2017-12-06 VITALS
DIASTOLIC BLOOD PRESSURE: 64 MMHG | HEART RATE: 88 BPM | RESPIRATION RATE: 20 BRPM | WEIGHT: 268.6 LBS | BODY MASS INDEX: 43.35 KG/M2 | OXYGEN SATURATION: 97 % | SYSTOLIC BLOOD PRESSURE: 124 MMHG

## 2017-12-06 DIAGNOSIS — Z79.01 LONG TERM CURRENT USE OF ANTICOAGULANT THERAPY: ICD-10-CM

## 2017-12-06 DIAGNOSIS — I48.20 CHRONIC ATRIAL FIBRILLATION (H): ICD-10-CM

## 2017-12-06 DIAGNOSIS — R31.0 GROSS HEMATURIA: ICD-10-CM

## 2017-12-06 DIAGNOSIS — E78.5 HYPERLIPIDEMIA WITH TARGET LDL LESS THAN 100: ICD-10-CM

## 2017-12-06 DIAGNOSIS — R05.9 COUGH: Primary | ICD-10-CM

## 2017-12-06 LAB — INR PPP: 2.6 (ref 2–3)

## 2017-12-06 PROCEDURE — 99213 OFFICE O/P EST LOW 20 MIN: CPT | Performed by: FAMILY MEDICINE

## 2017-12-06 PROCEDURE — 85610 PROTHROMBIN TIME: CPT | Performed by: FAMILY MEDICINE

## 2017-12-06 PROCEDURE — 36415 COLL VENOUS BLD VENIPUNCTURE: CPT | Performed by: FAMILY MEDICINE

## 2017-12-06 RX ORDER — METOPROLOL TARTRATE 50 MG
100 TABLET ORAL 2 TIMES DAILY
Qty: 360 TABLET | Refills: 3 | Status: ON HOLD | OUTPATIENT
Start: 2018-01-01 | End: 2018-01-16

## 2017-12-06 RX ORDER — SIMVASTATIN 10 MG
10 TABLET ORAL AT BEDTIME
Qty: 90 TABLET | Refills: 3 | Status: SHIPPED | OUTPATIENT
Start: 2018-01-01 | End: 2019-01-27

## 2017-12-06 RX ORDER — FINASTERIDE 5 MG/1
TABLET, FILM COATED ORAL
Refills: 3 | Status: ON HOLD | COMMUNITY
Start: 2017-12-04 | End: 2018-01-16

## 2017-12-06 RX ORDER — WARFARIN SODIUM 2 MG/1
TABLET ORAL
Qty: 135 TABLET | Refills: 3 | Status: ON HOLD | OUTPATIENT
Start: 2018-01-01 | End: 2018-01-16

## 2017-12-06 NOTE — PATIENT INSTRUCTIONS
[facility] Anticoaglution Latest Ref Rng & Units 12/6/2017   INR 2.0 - 3.0 2.6   ASSESS  THER   INR GOAL  2.0-3.0   WEEKLY DOSE  4mg Sun, 3 mg otherwise   PT INSTRUCT  same dose   RECHECK  4 WEEKS     ATRIAL FIB   LOCATION  Clinic   Comments

## 2017-12-06 NOTE — MR AVS SNAPSHOT
"              After Visit Summary   12/6/2017    Vasiliy Corbin    MRN: [id number]           Patient Information     Date Of Birth          1935        Visit Information        Provider Department      12/6/2017 9:30 AM Heriberto Haile MD [facility]        Today's Diagnoses     Long term current use of anticoagulant therapy        Chronic atrial fibrillation (H)        Encounter for medication refill          Care Instructions    [facility] Anticoaglution Latest Ref Rng & Units 12/6/2017   INR 2.0 - 3.0 2.6   ASSESS  THER   INR GOAL  2.0-3.0   WEEKLY DOSE  4mg Sun, 3 mg otherwise   PT INSTRUCT  same dose   RECHECK  4 WEEKS     ATRIAL FIB   LOCATION  Clinic   Comments               Follow-ups after your visit        Who to contact     If you have questions or need follow up information about today's clinic visit or your schedule please contact [facility] directly at [phone].  Normal or non-critical lab and imaging results will be communicated to you by [employer], letter or phone within 4 business days after the clinic has received the results. If you do not hear from us within 7 days, please contact the clinic through [employer] or phone. If you have a critical or abnormal lab result, we will notify you by phone as soon as possible.  Submit refill requests through [employer] or call your pharmacy and they will forward the refill request to us. Please allow 3 business days for your refill to be completed.          Additional Information About Your Visit        MyChart Information     [employer] lets you send messages to your doctor, view your test results, renew your prescriptions, schedule appointments and more. To sign up, go to [email or web address] . Click on \"Log in\" on the left side of the screen, which will take you to the Welcome page. Then click on \"Sign up Now\" on the right side of the page.     You will be asked to enter the access code listed below, as well as some personal " information. Please follow the directions to create your username and password.     Your access code is: [email or web address]  Expires: 2017  9:43 AM     Your access code will  in 90 days. If you need help or a new code, please call your [place] clinic or [phone].        Care EveryWhere ID     This is your Care EveryWhere ID. This could be used by other organizations to access your [place] medical records  [id number]        Your Vitals Were     Pulse Respirations Pulse Oximetry BMI (Body Mass Index)          88 20 97% 43.35 kg/m2         Blood Pressure from Last 3 Encounters:   17 124/64   17 120/64   10/09/17 112/68    Weight from Last 3 Encounters:   17 121.8 kg (268 lb 9.6 oz)   17 121.4 kg (267 lb 9.6 oz)   10/09/17 122 kg (269 lb)              We Performed the Following     Prothrombin - INR ([facility])          Today's Medication Changes          These changes are accurate as of: 17  9:59 AM.  If you have any questions, ask your nurse or doctor.               These medicines have changed or have updated prescriptions.        Dose/Directions    furosemide 40 MG tablet   Commonly known as:  LASIX   This may have changed:    - how much to take  - additional instructions   Used for:  Localized edema        Dose:  60 mg   Take 1.5 tablets (60 mg) by mouth daily One tablet in am   Quantity:  135 tablet   Refills:  3       simvastatin 10 MG tablet   Commonly known as:  ZOCOR   This may have changed:  See the new instructions.   Used for:  Encounter for medication refill   Changed by:  Heriberto Haile MD        Dose:  10 mg   Start taking on:  2018   Take 1 tablet (10 mg) by mouth At Bedtime   Quantity:  90 tablet   Refills:  3       warfarin 2 MG tablet   Commonly known as:  COUMADIN   This may have changed:    - how much to take  - how to take this  - when to take this  - additional instructions   Used for:  Chronic atrial fibrillation (H)        Start taking on:  2018   1  1/2 pills per day   Quantity:  135 tablet   Refills:  3            Where to get your medicines      These medications were sent to [facility] [address] [address], [place] [address]     Phone:  [phone]     metoprolol 50 MG tablet    simvastatin 10 MG tablet    warfarin 2 MG tablet                Primary Care Provider Office Phone # Fax #    Heriberto Haile [phone] [address]  [facility] [address]        Equal Access to Services     [facility]: Hadii aad ku hadasho Soomaali, waaxda luqadaha, qaybta kaalmada adeegyada, waxlucita idiin hayaan flavio khblaire dejesus . So [facility] [phone].    ATENCIÓN: Si habla español, tiene a camacho disposición servicios gratuitos de asistencia lingüística. [facility] [phone].    We comply with applicable federal civil rights laws and [place] laws. We do not discriminate on the basis of race, color, national origin, age, disability, sex, sexual orientation, or gender identity.            Thank you!     Thank you for choosing [facility]  for your care. Our goal is always to provide you with excellent care. Hearing back from our patients is one way we can continue to improve our services. Please take a few minutes to complete the written survey that you may receive in the mail after your visit with us. Thank you!             Your Updated Medication List - Protect others around you: Learn how to safely use, store and throw away your medicines at www.disposemymeds.org.          This list is accurate as of: 12/6/17  9:59 AM.  Always use your most recent med list.                   Brand Name Dispense Instructions for use Diagnosis    allopurinol 300 MG tablet    ZYLOPRIM    90 tablet    TAKE 1 TABLET BY MOUTH DAILY    Encounter for medication refill       ASPIRIN NOT PRESCRIBED    INTENTIONAL    0 each    Reported on 4/19/2017 On warfarin    Chronic atrial  fibrillation (H)       digoxin 125 MCG tablet    LANOXIN    90 tablet    Take 1 tablet (125 mcg) by mouth daily    Chronic atrial fibrillation (H)       finasteride 5 MG tablet    PROSCAR     TK 1 T PO D        furosemide 40 MG tablet    LASIX    135 tablet    Take 1.5 tablets (60 mg) by mouth daily One tablet in am    Localized edema       ipratropium 0.06 % spray    ATROVENT    45 mL    USE 2 SPRAYS IN EACH NOSTRIL FOUR TIMES DAILY AS NEEDED FOR RHINITIS    Encounter for medication refill, VMR (vasomotor rhinitis)       losartan 50 MG tablet    COZAAR    90 tablet    Take 1 tablet (50 mg) by mouth daily    Essential hypertension       metoprolol 50 MG tablet   Start taking on:  1/1/2018    LOPRESSOR    360 tablet    Take 2 tablets (100 mg) by mouth 2 times daily    Chronic atrial fibrillation (H)       Multi-vitamin Tabs tablet   Generic drug:  multivitamin, therapeutic with minerals     100 tablet    Take 1 tablet by mouth daily.        omeprazole 20 MG CR capsule    priLOSEC          simvastatin 10 MG tablet   Start taking on:  1/1/2018    ZOCOR    90 tablet    Take 1 tablet (10 mg) by mouth At Bedtime    Encounter for medication refill       warfarin 2 MG tablet   Start taking on:  1/1/2018    COUMADIN    135 tablet    1 1/2 pills per day    Chronic atrial fibrillation (H)

## 2017-12-06 NOTE — PROGRESS NOTES
Subjective:  Here to discuss the status of the following problem(s):(Unless noted the problem(s) is/are stable and if applicable the medicine(s) being used is/are causing no side effects or problems.)    CC: INR Followup      1. Cough  Did go to see Pulmonology - Kaila silva, helped with cough   Dd go to speech. Is 20 % better    2. Chronic atrial fibrillation (H)  Rate okay, dig and metoprolol      3. Long term current use of anticoagulant therapy  Having bleeding in urine    HEMATURIA BPH and friable vascular prostate- saw Dr Lima  Who started finasteride.DID CYSTOSCOPY AND ORDERED CT SCAN    4. Hyperlipidemia with target LDL less than 100  Needs refill       ROS:  General and CV completed and negative except as noted above    Past Medical History:   Diagnosis Date     Atrial fibrillation (H)     became chronic afib in 2016,paroxysmal,was on amiodarone but went into persistent afib in april 2106 and amiodrone was d/cd. now on BBLK and considering cardioversion.(5/2106).In Sept 2016 plan is rate control which has been a challenge and Holter in 6 months     CAD (coronary artery disease)     mild stenosis per cath     Cardiomyopathy (H)      Chronic cough 2017    Eval with Dr Sarai Oro - Kaila Silva - rec speech therapy, ct for eval of crackles No obstruction or copd     Colon polyp 2010     Gout      History of BPH     laser TURP in 2016 Dr Lima     HTN (hypertension)      Hyperlipidemia      Obesity (BMI 35.0-39.9 without comorbidity)      SUJEY on CPAP      Venous insufficiency of right leg     no trteatment recommended by Judy - option to treat is there Dr Zapata     Current Outpatient Prescriptions   Medication     finasteride (PROSCAR) 5 MG tablet     [START ON 1/1/2018] metoprolol (LOPRESSOR) 50 MG tablet     [START ON 1/1/2018] simvastatin (ZOCOR) 10 MG tablet     [START ON 1/1/2018] warfarin (COUMADIN) 2 MG tablet     omeprazole (PRILOSEC) 20 MG CR capsule     allopurinol (ZYLOPRIM) 300 MG tablet      losartan (COZAAR) 50 MG tablet     furosemide (LASIX) 40 MG tablet     ipratropium (ATROVENT) 0.06 % spray     digoxin (LANOXIN) 125 MCG tablet     Multiple Vitamin (MULTI-VITAMIN) per tablet     [DISCONTINUED] metoprolol (LOPRESSOR) 50 MG tablet     [DISCONTINUED] simvastatin (ZOCOR) 10 MG tablet     [DISCONTINUED] warfarin (COUMADIN) 2 MG tablet     ASPIRIN NOT PRESCRIBED (INTENTIONAL)     No current facility-administered medications for this visit.       reports that he quit smoking about 40 years ago. His smoking use included Cigarettes. He has a 5.00 pack-year smoking history. He quit smokeless tobacco use about 57 years ago. He reports that he does not drink alcohol or use illicit drugs.      EXAM:  BP: 124/64   Pulse: 88[sl irregular[      Wt Readings from Last 2 Encounters:   12/06/17 121.8 kg (268 lb 9.6 oz)   11/08/17 121.4 kg (267 lb 9.6 oz)       BMI= Body mass index is 43.35 kg/(m^2).    EXAM:   APPEARANCE: = alert, no distress and over weight  Conj/Eyelids = Nrl  Ears/Nose = Nrl  Neck = Nrl  Resp effort = Nrl  Breath Sounds = Nrl  Heart Rate, Rythym, & sounds (no Murm)  = Nrl  Digits and Nails =Nrl  SKIN absent significant rashes or lesions = Nrl  Ext (edema) =  1+, with jobst  Mood/Affect = Nrl    Assessment/Plan:  Vasiliy was seen today for inr followup.    Diagnoses and all orders for this visit:    Cough  Improved, did some speech therapy  Should follow up with Pulm    Chronic atrial fibrillation (H)  -     Prothrombin - INR ([facility])  -     metoprolol (LOPRESSOR) 50 MG tablet; Take 2 tablets (100 mg) by mouth 2 times daily  -     warfarin (COUMADIN) 2 MG tablet; 1 1/2 pills per day    Long term current use of anticoagulant therapy  -     Prothrombin - INR ([facility])    Hyperlipidemia with target LDL less than 100  Refilled statin  Other orders  -     simvastatin (ZOCOR) 10 MG tablet; Take 1 tablet (10 mg) by mouth At Bedtime    HEMATURIA- BPH   I encouraged him to go back and see Dr Lima if he keeps  bleeding. HE MAY NEED ANOTHER PROCEDURE          Heriberto Haile  [facility]

## 2017-12-08 ENCOUNTER — TRANSFERRED RECORDS (OUTPATIENT)
Dept: FAMILY MEDICINE | Facility: CLINIC | Age: 82
End: 2017-12-08

## 2018-01-03 VITALS — SYSTOLIC BLOOD PRESSURE: 126 MMHG | RESPIRATION RATE: 24 BRPM | DIASTOLIC BLOOD PRESSURE: 68 MMHG | HEART RATE: 68 BPM

## 2018-01-03 DIAGNOSIS — I48.20 CHRONIC ATRIAL FIBRILLATION (H): ICD-10-CM

## 2018-01-03 DIAGNOSIS — Z79.01 LONG TERM CURRENT USE OF ANTICOAGULANT THERAPY: Primary | ICD-10-CM

## 2018-01-03 LAB — INR PPP: 2.5 (ref 2–3)

## 2018-01-03 PROCEDURE — 36415 COLL VENOUS BLD VENIPUNCTURE: CPT | Performed by: FAMILY MEDICINE

## 2018-01-03 PROCEDURE — 85610 PROTHROMBIN TIME: CPT | Performed by: FAMILY MEDICINE

## 2018-01-05 NOTE — PROGRESS NOTES
For bladder surgery  HCM colonoscopy    CT Abdomen Pelvis without and with contrast on 2017  History: Gross hematuria  Impression:   1. Mild focal thickening of the anterior bladder wall of uncertain significance. This could be postoperative, inflammatory or neoplastic. Correlate clinically and cystoscopy findings.   2. Enlarged prostate.   3. No other acute of suspicious findings.   4. Left ureter not well opacified.     [facility] [address] [place] [id number] [phone]  Dept: [phone]    PRE-OP EVALUATION:  Today's date: 2018    Vasiliy Corbin (: 1935) presents for pre-operative evaluation assessment as requested by Dr. Lokesh Lima.  He requires evaluation and anesthesia risk assessment prior to undergoing surgery/procedure for treatment H/o Gross hematuria with abnormal finding on CT (see above) .  Proposed procedure: Cystoscopy, Transurethral Resection of bladder lesion     Date of Surgery/ Procedure: 2018  Time of Surgery/ Procedure: [facility]  Hospital/Surgical Facility: [facility]   Primary Physician: Nena Pablo  Type of Anesthesia Anticipated: General    Patient has a Health Care Directive or Living Will:  NO    1. NO - Do you have a history of heart attack, stroke, stent, bypass or surgery on an artery in the head, neck, heart or legs?  2. NO - Do you ever have any pain or discomfort in your chest?  3. NO - Do you have a history of  Heart Failure?  4. NO - Are you troubled by shortness of breath when: walking on the level, up a slight hill or at night?  5. NO - Do you currently have a cold, bronchitis or other respiratory infection?  6. NO - Do you have a cough, shortness of breath or wheezing?  7. NO - Do you sometimes get pains in the calves of your legs when you walk?  8. NO - Do you or anyone in your family have previous history of blood clots?  9. NO - Do you or does anyone in your family have a serious bleeding problem such as prolonged  "bleeding following surgeries or cuts?  10. NO - Have you ever had problems with anemia or been told to take iron pills?  11. NO - Have you had any abnormal blood loss such as black, tarry or bloody stools, or abnormal vaginal bleeding?  12. NO - Have you ever had a blood transfusion?  13. NO - Have you or any of your relatives ever had problems with anesthesia?  14. YES - DO YOU HAVE SLEEP APNEA, EXCESSIVE SNORING OR DAYTIME DROWSINESS? Sleep Apnea- uses CPAP   15. NO - Do you have any prosthetic heart valves?  16. NO - Do you have prosthetic joints?  17. NO - Is there any chance that you may be pregnant?    Ex smoker  ETOH no  Street drugs/MJ no  Caffeine coffee decaf    Sleep ok  Appetite ok  Exercise walking    Arthritis with nerve pain, both hips    Estimated body mass index is 43.74 kg/(m^2) as calculated from the following:    Height as of 9/29/17: 1.676 m (5' 6\").    Weight as of this encounter: 122.9 kg (271 lb).  Weight loss clinic advised.    Dr [facility]  medical f/u end of the month, last seen 6 months ago    Gout last flare years ago    BP Readings from Last 3 Encounters:   01/09/18 116/72   01/03/18 126/68   12/06/17 124/64       Interpretation Summary     The visual ejection fraction is estimated at 50-55%.  No regional wall motion abnormalities noted.  The right ventricle is mildly dilated.  The right ventricular systolic function is normal.  There is mild biatrial enlargement.  There is mild AR.  There is no pericardial effusion.     No significant change when compared to 12/28/16 study.    Genital rectal per surgery    Previous work as     No travel  No exposures  _____________________________________________________________________________      HPI:                                                      Brief HPI related to upcoming procedure: Cystoscopy, Transurethral Resection of bladder lesion     See problem list for active medical problems.  Problems all longstanding and " stable, except as noted/documented.  See ROS for pertinent symptoms related to these conditions.                                                                                                  .  HYPERTENSION - Patient has longstanding history of mod-severe HTN , currently denies any symptoms referable to elevated blood pressure. Specifically denies chest pain, palpitations, dyspnea, orthopnea, PND or peripheral edema. Blood pressure readings  BP Readings from Last 3 Encounters:   01/09/18 116/72   01/03/18 126/68   12/06/17 124/64      been in normal range. Current medication regimen is as listed below. Patient denies any side effects of medication.                                                                                                                                                                                          .  HYPERLIPIDEMIA - Patient has a long history of significant Hyperlipidemia  requiring medication for treatment with recent  LDL Cholesterol Calculated   Date Value Ref Range Status   12/28/2016 63 <100 mg/dL Final     Comment:     Desirable:       <100 mg/dl   ]    control. Patient reports no problems or side effects with the medication.                                                                                                                                                       .    MEDICAL HISTORY:                                                      Patient Active Problem List    Diagnosis Date Noted     History of BPH      Priority: Medium     Morbid obesity due to excess calories (H) 08/07/2017     Priority: Medium     Cardiomyopathy, unspecified type (H) 06/26/2017     Priority: Medium     Diastolic dysfunction 04/20/2017     Priority: Medium     Encounter for medication refill 01/05/2017     Priority: Medium     Enlarged prostate with urinary retention      Priority: Medium     him started Proscar and Flomax in December and saw        Syncope 03/26/2016      Priority: Medium     Coronary artery disease involving native coronary artery of native heart without angina pectoris 11/23/2015     Priority: Medium     Chronic atrial fibrillation (H) 10/26/2015     Priority: Medium     Essential hypertension 06/15/2015     Priority: Medium     Health Care Home 08/19/2013     Priority: Medium     State Tier Level:  Tier 1           ACP (advance care planning)      Priority: Medium     VMR (vasomotor rhinitis) 04/04/2013     Priority: Medium     Hyperlipidemia with target LDL less than 100      Priority: Medium     Diagnosis updated by automated process. Provider to review and confirm.       Cervical spine arthritis (H) 03/21/2012     Priority: Medium     Long term current use of anticoagulant therapy 02/29/2012     Priority: Medium     Problem list name updated by automated process. Provider to review       Restless leg syndrome 05/23/2011     Priority: Medium      Past Medical History:   Diagnosis Date     Atrial fibrillation (H)     became chronic afib in 2016,paroxysmal,was on amiodarone but went into persistent afib in april 2106 and amiodrone was d/cd. now on BBLK and considering cardioversion.(5/2106).In Sept 2016 plan is rate control which has been a challenge and Holter in 6 months     CAD (coronary artery disease)     mild stenosis per cath     Cardiomyopathy (H)      Chronic cough 2017    Eval with Dr Sarai Oro - Kaila Ricardo - rec speech therapy, ct for eval of crackles No obstruction or copd     Colon polyp 2010     Gout      History of BPH     laser TURP in 2016 Dr Lima     HTN (hypertension)      Hyperlipidemia      Obesity (BMI 35.0-39.9 without comorbidity)      SUJEY on CPAP      Venous insufficiency of right leg     no trteatment recommended by [facility] - option to treat is there Dr Zapata     Past Surgical History:   Procedure Laterality Date     CT release       CYSTOSCOPY N/A 4/7/2016    Procedure: CYSTOSCOPY;  Surgeon: Lokesh Lima MD;  Location:  OR      HC LEFT HEART CATHETERIZATION  3/2010    Mild CAD     LASER KTP TRANSURETHRAL RESECTION (TUR) PROSTATE N/A 4/7/2016    Procedure: LASER KTP TRANSURETHRAL RESECTION (TUR) PROSTATE;  Surgeon: Lokesh Lima MD;  Location: [facility] OR     ulnar reroute      right      Current Outpatient Prescriptions   Medication Sig Dispense Refill     finasteride (PROSCAR) 5 MG tablet TK 1 T PO D  3     metoprolol (LOPRESSOR) 50 MG tablet Take 2 tablets (100 mg) by mouth 2 times daily 360 tablet 3     simvastatin (ZOCOR) 10 MG tablet Take 1 tablet (10 mg) by mouth At Bedtime 90 tablet 3     warfarin (COUMADIN) 2 MG tablet 1 1/2 pills per day 135 tablet 3     omeprazole (PRILOSEC) 20 MG CR capsule   1     allopurinol (ZYLOPRIM) 300 MG tablet TAKE 1 TABLET BY MOUTH DAILY 90 tablet 3     losartan (COZAAR) 50 MG tablet Take 1 tablet (50 mg) by mouth daily 90 tablet 3     furosemide (LASIX) 40 MG tablet Take 1.5 tablets (60 mg) by mouth daily One tablet in am (Patient taking differently: Take 40 mg by mouth daily One tablet in am) 135 tablet 3     ipratropium (ATROVENT) 0.06 % spray USE 2 SPRAYS IN EACH NOSTRIL FOUR TIMES DAILY AS NEEDED FOR RHINITIS 45 mL 3     digoxin (LANOXIN) 125 MCG tablet Take 1 tablet (125 mcg) by mouth daily 90 tablet 3     ASPIRIN NOT PRESCRIBED (INTENTIONAL) Reported on 4/19/2017  On warfarin 0 each 0     Multiple Vitamin (MULTI-VITAMIN) per tablet Take 1 tablet by mouth daily. 100 tablet 12     OTC products: None, except as noted above    Allergies   Allergen Reactions     Cardizem [Diltiazem]      Swelling and poor rate control     Lisinopril Cough      Latex Allergy: NO    Social History   Substance Use Topics     Smoking status: Former Smoker     Packs/day: 1.00     Years: 5.00     Types: Cigarettes     Quit date: 4/3/1977     Smokeless tobacco: Former User     Quit date: 1/1/1960     Alcohol use No     History   Drug Use No     Living will form given  REVIEW OF SYSTEMS:                                                     Constitutional, neuro, ENT, endocrine, pulmonary, cardiac, gastrointestinal, genitourinary, musculoskeletal, integument and psychiatric systems are negative, except as otherwise noted.    INR today 2.5  No bleeding no bruising  No coumadin interfering foods      EXAM:                                                    /72  Pulse 70  Temp 97.8  F (36.6  C) (Oral)  Resp 16  Wt 122.9 kg (271 lb)  SpO2 97%  BMI 43.74 kg/m2    GENERAL APPEARANCE: healthy, alert and no distress bilateral hearing aids glasses     EYES: EOMI,  PERRL     HENT: ear canals and TM's normal and nose and mouth without ulcers or lesions     NECK: no adenopathy, no asymmetry, masses, or scars and thyroid normal to palpation     RESP: lungs clear to auscultation - no rales, rhonchi or wheezes     CV: regular rates and rhythm, normal S1 S2, no S3 or S4 and no murmur, click or rub     ABDOMEN:  soft, nontender, no HSM or masses and bowel sounds normal     MS: extremities normal- no gross deformities noted, no evidence of inflammation in joints, FROM in all extremities.     SKIN: no suspicious lesions or rashes   Left pinna rough skin and on nose (referred to dermatology)     NEURO: Normal strength and tone, sensory exam grossly normal, mentation intact and speech normal  RHDominant     PSYCH: mentation appears normal. and affect normal/bright     LYMPHATICS: No axillary, cervical, or supraclavicular nodes    DIAGNOSTICS:                                                      EKG: atrial fibrillation HR 89 consider AS infarct (computer reading) similar to previous tracings without acute changes  Labs Drawn and in Process:   Unresulted Labs Ordered in the Past 30 Days of this Admission     No orders found from 11/10/2017 to 1/10/2018.          Recent Labs   Lab Test  01/03/18   0905  12/06/17   0933  11/08/17   1000  11/08/17   0930   05/22/17   1220   12/12/16   0937   04/01/16   1313   HGB   --    --   14.4   --    --    --    --    15.6   --    --    PLT   --    --   202   --    --    --    --   194   --    --    INR  2.5  2.6   --    --    < >   --    < >   --    < >   --    NA   --    --    --   141   --   140   < >   --    < >   --    POTASSIUM   --    --    --   4.4   --   4.4   < >   --    < >   --    CR   --    --    --   1.08   --   1.18   < >   --    < >   --    A1C   --    --    --    --    --    --    --    --    --   5.5    < > = values in this interval not displayed.        IMPRESSION:                                                    Reason for surgery/procedure: Cystoscopy, Transurethral Resection of bladder lesion   Diagnosis/reason for consult: preoperative clearance    The proposed surgical procedure is considered LOW risk.    REVISED CARDIAC RISK INDEX  The patient has the following serious cardiovascular risks for perioperative complications such as (MI, PE, VFib and 3  AV Block):  Coronary Artery Disease (MI, positive stress test, angina, Qs on EKG)  H/o Chronic afib, cardiomyopathy, CAD without angina  INTERPRETATION: 2 risks: Class III (moderate risk - 6.6% complication rate)    The patient has the following additional risks for perioperative complications:  See problem list        ICD-10-CM    1. Preop general physical exam Z01.818 EKG 12-lead complete w/read - Clinics   2. Enlarged prostate with urinary retention N40.1 PSA Serum ([facility])    R33.8    3. Long term current use of anticoagulant therapy Z79.01    4. Cervical spine arthritis (H) M46.92    5. Hyperlipidemia with target LDL less than 100 E78.5    6. Restless leg syndrome G25.81    7. Essential hypertension I10 Basic Metabolic Panel (8) ([facility])   8. Chronic atrial fibrillation (H) I48.2 Basic Metabolic Panel (8) ([facility])     TSH ([facility])     Digoxin Serum ([facility])   9. Coronary artery disease involving native coronary artery of native heart without angina pectoris I25.10 Basic Metabolic Panel (8) ([facility])     Digoxin Serum ([facility])     CANCELED: Digoxin  level   10. Cardiomyopathy, unspecified type (H) I42.9 Digoxin Serum ([facility])   11. Morbid obesity due to excess calories (H) E66.01 BARIATRIC ADULT REFERRAL   12. Gross hematuria R31.0 CBC with Diff/Plt ([facility])     CANCELED: Urinalysis w/reflex protein, bili ([facility])     CANCELED: Urine Culture  Routine ([facility])   13. Vitamin D deficiency E55.9 Vitamin D  25-Hydroxy ([facility])   14. Encounter for therapeutic drug monitoring Z51.81 Lipid Panel ([facility])     Hemoglobin A1C ([facility])     Hepatic Function Panel (7) ([facility])   15. Skin lesion of left ear L98.9 DERMATOLOGY REFERRAL   16. Skin cancer screening Z12.83 DERMATOLOGY REFERRAL   17. Body mass index (BMI) of 40.0-44.9 in adult (H) Z68.41 BARIATRIC ADULT REFERRAL   ASSESSMENT / PLAN:  (Z01.818) Preop general physical exam  (primary encounter diagnosis)  Comment:   Plan: EKG 12-lead complete w/read - Clinics            (N40.1,  R33.8) Enlarged prostate with urinary retention  Comment:   Plan: PSA Serum ([facility])            (Z79.01) Long term current use of anticoagulant therapy  Comment:   Plan: as above    (M46.92) Cervical spine arthritis (H)  Comment:   Plan: continue cares    (E78.5) Hyperlipidemia with target LDL less than 100  Comment:   LDL Cholesterol Calculated   Date Value Ref Range Status   12/28/2016 63 <100 mg/dL Final     Comment:     Desirable:       <100 mg/dl   ]    Plan: continue cares    (G25.81) Restless leg syndrome  Comment:   Plan: Continue cares    (I10) Essential hypertension  Comment:   BP Readings from Last 3 Encounters:   01/09/18 116/72   01/03/18 126/68   12/06/17 124/64       Plan: Basic Metabolic Panel (8) ([facility])            (I48.2) Chronic atrial fibrillation (H)  Comment:   Plan: Basic Metabolic Panel (8) ([facility]), TSH         ([facility]), Digoxin Serum ([facility])            (I25.10) Coronary artery disease involving native coronary artery of native heart without angina pectoris  Comment: No CP today  Plan: Basic Metabolic Panel (8)  ([facility]), Digoxin         Serum ([facility]), CANCELED: Digoxin level            (I42.9) Cardiomyopathy, unspecified type (H)  Comment:   Plan: Digoxin Serum ([facility])            (E66.01) Morbid obesity due to excess calories (H)  Comment:   Plan: BARIATRIC ADULT REFERRAL            (R31.0) Gross hematuria  Comment:   Plan: CBC with Diff/Plt ([facility]), CANCELED: Urinalysis         w/reflex protein, bili ([facility]), CANCELED: Urine         Culture  Routine ([facility])            (E55.9) Vitamin D deficiency  Comment:   Plan: Vitamin D  25-Hydroxy ([facility])            (Z51.81) Encounter for therapeutic drug monitoring  Comment:   Plan: Lipid Panel ([facility]), Hemoglobin A1C         ([facility]), Hepatic Function Panel (7) ([facility])            (L98.9) Skin lesion of left ear  Comment:   Plan: DERMATOLOGY REFERRAL            (Z12.83) Skin cancer screening  Comment:   Plan: DERMATOLOGY REFERRAL            (Z68.41) Body mass index (BMI) of 40.0-44.9 in adult (H)  Comment:   Plan: BARIATRIC ADULT REFERRAL            INR 2.5 today on 3 mg daily. Hold 5 days before surgery  Labs done today  EKG Afib no acute changes  Bring CPAP machine to the hospital    Take BP and heart medications day of surgery    RECOMMENDATIONS:                                                      --Consult hospital rounder / IM to assist post-op medical management prn    Cardiovascular Risk  Patient is already on a Beta Blocker. Continue Betablocker therapy after surgery, using Beta blocker order set as necessary for NPO status.      Obstructive Sleep Apnea (or suspected sleep apnea)  Patient is to bring their home CPAP with them on the day of surgery          --Patient is to take all scheduled medications on the day of surgery EXCEPT for modifications listed below.    APPROVAL GIVEN to proceed with proposed procedure, without further diagnostic evaluation       Signed Electronically by: Nena Pablo MD    Copy of this evaluation report is provided to  requesting physician.    [facility] Preop Guidelines

## 2018-01-05 NOTE — PATIENT INSTRUCTIONS
Before Your Surgery      Call your surgeon if there is any change in your health. This includes signs of a cold or flu (such as a sore throat, runny nose, cough, rash or fever).    Do not smoke, drink alcohol or take over the counter medicine (unless your surgeon or primary care doctor tells you to) for the 24 hours before and after surgery.    If you take prescribed drugs: Follow your doctor s orders about which medicines to take and which to stop until after surgery.    Eating and drinking prior to surgery: follow the instructions from your surgeon    Take a shower or bath the night before surgery. Use the soap your surgeon gave you to gently clean your skin. If you do not have soap from your surgeon, use your regular soap. Do not shave or scrub the surgery site.  Wear clean pajamas and have clean sheets on your bed.     INR 2.5 today on 3 mg daily. Hold 5 days before surgery  Labs done today  EKG Afib no acute changes  Bring CPAP machine to the hospital    Take BP and heart medications day of surgery

## 2018-01-09 ENCOUNTER — OFFICE VISIT (OUTPATIENT)
Dept: FAMILY MEDICINE | Facility: CLINIC | Age: 83
End: 2018-01-09

## 2018-01-09 VITALS
WEIGHT: 271 LBS | DIASTOLIC BLOOD PRESSURE: 72 MMHG | RESPIRATION RATE: 16 BRPM | TEMPERATURE: 97.8 F | OXYGEN SATURATION: 97 % | HEART RATE: 70 BPM | SYSTOLIC BLOOD PRESSURE: 116 MMHG | BODY MASS INDEX: 43.74 KG/M2

## 2018-01-09 DIAGNOSIS — R33.8 ENLARGED PROSTATE WITH URINARY RETENTION: ICD-10-CM

## 2018-01-09 DIAGNOSIS — H61.92 SKIN LESION OF LEFT EAR: ICD-10-CM

## 2018-01-09 DIAGNOSIS — E55.9 VITAMIN D DEFICIENCY: ICD-10-CM

## 2018-01-09 DIAGNOSIS — Z79.01 LONG TERM CURRENT USE OF ANTICOAGULANT THERAPY: ICD-10-CM

## 2018-01-09 DIAGNOSIS — E66.01 MORBID OBESITY DUE TO EXCESS CALORIES (H): ICD-10-CM

## 2018-01-09 DIAGNOSIS — R31.0 GROSS HEMATURIA: ICD-10-CM

## 2018-01-09 DIAGNOSIS — Z12.83 SKIN CANCER SCREENING: ICD-10-CM

## 2018-01-09 DIAGNOSIS — M47.812 CERVICAL SPINE ARTHRITIS: ICD-10-CM

## 2018-01-09 DIAGNOSIS — I42.9 CARDIOMYOPATHY, UNSPECIFIED TYPE (H): ICD-10-CM

## 2018-01-09 DIAGNOSIS — E78.5 HYPERLIPIDEMIA WITH TARGET LDL LESS THAN 100: ICD-10-CM

## 2018-01-09 DIAGNOSIS — G25.81 RESTLESS LEG SYNDROME: ICD-10-CM

## 2018-01-09 DIAGNOSIS — N40.1 ENLARGED PROSTATE WITH URINARY RETENTION: ICD-10-CM

## 2018-01-09 DIAGNOSIS — I25.10 CORONARY ARTERY DISEASE INVOLVING NATIVE CORONARY ARTERY OF NATIVE HEART WITHOUT ANGINA PECTORIS: ICD-10-CM

## 2018-01-09 DIAGNOSIS — I10 ESSENTIAL HYPERTENSION: ICD-10-CM

## 2018-01-09 DIAGNOSIS — Z01.818 PREOP GENERAL PHYSICAL EXAM: Primary | ICD-10-CM

## 2018-01-09 DIAGNOSIS — I48.20 CHRONIC ATRIAL FIBRILLATION (H): ICD-10-CM

## 2018-01-09 DIAGNOSIS — Z51.81 ENCOUNTER FOR THERAPEUTIC DRUG MONITORING: ICD-10-CM

## 2018-01-09 LAB
% GRANULOCYTES: 66.5 % (ref 42.2–75.2)
HCT VFR BLD AUTO: 44.8 % (ref 39–51)
HEMOGLOBIN: 14.4 G/DL (ref 13.4–17.5)
LYMPHOCYTES NFR BLD AUTO: 24.8 % (ref 20.5–51.1)
MCH RBC QN AUTO: 30.3 PG (ref 27–31)
MCHC RBC AUTO-ENTMCNC: 32.1 G/DL (ref 33–37)
MCV RBC AUTO: 94.2 FL (ref 80–100)
MONOCYTES NFR BLD AUTO: 8.7 % (ref 1.7–9.3)
PLATELET # BLD AUTO: 194 K/UL (ref 140–450)
RBC # BLD AUTO: 4.76 X10/CMM (ref 4.2–5.9)
WBC # BLD AUTO: 7.2 X10/CMM (ref 3.8–11)

## 2018-01-09 PROCEDURE — 93000 ELECTROCARDIOGRAM COMPLETE: CPT | Performed by: FAMILY MEDICINE

## 2018-01-09 PROCEDURE — 99214 OFFICE O/P EST MOD 30 MIN: CPT | Performed by: FAMILY MEDICINE

## 2018-01-09 PROCEDURE — 85025 COMPLETE CBC W/AUTO DIFF WBC: CPT | Performed by: FAMILY MEDICINE

## 2018-01-09 PROCEDURE — 36415 COLL VENOUS BLD VENIPUNCTURE: CPT | Performed by: FAMILY MEDICINE

## 2018-01-09 NOTE — MR AVS SNAPSHOT
After Visit Summary   1/9/2018    Vasiliy Corbin    MRN: [id number]           Patient Information     Date Of Birth          1935        Visit Information        Provider Department      1/9/2018 10:00 AM Nena Pablo MD [facility]        Today's Diagnoses     Preop general physical exam    -  1    Enlarged prostate with urinary retention        Long term current use of anticoagulant therapy        Cervical spine arthritis (H)        Hyperlipidemia with target LDL less than 100        Restless leg syndrome        Essential hypertension        Chronic atrial fibrillation (H)        Coronary artery disease involving native coronary artery of native heart without angina pectoris        Cardiomyopathy, unspecified type (H)        Morbid obesity due to excess calories (H)        Gross hematuria        Vitamin D deficiency        Encounter for therapeutic drug monitoring          Care Instructions      Before Your Surgery      Call your surgeon if there is any change in your health. This includes signs of a cold or flu (such as a sore throat, runny nose, cough, rash or fever).    Do not smoke, drink alcohol or take over the counter medicine (unless your surgeon or primary care doctor tells you to) for the 24 hours before and after surgery.    If you take prescribed drugs: Follow your doctor s orders about which medicines to take and which to stop until after surgery.    Eating and drinking prior to surgery: follow the instructions from your surgeon    Take a shower or bath the night before surgery. Use the soap your surgeon gave you to gently clean your skin. If you do not have soap from your surgeon, use your regular soap. Do not shave or scrub the surgery site.  Wear clean pajamas and have clean sheets on your bed.     INR 2.5 today on 3 mg daily. Hold 5 days before surgery  Labs done today  EKG Afib no acute changes  Bring CPAP machine to the hospital    Take BP and heart  medications day of surgery            Follow-ups after your visit        Your next 10 appointments already scheduled     Jan 16, 2018   Procedure with Lokesh Lima MD   [facility] (--)    [address], [address]  [facility] [id number] [phone]            Jan 22, 2018  9:30 AM CST   Ech Complete with [id number]   [facility] CV Echocardiography ([facility])    [address]  [address]  [facility] [id number] [phone]           1. Please bring or wear a comfortable two-piece outfit. 2. You may eat, drink and take your normal medicines. 3. For any questions that cannot be answered, please contact the ordering physician            Jan 29, 2018  2:30 PM CST   Return Visit with KAYLEE Kraus CNP   [facility] ([facility])    [address]  [facility] [id number] [phone]            Jan 31, 2018  9:00 AM CST   LAB with RF LAB   [facility] ([facility])    [address] [place] [address] [phone]           Please do not eat 10-12 hours before your appointment if you are coming in fasting for labs on lipids, cholesterol, or glucose (sugar). This does not apply to pregnant women. Water, hot tea and black coffee (with nothing added) are okay. Do not drink other fluids, diet soda or chew gum.              Who to contact     If you have questions or need follow up information about today's clinic visit or your schedule please contact [facility] directly at [phone].  Normal or non-critical lab and imaging results will be communicated to you by MyChart, letter or phone within 4 business days after the clinic has received the results. If you do not hear from us within 7 days, please contact the clinic through MyChart or phone. If you have a critical or abnormal lab result, we will notify you by phone as  "soon as possible.  Submit refill requests through [employer] or call your pharmacy and they will forward the refill request to us. Please allow 3 business days for your refill to be completed.          Additional Information About Your Visit        [employer] Information     [employer] lets you send messages to your doctor, view your test results, renew your prescriptions, schedule appointments and more. To sign up, go to [email or web address] . Click on \"Log in\" on the left side of the screen, which will take you to the Welcome page. Then click on \"Sign up Now\" on the right side of the page.     You will be asked to enter the access code listed below, as well as some personal information. Please follow the directions to create your username and password.     Your access code is: [email or web address]  Expires: 2018 10:38 AM     Your access code will  in 90 days. If you need help or a new code, please call your [facility] clinic or [phone].        Care EveryWhere ID     This is your Care EveryWhere ID. This could be used by other organizations to access your [facility] medical records  [id number]        Your Vitals Were     Pulse Temperature Respirations Pulse Oximetry BMI (Body Mass Index)       70 97.8  F (36.6  C) (Oral) 16 97% 43.74 kg/m2        Blood Pressure from Last 3 Encounters:   18 116/72   18 126/68   17 124/64    Weight from Last 3 Encounters:   18 122.9 kg (271 lb)   17 121.8 kg (268 lb 9.6 oz)   17 121.4 kg (267 lb 9.6 oz)              We Performed the Following     Basic Metabolic Panel (8) ([facility])     CBC with Diff/Plt ([facility])     Digoxin level     EKG 12-lead complete w/read - Clinics     Hemoglobin A1C ([facility])     Hepatic Function Panel (7) ([facility])     Lipid Panel ([facility])     PSA Serum ([facility])     TSH ([facility])     Urinalysis w/reflex protein, bili ([facility])     Urine Culture  Routine ([facility])     Vitamin D  25-Hydroxy ([facility])          Today's Medication " Changes          These changes are accurate as of: 1/9/18 10:38 AM.  If you have any questions, ask your nurse or doctor.               These medicines have changed or have updated prescriptions.        Dose/Directions    furosemide 40 MG tablet   Commonly known as:  LASIX   This may have changed:    - how much to take  - additional instructions   Used for:  Localized edema        Dose:  60 mg   Take 1.5 tablets (60 mg) by mouth daily One tablet in am   Quantity:  135 tablet   Refills:  3                Primary Care Provider Office Phone # Fax #    Nena Pablo [phone] [address]  [facility] [address]        Equal Access to Services     [facility]: Hadii abimbola wallace hadasho Sopito, waaxda luqadaha, qaybta kaalmada adeegyada, sol dejesus . So [facility] [phone].    ATENCIÓN: Si habla español, tiene a camacho disposición servicios gratuitos de asistencia lingüística. Llame al [phone].    We comply with applicable federal civil rights laws and [place] laws. We do not discriminate on the basis of race, color, national origin, age, disability, sex, sexual orientation, or gender identity.            Thank you!     Thank you for choosing [facility]  for your care. Our goal is always to provide you with excellent care. Hearing back from our patients is one way we can continue to improve our services. Please take a few minutes to complete the written survey that you may receive in the mail after your visit with us. Thank you!             Your Updated Medication List - Protect others around you: Learn how to safely use, store and throw away your medicines at www.disposemymeds.org.          This list is accurate as of: 1/9/18 10:38 AM.  Always use your most recent med list.                   Brand Name Dispense Instructions for use Diagnosis    allopurinol 300 MG tablet    ZYLOPRIM    90 tablet    TAKE 1 TABLET BY MOUTH DAILY    Encounter for  medication refill       ASPIRIN NOT PRESCRIBED    INTENTIONAL    0 each    Reported on 4/19/2017 On warfarin    Chronic atrial fibrillation (H)       digoxin 125 MCG tablet    LANOXIN    90 tablet    Take 1 tablet (125 mcg) by mouth daily    Chronic atrial fibrillation (H)       finasteride 5 MG tablet    PROSCAR     TK 1 T PO D    Gross hematuria       furosemide 40 MG tablet    LASIX    135 tablet    Take 1.5 tablets (60 mg) by mouth daily One tablet in am    Localized edema       ipratropium 0.06 % spray    ATROVENT    45 mL    USE 2 SPRAYS IN EACH NOSTRIL FOUR TIMES DAILY AS NEEDED FOR RHINITIS    Encounter for medication refill, VMR (vasomotor rhinitis)       losartan 50 MG tablet    COZAAR    90 tablet    Take 1 tablet (50 mg) by mouth daily    Essential hypertension       metoprolol 50 MG tablet    LOPRESSOR    360 tablet    Take 2 tablets (100 mg) by mouth 2 times daily    Chronic atrial fibrillation (H)       Multi-vitamin Tabs tablet   Generic drug:  multivitamin, therapeutic with minerals     100 tablet    Take 1 tablet by mouth daily.        omeprazole 20 MG CR capsule    priLOSEC          simvastatin 10 MG tablet    ZOCOR    90 tablet    Take 1 tablet (10 mg) by mouth At Bedtime    Hyperlipidemia with target LDL less than 100       warfarin 2 MG tablet    COUMADIN    135 tablet    1 1/2 pills per day    Chronic atrial fibrillation (H)

## 2018-01-09 NOTE — LETTER
[facility]  [address] [place]  [address]  Phone: [phone]    January 15, 2018      Vasiliy Corbin  [address]  [facility] [address]              Dear Vasiliy,    The results from your recent visit showed that your a1c was 6. This is in the pre-diabetes range. Watch your diet.    Digoxin dose is low. You have 125 mg dose daily listed for this. You get this from another provider.   Please call them to see if they want it adjusted.  Basic Metabolic panel showed a  mild increase in sugar and BUN/Cr ratio otherwise fine.    Triglycerides are elevated. Eat more fish, fish oil, ground flax seed, and oatmeal. Eat less sugars.    PSA(prostat)  is elevated. Please see urology about this.    TSH (thyroid test) and Vitamin D were normal.    Liver panel- mild change in Alkaline phosphatase otherwise ok.  CBC was ok        Sincerely,     Nena Pablo M.D.    Results for orders placed or performed in visit on 01/09/18   Basic Metabolic Panel (8) ([facility])   Result Value Ref Range    Glucose 114 (H) 65 - 99 mg/dL    Urea Nitrogen 27 8 - 27 mg/dL    Creatinine 0.97 0.76 - 1.27 mg/dL    eGFR If NonAfricn Am 72 >59 mL/min/1.73    eGFR If Africn Am 84 >59 mL/min/1.73    BUN/Creatinine Ratio 28 (H) 10 - 24    Sodium 141 134 - 144 mmol/L    Potassium 4.4 3.5 - 5.2 mmol/L    Chloride 100 96 - 106 mmol/L    Total CO2 23 18 - 28 mmol/L    Calcium 9.3 8.6 - 10.2 mg/dL    Narrative    Performed at:  01 - [facility] [address], [place], [place]  [id number]  : Blayne Colin MD, Phone:  [phone]   Lipid Panel ([facility])   Result Value Ref Range    Cholesterol 119 100 - 199 mg/dL    Triglycerides 176 (H) 0 - 149 mg/dL    HDL Cholesterol 37 (L) >39 mg/dL    VLDL Cholesterol Antonio 35 5 - 40 mg/dL    LDL Cholesterol Calculated 47 0 - 99 mg/dL    LDL/HDL Ratio 1.3 0.0 - 3.6 ratio units    Narrative    Performed at:  01 - [facility] [address], [place], [place]  [id number]  [facility]  Director: Blayne Colin MD, Phone:  [phone]   CBC with Diff/Plt ([facility])   Result Value Ref Range    WBC x10/cmm 7.2 3.8 - 11.0 x10/cmm    % Lymphocytes 24.8 20.5 - 51.1 %    % Monocytes 8.7 1.7 - 9.3 %    % Granulocytes 66.5 42.2 - 75.2 %    RBC x10/cmm 4.76 4.2 - 5.9 x10/cmm    Hemoglobin 14.4 13.4 - 17.5 g/dl    Hematocrit 44.8 39 - 51 %    MCV 94.2 80 - 100 fL    MCH 30.3 27.0 - 31.0 pg    MCHC 32.1 (A) 33.0 - 37.0 g/dL    Platelet Count 194 140 - 450 K/uL   PSA Serum ([facility])   Result Value Ref Range    PSA NG/ML 6.4 (H) 0.0 - 4.0 ng/mL    Narrative    Performed at:  01 - [facility] [address], [place], [place]  [id number]  : Blayne Colin MD, Phone:  [phone]   TSH ([facility])   Result Value Ref Range    TSH 3.180 0.450 - 4.500 uIU/mL    Narrative    Performed at:  01 - [facility] [address], [place], [place]  [id number]  : Blayne Colin MD, Phone:  [phone]   Vitamin D  25-Hydroxy ([facility])   Result Value Ref Range    Vitamin D,25-Hydroxy 31.7 30.0 - 100.0 ng/mL    Narrative    Performed at:  01 - [facility] [address], [place], [place]  [id number]  : Blayne Colin MD, Phone:  [phone]   Hemoglobin A1C ([facility])   Result Value Ref Range    Hemoglobin A1C 6.0 (H) 4.8 - 5.6 %    Narrative    Performed at:  01 - [facility] [address], [place], [place]  [id number]  : Blayne Colin MD, Phone:  [phone]   Hepatic Function Panel (7) ([facility])   Result Value Ref Range    Protein Total 7.2 6.0 - 8.5 g/dL    Albumin 4.1 3.5 - 4.7 g/dL    Bilirubin Total 0.4 0.0 - 1.2 mg/dL    Bilirubin Direct 0.16 0.00 - 0.40 mg/dL    Alkaline Phosphatase 135 (H) 39 - 117 IU/L    AST 20 0 - 40 IU/L    ALT 20 0 - 44 IU/L    Narrative    Performed at:  01 - [facility] [address], [place], [place]  [id number]  : Blayne Colin MD, Phone:  [phone]   Digoxin Serum ([facility])   Result Value Ref Range    Digoxin Serum 0.4 (L) 0.5 - 0.9  ng/mL    Narrative    Performed at:  01 - [facility]  [address] [place], [place]  [id number]  : Blayne Colin MD, Phone:  [phone]

## 2018-01-10 LAB
ALBUMIN SERPL-MCNC: 4.1 G/DL (ref 3.5–4.7)
ALP SERPL-CCNC: 135 IU/L (ref 39–117)
ALT SERPL-CCNC: 20 IU/L (ref 0–44)
AST SERPL-CCNC: 20 IU/L (ref 0–40)
BILIRUB SERPL-MCNC: 0.4 MG/DL (ref 0–1.2)
BILIRUBIN, DIRECT: 0.16 MG/DL (ref 0–0.4)
BUN SERPL-MCNC: 27 MG/DL (ref 8–27)
BUN/CREATININE RATIO: 28 (ref 10–24)
CALCIUM SERPL-MCNC: 9.3 MG/DL (ref 8.6–10.2)
CHLORIDE SERPLBLD-SCNC: 100 MMOL/L (ref 96–106)
CHOLEST SERPL-MCNC: 119 MG/DL (ref 100–199)
CREAT SERPL-MCNC: 0.97 MG/DL (ref 0.76–1.27)
DIGOXIN SERPL IA-MCNC: 0.4 NG/ML (ref 0.5–0.9)
EGFR IF AFRICN AM: 84 ML/MIN/1.73
EGFR IF NONAFRICN AM: 72 ML/MIN/1.73
GLUCOSE SERPL-MCNC: 114 MG/DL (ref 65–99)
HBA1C MFR BLD: 6 % (ref 4.8–5.6)
HDLC SERPL-MCNC: 37 MG/DL
LDL/HDL RATIO: 1.3 RATIO UNITS (ref 0–3.6)
LDLC SERPL CALC-MCNC: 47 MG/DL (ref 0–99)
POTASSIUM SERPL-SCNC: 4.4 MMOL/L (ref 3.5–5.2)
PROT SERPL-MCNC: 7.2 G/DL (ref 6–8.5)
PSA NG/ML: 6.4 NG/ML (ref 0–4)
SODIUM SERPL-SCNC: 141 MMOL/L (ref 134–144)
TOTAL CO2: 23 MMOL/L (ref 18–28)
TRIGL SERPL-MCNC: 176 MG/DL (ref 0–149)
TSH BLD-ACNC: 3.18 UIU/ML (ref 0.45–4.5)
VITAMIN D, 25-HYDROXY: 31.7 NG/ML (ref 30–100)
VLDLC SERPL CALC-MCNC: 35 MG/DL (ref 5–40)

## 2018-01-11 PROBLEM — R73.03 PRE-DIABETES: Status: ACTIVE | Noted: 2018-01-11

## 2018-01-11 NOTE — H&P (VIEW-ONLY)
For bladder surgery  HCM colonoscopy    CT Abdomen Pelvis without and with contrast on 2017  History: Gross hematuria  Impression:   1. Mild focal thickening of the anterior bladder wall of uncertain significance. This could be postoperative, inflammatory or neoplastic. Correlate clinically and cystoscopy findings.   2. Enlarged prostate.   3. No other acute of suspicious findings.   4. Left ureter not well opacified.     [facility] [address] [place] [id number] [phone]  Dept: [phone]    PRE-OP EVALUATION:  Today's date: 2018    Vasiliy Corbin (: 1935) presents for pre-operative evaluation assessment as requested by Dr. Lokesh Lima.  He requires evaluation and anesthesia risk assessment prior to undergoing surgery/procedure for treatment H/o Gross hematuria with abnormal finding on CT (see above) .  Proposed procedure: Cystoscopy, Transurethral Resection of bladder lesion     Date of Surgery/ Procedure: 2018  Time of Surgery/ Procedure: [facility]  Hospital/Surgical Facility: [facility]   Primary Physician: Nena Pablo  Type of Anesthesia Anticipated: General    Patient has a Health Care Directive or Living Will:  NO    1. NO - Do you have a history of heart attack, stroke, stent, bypass or surgery on an artery in the head, neck, heart or legs?  2. NO - Do you ever have any pain or discomfort in your chest?  3. NO - Do you have a history of  Heart Failure?  4. NO - Are you troubled by shortness of breath when: walking on the level, up a slight hill or at night?  5. NO - Do you currently have a cold, bronchitis or other respiratory infection?  6. NO - Do you have a cough, shortness of breath or wheezing?  7. NO - Do you sometimes get pains in the calves of your legs when you walk?  8. NO - Do you or anyone in your family have previous history of blood clots?  9. NO - Do you or does anyone in your family have a serious bleeding problem such as prolonged  "bleeding following surgeries or cuts?  10. NO - Have you ever had problems with anemia or been told to take iron pills?  11. NO - Have you had any abnormal blood loss such as black, tarry or bloody stools, or abnormal vaginal bleeding?  12. NO - Have you ever had a blood transfusion?  13. NO - Have you or any of your relatives ever had problems with anesthesia?  14. YES - DO YOU HAVE SLEEP APNEA, EXCESSIVE SNORING OR DAYTIME DROWSINESS? Sleep Apnea- uses CPAP   15. NO - Do you have any prosthetic heart valves?  16. NO - Do you have prosthetic joints?  17. NO - Is there any chance that you may be pregnant?    Ex smoker  ETOH no  Street drugs/MJ no  Caffeine coffee decaf    Sleep ok  Appetite ok  Exercise walking    Arthritis with nerve pain, both hips    Estimated body mass index is 43.74 kg/(m^2) as calculated from the following:    Height as of 9/29/17: 1.676 m (5' 6\").    Weight as of this encounter: 122.9 kg (271 lb).  Weight loss clinic advised.    Dr [facility]  medical f/u end of the month, last seen 6 months ago    Gout last flare years ago    BP Readings from Last 3 Encounters:   01/09/18 116/72   01/03/18 126/68   12/06/17 124/64       Interpretation Summary     The visual ejection fraction is estimated at 50-55%.  No regional wall motion abnormalities noted.  The right ventricle is mildly dilated.  The right ventricular systolic function is normal.  There is mild biatrial enlargement.  There is mild AR.  There is no pericardial effusion.     No significant change when compared to 12/28/16 study.    Genital rectal per surgery    Previous work as     No travel  No exposures  _____________________________________________________________________________      HPI:                                                      Brief HPI related to upcoming procedure: Cystoscopy, Transurethral Resection of bladder lesion     See problem list for active medical problems.  Problems all longstanding and " stable, except as noted/documented.  See ROS for pertinent symptoms related to these conditions.                                                                                                  .  HYPERTENSION - Patient has longstanding history of mod-severe HTN , currently denies any symptoms referable to elevated blood pressure. Specifically denies chest pain, palpitations, dyspnea, orthopnea, PND or peripheral edema. Blood pressure readings  BP Readings from Last 3 Encounters:   01/09/18 116/72   01/03/18 126/68   12/06/17 124/64      been in normal range. Current medication regimen is as listed below. Patient denies any side effects of medication.                                                                                                                                                                                          .  HYPERLIPIDEMIA - Patient has a long history of significant Hyperlipidemia  requiring medication for treatment with recent  LDL Cholesterol Calculated   Date Value Ref Range Status   12/28/2016 63 <100 mg/dL Final     Comment:     Desirable:       <100 mg/dl   ]    control. Patient reports no problems or side effects with the medication.                                                                                                                                                       .    MEDICAL HISTORY:                                                      Patient Active Problem List    Diagnosis Date Noted     History of BPH      Priority: Medium     Morbid obesity due to excess calories (H) 08/07/2017     Priority: Medium     Cardiomyopathy, unspecified type (H) 06/26/2017     Priority: Medium     Diastolic dysfunction 04/20/2017     Priority: Medium     Encounter for medication refill 01/05/2017     Priority: Medium     Enlarged prostate with urinary retention      Priority: Medium     him started Proscar and Flomax in December and saw        Syncope 03/26/2016      Priority: Medium     Coronary artery disease involving native coronary artery of native heart without angina pectoris 11/23/2015     Priority: Medium     Chronic atrial fibrillation (H) 10/26/2015     Priority: Medium     Essential hypertension 06/15/2015     Priority: Medium     Health Care Home 08/19/2013     Priority: Medium     State Tier Level:  Tier 1           ACP (advance care planning)      Priority: Medium     VMR (vasomotor rhinitis) 04/04/2013     Priority: Medium     Hyperlipidemia with target LDL less than 100      Priority: Medium     Diagnosis updated by automated process. Provider to review and confirm.       Cervical spine arthritis (H) 03/21/2012     Priority: Medium     Long term current use of anticoagulant therapy 02/29/2012     Priority: Medium     Problem list name updated by automated process. Provider to review       Restless leg syndrome 05/23/2011     Priority: Medium      Past Medical History:   Diagnosis Date     Atrial fibrillation (H)     became chronic afib in 2016,paroxysmal,was on amiodarone but went into persistent afib in april 2106 and amiodrone was d/cd. now on BBLK and considering cardioversion.(5/2106).In Sept 2016 plan is rate control which has been a challenge and Holter in 6 months     CAD (coronary artery disease)     mild stenosis per cath     Cardiomyopathy (H)      Chronic cough 2017    Eval with Dr Sarai Oro - Kaila Ricardo - rec speech therapy, ct for eval of crackles No obstruction or copd     Colon polyp 2010     Gout      History of BPH     laser TURP in 2016 Dr Lima     HTN (hypertension)      Hyperlipidemia      Obesity (BMI 35.0-39.9 without comorbidity)      SUJEY on CPAP      Venous insufficiency of right leg     no trteatment recommended by [facility] - option to treat is there Dr Zapata     Past Surgical History:   Procedure Laterality Date     CT release       CYSTOSCOPY N/A 4/7/2016    Procedure: CYSTOSCOPY;  Surgeon: Lokesh Lima MD;  Location:  OR      HC LEFT HEART CATHETERIZATION  3/2010    Mild CAD     LASER KTP TRANSURETHRAL RESECTION (TUR) PROSTATE N/A 4/7/2016    Procedure: LASER KTP TRANSURETHRAL RESECTION (TUR) PROSTATE;  Surgeon: Lokesh Lima MD;  Location: [facility] OR     ulnar reroute      right      Current Outpatient Prescriptions   Medication Sig Dispense Refill     finasteride (PROSCAR) 5 MG tablet TK 1 T PO D  3     metoprolol (LOPRESSOR) 50 MG tablet Take 2 tablets (100 mg) by mouth 2 times daily 360 tablet 3     simvastatin (ZOCOR) 10 MG tablet Take 1 tablet (10 mg) by mouth At Bedtime 90 tablet 3     warfarin (COUMADIN) 2 MG tablet 1 1/2 pills per day 135 tablet 3     omeprazole (PRILOSEC) 20 MG CR capsule   1     allopurinol (ZYLOPRIM) 300 MG tablet TAKE 1 TABLET BY MOUTH DAILY 90 tablet 3     losartan (COZAAR) 50 MG tablet Take 1 tablet (50 mg) by mouth daily 90 tablet 3     furosemide (LASIX) 40 MG tablet Take 1.5 tablets (60 mg) by mouth daily One tablet in am (Patient taking differently: Take 40 mg by mouth daily One tablet in am) 135 tablet 3     ipratropium (ATROVENT) 0.06 % spray USE 2 SPRAYS IN EACH NOSTRIL FOUR TIMES DAILY AS NEEDED FOR RHINITIS 45 mL 3     digoxin (LANOXIN) 125 MCG tablet Take 1 tablet (125 mcg) by mouth daily 90 tablet 3     ASPIRIN NOT PRESCRIBED (INTENTIONAL) Reported on 4/19/2017  On warfarin 0 each 0     Multiple Vitamin (MULTI-VITAMIN) per tablet Take 1 tablet by mouth daily. 100 tablet 12     OTC products: None, except as noted above    Allergies   Allergen Reactions     Cardizem [Diltiazem]      Swelling and poor rate control     Lisinopril Cough      Latex Allergy: NO    Social History   Substance Use Topics     Smoking status: Former Smoker     Packs/day: 1.00     Years: 5.00     Types: Cigarettes     Quit date: 4/3/1977     Smokeless tobacco: Former User     Quit date: 1/1/1960     Alcohol use No     History   Drug Use No     Living will form given  REVIEW OF SYSTEMS:                                                     Constitutional, neuro, ENT, endocrine, pulmonary, cardiac, gastrointestinal, genitourinary, musculoskeletal, integument and psychiatric systems are negative, except as otherwise noted.    INR today 2.5  No bleeding no bruising  No coumadin interfering foods      EXAM:                                                    /72  Pulse 70  Temp 97.8  F (36.6  C) (Oral)  Resp 16  Wt 122.9 kg (271 lb)  SpO2 97%  BMI 43.74 kg/m2    GENERAL APPEARANCE: healthy, alert and no distress bilateral hearing aids glasses     EYES: EOMI,  PERRL     HENT: ear canals and TM's normal and nose and mouth without ulcers or lesions     NECK: no adenopathy, no asymmetry, masses, or scars and thyroid normal to palpation     RESP: lungs clear to auscultation - no rales, rhonchi or wheezes     CV: regular rates and rhythm, normal S1 S2, no S3 or S4 and no murmur, click or rub     ABDOMEN:  soft, nontender, no HSM or masses and bowel sounds normal     MS: extremities normal- no gross deformities noted, no evidence of inflammation in joints, FROM in all extremities.     SKIN: no suspicious lesions or rashes   Left pinna rough skin and on nose (referred to dermatology)     NEURO: Normal strength and tone, sensory exam grossly normal, mentation intact and speech normal  RHDominant     PSYCH: mentation appears normal. and affect normal/bright     LYMPHATICS: No axillary, cervical, or supraclavicular nodes    DIAGNOSTICS:                                                      EKG: atrial fibrillation HR 89 consider AS infarct (computer reading) similar to previous tracings without acute changes  Labs Drawn and in Process:   Unresulted Labs Ordered in the Past 30 Days of this Admission     No orders found from 11/10/2017 to 1/10/2018.          Recent Labs   Lab Test  01/03/18   0905  12/06/17   0933  11/08/17   1000  11/08/17   0930   05/22/17   1220   12/12/16   0937   04/01/16   1313   HGB   --    --   14.4   --    --    --    --    15.6   --    --    PLT   --    --   202   --    --    --    --   194   --    --    INR  2.5  2.6   --    --    < >   --    < >   --    < >   --    NA   --    --    --   141   --   140   < >   --    < >   --    POTASSIUM   --    --    --   4.4   --   4.4   < >   --    < >   --    CR   --    --    --   1.08   --   1.18   < >   --    < >   --    A1C   --    --    --    --    --    --    --    --    --   5.5    < > = values in this interval not displayed.        IMPRESSION:                                                    Reason for surgery/procedure: Cystoscopy, Transurethral Resection of bladder lesion   Diagnosis/reason for consult: preoperative clearance    The proposed surgical procedure is considered LOW risk.    REVISED CARDIAC RISK INDEX  The patient has the following serious cardiovascular risks for perioperative complications such as (MI, PE, VFib and 3  AV Block):  Coronary Artery Disease (MI, positive stress test, angina, Qs on EKG)  H/o Chronic afib, cardiomyopathy, CAD without angina  INTERPRETATION: 2 risks: Class III (moderate risk - 6.6% complication rate)    The patient has the following additional risks for perioperative complications:  See problem list        ICD-10-CM    1. Preop general physical exam Z01.818 EKG 12-lead complete w/read - Clinics   2. Enlarged prostate with urinary retention N40.1 PSA Serum ([facility])    R33.8    3. Long term current use of anticoagulant therapy Z79.01    4. Cervical spine arthritis (H) M46.92    5. Hyperlipidemia with target LDL less than 100 E78.5    6. Restless leg syndrome G25.81    7. Essential hypertension I10 Basic Metabolic Panel (8) ([facility])   8. Chronic atrial fibrillation (H) I48.2 Basic Metabolic Panel (8) ([facility])     TSH ([facility])     Digoxin Serum ([facility])   9. Coronary artery disease involving native coronary artery of native heart without angina pectoris I25.10 Basic Metabolic Panel (8) ([facility])     Digoxin Serum ([facility])     CANCELED: Digoxin  level   10. Cardiomyopathy, unspecified type (H) I42.9 Digoxin Serum ([facility])   11. Morbid obesity due to excess calories (H) E66.01 BARIATRIC ADULT REFERRAL   12. Gross hematuria R31.0 CBC with Diff/Plt ([facility])     CANCELED: Urinalysis w/reflex protein, bili ([facility])     CANCELED: Urine Culture  Routine ([facility])   13. Vitamin D deficiency E55.9 Vitamin D  25-Hydroxy ([facility])   14. Encounter for therapeutic drug monitoring Z51.81 Lipid Panel ([facility])     Hemoglobin A1C ([facility])     Hepatic Function Panel (7) ([facility])   15. Skin lesion of left ear L98.9 DERMATOLOGY REFERRAL   16. Skin cancer screening Z12.83 DERMATOLOGY REFERRAL   17. Body mass index (BMI) of 40.0-44.9 in adult (H) Z68.41 BARIATRIC ADULT REFERRAL   ASSESSMENT / PLAN:  (Z01.818) Preop general physical exam  (primary encounter diagnosis)  Comment:   Plan: EKG 12-lead complete w/read - Clinics            (N40.1,  R33.8) Enlarged prostate with urinary retention  Comment:   Plan: PSA Serum ([facility])            (Z79.01) Long term current use of anticoagulant therapy  Comment:   Plan: as above    (M46.92) Cervical spine arthritis (H)  Comment:   Plan: continue cares    (E78.5) Hyperlipidemia with target LDL less than 100  Comment:   LDL Cholesterol Calculated   Date Value Ref Range Status   12/28/2016 63 <100 mg/dL Final     Comment:     Desirable:       <100 mg/dl   ]    Plan: continue cares    (G25.81) Restless leg syndrome  Comment:   Plan: Continue cares    (I10) Essential hypertension  Comment:   BP Readings from Last 3 Encounters:   01/09/18 116/72   01/03/18 126/68   12/06/17 124/64       Plan: Basic Metabolic Panel (8) ([facility])            (I48.2) Chronic atrial fibrillation (H)  Comment:   Plan: Basic Metabolic Panel (8) ([facility]), TSH         ([facility]), Digoxin Serum ([facility])            (I25.10) Coronary artery disease involving native coronary artery of native heart without angina pectoris  Comment: No CP today  Plan: Basic Metabolic Panel (8)  ([facility]), Digoxin         Serum ([facility]), CANCELED: Digoxin level            (I42.9) Cardiomyopathy, unspecified type (H)  Comment:   Plan: Digoxin Serum ([facility])            (E66.01) Morbid obesity due to excess calories (H)  Comment:   Plan: BARIATRIC ADULT REFERRAL            (R31.0) Gross hematuria  Comment:   Plan: CBC with Diff/Plt ([facility]), CANCELED: Urinalysis         w/reflex protein, bili ([facility]), CANCELED: Urine         Culture  Routine ([facility])            (E55.9) Vitamin D deficiency  Comment:   Plan: Vitamin D  25-Hydroxy ([facility])            (Z51.81) Encounter for therapeutic drug monitoring  Comment:   Plan: Lipid Panel ([facility]), Hemoglobin A1C         ([facility]), Hepatic Function Panel (7) ([facility])            (L98.9) Skin lesion of left ear  Comment:   Plan: DERMATOLOGY REFERRAL            (Z12.83) Skin cancer screening  Comment:   Plan: DERMATOLOGY REFERRAL            (Z68.41) Body mass index (BMI) of 40.0-44.9 in adult (H)  Comment:   Plan: BARIATRIC ADULT REFERRAL            INR 2.5 today on 3 mg daily. Hold 5 days before surgery  Labs done today  EKG Afib no acute changes  Bring CPAP machine to the hospital    Take BP and heart medications day of surgery    RECOMMENDATIONS:                                                      --Consult hospital rounder / IM to assist post-op medical management prn    Cardiovascular Risk  Patient is already on a Beta Blocker. Continue Betablocker therapy after surgery, using Beta blocker order set as necessary for NPO status.      Obstructive Sleep Apnea (or suspected sleep apnea)  Patient is to bring their home CPAP with them on the day of surgery          --Patient is to take all scheduled medications on the day of surgery EXCEPT for modifications listed below.    APPROVAL GIVEN to proceed with proposed procedure, without further diagnostic evaluation       Signed Electronically by: Nena Pablo MD    Copy of this evaluation report is provided to  requesting physician.    [facility] Preop Guidelines

## 2018-01-16 ENCOUNTER — ANESTHESIA EVENT (OUTPATIENT)
Dept: SURGERY | Facility: CLINIC | Age: 83
End: 2018-01-16
Payer: MEDICARE

## 2018-01-16 ENCOUNTER — HOSPITAL ENCOUNTER (OUTPATIENT)
Facility: CLINIC | Age: 83
Discharge: HOME | End: 2018-01-16
Attending: UROLOGY | Admitting: UROLOGY
Payer: MEDICARE

## 2018-01-16 ENCOUNTER — ANESTHESIA (OUTPATIENT)
Dept: SURGERY | Facility: CLINIC | Age: 83
End: 2018-01-16
Payer: MEDICARE

## 2018-01-16 VITALS
HEIGHT: 66 IN | TEMPERATURE: 98.2 F | OXYGEN SATURATION: 95 % | WEIGHT: 274.7 LBS | SYSTOLIC BLOOD PRESSURE: 139 MMHG | BODY MASS INDEX: 44.15 KG/M2 | DIASTOLIC BLOOD PRESSURE: 71 MMHG | RESPIRATION RATE: 16 BRPM

## 2018-01-16 DIAGNOSIS — Z87.438 HISTORY OF BPH: Primary | ICD-10-CM

## 2018-01-16 LAB
ABO + RH BLD: NORMAL
ABO + RH BLD: NORMAL
BLD GP AB SCN SERPL QL: NORMAL
BLOOD BANK CMNT PATIENT-IMP: NORMAL
HGB BLD-MCNC: 15 G/DL (ref 13.3–17.7)
INR PPP: 1.18 (ref 0.86–1.14)
POTASSIUM SERPL-SCNC: 4.4 MMOL/L (ref 3.4–5.3)
SPECIMEN EXP DATE BLD: NORMAL

## 2018-01-16 PROCEDURE — 36415 COLL VENOUS BLD VENIPUNCTURE: CPT | Performed by: UROLOGY

## 2018-01-16 PROCEDURE — 71000013 ZZH RECOVERY PHASE 1 LEVEL 1 EA ADDTL HR: Performed by: UROLOGY

## 2018-01-16 PROCEDURE — 25000128 H RX IP 250 OP 636: Performed by: UROLOGY

## 2018-01-16 PROCEDURE — 85018 HEMOGLOBIN: CPT | Performed by: UROLOGY

## 2018-01-16 PROCEDURE — 86900 BLOOD TYPING SEROLOGIC ABO: CPT | Performed by: UROLOGY

## 2018-01-16 PROCEDURE — 25000125 ZZHC RX 250: Performed by: NURSE ANESTHETIST, CERTIFIED REGISTERED

## 2018-01-16 PROCEDURE — 71000012 ZZH RECOVERY PHASE 1 LEVEL 1 FIRST HR: Performed by: UROLOGY

## 2018-01-16 PROCEDURE — 27210995 ZZH RX 272: Performed by: UROLOGY

## 2018-01-16 PROCEDURE — 86901 BLOOD TYPING SEROLOGIC RH(D): CPT | Performed by: UROLOGY

## 2018-01-16 PROCEDURE — 36000050 ZZH SURGERY LEVEL 2 1ST 30 MIN: Performed by: UROLOGY

## 2018-01-16 PROCEDURE — 25000125 ZZHC RX 250: Performed by: UROLOGY

## 2018-01-16 PROCEDURE — A9270 NON-COVERED ITEM OR SERVICE: HCPCS | Mod: GY | Performed by: NURSE ANESTHETIST, CERTIFIED REGISTERED

## 2018-01-16 PROCEDURE — 25000128 H RX IP 250 OP 636: Performed by: NURSE ANESTHETIST, CERTIFIED REGISTERED

## 2018-01-16 PROCEDURE — 37000008 ZZH ANESTHESIA TECHNICAL FEE, 1ST 30 MIN: Performed by: UROLOGY

## 2018-01-16 PROCEDURE — A9270 NON-COVERED ITEM OR SERVICE: HCPCS | Mod: GY | Performed by: UROLOGY

## 2018-01-16 PROCEDURE — 71000027 ZZH RECOVERY PHASE 2 EACH 15 MINS: Performed by: UROLOGY

## 2018-01-16 PROCEDURE — 37000009 ZZH ANESTHESIA TECHNICAL FEE, EACH ADDTL 15 MIN: Performed by: UROLOGY

## 2018-01-16 PROCEDURE — 36000052 ZZH SURGERY LEVEL 2 EA 15 ADDTL MIN: Performed by: UROLOGY

## 2018-01-16 PROCEDURE — 40000170 ZZH STATISTIC PRE-PROCEDURE ASSESSMENT II: Performed by: UROLOGY

## 2018-01-16 PROCEDURE — 25000566 ZZH SEVOFLURANE, EA 15 MIN: Performed by: UROLOGY

## 2018-01-16 PROCEDURE — 27210794 ZZH OR GENERAL SUPPLY STERILE: Performed by: UROLOGY

## 2018-01-16 PROCEDURE — 84132 ASSAY OF SERUM POTASSIUM: CPT | Performed by: UROLOGY

## 2018-01-16 PROCEDURE — 85610 PROTHROMBIN TIME: CPT | Performed by: UROLOGY

## 2018-01-16 PROCEDURE — 25800025 ZZH RX 258: Performed by: UROLOGY

## 2018-01-16 PROCEDURE — 25000132 ZZH RX MED GY IP 250 OP 250 PS 637: Mod: GY | Performed by: NURSE ANESTHETIST, CERTIFIED REGISTERED

## 2018-01-16 PROCEDURE — 86850 RBC ANTIBODY SCREEN: CPT | Performed by: UROLOGY

## 2018-01-16 RX ORDER — ONDANSETRON 4 MG/1
4 TABLET, ORALLY DISINTEGRATING ORAL EVERY 30 MIN PRN
Status: DISCONTINUED | OUTPATIENT
Start: 2018-01-16 | End: 2018-01-16 | Stop reason: HOSPADM

## 2018-01-16 RX ORDER — NEOSTIGMINE METHYLSULFATE 1 MG/ML
VIAL (ML) INJECTION PRN
Status: DISCONTINUED | OUTPATIENT
Start: 2018-01-16 | End: 2018-01-16

## 2018-01-16 RX ORDER — HYDROMORPHONE HYDROCHLORIDE 1 MG/ML
.3-.5 INJECTION, SOLUTION INTRAMUSCULAR; INTRAVENOUS; SUBCUTANEOUS EVERY 10 MIN PRN
Status: DISCONTINUED | OUTPATIENT
Start: 2018-01-16 | End: 2018-01-16 | Stop reason: HOSPADM

## 2018-01-16 RX ORDER — NALOXONE HYDROCHLORIDE 0.4 MG/ML
.1-.4 INJECTION, SOLUTION INTRAMUSCULAR; INTRAVENOUS; SUBCUTANEOUS
Status: DISCONTINUED | OUTPATIENT
Start: 2018-01-16 | End: 2018-01-16 | Stop reason: HOSPADM

## 2018-01-16 RX ORDER — HYDROCODONE BITARTRATE AND ACETAMINOPHEN 5; 325 MG/1; MG/1
1 TABLET ORAL ONCE
Status: DISCONTINUED | OUTPATIENT
Start: 2018-01-16 | End: 2018-01-16 | Stop reason: HOSPADM

## 2018-01-16 RX ORDER — FUROSEMIDE 10 MG/ML
INJECTION INTRAMUSCULAR; INTRAVENOUS PRN
Status: DISCONTINUED | OUTPATIENT
Start: 2018-01-16 | End: 2018-01-16

## 2018-01-16 RX ORDER — ALBUTEROL SULFATE 90 UG/1
AEROSOL, METERED RESPIRATORY (INHALATION) PRN
Status: DISCONTINUED | OUTPATIENT
Start: 2018-01-16 | End: 2018-01-16

## 2018-01-16 RX ORDER — DEXAMETHASONE SODIUM PHOSPHATE 4 MG/ML
INJECTION, SOLUTION INTRA-ARTICULAR; INTRALESIONAL; INTRAMUSCULAR; INTRAVENOUS; SOFT TISSUE PRN
Status: DISCONTINUED | OUTPATIENT
Start: 2018-01-16 | End: 2018-01-16

## 2018-01-16 RX ORDER — CIPROFLOXACIN 250 MG/1
250 TABLET, FILM COATED ORAL 2 TIMES DAILY
Qty: 6 TABLET | Refills: 0 | Status: SHIPPED | OUTPATIENT
Start: 2018-01-16 | End: 2018-02-07

## 2018-01-16 RX ORDER — GLYCINE 1.5 G/100ML
SOLUTION IRRIGATION PRN
Status: DISCONTINUED | OUTPATIENT
Start: 2018-01-16 | End: 2018-01-16 | Stop reason: HOSPADM

## 2018-01-16 RX ORDER — GENTAMICIN SULFATE 80 MG/100ML
80 INJECTION, SOLUTION INTRAVENOUS
Status: COMPLETED | OUTPATIENT
Start: 2018-01-16 | End: 2018-01-16

## 2018-01-16 RX ORDER — FENTANYL CITRATE 50 UG/ML
INJECTION, SOLUTION INTRAMUSCULAR; INTRAVENOUS PRN
Status: DISCONTINUED | OUTPATIENT
Start: 2018-01-16 | End: 2018-01-16

## 2018-01-16 RX ORDER — ONDANSETRON 2 MG/ML
INJECTION INTRAMUSCULAR; INTRAVENOUS PRN
Status: DISCONTINUED | OUTPATIENT
Start: 2018-01-16 | End: 2018-01-16

## 2018-01-16 RX ORDER — GLYCOPYRROLATE 0.2 MG/ML
INJECTION, SOLUTION INTRAMUSCULAR; INTRAVENOUS PRN
Status: DISCONTINUED | OUTPATIENT
Start: 2018-01-16 | End: 2018-01-16

## 2018-01-16 RX ORDER — HYDROCODONE BITARTRATE AND ACETAMINOPHEN 5; 325 MG/1; MG/1
1-2 TABLET ORAL EVERY 4 HOURS PRN
Qty: 15 TABLET | Refills: 0 | Status: SHIPPED | OUTPATIENT
Start: 2018-01-16 | End: 2018-02-07

## 2018-01-16 RX ORDER — ONDANSETRON 2 MG/ML
4 INJECTION INTRAMUSCULAR; INTRAVENOUS EVERY 30 MIN PRN
Status: DISCONTINUED | OUTPATIENT
Start: 2018-01-16 | End: 2018-01-16 | Stop reason: HOSPADM

## 2018-01-16 RX ORDER — PROPOFOL 10 MG/ML
INJECTION, EMULSION INTRAVENOUS PRN
Status: DISCONTINUED | OUTPATIENT
Start: 2018-01-16 | End: 2018-01-16

## 2018-01-16 RX ORDER — LIDOCAINE HYDROCHLORIDE 20 MG/ML
INJECTION, SOLUTION INFILTRATION; PERINEURAL PRN
Status: DISCONTINUED | OUTPATIENT
Start: 2018-01-16 | End: 2018-01-16

## 2018-01-16 RX ORDER — SODIUM CHLORIDE, SODIUM LACTATE, POTASSIUM CHLORIDE, CALCIUM CHLORIDE 600; 310; 30; 20 MG/100ML; MG/100ML; MG/100ML; MG/100ML
INJECTION, SOLUTION INTRAVENOUS CONTINUOUS PRN
Status: DISCONTINUED | OUTPATIENT
Start: 2018-01-16 | End: 2018-01-16

## 2018-01-16 RX ORDER — IPRATROPIUM BROMIDE 42 UG/1
2 SPRAY, METERED NASAL 4 TIMES DAILY PRN
COMMUNITY
End: 2018-02-07

## 2018-01-16 RX ORDER — SODIUM CHLORIDE, SODIUM LACTATE, POTASSIUM CHLORIDE, CALCIUM CHLORIDE 600; 310; 30; 20 MG/100ML; MG/100ML; MG/100ML; MG/100ML
INJECTION, SOLUTION INTRAVENOUS CONTINUOUS
Status: DISCONTINUED | OUTPATIENT
Start: 2018-01-16 | End: 2018-01-16 | Stop reason: HOSPADM

## 2018-01-16 RX ORDER — FENTANYL CITRATE 0.05 MG/ML
25-50 INJECTION, SOLUTION INTRAMUSCULAR; INTRAVENOUS
Status: DISCONTINUED | OUTPATIENT
Start: 2018-01-16 | End: 2018-01-16 | Stop reason: HOSPADM

## 2018-01-16 RX ORDER — MEPERIDINE HYDROCHLORIDE 25 MG/ML
12.5 INJECTION INTRAMUSCULAR; INTRAVENOUS; SUBCUTANEOUS
Status: DISCONTINUED | OUTPATIENT
Start: 2018-01-16 | End: 2018-01-16 | Stop reason: HOSPADM

## 2018-01-16 RX ADMIN — ALBUTEROL SULFATE 4 PUFF: 90 AEROSOL, METERED RESPIRATORY (INHALATION) at 08:20

## 2018-01-16 RX ADMIN — GENTAMICIN SULFATE 80 MG: 80 INJECTION, SOLUTION INTRAVENOUS at 07:45

## 2018-01-16 RX ADMIN — GLYCOPYRROLATE 0.8 MG: 0.2 INJECTION, SOLUTION INTRAMUSCULAR; INTRAVENOUS at 08:09

## 2018-01-16 RX ADMIN — PROPOFOL 50 MG: 10 INJECTION, EMULSION INTRAVENOUS at 07:39

## 2018-01-16 RX ADMIN — PROPOFOL 30 MG: 10 INJECTION, EMULSION INTRAVENOUS at 08:06

## 2018-01-16 RX ADMIN — PHENYLEPHRINE HYDROCHLORIDE 100 MCG: 10 INJECTION INTRAVENOUS at 07:52

## 2018-01-16 RX ADMIN — FENTANYL CITRATE 100 MCG: 50 INJECTION, SOLUTION INTRAMUSCULAR; INTRAVENOUS at 07:38

## 2018-01-16 RX ADMIN — ROCURONIUM BROMIDE 40 MG: 10 INJECTION INTRAVENOUS at 07:38

## 2018-01-16 RX ADMIN — SODIUM CHLORIDE, POTASSIUM CHLORIDE, SODIUM LACTATE AND CALCIUM CHLORIDE: 600; 310; 30; 20 INJECTION, SOLUTION INTRAVENOUS at 07:35

## 2018-01-16 RX ADMIN — PROPOFOL 150 MG: 10 INJECTION, EMULSION INTRAVENOUS at 07:38

## 2018-01-16 RX ADMIN — FUROSEMIDE 10 MG: 10 INJECTION, SOLUTION INTRAVENOUS at 08:07

## 2018-01-16 RX ADMIN — ONDANSETRON 4 MG: 2 INJECTION INTRAMUSCULAR; INTRAVENOUS at 08:03

## 2018-01-16 RX ADMIN — PHENYLEPHRINE HYDROCHLORIDE 100 MCG: 10 INJECTION INTRAVENOUS at 07:56

## 2018-01-16 RX ADMIN — DEXAMETHASONE SODIUM PHOSPHATE 4 MG: 4 INJECTION, SOLUTION INTRA-ARTICULAR; INTRALESIONAL; INTRAMUSCULAR; INTRAVENOUS; SOFT TISSUE at 07:55

## 2018-01-16 RX ADMIN — LIDOCAINE HYDROCHLORIDE 40 MG: 20 INJECTION, SOLUTION INFILTRATION; PERINEURAL at 07:38

## 2018-01-16 RX ADMIN — PHENYLEPHRINE HYDROCHLORIDE 100 MCG: 10 INJECTION INTRAVENOUS at 08:05

## 2018-01-16 RX ADMIN — NEOSTIGMINE METHYLSULFATE 5 MG: 1 INJECTION INTRAMUSCULAR; INTRAVENOUS; SUBCUTANEOUS at 08:09

## 2018-01-16 RX ADMIN — PHENYLEPHRINE HYDROCHLORIDE 100 MCG: 10 INJECTION INTRAVENOUS at 07:38

## 2018-01-16 ASSESSMENT — ENCOUNTER SYMPTOMS
SEIZURES: 0
DYSRHYTHMIAS: 1

## 2018-01-16 NOTE — OR NURSING
Anesthesia notified re variable heart rate(80's-120), and ankle edema( more pronounced than usual per pt)

## 2018-01-16 NOTE — IP AVS SNAPSHOT
[facility] [address]    [place] [address]    Phone:  [phone]                                       After Visit Summary   1/16/2018    Vasiliy Corbin    MRN: [id number]           After Visit Summary Signature Page     I have received my discharge instructions, and my questions have been answered. I have discussed any challenges I see with this plan with the nurse or doctor.    ..........................................................................................................................................  Patient/Patient Representative Signature      ..........................................................................................................................................  Patient Representative Print Name and Relationship to Patient    ..................................................               ................................................  Date                                            Time    ..........................................................................................................................................  Reviewed by Signature/Title    ...................................................              ..............................................  Date                                                            Time

## 2018-01-16 NOTE — OR NURSING
Wife at bedside for pressley cath teachings- verbalized understanding, denies questions- good technique observed-

## 2018-01-16 NOTE — DISCHARGE INSTRUCTIONS
**Because you had anesthesia today and your history of sleep apnea, it is extremely important that you use your CPAP machine for the next 24 hours while napping or sleeping.**    Same Day Surgery Discharge Instructions for  Sedation and General Anesthesia       It's not unusual to feel dizzy, light-headed or faint for up to 24 hours after surgery or while taking pain medication.  If you have these symptoms: sit for a few minutes before standing and have someone assist you when you get up to walk or use the bathroom.      You should rest and relax for the next 24 hours. We recommend you make arrangements to have an adult stay with you for at least 24 hours after your discharge.  Avoid hazardous and strenuous activity.      DO NOT DRIVE any vehicle or operate mechanical equipment for 24 hours following the end of your surgery.  Even though you may feel normal, your reactions may be affected by the medication you have received.      Do not drink alcoholic beverages for 24 hours following surgery.       Slowly progress to your regular diet as you feel able. It's not unusual to feel nauseated and/or vomit after receiving anesthesia.  If you develop these symptoms, drink clear liquids (apple juice, ginger ale, broth, 7-up, etc. ) until you feel better.  If your nausea and vomiting persists for 24 hours, please notify your surgeon.        All narcotic pain medications, along with inactivity and anesthesia, can cause constipation. Drinking plenty of liquids and increasing fiber intake will help.      For any questions of a medical nature, call your surgeon.      Do not make important decisions for 24 hours.      If you had general anesthesia, you may have a sore throat for a couple of days related to the breathing tube used during surgery.  You may use Cepacol lozenges to help with this discomfort.  If it worsens or if you develop a fever, contact your surgeon.       If you feel your pain is not well managed with the pain  medications prescribed by your surgeon, please contact your surgeon's office to let them know so they can address your concerns.       HOLD COUMADIN AND ASPIRIN TILL OKAY WITH DR CROCKETT.      Cystoscopy Discharge Instructions      Diet:    Return to the diet that you were on before the procedure, unless you are given specific diet instructions.    It is important to drink 6-8 glasses of fluids per day at home - at least 3-4 glasses should be water.    Activity:    Walk short distances and increase as your strength allows.    You may climb stairs.    Do not do strenuous exercise or heavy lifting until approved by surgeon.    Do not drive while taking narcotic pain medications.    Bathing:    You may take a shower.    Call your physician if these signs/symptoms are present:    Pain that is not relieved by a short rest or ordered pain medications.    Temperature at or above 101.0 F or chills.    Inability or difficulty urinating.  Excessive blood in urine.    Any questions or concerns.      DISCHARGE INSTRUCTIONS FOR   CATHETER CARE AT HOME      .      Basic Catheter Care  1. Always wash hands before and after handling your catheter.  2. Use soap and water to wash the area around your catheter.  3. Do this procedure twice a day.  4. Proper cleansing will help keep the area from becoming irritated or infected.    Leg Bag  This is a small plastic bag that collects urine draining from your catheter and then strapped around your thigh. It will need to be emptied when the bag is 1/2 to 3/4 full.     Large Drainage Bag  1. This bag is larger than the leg bag and holds more urine.  It is to be used while at home, especially at night.    2. Before you go to bed, change the leg bag to the large drainage bag.  3. Pinch off the catheter with your fingers and swab the connection between the catheter and leg bag with alcohol sponge.  4. Disconnect the leg bag and connect the large drainage bag to your catheter.  5. When you get  into bed, arrange the drainage tubing so that it doesn t kink.  6. Be sure to keep the bag below the level of your bladder and allow enough slack for turning.    Cleaning Your Drainage Bags    1. Wash hands.  2. Using funnel or syringe, fill the bag half full with a solution of 1/2  vinegar and 1/2 water.  3. Shake bag, allowing mixture to cleanse inside of bag.  4. Empty out all vinegar and water mixture from your bag.  5. Hang bag to dry when not in use.  6. Clean your bags anytime you change them.      Helpful Hints  1. Always keep drainage bags below bladder level to insure adequate drainage.  2. Drink 4-6 glasses of water daily along with other fluids you normally drink to keep urine free of infection and / or clots.  3. If you notice no urine in your bag for 2 to 4 hours or you develop extreme discomfort in bladder area, your catheter maybe plugged.  Notify your doctor.  4. If you notice your urine becomes foul smelling and cloudy, notify your doctor.  Also notify your doctor if you develop fever or chills.  5. If you notice urine leaking around the outside of the catheter, check to be sure catheter or tubing is not kinked.  6. Don t use leg bag while in bed.        **If you have questions or concerns about your procedure,  call Dr. Lima at [phone]**

## 2018-01-16 NOTE — BRIEF OP NOTE
[facility] Brief Operative Note    Pre-operative diagnosis: HEMATURIA, BLADDER LESION   Post-operative diagnosis NO ANTERIOR BLADDER LESION, PROSTATE BLEEDING   Procedure: Procedure(s):  CYSTOSCOPY, FULGERATION - Wound Class: II-Clean Contaminated   Surgeon(s): Surgeon(s) and Role:     * Lokesh Lima MD - Primary   Estimated blood loss: 5 mL    Specimens: NONE   Findings: NO ANTERIOR BLADDER LESION, BPH, FRIABLE PROSTATE

## 2018-01-16 NOTE — ANESTHESIA POSTPROCEDURE EVALUATION
Patient: Vasiliy Corbin    Procedure(s):  CYSTOSCOPY, FULGERATION - Wound Class: II-Clean Contaminated    Diagnosis:HEMOTURIA, BLADDER LESION  Diagnosis Additional Information: No value filed.    Anesthesia Type:  General, ETT    Note:  Anesthesia Post Evaluation    Patient location during evaluation: PACU  Patient participation: Able to fully participate in evaluation  Level of consciousness: awake  Pain management: adequate  Airway patency: patent  Cardiovascular status: acceptable  Respiratory status: acceptable  Hydration status: acceptable  PONV: none     Anesthetic complications: None          Last vitals:  Vitals:    01/16/18 0900 01/16/18 0915 01/16/18 1004   BP: 125/83  139/71   Resp: 19 17 16   Temp: 35.8  C (96.4  F) 36.8  C (98.2  F)    SpO2: 95% 95% 95%         Electronically Signed By: Mini Cano MD  January 16, 2018  2:20 PM

## 2018-01-16 NOTE — OP NOTE
"DATE OF PROCEDURE:  1/16/2018      PREOPERATIVE DIAGNOSES:     1.  Gross hematuria.     2.  Questionable anterior bladder lesion.      POSTOPERATIVE DIAGNOSES:   1.  No bladder lesions seen.     2.  Benign prostatic hyperplasia.   3.  Friable prostate.      PROCEDURE:     1.  Cystoscopy.    2.  Fulguration of the prostate.      ANESTHESIA:  General.      SURGEON:  Dr. Lima.      ESTIMATED BLOOD LOSS:  Less than 5 mL.      SUMMARY OF FINDINGS:  No bladder lesions seen, friable prostate.      BRIEF HISTORY AND PHYSICAL:  Vasiliy Corbin is an 82-year-old male with multiple medical problems, had been on warfarin.  The patient has had intermittent episodes of gross hematuria.  He apparently has undergone a previous TURP in the past.  He denies any significant voiding symptoms except for the intermittent hematuria.  He has undergone cystoscopy in the office and was found to have residual prostatic tissue and with a rather significant friable prostate.  No bladder lesions were seen in the office, however, a CT scan demonstrated questionable anterior bladder wall thickening, which was \"suspicious.\"  With these findings, the patient now is scheduled for evaluation under anesthesia.  Procedure thoroughly explained to the patient including possible complications and realistic expectations and he has elected to proceed.      DESCRIPTION OF PROCEDURE:  Proper permits were obtained and signed.  The patient was given general anesthesia.  He was prepped and draped in the usual sterile fashion.  A 22 [place] Storz was visually passed through the urethra, which was normal.  Prostate measured approximately 5 cm and was obstructing at the level of the apex and mid prostate, although there was evidence of previous resection at the bladder neck.  The bladder itself demonstrated evidence of mild trabeculation.  Orifices demonstrated clear efflux.  Inspection with the 30 degree and 70 degree lens failed to demonstrate any obvious bladder " lesions within the entire bladder including the anterior bladder.  There was somewhat protrusion of the prostate into the bladder slightly.  The prostate was rather friable and there were significant bleeding sites identified, and this was felt to be most likely the etiology for his intermittent gross hematuria while on warfarin.  The patient underwent fulguration of the prostatic fossa and this cleared up the urine nicely.  Because of his rather friable medical health, it was decided no further treatment at this time.  A 20 Howell catheter was placed and irrigated clear.  He was taken to the recovery room in stable condition.            OSMANI CROCKETT MD             D: 2018 08:23   T: 2018 09:37   MT: NTS      Name:     AVERY FERRER   MRN:      [id number]        Account:        [id number]   :      1935           Procedure Date: 2018      Document: [id number]       cc: Nena Pablo MD

## 2018-01-16 NOTE — IP AVS SNAPSHOT
MRN:[id number]                      After Visit Summary   1/16/2018    Vasiliy Corbin    MRN: [id number]           Thank you!     Thank you for choosing [facility] for your care. Our goal is always to provide you with excellent care. Hearing back from our patients is one way we can continue to improve our services. Please take a few minutes to complete the written survey that you may receive in the mail after you visit with us. Thank you!        Patient Information     Date Of Birth          1935        About your hospital stay     You were admitted on:  January 16, 2018 You last received care in the:  [facility] PACU    You were discharged on:  January 16, 2018       Who to Call     For medical emergencies, please call 911.  For non-urgent questions about your medical care, please call your primary care provider or clinic, [phone]  For questions related to your surgery, please call your surgery clinic        Attending Provider     Provider Specialty    Lokesh Lima MD Urology       Primary Care Provider Office Phone # Fax #    Nena Pablo [phone]      After Care Instructions     Discharge Instructions       Resume Aspirin / warfarin (COUMADIN) when urine is clear to faint pink.RESTART IN 48 H            Discharge Instructions       Patient to arrange for follow up appointment in 3 days. SEE Friday ROSS OFFICE 10 AM TO REMOVE [place]            Encourage fluids       Encourage fluids at home to keep urine clear to light pink            No Aspirin, Ibuprofen or Naproxen products       for 3-5 days following surgery                  Your next 10 appointments already scheduled     Jan 22, 2018  9:30 AM CST   Ech Complete with [id number]   [facility] CV Echocardiography (Card[facility]ovascular [facility] at [facility])    [address] [place] [id number] [phone]           1. Please bring or wear a comfortable  two-piece outfit. 2. You may eat, drink and take your normal medicines. 3. For any questions that cannot be answered, please contact the ordering physician            Jan 29, 2018  2:30 PM CST   Return Visit with KAYLEE Kraus CNP   [facility] ([facility])    [address]  [facility] [id number] [phone]            Jan 31, 2018  9:00 AM CST   [facility] with [facility]   [facility]    [address] [place] [address] [phone]           Please do not eat 10-12 hours before your appointment if you are coming in fasting for labs on lipids, cholesterol, or glucose (sugar). This does not apply to pregnant women. Water, hot tea and black coffee (with nothing added) are okay. Do not drink other fluids, diet soda or chew gum.              Further instructions from your care team       **Because you had anesthesia today and your history of sleep apnea, it is extremely important that you use your CPAP machine for the next 24 hours while napping or sleeping.**    Same Day Surgery Discharge Instructions for  Sedation and General Anesthesia       It's not unusual to feel dizzy, light-headed or faint for up to 24 hours after surgery or while taking pain medication.  If you have these symptoms: sit for a few minutes before standing and have someone assist you when you get up to walk or use the bathroom.      You should rest and relax for the next 24 hours. We recommend you make arrangements to have an adult stay with you for at least 24 hours after your discharge.  Avoid hazardous and strenuous activity.      DO NOT DRIVE any vehicle or operate mechanical equipment for 24 hours following the end of your surgery.  Even though you may feel normal, your reactions may be affected by the medication you have received.      Do not drink alcoholic beverages for 24 hours following surgery.       Slowly progress to  your regular diet as you feel able. It's not unusual to feel nauseated and/or vomit after receiving anesthesia.  If you develop these symptoms, drink clear liquids (apple juice, ginger ale, broth, 7-up, etc. ) until you feel better.  If your nausea and vomiting persists for 24 hours, please notify your surgeon.        All narcotic pain medications, along with inactivity and anesthesia, can cause constipation. Drinking plenty of liquids and increasing fiber intake will help.      For any questions of a medical nature, call your surgeon.      Do not make important decisions for 24 hours.      If you had general anesthesia, you may have a sore throat for a couple of days related to the breathing tube used during surgery.  You may use Cepacol lozenges to help with this discomfort.  If it worsens or if you develop a fever, contact your surgeon.       If you feel your pain is not well managed with the pain medications prescribed by your surgeon, please contact your surgeon's office to let them know so they can address your concerns.       HOLD COUMADIN AND ASPIRIN TILL OKAY WITH DR CROCKETT.      Cystoscopy Discharge Instructions      Diet:    Return to the diet that you were on before the procedure, unless you are given specific diet instructions.    It is important to drink 6-8 glasses of fluids per day at home - at least 3-4 glasses should be water.    Activity:    Walk short distances and increase as your strength allows.    You may climb stairs.    Do not do strenuous exercise or heavy lifting until approved by surgeon.    Do not drive while taking narcotic pain medications.    Bathing:    You may take a shower.    Call your physician if these signs/symptoms are present:    Pain that is not relieved by a short rest or ordered pain medications.    Temperature at or above 101.0 F or chills.    Inability or difficulty urinating.  Excessive blood in urine.    Any questions or concerns.      DISCHARGE INSTRUCTIONS FOR    CATHETER CARE AT HOME      .      Basic Catheter Care  1. Always wash hands before and after handling your catheter.  2. Use soap and water to wash the area around your catheter.  3. Do this procedure twice a day.  4. Proper cleansing will help keep the area from becoming irritated or infected.    Leg Bag  This is a small plastic bag that collects urine draining from your catheter and then strapped around your thigh. It will need to be emptied when the bag is 1/2 to 3/4 full.     Large Drainage Bag  1. This bag is larger than the leg bag and holds more urine.  It is to be used while at home, especially at night.    2. Before you go to bed, change the leg bag to the large drainage bag.  3. Pinch off the catheter with your fingers and swab the connection between the catheter and leg bag with alcohol sponge.  4. Disconnect the leg bag and connect the large drainage bag to your catheter.  5. When you get into bed, arrange the drainage tubing so that it doesn t kink.  6. Be sure to keep the bag below the level of your bladder and allow enough slack for turning.    Cleaning Your Drainage Bags    1. Wash hands.  2. Using funnel or syringe, fill the bag half full with a solution of 1/2  vinegar and 1/2 water.  3. Shake bag, allowing mixture to cleanse inside of bag.  4. Empty out all vinegar and water mixture from your bag.  5. Hang bag to dry when not in use.  6. Clean your bags anytime you change them.      Helpful Hints  1. Always keep drainage bags below bladder level to insure adequate drainage.  2. Drink 4-6 glasses of water daily along with other fluids you normally drink to keep urine free of infection and / or clots.  3. If you notice no urine in your bag for 2 to 4 hours or you develop extreme discomfort in bladder area, your catheter maybe plugged.  Notify your doctor.  4. If you notice your urine becomes foul smelling and cloudy, notify your doctor.  Also notify your doctor if you develop fever or  "chills.  5. If you notice urine leaking around the outside of the catheter, check to be sure catheter or tubing is not kinked.  6. Don t use leg bag while in bed.        **If you have questions or concerns about your procedure,  call Dr. Lima at [phone]**    Pending Results     No orders found from 2018 to 2018.            Admission Information     Date & Time Provider Department Dept. Phone    2018 Lokesh Lima MD [facility] PACU [phone]      Your Vitals Were     Blood Pressure Temperature Respirations Height Weight Pulse Oximetry    125/83 98.2  F (36.8  C) (Temporal) 17 1.676 m (5' 6\") 124.6 kg (274 lb 11.2 oz) 95%    BMI (Body Mass Index)                   44.34 kg/m2           [employer] Information     [employer] lets you send messages to your doctor, view your test results, renew your prescriptions, schedule appointments and more. To sign up, go to [email or web address] . Click on \"Log in\" on the left side of the screen, which will take you to the Welcome page. Then click on \"Sign up Now\" on the right side of the page.     You will be asked to enter the access code listed below, as well as some personal information. Please follow the directions to create your username and password.     Your access code is: [email or web address]  Expires: 2018 10:38 AM     Your access code will  in 90 days. If you need help or a new code, please call your [facility] clinic or [phone].        Care EveryWhere ID     This is your Care EveryWhere ID. This could be used by other organizations to access your [facility] medical records  [id number]        Equal Access to Services     ABIOLA DOMINGUEZ : Daniel Younger, makayla bowman, sol ely. [facility] [phone].    ATENCIÓN: Si habla español, tiene a camacho disposición servicios gratuitos de asistencia lingüística. Llame al [phone].    We comply with applicable federal " civil rights laws and [place] laws. We do not discriminate on the basis of race, color, national origin, age, disability, sex, sexual orientation, or gender identity.               Review of your medicines      START taking        Dose / Directions    ciprofloxacin 250 MG tablet   Commonly known as:  CIPRO   Used for:  History of BPH        Dose:  250 mg   Take 1 tablet (250 mg) by mouth 2 times daily   Quantity:  6 tablet   Refills:  0       HYDROcodone-acetaminophen 5-325 MG per tablet   Commonly known as:  NORCO   Used for:  History of BPH        Dose:  1-2 tablet   Take 1-2 tablets by mouth every 4 hours as needed for moderate to severe pain (Moderate to Severe Pain)   Quantity:  15 tablet   Refills:  0         CONTINUE these medicines which have NOT CHANGED        Dose / Directions    allopurinol 300 MG tablet   Commonly known as:  ZYLOPRIM   Used for:  Encounter for medication refill        TAKE 1 TABLET BY MOUTH DAILY   Quantity:  90 tablet   Refills:  3       digoxin 125 MCG tablet   Commonly known as:  LANOXIN   Used for:  Chronic atrial fibrillation (H)        Dose:  125 mcg   Take 1 tablet (125 mcg) by mouth daily   Quantity:  90 tablet   Refills:  3       ipratropium 0.06 % spray   Commonly known as:  ATROVENT        Dose:  2 spray   Spray 2 sprays into both nostrils 4 times daily as needed for rhinitis   Refills:  0       LASIX PO        Dose:  60 mg   Take 60 mg by mouth daily (1.5 x 40mg = 60mg)   Refills:  0       LOPRESSOR PO        Dose:  100 mg   Take 100 mg by mouth 2 times daily (2 x 50mg = 100mg)   Refills:  0       losartan 50 MG tablet   Commonly known as:  COZAAR   Used for:  Essential hypertension        Dose:  50 mg   Take 1 tablet (50 mg) by mouth daily   Quantity:  90 tablet   Refills:  3       Multi-vitamin Tabs tablet   Generic drug:  multivitamin, therapeutic with minerals        Dose:  1 tablet   Take 1 tablet by mouth daily.   Quantity:  100 tablet   Refills:  12       OMEPRAZOLE  PO        Dose:  40 mg   Take 40 mg by mouth 2 times daily (2 x 20mg = 40mg)   Refills:  0       PROSCAR PO        Dose:  5 mg   Take 5 mg by mouth daily   Refills:  0       simvastatin 10 MG tablet   Commonly known as:  ZOCOR   Used for:  Hyperlipidemia with target LDL less than 100        Dose:  10 mg   Take 1 tablet (10 mg) by mouth At Bedtime   Quantity:  90 tablet   Refills:  3         STOP taking     ASPIRIN NOT PRESCRIBED   Commonly known as:  INTENTIONAL           WARFARIN SODIUM PO                Where to get your medicines      These medications were sent to [facility] - [address]  [address], [place] [address]     Phone:  [phone]     ciprofloxacin 250 MG tablet         Some of these will need a paper prescription and others can be bought over the counter. Ask your nurse if you have questions.     Bring a paper prescription for each of these medications     HYDROcodone-acetaminophen 5-325 MG per tablet               ANTIBIOTIC INSTRUCTION     You've Been Prescribed an Antibiotic - Now What?  Your healthcare team thinks that you or your loved one might have an infection. Some infections can be treated with antibiotics, which are powerful, life-saving drugs. Like all medications, antibiotics have side effects and should only be used when necessary. There are some important things you should know about your antibiotic treatment.      Your healthcare team may run tests before you start taking an antibiotic.    Your team may take samples (e.g., from your blood, urine or other areas) to run tests to look for bacteria. These test can be important to determine if you need an antibiotic at all and, if you do, which antibiotic will work best.      Within a few days, your healthcare team might change or even stop your antibiotic.    Your team may start you on an antibiotic while they are working to find out what is making you sick.    Your team might change your  antibiotic because test results show that a different antibiotic would be better to treat your infection.    In some cases, once your team has more information, they learn that you do not need an antibiotic at all. They may find out that you don't have an infection, or that the antibiotic you're taking won't work against your infection. For example, an infection caused by a virus can't be treated with antibiotics. Staying on an antibiotic when you don't need it is more likely to be harmful than helpful.      You may experience side effects from your antibiotic.    Like all medications, antibiotics have side effects. Some of these can be serious.    Let you healthcare team know if you have any known allergies when you are admitted to the hospital.    One significant side effect of nearly all antibiotics is the risk of severe and sometimes deadly diarrhea caused by Clostridium difficile (C. Difficile). This occurs when a person takes antibiotics because some good germs are destroyed. Antibiotic use allows C. diificile to take over, putting patients at high risk for this serious infection.    As a patient or caregiver, it is important to understand your or your loved one's antibiotic treatment. It is especially important for caregivers to speak up when patients can't speak for themselves. Here are some important questions to ask your healthcare team.    What infection is this antibiotic treating and how do you know I have that infection?    What side effects might occur from this antibiotic?    How long will I need to take this antibiotic?    Is it safe to take this antibiotic with other medications or supplements (e.g., vitamins) that I am taking?     Are there any special directions I need to know about taking this antibiotic? For example, should I take it with food?    How will I be monitored to know whether my infection is responding to the antibiotic?    What tests may help to make sure the right antibiotic is  prescribed for me?      Information provided by:  www.cdc.gov/getsmart  U.S. Department of Health and Human Services  Centers for disease Control and Prevention  National Center for Emerging and Zoonotic Infectious Diseases  Division of Healthcare Quality Promotion         Protect others around you: Learn how to safely use, store and throw away your medicines at www.disposemymeds.org.             Medication List: This is a list of all your medications and when to take them. Check marks below indicate your daily home schedule. Keep this list as a reference.      Medications           Morning Afternoon Evening Bedtime As Needed    allopurinol 300 MG tablet   Commonly known as:  ZYLOPRIM   TAKE 1 TABLET BY MOUTH DAILY                                ciprofloxacin 250 MG tablet   Commonly known as:  CIPRO   Take 1 tablet (250 mg) by mouth 2 times daily                                digoxin 125 MCG tablet   Commonly known as:  LANOXIN   Take 1 tablet (125 mcg) by mouth daily                                HYDROcodone-acetaminophen 5-325 MG per tablet   Commonly known as:  NORCO   Take 1-2 tablets by mouth every 4 hours as needed for moderate to severe pain (Moderate to Severe Pain)                                ipratropium 0.06 % spray   Commonly known as:  ATROVENT   Spray 2 sprays into both nostrils 4 times daily as needed for rhinitis                                LASIX PO   Take 60 mg by mouth daily (1.5 x 40mg = 60mg)                                LOPRESSOR PO   Take 100 mg by mouth 2 times daily (2 x 50mg = 100mg)                                losartan 50 MG tablet   Commonly known as:  COZAAR   Take 1 tablet (50 mg) by mouth daily                                Multi-vitamin Tabs tablet   Take 1 tablet by mouth daily.   Generic drug:  multivitamin, therapeutic with minerals                                OMEPRAZOLE PO   Take 40 mg by mouth 2 times daily (2 x 20mg = 40mg)                                PROSCAR  PO   Take 5 mg by mouth daily                                simvastatin 10 MG tablet   Commonly known as:  ZOCOR   Take 1 tablet (10 mg) by mouth At Bedtime

## 2018-01-16 NOTE — ANESTHESIA PREPROCEDURE EVALUATION
Anesthesia Evaluation     . Pt has had prior anesthetic.     History of anesthetic complications          ROS/MED HX    ENT/Pulmonary:     (+)sleep apnea, allergic rhinitis, uses CPAP , . .   (-) recent URI   Neurologic: Comment: Restless leg syndrome     (-) seizures, CVA and migraines   Cardiovascular:     (+) Dyslipidemia, hypertension--CAD, --. Taking blood thinners : . . fainting (syncope). :. dysrhythmias a-fib, .       METS/Exercise Tolerance:     Hematologic:  - neg hematologic  ROS       Musculoskeletal: Comment: Right facial laceration form syncopal episode  (+) arthritis, , , -       GI/Hepatic:  - neg GI/hepatic ROS      (-) GERD   Renal/Genitourinary:     (+) chronic renal disease (renal cyst), BPH,       Endo:     (+) thyroid problem  Thyroid disease - Other goiter, Obesity, .   (-) Type II DM   Psychiatric:         Infectious Disease:  - neg infectious disease ROS       Malignancy:      - no malignancy   Other:                     Physical Exam  Normal systems: pulmonary and dental    Airway   Mallampati: III  TM distance: >3 FB  Neck ROM: full    Dental     Cardiovascular   Rhythm and rate: irregular and abnormal      Pulmonary    breath sounds clear to auscultation                        Anesthesia Plan      History & Physical Review  History and physical reviewed and following examination; no interval change.    ASA Status:  3 .    NPO Status:  > 8 hours    Plan for General and ETT with Intravenous induction. Maintenance will be Balanced.    PONV prophylaxis:  Ondansetron (or other 5HT-3) and Dexamethasone or Solumedrol  Additional equipment: Videolaryngoscope      Postoperative Care  Postoperative pain management:  IV analgesics.      Consents  Anesthetic plan, risks, benefits and alternatives discussed with:  Patient..        Procedure: Procedure(s):  COMBINED CYSTOSCOPY, TRANSURETHRAL RESECTION (TUR) TUMOR BLADDER  Preop diagnosis: HEMOTURIA, BLADDER LESION    Allergies   Allergen Reactions      Cardizem [Diltiazem]      Swelling and poor rate control     Lisinopril Cough     Past Medical History:   Diagnosis Date     Atrial fibrillation (H)     became chronic afib in 2016,paroxysmal,was on amiodarone but went into persistent afib in april 2106 and amiodrone was d/cd. now on BBLK and considering cardioversion.(5/2106).In Sept 2016 plan is rate control which has been a challenge and Holter in 6 months     CAD (coronary artery disease)     mild stenosis per cath     Cardiomyopathy (H)      Chronic cough 2017    Eval with Dr Sarai Oro - [place] [facility] - rec speech therapy, ct for eval of crackles No obstruction or copd     Colon polyp 2010     Gout      History of BPH     laser TURP in 2016 Dr Lima     HTN (hypertension)      Hyperlipidemia      Obesity (BMI 35.0-39.9 without comorbidity)      SUJEY on CPAP      Venous insufficiency of right leg     no trteatment recommended by Judy - option to treat is there Dr Zapata     Past Surgical History:   Procedure Laterality Date     CT release       CYSTOSCOPY N/A 4/7/2016    Procedure: CYSTOSCOPY;  Surgeon: Lokesh Lima MD;  Location:  OR     HC LEFT HEART CATHETERIZATION  3/2010    Mild CAD     LASER KTP TRANSURETHRAL RESECTION (TUR) PROSTATE N/A 4/7/2016    Procedure: LASER KTP TRANSURETHRAL RESECTION (TUR) PROSTATE;  Surgeon: Lokesh Lima MD;  Location:  OR     ulnar reroute      right      Prior to Admission medications    Medication Sig Start Date End Date Taking? Authorizing Provider   Finasteride (PROSCAR PO) Take 5 mg by mouth daily   Yes Reported, Patient   Furosemide (LASIX PO) Take 60 mg by mouth daily (1.5 x 40mg = 60mg)   Yes Reported, Patient   ipratropium (ATROVENT) 0.06 % spray Spray 2 sprays into both nostrils 4 times daily as needed for rhinitis   Yes Reported, Patient   Metoprolol Tartrate (LOPRESSOR PO) Take 100 mg by mouth 2 times daily (2 x 50mg = 100mg)   Yes Reported, Patient   OMEPRAZOLE PO Take 40 mg by mouth 2 times daily  (2 x 20mg = 40mg)   Yes Reported, Patient   WARFARIN SODIUM PO Take 3 mg by mouth daily (1.5 x 2mg = 3mg)   Yes Reported, Patient   simvastatin (ZOCOR) 10 MG tablet Take 1 tablet (10 mg) by mouth At Bedtime 1/1/18  Yes Heriberto Haile MD   allopurinol (ZYLOPRIM) 300 MG tablet TAKE 1 TABLET BY MOUTH DAILY 9/17/17  Yes Heriberto Haile MD   losartan (COZAAR) 50 MG tablet Take 1 tablet (50 mg) by mouth daily 7/11/17  Yes Diomedes Menendez MD   digoxin (LANOXIN) 125 MCG tablet Take 1 tablet (125 mcg) by mouth daily 3/3/17  Yes Laura Wooten, APRN CNP   Multiple Vitamin (MULTI-VITAMIN) per tablet Take 1 tablet by mouth daily. 11/28/11  Yes Heriberto Haile MD   ASPIRIN NOT PRESCRIBED (INTENTIONAL) Reported on 4/19/2017  On warfarin    Heriberto Haile MD     Current Facility-Administered Medications Ordered in Epic   Medication Dose Route Frequency Last Rate Last Dose     Provider ordered ALTERNATE pre op antibiotic.  1 each As instructed Continuous         gentamicin (GARAMYCIN) infusion 80 mg  80 mg Intravenous Pre-Op/Pre-procedure x 1 dose         No current [facility]-ordered outpatient prescriptions on file.     Wt Readings from Last 1 Encounters:   01/09/18 122.9 kg (271 lb)     Temp Readings from Last 1 Encounters:   01/09/18 36.6  C (97.8  F) (Oral)     BP Readings from Last 6 Encounters:   01/09/18 116/72   01/03/18 126/68   12/06/17 124/64   11/08/17 120/64   10/09/17 112/68   09/29/17 128/90     Pulse Readings from Last 4 Encounters:   01/09/18 70   01/03/18 68   12/06/17 88   11/08/17 74     Resp Readings from Last 1 Encounters:   01/09/18 16     SpO2 Readings from Last 1 Encounters:   01/09/18 97%     Recent Labs   Lab Test  01/09/18   1125  11/08/17   0930  05/22/17   1220  04/26/17   0831   NA  141  141  140  142   POTASSIUM  4.4  4.4  4.4  4.0   CHLORIDE  100  101  104  103   CO2   --    --   29  33*   ANIONGAP   --    --   11.3  4*   GLC  114*  102*  112*  133*   BUN  27  28*  18  17  19  25   CR   0.97  1.08  1.18  1.20   EDUARD  9.3  9.6  9.4  9.8     Recent Labs   Lab Test  01/09/18   1116  11/08/17   1000   WBC  7.2  6.5   HGB  14.4  14.4   PLT  194  202     Recent Labs   Lab Test  01/03/18   0905  12/06/17   0933   INR  2.5  2.6      RECENT LABS:   ECG:   ECHO:   CXR:                        .

## 2018-01-22 ENCOUNTER — HOSPITAL ENCOUNTER (OUTPATIENT)
Dept: CARDIOLOGY | Facility: CLINIC | Age: 83
Discharge: HOME | End: 2018-01-22
Attending: INTERNAL MEDICINE | Admitting: INTERNAL MEDICINE
Payer: MEDICARE

## 2018-01-22 DIAGNOSIS — I42.9 CARDIOMYOPATHY (H): ICD-10-CM

## 2018-01-22 PROCEDURE — 93306 TTE W/DOPPLER COMPLETE: CPT | Mod: 26 | Performed by: INTERNAL MEDICINE

## 2018-01-22 PROCEDURE — 93306 TTE W/DOPPLER COMPLETE: CPT

## 2018-01-24 ENCOUNTER — TRANSFERRED RECORDS (OUTPATIENT)
Dept: FAMILY MEDICINE | Facility: CLINIC | Age: 83
End: 2018-01-24

## 2018-01-25 DIAGNOSIS — Z79.899 ENCOUNTER FOR LONG-TERM (CURRENT) USE OF MEDICATIONS: Primary | ICD-10-CM

## 2018-01-25 RX ORDER — IPRATROPIUM BROMIDE 42 UG/1
SPRAY, METERED NASAL
Qty: 135 ML | Refills: 1 | Status: SHIPPED | OUTPATIENT
Start: 2018-01-25 | End: 2019-01-27

## 2018-01-25 NOTE — TELEPHONE ENCOUNTER
ipratropium (ATROVENT) 0.06 % spray   LAST  Med check 1-9/18   last labs(for RX) 1/16/18 @ hospital  Next  appt scheduled =  1/31/18 INR  Sweetie Diaz MA

## 2018-01-31 VITALS — BODY MASS INDEX: 42.77 KG/M2 | DIASTOLIC BLOOD PRESSURE: 70 MMHG | SYSTOLIC BLOOD PRESSURE: 120 MMHG | WEIGHT: 265 LBS

## 2018-01-31 DIAGNOSIS — Z79.01 LONG TERM CURRENT USE OF ANTICOAGULANT THERAPY: Primary | ICD-10-CM

## 2018-01-31 LAB — INR PPP: 2 (ref 2–3)

## 2018-01-31 PROCEDURE — 85610 PROTHROMBIN TIME: CPT | Performed by: FAMILY MEDICINE

## 2018-01-31 PROCEDURE — 36415 COLL VENOUS BLD VENIPUNCTURE: CPT | Performed by: FAMILY MEDICINE

## 2018-02-05 ENCOUNTER — TRANSFERRED RECORDS (OUTPATIENT)
Dept: FAMILY MEDICINE | Facility: CLINIC | Age: 83
End: 2018-02-05

## 2018-02-07 ENCOUNTER — OFFICE VISIT (OUTPATIENT)
Dept: CARDIOLOGY | Facility: CLINIC | Age: 83
End: 2018-02-07
Attending: INTERNAL MEDICINE
Payer: MEDICARE

## 2018-02-07 VITALS
BODY MASS INDEX: 43.79 KG/M2 | WEIGHT: 272.5 LBS | DIASTOLIC BLOOD PRESSURE: 72 MMHG | SYSTOLIC BLOOD PRESSURE: 124 MMHG | HEART RATE: 64 BPM | HEIGHT: 66 IN

## 2018-02-07 DIAGNOSIS — E78.5 HYPERLIPIDEMIA WITH TARGET LDL LESS THAN 100: ICD-10-CM

## 2018-02-07 DIAGNOSIS — I51.89 DIASTOLIC DYSFUNCTION: Primary | ICD-10-CM

## 2018-02-07 DIAGNOSIS — I10 ESSENTIAL HYPERTENSION: ICD-10-CM

## 2018-02-07 DIAGNOSIS — I87.2 VENOUS (PERIPHERAL) INSUFFICIENCY: ICD-10-CM

## 2018-02-07 DIAGNOSIS — I48.20 CHRONIC ATRIAL FIBRILLATION (H): ICD-10-CM

## 2018-02-07 DIAGNOSIS — I25.10 CORONARY ARTERY DISEASE INVOLVING NATIVE CORONARY ARTERY OF NATIVE HEART WITHOUT ANGINA PECTORIS: ICD-10-CM

## 2018-02-07 PROCEDURE — 99214 OFFICE O/P EST MOD 30 MIN: CPT | Performed by: PHYSICIAN ASSISTANT

## 2018-02-07 RX ORDER — WARFARIN SODIUM 2 MG/1
2 TABLET ORAL DAILY
Status: ON HOLD | COMMUNITY
End: 2020-02-25

## 2018-02-07 NOTE — PATIENT INSTRUCTIONS
Today's Plan:   1) Your heart ultrasound is normal.   2) No medication changes today.   3) Follow up with Dr Menendez/Laura next year.     If you have questions or concerns please call my nurse team at [phone].     Scheduling phone number: [phone]  Reminder: Please bring in all current medications, over the counter supplements and vitamin bottles to your next appointment.    It was a pleasure seeing you today!     Micky Mcdaniel  2/7/2018

## 2018-02-07 NOTE — MR AVS SNAPSHOT
After Visit Summary   2/7/2018    Vasiliy Corbin    MRN: [id number]           Patient Information     Date Of Birth          1935        Visit Information        Provider Department      2/7/2018 3:00 PM Micky Mcdaniel PA-C [facility]        Today's Diagnoses     Diastolic dysfunction    -  1    Cardiomyopathy (H)        Syncope, unspecified syncope type          Care Instructions    Today's Plan:   1) Your heart ultrasound is normal.   2) No medication changes today.   3) Follow up with Dr Menendez/Laura next year.     If you have questions or concerns please call my nurse team at [phone].     Scheduling phone number: [phone]  Reminder: Please bring in all current medications, over the counter supplements and vitamin bottles to your next appointment.    It was a pleasure seeing you today!     Micky Mcdaniel  2/7/2018              Follow-ups after your visit        Your next 10 appointments already scheduled     Feb 28, 2018  9:00 AM CST   LAB with RF LAB   [facility] ([facility])    [address] [place] [address] [phone]           Please do not eat 10-12 hours before your appointment if you are coming in fasting for labs on lipids, cholesterol, or glucose (sugar). This does not apply to pregnant women. Water, hot tea and black coffee (with nothing added) are okay. Do not drink other fluids, diet soda or chew gum.              Who to contact     If you have questions or need follow up information about today's clinic visit or your schedule please contact [facility] directly at [phone].  Normal or non-critical lab and imaging results will be communicated to you by MyChart, letter or phone within 4 business days after the clinic has received the results. If you do not hear from us within 7 days, please contact the clinic through [employer] or  "phone. If you have a critical or abnormal lab result, we will notify you by phone as soon as possible.  Submit refill requests through [employer] or call your pharmacy and they will forward the refill request to us. Please allow 3 business days for your refill to be completed.          Additional Information About Your Visit        Soapetshart Information     [employer] lets you send messages to your doctor, view your test results, renew your prescriptions, schedule appointments and more. To sign up, go to [email or web address] . Click on \"Log in\" on the left side of the screen, which will take you to the Welcome page. Then click on \"Sign up Now\" on the right side of the page.     You will be asked to enter the access code listed below, as well as some personal information. Please follow the directions to create your username and password.     Your access code is: [email or web address]  Expires: 2018 10:38 AM     Your access code will  in 90 days. If you need help or a new code, please call your [facility] clinic or [phone].        Care EveryWhere ID     This is your Care EveryWhere ID. This could be used by other organizations to access your [facility] medical records  [id number]        Your Vitals Were     Pulse Height BMI (Body Mass Index)             64 1.676 m (5' 6\") 43.98 kg/m2          Blood Pressure from Last 3 Encounters:   18 124/72   18 120/70   18 139/71    Weight from Last 3 Encounters:   18 123.6 kg (272 lb 8 oz)   18 120.2 kg (265 lb)   18 124.6 kg (274 lb 11.2 oz)              We Performed the Following     Follow-Up with Cardiac Advanced Practice Provider        Primary Care Provider Office Phone # Fax #    Nena Pablo [phone] [address]  [facility] [address]        Equal Access to Services     ABIOLA DOMINGUEZ AH: Daniel Younger, makayla bowman, sol elyaan ah. So " [facility] [phone].    ATENCIÓN: Si jennifer cross, tiene a camacho disposición servicios gratuitos de asistencia lingüística. Noel mishra [phone].    We comply with applicable federal civil rights laws and [place] laws. We do not discriminate on the basis of race, color, national origin, age, disability, sex, sexual orientation, or gender identity.            Thank you!     Thank you for choosing [facility]  for your care. Our goal is always to provide you with excellent care. Hearing back from our patients is one way we can continue to improve our services. Please take a few minutes to complete the written survey that you may receive in the mail after your visit with us. Thank you!             Your Updated Medication List - Protect others around you: Learn how to safely use, store and throw away your medicines at www.disposemymeds.org.          This list is accurate as of 2/7/18  3:15 PM.  Always use your most recent med list.                   Brand Name Dispense Instructions for use Diagnosis    allopurinol 300 MG tablet    ZYLOPRIM    90 tablet    TAKE 1 TABLET BY MOUTH DAILY    Encounter for medication refill       digoxin 125 MCG tablet    LANOXIN    90 tablet    Take 1 tablet (125 mcg) by mouth daily    Chronic atrial fibrillation (H)       ipratropium 0.06 % spray    ATROVENT    135 mL    USE 2 SPRAYS IN EACH NOSTRIL FOUR TIMES DAILY AS NEEDED FOR RHINITIS    Encounter for long-term (current) use of medications       LASIX PO      Take 60 mg by mouth daily (1.5 x 40mg = 60mg)        LOPRESSOR PO      Take 100 mg by mouth 2 times daily (2 x 50mg = 100mg)        losartan 50 MG tablet    COZAAR    90 tablet    Take 1 tablet (50 mg) by mouth daily    Essential hypertension       Multi-vitamin Tabs tablet   Generic drug:  multivitamin, therapeutic with minerals     100 tablet    Take 1 tablet by mouth daily.        PROSCAR PO      Take 5 mg by mouth daily        simvastatin 10  MG tablet    ZOCOR    90 tablet    Take 1 tablet (10 mg) by mouth At Bedtime    Hyperlipidemia with target LDL less than 100       warfarin 2 MG tablet    COUMADIN     Take 2 mg by mouth daily Taking 1 1/2 pills (2.5mg) daily

## 2018-02-07 NOTE — LETTER
2/7/2018    Nena Pablo MD  [address]  [facility] [address]    RE: Vasiliy Corbin       Dear Colleague,    I had the pleasure of seeing Vasiliy Corbin in the [facility].    History of Present Illness:   This is a pleasant 82-year-old gentleman who presents to cardiology clinic for 6 month follow-up.  He normally follows with Dr. Menendez.  His past medical history is notable for persistent atrial fibrillation (rate control with beta blocker and digoxin, Coumadin for CVA prophylaxis), diastolic CHF, venous insufficiency, obstructive sleep apnea (uses CPAP regular), mild coronary artery disease, hypertension, hyperlipidemia, and obesity.    He denies any palpitations, shortness of breath, chest discomfort, PND, orthopnea, near syncope, or syncope.  He was seen by Dr. Zapata in vascular clinic for evaluation of peripheral edema and possible consideration for ablation therapy as he was noted to have venous incompetence.  The patient elected to not proceed with this as his symptoms are not too bothersome for him.    He has no new questions or concerns.    Physical examination:  General-NAD, obese  Respiratory-clear to auscultation bilaterally  Cardiac-irregularly irregular rhythm without murmur rub or gallop  Abdomen-soft nontender  Extremities-trace edema of lower extremities bilaterally, wearing compressions  Neuro-alert and oriented ×3    Assessment and plan:  This is a pleasant 82-year-old gentleman who presents to cardiology clinic for 6 month follow-up. His past medical history is notable for persistent atrial fibrillation (rate control with beta blocker and digoxin, Coumadin for CVA prophylaxis), diastolic CHF, venous insufficiency, obstructive sleep apnea (uses CPAP regular), mild coronary artery disease, hypertension, hyperlipidemia, and obesity. He appears to be doing quite well from a cardiac standpoint.    His vital signs are within normal limits.  His  laboratory work is unremarkable.  His echocardiogram shows no new changes.  I did not make any medication changes today.  He will follow with his primary cardiology team next year.    Thank you for allowing me to participate in the care of this patient today.       This note was completed in part using Dragon voice recognition software. Although reviewed after completion, some word and grammatical errors may occur.    Orders this Visit:  No orders of the defined types were placed in this encounter.    Orders Placed This Encounter   Medications     warfarin (COUMADIN) 2 MG tablet     Sig: Take 2 mg by mouth daily Taking 1 1/2 pills (2.5mg) daily     Medications Discontinued During This Encounter   Medication Reason     HYDROcodone-acetaminophen (NORCO) 5-325 MG per tablet Stopped by Patient     ipratropium (ATROVENT) 0.06 % spray Medication Reconciliation Clean Up     OMEPRAZOLE PO Therapy completed     ciprofloxacin (CIPRO) 250 MG tablet Therapy completed         Encounter Diagnoses   Name Primary?     Cardiomyopathy (H)      Diastolic dysfunction Yes     Syncope, unspecified syncope type        CURRENT MEDICATIONS:  Current Outpatient Prescriptions   Medication Sig Dispense Refill     warfarin (COUMADIN) 2 MG tablet Take 2 mg by mouth daily Taking 1 1/2 pills (2.5mg) daily       ipratropium (ATROVENT) 0.06 % spray USE 2 SPRAYS IN EACH NOSTRIL FOUR TIMES DAILY AS NEEDED FOR RHINITIS 135 mL 1     Finasteride (PROSCAR PO) Take 5 mg by mouth daily       Furosemide (LASIX PO) Take 60 mg by mouth daily (1.5 x 40mg = 60mg)       Metoprolol Tartrate (LOPRESSOR PO) Take 100 mg by mouth 2 times daily (2 x 50mg = 100mg)       simvastatin (ZOCOR) 10 MG tablet Take 1 tablet (10 mg) by mouth At Bedtime 90 tablet 3     allopurinol (ZYLOPRIM) 300 MG tablet TAKE 1 TABLET BY MOUTH DAILY 90 tablet 3     losartan (COZAAR) 50 MG tablet Take 1 tablet (50 mg) by mouth daily 90 tablet 3     digoxin (LANOXIN) 125 MCG tablet Take 1 tablet  (125 mcg) by mouth daily 90 tablet 3     Multiple Vitamin (MULTI-VITAMIN) per tablet Take 1 tablet by mouth daily. 100 tablet 12       ALLERGIES     Allergies   Allergen Reactions     Cardizem [Diltiazem]      Swelling and poor rate control     Lisinopril Cough       PAST MEDICAL HISTORY:  Past Medical History:   Diagnosis Date     Atrial fibrillation (H)     became chronic afib in 2016,paroxysmal,was on amiodarone but went into persistent afib in april 2106 and amiodrone was d/cd. now on BBLK and considering cardioversion.(5/2106).In Sept 2016 plan is rate control which has been a challenge and Holter in 6 months     CAD (coronary artery disease)     mild stenosis per cath     Cardiomyopathy (H)      Chronic cough 2017    Eval with Dr Sarai Oro - [facility] - rec speech therapy, ct for eval of crackles No obstruction or copd     Colon polyp 2010     Gout      History of BPH     laser TURP in 2016 Dr Lima     HTN (hypertension)      Hyperlipidemia      Obesity (BMI 35.0-39.9 without comorbidity)      SUJEY on CPAP      Venous insufficiency of right leg     no trteatment recommended by Judy - option to treat is there Dr Zapata       PAST SURGICAL HISTORY:  Past Surgical History:   Procedure Laterality Date     CT release       CYSTOSCOPY N/A 4/7/2016    Procedure: CYSTOSCOPY;  Surgeon: Lokesh Lima MD;  Location:  OR     CYSTOSCOPY, FULGURATE BLEEDERS, EVACUATE CLOT(S), COMBINED N/A 1/16/2018    Procedure: COMBINED CYSTOSCOPY, FULGURATE BLEEDERS, EVACUATE CLOT(S);  CYSTOSCOPY, FULGERATION;  Surgeon: Lokesh Lima MD;  Location:  OR     HC LEFT HEART CATHETERIZATION  3/2010    Mild CAD     LASER KTP TRANSURETHRAL RESECTION (TUR) PROSTATE N/A 4/7/2016    Procedure: LASER KTP TRANSURETHRAL RESECTION (TUR) PROSTATE;  Surgeon: Lokesh Lima MD;  Location:  OR     ulnar reroute      right        FAMILY HISTORY:  Family History   Problem Relation Age of Onset     HEART DISEASE Mother 92     Heart failure  "    HEART DISEASE Father 92     C.A.D. Brother 70     MI     Myocardial Infarction Brother      Family History Negative Sister      Family History Negative Brother      Family History Negative Brother      Family History Negative Brother      Family History Negative Sister        SOCIAL HISTORY:  Social History     Social History     Marital status:      Spouse name: N/A     Number of children: N/A     Years of education: N/A     Social History Main Topics     Smoking status: Former Smoker     Packs/day: 1.00     Years: 5.00     Types: Cigarettes     Quit date: 4/3/1977     Smokeless tobacco: Former User     Quit date: 1/1/1960     Alcohol use No     Drug use: No     Sexual activity: Yes     Other Topics Concern     Parent/Sibling W/ Cabg, Mi Or Angioplasty Before 65f 55m? No     Caffeine Concern Yes     8 cups of  decaf coffee per day     Sleep Concern Yes     sleep apnea, wears CPAP     Stress Concern No     Weight Concern Yes     Weight gain     Special Diet No     Exercise No     Seat Belt Yes     Social History Narrative       Review of Systems:  Skin:  Negative     Eyes:  Positive for glasses  ENT:  Positive for hearing loss  Respiratory:  Positive for sleep apnea;CPAP;cough  Cardiovascular:    Positive for;lower extremity symptoms;edema  Gastroenterology: Negative    Genitourinary:  not assessed    Musculoskeletal:  Positive for joint pain  Neurologic:  Negative    Psychiatric:  Positive for sleep disturbances  Heme/Lymph/Imm:  Negative    Endocrine:  Negative      Physical Exam:  Vitals: /72  Pulse 64  Ht 1.676 m (5' 6\")  Wt 123.6 kg (272 lb 8 oz)  BMI 43.98 kg/m2     See dictation above.    Recent Lab Results:  LIPID RESULTS:  Lab Results   Component Value Date    CHOL 119 01/09/2018    HDL 37 (L) 01/09/2018    LDL 47 01/09/2018    TRIG 176 (H) 01/09/2018    CHOLHDLRATIO 3.4 02/01/2012       LIVER ENZYME RESULTS:  Lab Results   Component Value Date    AST 20 01/09/2018    ALT 20 01/09/2018 "       CBC RESULTS:  Lab Results   Component Value Date    WBC 7.2 01/09/2018    WBC 10.8 03/27/2016    RBC 4.76 01/09/2018    RBC 4.26 (L) 03/27/2016    HGB 15.0 01/16/2018    HCT 44.8 01/09/2018    MCV 94.2 01/09/2018    MCH 30.3 01/09/2018    MCHC 32.1 (A) 01/09/2018    RDW 14.6 03/27/2016     01/09/2018       BMP RESULTS:  Lab Results   Component Value Date     01/09/2018    POTASSIUM 4.4 01/16/2018    CHLORIDE 100 01/09/2018    CO2 29 05/22/2017    ANIONGAP 11.3 05/22/2017     (H) 01/09/2018    BUN 27 01/09/2018    BUN 28 (H) 01/09/2018    CR 0.97 01/09/2018    GFRESTIMATED 59 (L) 05/22/2017    GFRESTBLACK 72 05/22/2017    EDUARD 9.3 01/09/2018        A1C RESULTS:  Lab Results   Component Value Date    A1C 6.0 (H) 01/09/2018       INR RESULTS:  Lab Results   Component Value Date    INR 2.0 01/31/2018    INR 1.18 (H) 01/16/2018           Micky Mcdaniel PA-C   February 7, 2018       Thank you for allowing me to participate in the care of your patient.      Sincerely,     Micky Mcdaniel PA-C     [facility]

## 2018-02-07 NOTE — PROGRESS NOTES
History of Present Illness:   This is a pleasant 82-year-old gentleman who presents to cardiology clinic for 6 month follow-up.  He normally follows with Dr. Menendez.  His past medical history is notable for persistent atrial fibrillation (rate control with beta blocker and digoxin, Coumadin for CVA prophylaxis), diastolic CHF, venous insufficiency, obstructive sleep apnea (uses CPAP regular), mild coronary artery disease, hypertension, hyperlipidemia, and obesity.    He denies any palpitations, shortness of breath, chest discomfort, PND, orthopnea, near syncope, or syncope.  He was seen by Dr. Zapata in vascular clinic for evaluation of peripheral edema and possible consideration for ablation therapy as he was noted to have venous incompetence.  The patient elected to not proceed with this as his symptoms are not too bothersome for him.    He has no new questions or concerns.    Physical examination:  General-NAD, obese  Respiratory-clear to auscultation bilaterally  Cardiac-irregularly irregular rhythm without murmur rub or gallop  Abdomen-soft nontender  Extremities-trace edema of lower extremities bilaterally, wearing compressions  Neuro-alert and oriented ×3    Assessment and plan:  This is a pleasant 82-year-old gentleman who presents to cardiology clinic for 6 month follow-up. His past medical history is notable for persistent atrial fibrillation (rate control with beta blocker and digoxin, Coumadin for CVA prophylaxis), diastolic CHF, venous insufficiency, obstructive sleep apnea (uses CPAP regular), mild coronary artery disease, hypertension, hyperlipidemia, and obesity. He appears to be doing quite well from a cardiac standpoint.    His vital signs are within normal limits.  His laboratory work is unremarkable.  His echocardiogram shows no new changes.  I did not make any medication changes today.  He will follow with his primary cardiology team next year.    Thank you for allowing me to participate in the care  of this patient today.       This note was completed in part using Dragon voice recognition software. Although reviewed after completion, some word and grammatical errors may occur.    Orders this Visit:  No orders of the defined types were placed in this encounter.    Orders Placed This Encounter   Medications     warfarin (COUMADIN) 2 MG tablet     Sig: Take 2 mg by mouth daily Taking 1 1/2 pills (2.5mg) daily     Medications Discontinued During This Encounter   Medication Reason     HYDROcodone-acetaminophen (NORCO) 5-325 MG per tablet Stopped by Patient     ipratropium (ATROVENT) 0.06 % spray Medication Reconciliation Clean Up     OMEPRAZOLE PO Therapy completed     ciprofloxacin (CIPRO) 250 MG tablet Therapy completed         Encounter Diagnoses   Name Primary?     Cardiomyopathy (H)      Diastolic dysfunction Yes     Syncope, unspecified syncope type        CURRENT MEDICATIONS:  Current Outpatient Prescriptions   Medication Sig Dispense Refill     warfarin (COUMADIN) 2 MG tablet Take 2 mg by mouth daily Taking 1 1/2 pills (2.5mg) daily       ipratropium (ATROVENT) 0.06 % spray USE 2 SPRAYS IN EACH NOSTRIL FOUR TIMES DAILY AS NEEDED FOR RHINITIS 135 mL 1     Finasteride (PROSCAR PO) Take 5 mg by mouth daily       Furosemide (LASIX PO) Take 60 mg by mouth daily (1.5 x 40mg = 60mg)       Metoprolol Tartrate (LOPRESSOR PO) Take 100 mg by mouth 2 times daily (2 x 50mg = 100mg)       simvastatin (ZOCOR) 10 MG tablet Take 1 tablet (10 mg) by mouth At Bedtime 90 tablet 3     allopurinol (ZYLOPRIM) 300 MG tablet TAKE 1 TABLET BY MOUTH DAILY 90 tablet 3     losartan (COZAAR) 50 MG tablet Take 1 tablet (50 mg) by mouth daily 90 tablet 3     digoxin (LANOXIN) 125 MCG tablet Take 1 tablet (125 mcg) by mouth daily 90 tablet 3     Multiple Vitamin (MULTI-VITAMIN) per tablet Take 1 tablet by mouth daily. 100 tablet 12       ALLERGIES     Allergies   Allergen Reactions     Cardizem [Diltiazem]      Swelling and poor rate  control     Lisinopril Cough       PAST MEDICAL HISTORY:  Past Medical History:   Diagnosis Date     Atrial fibrillation (H)     became chronic afib in 2016,paroxysmal,was on amiodarone but went into persistent afib in april 2106 and amiodrone was d/cd. now on BBLK and considering cardioversion.(5/2106).In Sept 2016 plan is rate control which has been a challenge and Holter in 6 months     CAD (coronary artery disease)     mild stenosis per cath     Cardiomyopathy (H)      Chronic cough 2017    Eval with Dr Sarai Oro - [facility] - rec speech therapy, ct for eval of crackles No obstruction or copd     Colon polyp 2010     Gout      History of BPH     laser TURP in 2016 Dr Lima     HTN (hypertension)      Hyperlipidemia      Obesity (BMI 35.0-39.9 without comorbidity)      SUJEY on CPAP      Venous insufficiency of right leg     no trteatment recommended by Judy - option to treat is there Dr Zapata       PAST SURGICAL HISTORY:  Past Surgical History:   Procedure Laterality Date     CT release       CYSTOSCOPY N/A 4/7/2016    Procedure: CYSTOSCOPY;  Surgeon: Lokesh Lima MD;  Location:  OR     CYSTOSCOPY, FULGURATE BLEEDERS, EVACUATE CLOT(S), COMBINED N/A 1/16/2018    Procedure: COMBINED CYSTOSCOPY, FULGURATE BLEEDERS, EVACUATE CLOT(S);  CYSTOSCOPY, FULGERATION;  Surgeon: Lokesh Lima MD;  Location:  OR     HC LEFT HEART CATHETERIZATION  3/2010    Mild CAD     LASER KTP TRANSURETHRAL RESECTION (TUR) PROSTATE N/A 4/7/2016    Procedure: LASER KTP TRANSURETHRAL RESECTION (TUR) PROSTATE;  Surgeon: Lokesh Lima MD;  Location:  OR     ulnar reroute      right        FAMILY HISTORY:  Family History   Problem Relation Age of Onset     HEART DISEASE Mother 92     Heart failure     HEART DISEASE Father 92     C.A.D. Brother 70     MI     Myocardial Infarction Brother      Family History Negative Sister      Family History Negative Brother      Family History Negative Brother      Family History Negative  "Brother      Family History Negative Sister        SOCIAL HISTORY:  Social History     Social History     Marital status:      Spouse name: N/A     Number of children: N/A     Years of education: N/A     Social History Main Topics     Smoking status: Former Smoker     Packs/day: 1.00     Years: 5.00     Types: Cigarettes     Quit date: 4/3/1977     Smokeless tobacco: Former User     Quit date: 1/1/1960     Alcohol use No     Drug use: No     Sexual activity: Yes     Other Topics Concern     Parent/Sibling W/ Cabg, Mi Or Angioplasty Before 65f 55m? No     Caffeine Concern Yes     8 cups of  decaf coffee per day     Sleep Concern Yes     sleep apnea, wears CPAP     Stress Concern No     Weight Concern Yes     Weight gain     Special Diet No     Exercise No     Seat Belt Yes     Social History Narrative       Review of Systems:  Skin:  Negative     Eyes:  Positive for glasses  ENT:  Positive for hearing loss  Respiratory:  Positive for sleep apnea;CPAP;cough  Cardiovascular:    Positive for;lower extremity symptoms;edema  Gastroenterology: Negative    Genitourinary:  not assessed    Musculoskeletal:  Positive for joint pain  Neurologic:  Negative    Psychiatric:  Positive for sleep disturbances  Heme/Lymph/Imm:  Negative    Endocrine:  Negative      Physical Exam:  Vitals: /72  Pulse 64  Ht 1.676 m (5' 6\")  Wt 123.6 kg (272 lb 8 oz)  BMI 43.98 kg/m2     See dictation above.    Recent Lab Results:  LIPID RESULTS:  Lab Results   Component Value Date    CHOL 119 01/09/2018    HDL 37 (L) 01/09/2018    LDL 47 01/09/2018    TRIG 176 (H) 01/09/2018    CHOLHDLRATIO 3.4 02/01/2012       LIVER ENZYME RESULTS:  Lab Results   Component Value Date    AST 20 01/09/2018    ALT 20 01/09/2018       CBC RESULTS:  Lab Results   Component Value Date    WBC 7.2 01/09/2018    WBC 10.8 03/27/2016    RBC 4.76 01/09/2018    RBC 4.26 (L) 03/27/2016    HGB 15.0 01/16/2018    HCT 44.8 01/09/2018    MCV 94.2 01/09/2018    MCH 30.3 " 01/09/2018    MCHC 32.1 (A) 01/09/2018    RDW 14.6 03/27/2016     01/09/2018       BMP RESULTS:  Lab Results   Component Value Date     01/09/2018    POTASSIUM 4.4 01/16/2018    CHLORIDE 100 01/09/2018    CO2 29 05/22/2017    ANIONGAP 11.3 05/22/2017     (H) 01/09/2018    BUN 27 01/09/2018    BUN 28 (H) 01/09/2018    CR 0.97 01/09/2018    GFRESTIMATED 59 (L) 05/22/2017    GFRESTBLACK 72 05/22/2017    EDUARD 9.3 01/09/2018        A1C RESULTS:  Lab Results   Component Value Date    A1C 6.0 (H) 01/09/2018       INR RESULTS:  Lab Results   Component Value Date    INR 2.0 01/31/2018    INR 1.18 (H) 01/16/2018           Micky Mcdaniel PA-C   February 7, 2018

## 2018-02-22 DIAGNOSIS — I48.20 CHRONIC ATRIAL FIBRILLATION (H): ICD-10-CM

## 2018-02-22 RX ORDER — DIGOXIN 125 MCG
125 TABLET ORAL DAILY
Qty: 90 TABLET | Refills: 3 | Status: SHIPPED | OUTPATIENT
Start: 2018-02-22 | End: 2019-02-15

## 2018-02-28 VITALS — BODY MASS INDEX: 42.77 KG/M2 | DIASTOLIC BLOOD PRESSURE: 60 MMHG | WEIGHT: 265 LBS | SYSTOLIC BLOOD PRESSURE: 110 MMHG

## 2018-02-28 DIAGNOSIS — Z79.01 LONG TERM CURRENT USE OF ANTICOAGULANT THERAPY: Primary | ICD-10-CM

## 2018-02-28 LAB — INR PPP: 2.2 (ref 2–3)

## 2018-02-28 PROCEDURE — 36415 COLL VENOUS BLD VENIPUNCTURE: CPT | Performed by: FAMILY MEDICINE

## 2018-02-28 PROCEDURE — 85610 PROTHROMBIN TIME: CPT | Performed by: FAMILY MEDICINE

## 2018-03-28 VITALS — DIASTOLIC BLOOD PRESSURE: 76 MMHG | SYSTOLIC BLOOD PRESSURE: 116 MMHG | WEIGHT: 265 LBS | BODY MASS INDEX: 42.77 KG/M2

## 2018-03-28 DIAGNOSIS — Z79.01 LONG TERM CURRENT USE OF ANTICOAGULANT THERAPY: ICD-10-CM

## 2018-03-28 DIAGNOSIS — I48.20 CHRONIC ATRIAL FIBRILLATION (H): Primary | ICD-10-CM

## 2018-03-28 LAB — INR PPP: 3 (ref 2–3)

## 2018-03-28 PROCEDURE — 36415 COLL VENOUS BLD VENIPUNCTURE: CPT | Performed by: FAMILY MEDICINE

## 2018-03-28 PROCEDURE — 85610 PROTHROMBIN TIME: CPT | Performed by: FAMILY MEDICINE

## 2018-04-25 VITALS — SYSTOLIC BLOOD PRESSURE: 120 MMHG | BODY MASS INDEX: 44.55 KG/M2 | DIASTOLIC BLOOD PRESSURE: 70 MMHG | WEIGHT: 276 LBS

## 2018-04-25 DIAGNOSIS — Z79.01 LONG TERM CURRENT USE OF ANTICOAGULANT THERAPY: ICD-10-CM

## 2018-04-25 DIAGNOSIS — I48.20 CHRONIC ATRIAL FIBRILLATION (H): ICD-10-CM

## 2018-04-25 LAB — INR PPP: 2.4 (ref 2–3)

## 2018-04-25 PROCEDURE — 36415 COLL VENOUS BLD VENIPUNCTURE: CPT | Performed by: FAMILY MEDICINE

## 2018-04-25 PROCEDURE — 85610 PROTHROMBIN TIME: CPT | Performed by: FAMILY MEDICINE

## 2018-05-21 DIAGNOSIS — Z76.0 ENCOUNTER FOR MEDICATION REFILL: Primary | ICD-10-CM

## 2018-05-21 DIAGNOSIS — I10 ESSENTIAL HYPERTENSION: Primary | ICD-10-CM

## 2018-05-21 RX ORDER — FUROSEMIDE 40 MG
TABLET ORAL
Qty: 135 TABLET | Refills: 1 | Status: SHIPPED | OUTPATIENT
Start: 2018-05-21 | End: 2019-03-28

## 2018-05-21 RX ORDER — FUROSEMIDE 40 MG
60 TABLET ORAL DAILY
Qty: 30 TABLET | Refills: 1 | Status: SHIPPED | OUTPATIENT
Start: 2018-05-21 | End: 2018-05-21

## 2018-05-21 NOTE — TELEPHONE ENCOUNTER
furosemide (LASIX) 40 MG   LAST  Med check 1/9/18   last labs(for RX) 1/16/18 @ hospital  Next  appt scheduled =  Lab inr 5/23/18  Sweetie Diaz MA    Potassium   Date Value Ref Range Status   01/16/2018 4.4 3.4 - 5.3 mmol/L Final   ]

## 2018-05-30 VITALS — BODY MASS INDEX: 42.77 KG/M2 | DIASTOLIC BLOOD PRESSURE: 80 MMHG | WEIGHT: 265 LBS | SYSTOLIC BLOOD PRESSURE: 110 MMHG

## 2018-05-30 DIAGNOSIS — Z79.01 LONG TERM CURRENT USE OF ANTICOAGULANT THERAPY: ICD-10-CM

## 2018-05-30 DIAGNOSIS — I48.20 CHRONIC ATRIAL FIBRILLATION (H): ICD-10-CM

## 2018-05-30 LAB — INR PPP: 2.4 (ref 2–3)

## 2018-05-30 PROCEDURE — 36415 COLL VENOUS BLD VENIPUNCTURE: CPT | Performed by: FAMILY MEDICINE

## 2018-05-30 PROCEDURE — 85610 PROTHROMBIN TIME: CPT | Performed by: FAMILY MEDICINE

## 2018-06-27 VITALS — WEIGHT: 265 LBS | DIASTOLIC BLOOD PRESSURE: 64 MMHG | BODY MASS INDEX: 42.77 KG/M2 | SYSTOLIC BLOOD PRESSURE: 108 MMHG

## 2018-06-27 DIAGNOSIS — Z79.01 LONG TERM CURRENT USE OF ANTICOAGULANT THERAPY: ICD-10-CM

## 2018-06-27 DIAGNOSIS — I48.20 CHRONIC ATRIAL FIBRILLATION (H): ICD-10-CM

## 2018-06-27 LAB — INR PPP: 2.3 (ref 2–3)

## 2018-06-27 PROCEDURE — 36415 COLL VENOUS BLD VENIPUNCTURE: CPT | Performed by: FAMILY MEDICINE

## 2018-06-27 PROCEDURE — 85610 PROTHROMBIN TIME: CPT | Performed by: FAMILY MEDICINE

## 2018-07-10 DIAGNOSIS — I10 ESSENTIAL HYPERTENSION: ICD-10-CM

## 2018-07-10 RX ORDER — LOSARTAN POTASSIUM 50 MG/1
50 TABLET ORAL DAILY
Qty: 90 TABLET | Refills: 3 | Status: SHIPPED | OUTPATIENT
Start: 2018-07-10 | End: 2019-07-03

## 2018-07-27 VITALS
BODY MASS INDEX: 43.9 KG/M2 | SYSTOLIC BLOOD PRESSURE: 122 MMHG | WEIGHT: 272 LBS | RESPIRATION RATE: 16 BRPM | DIASTOLIC BLOOD PRESSURE: 74 MMHG

## 2018-07-27 DIAGNOSIS — I48.20 CHRONIC ATRIAL FIBRILLATION (H): ICD-10-CM

## 2018-07-27 DIAGNOSIS — Z79.01 LONG TERM CURRENT USE OF ANTICOAGULANT THERAPY: ICD-10-CM

## 2018-07-27 LAB — INR PPP: 2.5 (ref 2–3)

## 2018-07-27 PROCEDURE — 85610 PROTHROMBIN TIME: CPT | Performed by: FAMILY MEDICINE

## 2018-07-27 PROCEDURE — 36415 COLL VENOUS BLD VENIPUNCTURE: CPT | Performed by: FAMILY MEDICINE

## 2018-08-24 VITALS
WEIGHT: 272 LBS | RESPIRATION RATE: 16 BRPM | DIASTOLIC BLOOD PRESSURE: 72 MMHG | BODY MASS INDEX: 43.9 KG/M2 | SYSTOLIC BLOOD PRESSURE: 120 MMHG

## 2018-08-24 DIAGNOSIS — I48.20 CHRONIC ATRIAL FIBRILLATION (H): ICD-10-CM

## 2018-08-24 DIAGNOSIS — Z79.01 LONG TERM CURRENT USE OF ANTICOAGULANT THERAPY: ICD-10-CM

## 2018-08-24 LAB — INR PPP: 2.1 (ref 2–3)

## 2018-08-24 PROCEDURE — 36415 COLL VENOUS BLD VENIPUNCTURE: CPT | Performed by: FAMILY MEDICINE

## 2018-08-24 PROCEDURE — 85610 PROTHROMBIN TIME: CPT | Performed by: FAMILY MEDICINE

## 2018-09-21 VITALS — WEIGHT: 265 LBS | BODY MASS INDEX: 42.77 KG/M2 | SYSTOLIC BLOOD PRESSURE: 116 MMHG | DIASTOLIC BLOOD PRESSURE: 68 MMHG

## 2018-09-21 DIAGNOSIS — Z79.01 LONG TERM CURRENT USE OF ANTICOAGULANT THERAPY: ICD-10-CM

## 2018-09-21 DIAGNOSIS — I48.20 CHRONIC ATRIAL FIBRILLATION (H): ICD-10-CM

## 2018-09-21 LAB — INR PPP: 2.8 (ref 2–3)

## 2018-09-21 PROCEDURE — 85610 PROTHROMBIN TIME: CPT | Performed by: FAMILY MEDICINE

## 2018-09-21 PROCEDURE — 36415 COLL VENOUS BLD VENIPUNCTURE: CPT | Performed by: FAMILY MEDICINE

## 2018-09-28 DIAGNOSIS — Z76.0 ENCOUNTER FOR MEDICATION REFILL: ICD-10-CM

## 2018-10-01 RX ORDER — ALLOPURINOL 300 MG/1
1 TABLET ORAL DAILY
Qty: 90 TABLET | Refills: 3 | Status: SHIPPED | OUTPATIENT
Start: 2018-10-01 | End: 2019-09-23

## 2018-10-19 VITALS — DIASTOLIC BLOOD PRESSURE: 84 MMHG | SYSTOLIC BLOOD PRESSURE: 128 MMHG | BODY MASS INDEX: 44.81 KG/M2 | WEIGHT: 277.6 LBS

## 2018-10-19 DIAGNOSIS — Z87.39 HISTORY OF GOUT: ICD-10-CM

## 2018-10-19 DIAGNOSIS — I48.20 CHRONIC ATRIAL FIBRILLATION (H): ICD-10-CM

## 2018-10-19 DIAGNOSIS — Z79.899 ENCOUNTER FOR LONG-TERM (CURRENT) USE OF MEDICATIONS: Primary | ICD-10-CM

## 2018-10-19 DIAGNOSIS — I25.10 CAD (CORONARY ARTERY DISEASE): Primary | ICD-10-CM

## 2018-10-19 DIAGNOSIS — Z79.01 LONG TERM CURRENT USE OF ANTICOAGULANT THERAPY: ICD-10-CM

## 2018-10-19 LAB — INR PPP: 2.4 (ref 2–3)

## 2018-10-19 PROCEDURE — 36415 COLL VENOUS BLD VENIPUNCTURE: CPT | Performed by: FAMILY MEDICINE

## 2018-10-19 PROCEDURE — 85610 PROTHROMBIN TIME: CPT | Performed by: FAMILY MEDICINE

## 2018-10-19 NOTE — NURSING NOTE
Patient dehydrated- attempted to draw x2- return to clinic for uric acid at next months appt  Sweetie Diaz MA October 19, 2018 9:19 AM

## 2018-10-25 DIAGNOSIS — Z23 NEED FOR PROPHYLACTIC VACCINATION AND INOCULATION AGAINST INFLUENZA: Primary | ICD-10-CM

## 2018-10-25 PROCEDURE — 90662 IIV NO PRSV INCREASED AG IM: CPT | Performed by: FAMILY MEDICINE

## 2018-10-25 PROCEDURE — G0008 ADMIN INFLUENZA VIRUS VAC: HCPCS | Performed by: FAMILY MEDICINE

## 2018-10-25 NOTE — PROGRESS NOTES

## 2018-11-16 VITALS — WEIGHT: 270 LBS | BODY MASS INDEX: 43.58 KG/M2 | SYSTOLIC BLOOD PRESSURE: 122 MMHG | DIASTOLIC BLOOD PRESSURE: 84 MMHG

## 2018-11-16 DIAGNOSIS — I48.20 CHRONIC ATRIAL FIBRILLATION (H): ICD-10-CM

## 2018-11-16 DIAGNOSIS — Z79.01 LONG TERM CURRENT USE OF ANTICOAGULANT THERAPY: ICD-10-CM

## 2018-11-16 LAB — INR PPP: 2.4 (ref 2–3)

## 2018-11-16 PROCEDURE — 85610 PROTHROMBIN TIME: CPT | Performed by: FAMILY MEDICINE

## 2018-11-16 PROCEDURE — 36415 COLL VENOUS BLD VENIPUNCTURE: CPT | Performed by: FAMILY MEDICINE

## 2018-12-14 VITALS — WEIGHT: 270 LBS | DIASTOLIC BLOOD PRESSURE: 82 MMHG | SYSTOLIC BLOOD PRESSURE: 124 MMHG | BODY MASS INDEX: 43.58 KG/M2

## 2018-12-14 DIAGNOSIS — I48.20 CHRONIC ATRIAL FIBRILLATION (H): ICD-10-CM

## 2018-12-14 DIAGNOSIS — Z79.01 LONG TERM CURRENT USE OF ANTICOAGULANT THERAPY: ICD-10-CM

## 2018-12-14 LAB — INR PPP: 2.1 (ref 2–3)

## 2018-12-14 PROCEDURE — 85610 PROTHROMBIN TIME: CPT | Performed by: FAMILY MEDICINE

## 2018-12-14 PROCEDURE — 36415 COLL VENOUS BLD VENIPUNCTURE: CPT | Performed by: FAMILY MEDICINE

## 2019-01-11 VITALS — SYSTOLIC BLOOD PRESSURE: 118 MMHG | WEIGHT: 280 LBS | DIASTOLIC BLOOD PRESSURE: 72 MMHG | BODY MASS INDEX: 45.19 KG/M2

## 2019-01-11 DIAGNOSIS — I48.20 CHRONIC ATRIAL FIBRILLATION (H): ICD-10-CM

## 2019-01-11 DIAGNOSIS — Z79.01 LONG TERM CURRENT USE OF ANTICOAGULANT THERAPY: ICD-10-CM

## 2019-01-11 LAB — INR PPP: 2.2 (ref 2–3)

## 2019-01-11 PROCEDURE — 36415 COLL VENOUS BLD VENIPUNCTURE: CPT | Performed by: FAMILY MEDICINE

## 2019-01-11 PROCEDURE — 85610 PROTHROMBIN TIME: CPT | Performed by: FAMILY MEDICINE

## 2019-01-17 DIAGNOSIS — I48.20 CHRONIC ATRIAL FIBRILLATION (H): ICD-10-CM

## 2019-01-18 RX ORDER — WARFARIN SODIUM 2 MG/1
TABLET ORAL
Qty: 135 TABLET | Refills: 0 | Status: SHIPPED | OUTPATIENT
Start: 2019-01-18 | End: 2019-04-16

## 2019-01-27 DIAGNOSIS — Z79.899 ENCOUNTER FOR LONG-TERM (CURRENT) USE OF MEDICATIONS: ICD-10-CM

## 2019-01-27 DIAGNOSIS — I48.20 CHRONIC ATRIAL FIBRILLATION (H): ICD-10-CM

## 2019-01-27 DIAGNOSIS — E78.5 HYPERLIPIDEMIA WITH TARGET LDL LESS THAN 100: ICD-10-CM

## 2019-01-28 RX ORDER — METOPROLOL TARTRATE 50 MG
TABLET ORAL
Qty: 360 TABLET | Refills: 0 | Status: SHIPPED | OUTPATIENT
Start: 2019-01-28 | End: 2019-04-16

## 2019-01-28 RX ORDER — SIMVASTATIN 10 MG
TABLET ORAL
Qty: 90 TABLET | Refills: 0 | Status: SHIPPED | OUTPATIENT
Start: 2019-01-28 | End: 2019-04-16

## 2019-01-28 RX ORDER — IPRATROPIUM BROMIDE 42 UG/1
SPRAY, METERED NASAL
Qty: 135 ML | Refills: 0 | Status: SHIPPED | OUTPATIENT
Start: 2019-01-28 | End: 2019-04-16

## 2019-02-08 DIAGNOSIS — Z79.899 ENCOUNTER FOR LONG-TERM (CURRENT) USE OF MEDICATIONS: ICD-10-CM

## 2019-02-08 DIAGNOSIS — I48.20 CHRONIC ATRIAL FIBRILLATION (H): ICD-10-CM

## 2019-02-08 DIAGNOSIS — Z87.39 HISTORY OF GOUT: ICD-10-CM

## 2019-02-08 DIAGNOSIS — Z79.01 LONG TERM CURRENT USE OF ANTICOAGULANT THERAPY: ICD-10-CM

## 2019-02-08 LAB — INR PPP: 2.1 (ref 2–3)

## 2019-02-08 PROCEDURE — 36415 COLL VENOUS BLD VENIPUNCTURE: CPT | Performed by: FAMILY MEDICINE

## 2019-02-08 PROCEDURE — 85610 PROTHROMBIN TIME: CPT | Performed by: FAMILY MEDICINE

## 2019-02-08 NOTE — LETTER
[facility] [place] [id number] [place]  [address]  Phone: [phone]    February 11, 2019      Vasiliy Corbin  [address]  [facility] [address]              Dear Vasiliy,    The results from your recent visit showed Uric acid level was normal.           Sincerely,     Nena Pablo M.D.    Results for orders placed or performed in visit on 02/08/19   Prothrombin - INR ([facility])   Result Value Ref Range    INR 2.1 2.0 - 3.0   Uric Acid  Serum ([facility])   Result Value Ref Range    Uric Acid 5.4 3.7 - 8.6 mg/dL    Narrative    Performed at:  01 - [facility] [address], [place], [place]  [id number]  : Amaury Meadows MD, Phone:  [phone]

## 2019-02-08 NOTE — PROGRESS NOTES
Also mimi uric acid blood test as per open orders  Samy Diaz MA February 8, 2019 9:01 AM  No zev charges due to INR done in house lab today

## 2019-02-09 LAB — URATE SERPL-MCNC: 5.4 MG/DL (ref 3.7–8.6)

## 2019-02-15 DIAGNOSIS — I48.20 CHRONIC ATRIAL FIBRILLATION (H): ICD-10-CM

## 2019-02-15 RX ORDER — DIGOXIN 125 MCG
125 TABLET ORAL DAILY
Qty: 90 TABLET | Refills: 0 | Status: SHIPPED | OUTPATIENT
Start: 2019-02-15 | End: 2019-04-16

## 2019-03-08 VITALS
RESPIRATION RATE: 16 BRPM | BODY MASS INDEX: 43.58 KG/M2 | DIASTOLIC BLOOD PRESSURE: 66 MMHG | WEIGHT: 270 LBS | SYSTOLIC BLOOD PRESSURE: 118 MMHG

## 2019-03-08 DIAGNOSIS — I48.20 CHRONIC ATRIAL FIBRILLATION (H): ICD-10-CM

## 2019-03-08 DIAGNOSIS — Z79.01 LONG TERM CURRENT USE OF ANTICOAGULANT THERAPY: ICD-10-CM

## 2019-03-08 LAB — INR PPP: 2 (ref 2–3)

## 2019-03-08 PROCEDURE — 85610 PROTHROMBIN TIME: CPT | Performed by: FAMILY MEDICINE

## 2019-03-08 PROCEDURE — 36415 COLL VENOUS BLD VENIPUNCTURE: CPT | Performed by: FAMILY MEDICINE

## 2019-04-05 VITALS — BODY MASS INDEX: 45.16 KG/M2 | WEIGHT: 279.8 LBS | SYSTOLIC BLOOD PRESSURE: 122 MMHG | DIASTOLIC BLOOD PRESSURE: 72 MMHG

## 2019-04-05 DIAGNOSIS — Z79.01 LONG TERM CURRENT USE OF ANTICOAGULANT THERAPY: ICD-10-CM

## 2019-04-05 DIAGNOSIS — I48.20 CHRONIC ATRIAL FIBRILLATION (H): ICD-10-CM

## 2019-04-05 LAB — INR PPP: 2.3 (ref 2–3)

## 2019-04-05 PROCEDURE — 85610 PROTHROMBIN TIME: CPT | Performed by: FAMILY MEDICINE

## 2019-04-05 PROCEDURE — 36415 COLL VENOUS BLD VENIPUNCTURE: CPT | Performed by: FAMILY MEDICINE

## 2019-04-16 ENCOUNTER — OFFICE VISIT (OUTPATIENT)
Dept: FAMILY MEDICINE | Facility: CLINIC | Age: 84
End: 2019-04-16

## 2019-04-16 VITALS
DIASTOLIC BLOOD PRESSURE: 72 MMHG | OXYGEN SATURATION: 94 % | BODY MASS INDEX: 45.29 KG/M2 | SYSTOLIC BLOOD PRESSURE: 136 MMHG | RESPIRATION RATE: 16 BRPM | HEART RATE: 77 BPM | WEIGHT: 280.6 LBS

## 2019-04-16 DIAGNOSIS — Z87.39 HISTORY OF GOUT: ICD-10-CM

## 2019-04-16 DIAGNOSIS — I42.9 CARDIOMYOPATHY, UNSPECIFIED TYPE (H): ICD-10-CM

## 2019-04-16 DIAGNOSIS — E78.5 HYPERLIPIDEMIA WITH TARGET LDL LESS THAN 100: ICD-10-CM

## 2019-04-16 DIAGNOSIS — R60.0 LOCALIZED EDEMA: ICD-10-CM

## 2019-04-16 DIAGNOSIS — Z87.891 FORMER SMOKER: ICD-10-CM

## 2019-04-16 DIAGNOSIS — G25.81 RESTLESS LEG SYNDROME: ICD-10-CM

## 2019-04-16 DIAGNOSIS — R33.8 ENLARGED PROSTATE WITH URINARY RETENTION: ICD-10-CM

## 2019-04-16 DIAGNOSIS — Z79.899 ENCOUNTER FOR LONG-TERM (CURRENT) USE OF MEDICATIONS: Primary | ICD-10-CM

## 2019-04-16 DIAGNOSIS — E55.9 VITAMIN D DEFICIENCY: ICD-10-CM

## 2019-04-16 DIAGNOSIS — I48.20 CHRONIC ATRIAL FIBRILLATION (H): ICD-10-CM

## 2019-04-16 DIAGNOSIS — J30.0 VMR (VASOMOTOR RHINITIS): ICD-10-CM

## 2019-04-16 DIAGNOSIS — R73.03 PREDIABETES: ICD-10-CM

## 2019-04-16 DIAGNOSIS — E66.9 OBESITY (BMI 35.0-39.9 WITHOUT COMORBIDITY): ICD-10-CM

## 2019-04-16 DIAGNOSIS — I25.10 CORONARY ARTERY DISEASE INVOLVING NATIVE CORONARY ARTERY OF NATIVE HEART WITHOUT ANGINA PECTORIS: ICD-10-CM

## 2019-04-16 DIAGNOSIS — N40.1 ENLARGED PROSTATE WITH URINARY RETENTION: ICD-10-CM

## 2019-04-16 DIAGNOSIS — Z12.5 SCREENING FOR PROSTATE CANCER: ICD-10-CM

## 2019-04-16 DIAGNOSIS — M46.92 INFLAMMATORY SPONDYLOPATHY OF CERVICAL REGION (H): ICD-10-CM

## 2019-04-16 DIAGNOSIS — I10 ESSENTIAL HYPERTENSION: ICD-10-CM

## 2019-04-16 DIAGNOSIS — L57.0 ACTINIC KERATOSIS: ICD-10-CM

## 2019-04-16 DIAGNOSIS — K21.9 GASTROESOPHAGEAL REFLUX DISEASE, ESOPHAGITIS PRESENCE NOT SPECIFIED: ICD-10-CM

## 2019-04-16 DIAGNOSIS — M47.812 CERVICAL SPINE ARTHRITIS: ICD-10-CM

## 2019-04-16 LAB
% GRANULOCYTES: 66.7 % (ref 42.2–75.2)
BACTERIA URINE: NORMAL
BILIRUB UR QL STRIP: 0
BLOOD URINE DIP: 0
CASTS/LPF: NORMAL
COLOR UR: YELLOW
CRYSTAL URINE: NORMAL
EPITHELIAL CELLS - QUEST: NORMAL
GLUCOSE UR STRIP-MCNC: 0 MG/DL
HCT VFR BLD AUTO: 45 % (ref 39–51)
HEMOGLOBIN: 14.8 G/DL (ref 13.4–17.5)
KETONES UR QL STRIP: 0
LEUKOCYTE ESTERASE URINE DIP: 0
LYMPHOCYTES NFR BLD AUTO: 25.1 % (ref 20.5–51.1)
MCH RBC QN AUTO: 32.1 PG (ref 27–31)
MCHC RBC AUTO-ENTMCNC: 33 G/DL (ref 33–37)
MCV RBC AUTO: 97.3 FL (ref 80–100)
MONOCYTES NFR BLD AUTO: 8.2 % (ref 1.7–9.3)
MUCOUS URINE: NORMAL
NITRITE UR QL STRIP: NORMAL
PH UR STRIP: 5.5 PH (ref 5–9)
PLATELET # BLD AUTO: 183 K/UL (ref 140–450)
PROT UR QL: 0 MG/DL (ref ?–0.01)
RBC # BLD AUTO: 4.63 X10/CMM (ref 4.2–5.9)
RBC URINE: NORMAL (ref 0–3)
SP GR UR STRIP: 1 (ref 1–1.02)
UROBILINOGEN UR QL STRIP: 0.2 EU/DL (ref 0.2–1)
WBC # BLD AUTO: 6.7 X10/CMM (ref 3.8–11)
WBC URINE: NORMAL (ref 0–3)

## 2019-04-16 PROCEDURE — 36415 COLL VENOUS BLD VENIPUNCTURE: CPT | Performed by: FAMILY MEDICINE

## 2019-04-16 PROCEDURE — 85025 COMPLETE CBC W/AUTO DIFF WBC: CPT | Performed by: FAMILY MEDICINE

## 2019-04-16 PROCEDURE — 82570 ASSAY OF URINE CREATININE: CPT | Performed by: FAMILY MEDICINE

## 2019-04-16 PROCEDURE — 81003 URINALYSIS AUTO W/O SCOPE: CPT | Performed by: FAMILY MEDICINE

## 2019-04-16 PROCEDURE — 17000 DESTRUCT PREMALG LESION: CPT | Performed by: FAMILY MEDICINE

## 2019-04-16 PROCEDURE — 17003 DESTRUCT PREMALG LES 2-14: CPT | Performed by: FAMILY MEDICINE

## 2019-04-16 PROCEDURE — 82044 UR ALBUMIN SEMIQUANTITATIVE: CPT | Performed by: FAMILY MEDICINE

## 2019-04-16 PROCEDURE — 99214 OFFICE O/P EST MOD 30 MIN: CPT | Mod: 25 | Performed by: FAMILY MEDICINE

## 2019-04-16 RX ORDER — IPRATROPIUM BROMIDE 42 UG/1
SPRAY, METERED NASAL
Qty: 135 ML | Refills: 3 | Status: SHIPPED | OUTPATIENT
Start: 2019-04-16 | End: 2020-05-01

## 2019-04-16 RX ORDER — FUROSEMIDE 40 MG
TABLET ORAL
Qty: 135 TABLET | Refills: 3 | Status: SHIPPED | OUTPATIENT
Start: 2019-04-16 | End: 2019-04-22

## 2019-04-16 RX ORDER — DIGOXIN 125 MCG
125 TABLET ORAL DAILY
Qty: 90 TABLET | Refills: 3 | Status: SHIPPED | OUTPATIENT
Start: 2019-04-16 | End: 2020-02-14

## 2019-04-16 RX ORDER — SIMVASTATIN 10 MG
TABLET ORAL
Qty: 90 TABLET | Refills: 3 | Status: SHIPPED | OUTPATIENT
Start: 2019-04-16 | End: 2020-04-28

## 2019-04-16 RX ORDER — WARFARIN SODIUM 2 MG/1
TABLET ORAL
Qty: 135 TABLET | Refills: 3 | Status: ON HOLD | OUTPATIENT
Start: 2019-04-16 | End: 2020-02-26

## 2019-04-16 RX ORDER — METOPROLOL TARTRATE 50 MG
TABLET ORAL
Qty: 360 TABLET | Refills: 3 | Status: SHIPPED | OUTPATIENT
Start: 2019-04-16 | End: 2020-04-27

## 2019-04-16 NOTE — LETTER
[facility]  [address] [place]  [address]  Phone: [phone]    April 22, 2019      Vasiliy Corbin  [address]  [facility] [address]              Dear Vasiliy,    The results from your recent visit showed UA was normal   CBC was ok   Digoxin level is normal   BMP ok with minor change bun/cr   Lipids Triglycerides remain elevated. Eat more fish, fish oil, ground flax seed, and oatmeal. LESS SUGARS   Exercise more to bring HDL up.   Vitamin D is low. Take over the counter supplement 3000 international unit(s) daily   Thyroid labs were good   Liver labs were good. Alkaline phosphatase is slightly improved, but above normal. This comes from many sources.   PSA is normal   Uric acid is normal   Lab Results        Component                Value               Date                        A1C                      5.8                 04/16/2019                  A1C                      6.0                 01/09/2018                  A1C                      5.5                 04/01/2016     Microalbumin is abnormal. This reflects early kidney changes.               Sincerely,     Nena Pablo M.D.    Results for orders placed or performed in visit on 04/16/19   Basic Metabolic Panel (8) ([facility])   Result Value Ref Range    Glucose 99 65 - 99 mg/dL    Urea Nitrogen 22 8 - 27 mg/dL    Creatinine 0.85 0.76 - 1.27 mg/dL    eGFR If NonAfricn Am 80 >59 mL/min/1.73    eGFR If Africn Am 92 >59 mL/min/1.73    BUN/Creatinine Ratio 26 (H) 10 - 24    Sodium 142 134 - 144 mmol/L    Potassium 4.3 3.5 - 5.2 mmol/L    Chloride 101 96 - 106 mmol/L    Total CO2 28 20 - 29 mmol/L    Calcium 9.7 8.6 - 10.2 mg/dL    Narrative    Performed at:  01 - [facility]  [address] [place], [place]  [id number]  : Amaury Meadows MD, Phone:  [phone]   Lipid Panel ([facility])   Result Value Ref Range    Cholesterol 121 100 - 199 mg/dL    Triglycerides 171 (H) 0 - 149 mg/dL    HDL Cholesterol 38 (L)  >39 mg/dL    VLDL Cholesterol Antonio 34 5 - 40 mg/dL    LDL Cholesterol Calculated 49 0 - 99 mg/dL    LDL/HDL Ratio 1.3 0.0 - 3.6 ratio    Narrative    Performed at:  01 - [facility] [address], [place], [place]  [id number]  : Amaury Meadows MD, Phone:  [phone]   CBC with Diff/Plt ([facility])   Result Value Ref Range    WBC x10/cmm 6.7 3.8 - 11.0 x10/cmm    % Lymphocytes 25.1 20.5 - 51.1 %    % Monocytes 8.2 1.7 - 9.3 %    % Granulocytes 66.7 42.2 - 75.2 %    RBC x10/cmm 4.63 4.2 - 5.9 x10/cmm    Hemoglobin 14.8 13.4 - 17.5 g/dl    Hematocrit 45.0 39 - 51 %    MCV 97.3 80 - 100 fL    MCH 32.1 (A) 27.0 - 31.0 pg    MCHC 33.0 33.0 - 37.0 g/dL    Platelet Count 183 140 - 450 K/uL   Hemoglobin A1C ([facility])   Result Value Ref Range    Hemoglobin A1C 5.8 (H) 4.8 - 5.6 %    Narrative    Performed at:  01 - [facility] [address], [place], [place]  [id number]  : Amaury Meadows MD, Phone:  [phone]   Vitamin D  25-Hydroxy ([facility])   Result Value Ref Range    Vitamin D,25-Hydroxy 26.5 (L) 30.0 - 100.0 ng/mL    Narrative    Performed at:  01 - [facility] [address], [place], [place]  [id number]  : Amaury Meadows MD, Phone:  [phone]   TSH ([facility])   Result Value Ref Range    TSH 3.870 0.450 - 4.500 uIU/mL    Narrative    Performed at:  01 - [facility] [address], [place], [place]  [id number]  : Amaury Meadows MD, Phone:  [phone]   Thyroxine (T4) Free  Direct  S ([facility])   Result Value Ref Range    T4 Free 0.93 0.82 - 1.77 ng/dL    Narrative    Performed at:  01 - [facility] [address], [place], [place]  [id number]  : Amaury Meadows MD, Phone:  [phone]   Urinalysis w/reflex protein, bili ([facility])   Result Value Ref Range    Color Urine Yellow     pH Urine 5.5 5 - 9 pH    Specific Gravity Urine 1.005 1.005 - 1.025    Protein Urine 0 0.01 mg/dL    Glucose Urine 0     Ketones Urine 0     Leukocyte Esterase Urine 0     Blood Urine 0      Nitrite Urine Neg NEG    Bilirubin Urine Dip 0     Urobilinogen Urine 0.2 0.2 - 1.0 EU/dL    WBC Urine  0 - 3    RBC Urine  0 - 3    Epithelial Cells      Crystal Urine      Bacteria Urine      Mucous Urine      Casts/LPF      Impression    Urine dip only/Sweetie Diaz MA April 16, 2019 1:36 PM     Microalbumin ([facility])   Result Value Ref Range    Albumin mg/L 30 mg/L    Urine Creatinine Mg/Dl 50 mg/dL    A/C Ratio mg/g  (A) mg/g    Interpretation abnormal    Hepatic Function Panel (7) ([facility])   Result Value Ref Range    Protein Total 7.2 6.0 - 8.5 g/dL    Albumin 4.3 3.5 - 4.7 g/dL    Bilirubin Total 0.6 0.0 - 1.2 mg/dL    Bilirubin Direct 0.22 0.00 - 0.40 mg/dL    Alkaline Phosphatase 123 (H) 39 - 117 IU/L    AST 24 0 - 40 IU/L    ALT 16 0 - 44 IU/L    Narrative    Performed at:  01 - [facility] [address], [place], [place]  [id number]  : Amaury Meadows MD, Phone:  [phone]   PSA Serum ([facility])   Result Value Ref Range    PSA NG/ML 2.4 0.0 - 4.0 ng/mL    Narrative    Performed at:  01 - [facility] [address], [place], [place]  [id number]  : Amaury Meadows MD, Phone:  [phone]   Digoxin Serum ([facility])   Result Value Ref Range    Digoxin Serum 0.6 0.5 - 0.9 ng/mL    Narrative    Performed at:  01 - [facility] [address], [place], [place]  [id number]  : Amaury Meadows MD, Phone:  [phone]   Uric Acid  Serum ([facility])   Result Value Ref Range    Uric Acid 5.7 3.7 - 8.6 mg/dL    Narrative    Performed at:  01 - [facility] [address], [place], [place]  [id number]  : Amaury Meadows MD, Phone:  [phone]

## 2019-04-16 NOTE — PATIENT INSTRUCTIONS
"Labs     Cryo to actinic keratosis    Refills    Shingrix vaccine. Check with insurance for coverage, then if desired get on the list at the pharmacy.    Estimated body mass index is 45.29 kg/m  as calculated from the following:    Height as of 2/7/18: 1.676 m (5' 6\").    Weight as of this encounter: 127.3 kg (280 lb 9.6 oz).  Weight loss is recommended          "

## 2019-04-16 NOTE — PROGRESS NOTES
Medication review  Treated Actinic keratosis with cryo  SUBJECTIVE:   Vasiliy Corbin is a 84 year old male who presents to clinic today for the following   health issues:    White male glasses bilateral hearing aids    Had grandkids over the weekend 10 yo and 12 yo.    Medication review    Sleep -good  Appetite -good  Exercise -good    Smoking -no  ETOH -no  Street drugs/MJ -no  Caffeine -coffee    Fall/Seizure/LOC -no    Depression -no  Anxiety -no  Panic -no  SI/SP -no  Hallucinations -no  Paranoid -no  Manic -no  OCD -no  PTSD -no  Gambling -no    Breakfast oatmeal occasionally toast, eggs, bojorquez. Water.  Lunch cheese and crackers  Supper hamburgers bun salad   Snack ice cream          Pre-Diabetes Follow-up  Lab Results   Component Value Date    A1C 6.0 01/09/2018    A1C 5.5 04/01/2016         Patient is checking blood sugars: not at all    Diabetic concerns: None     Symptoms of hypoglycemia (low blood sugar): none     Paresthesias (numbness or burning in feet) or sores: No     Date of last diabetic eye exam: not sure    Diabetes Management Resources    Hyperlipidemia Follow-Up  LDL Cholesterol Calculated   Date Value Ref Range Status   01/09/2018 47 0 - 99 mg/dL Final         Rate your low fat/cholesterol diet?: fair    Taking statin?  Yes, no muscle aches from statin    Other lipid medications/supplements?:  none    Hypertension Follow-up      Outpatient blood pressures are not being checked.    Low Salt Diet: low salt    BP Readings from Last 2 Encounters:   04/05/19 122/72   03/08/19 118/66     Hemoglobin A1C (%)   Date Value   01/09/2018 6.0 (H)   04/01/2016 5.5     LDL Cholesterol Calculated (mg/dL)   Date Value   01/09/2018 47   12/28/2016 63     Creatinine   Date Value Ref Range Status   01/09/2018 0.97 0.76 - 1.27 mg/dL Final         Amount of exercise or physical activity: 5 days/week for an average of less than 15 minutes    Problems taking medications regularly: No    Medication side effects:  none    Diet: low salt and low fat/cholesterol            Additional history: as documented    Reviewed  and updated as needed this visit by clinical staff         Reviewed and updated as needed this visit by Provider         Patient Active Problem List   Diagnosis     Restless leg syndrome     Long term current use of anticoagulant therapy     Cervical spine arthritis     Hyperlipidemia with target LDL less than 100     VMR (vasomotor rhinitis)     ACP (advance care planning)     Health Care Home     Essential hypertension     Chronic atrial fibrillation (H)     Coronary artery disease involving native coronary artery of native heart without angina pectoris     Syncope     Enlarged prostate with urinary retention     Encounter for medication refill     Diastolic dysfunction     Cardiomyopathy, unspecified type (H)     Morbid obesity due to excess calories (H)     History of BPH     Pre-diabetes     Encounter for long-term (current) use of medications     Past Surgical History:   Procedure Laterality Date     CT release       CYSTOSCOPY N/A 2016    Procedure: CYSTOSCOPY;  Surgeon: Lokesh Lima MD;  Location:  OR     CYSTOSCOPY, FULGURATE BLEEDERS, EVACUATE CLOT(S), COMBINED N/A 2018    Procedure: COMBINED CYSTOSCOPY, FULGURATE BLEEDERS, EVACUATE CLOT(S);  CYSTOSCOPY, FULGERATION;  Surgeon: Lokesh Lima MD;  Location:  OR     HC LEFT HEART CATHETERIZATION  3/2010    Mild CAD     LASER KTP TRANSURETHRAL RESECTION (TUR) PROSTATE N/A 2016    Procedure: LASER KTP TRANSURETHRAL RESECTION (TUR) PROSTATE;  Surgeon: Lokesh Lima MD;  Location:  OR     ulnar reroute      right        Social History     Tobacco Use     Smoking status: Former Smoker     Packs/day: 1.00     Years: 5.00     Pack years: 5.00     Types: Cigarettes     Last attempt to quit: 4/3/1977     Years since quittin.0     Smokeless tobacco: Former User     Quit date: 1960   Substance Use Topics     Alcohol use: No      Alcohol/week: 0.0 oz     Family History   Problem Relation Age of Onset     Heart Disease Mother 92        Heart failure     Heart Disease Father 92     C.A.D. Brother 70        MI     Myocardial Infarction Brother      Family History Negative Sister      Family History Negative Brother      Family History Negative Brother      Family History Negative Brother      Family History Negative Sister          Current Outpatient Medications   Medication Sig Dispense Refill     allopurinol (ZYLOPRIM) 300 MG tablet Take 1 tablet (300 mg) by mouth daily 90 tablet 3     digoxin (LANOXIN) 125 MCG tablet Take 1 tablet (125 mcg) by mouth daily 90 tablet 0     Finasteride (PROSCAR PO) Take 5 mg by mouth daily       furosemide (LASIX) 40 MG tablet TAKE 1&1/2 TABLETS BY MOUTH EVERY  tablet 0     ipratropium (ATROVENT) 0.06 % nasal spray USE 2 SPRAYS IN EACH NOSTRIL FOUR TIMES DAILY AS NEEDED FOR RHINITIS 135 mL 0     losartan (COZAAR) 50 MG tablet Take 1 tablet (50 mg) by mouth daily 90 tablet 3     Metoprolol Tartrate (LOPRESSOR PO) Take 100 mg by mouth 2 times daily (2 x 50mg = 100mg)       metoprolol tartrate (LOPRESSOR) 50 MG tablet TAKE 2 TABLETS(100 MG) BY MOUTH TWICE DAILY 360 tablet 0     Multiple Vitamin (MULTI-VITAMIN) per tablet Take 1 tablet by mouth daily. 100 tablet 12     simvastatin (ZOCOR) 10 MG tablet TAKE 1 TABLET(10 MG) BY MOUTH AT BEDTIME 90 tablet 0     warfarin (COUMADIN) 2 MG tablet TAKE 1 1/2 TABLETS BY MOUTH DAILY 135 tablet 0     warfarin (COUMADIN) 2 MG tablet Take 2 mg by mouth daily Taking 1 1/2 pills (2.5mg) daily       Allergies   Allergen Reactions     Cardizem [Diltiazem]      Swelling and poor rate control     Lisinopril Cough     Recent Labs   Lab Test 01/16/18  0700 01/09/18  1127 01/09/18  1125 11/08/17  0930 05/22/17  1220 04/26/17  0831  12/28/16  1040 12/12/16  0930 04/01/16  1313  12/23/15  1203 11/23/15  1111   A1C  --  6.0*  --   --   --   --   --   --   --  5.5  --   --   --    LDL   --   --  47  --   --   --   --  63  --   --   --   --  82   HDL  --   --  37*  --   --   --   --  42  --   --   --   --  54   TRIG  --   --  176*  --   --   --   --  209*  --   --   --   --  156*   ALT  --   --  20  --   --   --   --  <5* 20  --   --  33 22   CR  --   --  0.97 1.08 1.18 1.20   < >  --  1.11  --    < > 1.31* 1.07   GFRESTIMATED  --   --   --   --  59* 58*   < >  --   --   --    < > 56*  --    GFRESTBLACK  --   --   --   --  72 70   < >  --   --   --    < > 68  --    POTASSIUM 4.4  --  4.4 4.4 4.4 4.0   < >  --  4.6  --    < > 4.5 4.7   TSH  --   --   --   --   --   --   --  1.81  --   --   --  2.17  --     < > = values in this interval not displayed.      BP Readings from Last 3 Encounters:   04/16/19 136/72   04/05/19 122/72   03/08/19 118/66    Wt Readings from Last 3 Encounters:   04/16/19 127.3 kg (280 lb 9.6 oz)   04/05/19 126.9 kg (279 lb 12.8 oz)   03/08/19 122.5 kg (270 lb)          January had eye exam  Shingrix discussed.        Labs reviewed in EPIC    ROS:  Constitutional, HEENT, cardiovascular, pulmonary, GI, , musculoskeletal, neuro, skin, endocrine and psych systems are negative, except as otherwise noted.    OBJECTIVE:     /72   Pulse 77   Resp 16   Wt 127.3 kg (280 lb 9.6 oz)   SpO2 94%   BMI 45.29 kg/m    There is no height or weight on file to calculate BMI.  GENERAL: healthy, alert and no distress  EYES: Eyes grossly normal to inspection, PERRL and conjunctivae and sclerae normal  HENT: ear canals and TM's normal, nose and mouth without ulcers or lesions  NECK: no adenopathy, no asymmetry, masses, or scars and thyroid normal to palpation  RESP: lungs clear to auscultation - no rales, rhonchi or wheezes  CV: regular rate and rhythm, normal S1 S2, no S3 or S4, no murmur, click or rub, no peripheral edema and peripheral pulses strong  ABDOMEN: soft, nontender, no hepatosplenomegaly, no masses and bowel sounds normal  MS: no gross musculoskeletal defects noted, no  edema  SKIN: no suspicious lesions or rashes actinic keratosis left tip of ear and right cheek/temple cryo today  NEURO: Normal strength and tone, mentation intact and speech normal  PSYCH: mentation appears normal, affect normal/bright  LYMPH: no cervical, supraclavicular, axillary, or inguinal adenopathy  Diabetic foot exam: normal DP and PT pulses, no trophic changes or ulcerative lesions and normal sensory exam    Diagnostic Test Results:  Results for orders placed or performed in visit on 04/05/19   Prothrombin - INR ([facility])   Result Value Ref Range    INR 2.3 2.0 - 3.0       ASSESSMENT/PLAN:   ASSESSMENT / PLAN:    (Z79.899) Encounter for long-term (current) use of medications  Comment: as above  Plan: Basic Metabolic Panel (8) ([facility]), CBC with         Diff/Plt ([facility]), Hepatic Function Panel (7)         ([facility]), Digoxin Serum ([facility]), Uric Acid          Serum ([facility]), ipratropium (ATROVENT) 0.06 %         nasal spray    (I48.2) Chronic atrial fibrillation (H)  Comment: stable  Plan: Basic Metabolic Panel (8) ([facility]), warfarin         (COUMADIN) 2 MG tablet, metoprolol tartrate         (LOPRESSOR) 50 MG tablet, digoxin (LANOXIN) 125        MCG tablet        Per cardiology    (E78.5) Hyperlipidemia with target LDL less than 100  Comment:   LDL Cholesterol Calculated   Date Value Ref Range Status   01/09/2018 47 0 - 99 mg/dL Final       Plan: Lipid Panel ([facility]), simvastatin (ZOCOR) 10         MG tablet            (I10) Essential hypertension  Comment:   BP Readings from Last 3 Encounters:   04/16/19 136/72   04/05/19 122/72   03/08/19 118/66       Plan: Basic Metabolic Panel (8) ([facility]), furosemide        (LASIX) 40 MG tablet            (Z87.39) History of gout  Comment: no flares reported  Plan: Uric Acid  Serum ([facility])            (I25.10) Coronary artery disease involving native coronary artery of native heart without angina pectoris  Comment: stable  Plan: per cardiology    (E66.9) Obesity (BMI  "35.0-39.9 without comorbidity)  Comment: weight loss recommended. Diet and exercise discussed  Plan: Hemoglobin A1C ([facility]), TSH ([facility]),         Thyroxine (T4) Free  Direct  S ([facility]),         Microalbumin ([facility])            (E55.9) Vitamin D deficiency  Comment:   Plan: Vitamin D  25-Hydroxy ([facility])            (I42.9) Cardiomyopathy, unspecified type (H)  Comment:   Plan: per cardiology    (K21.9) Gastroesophageal reflux disease, esophagitis presence not specified  Comment: stable  Plan: Continue cares, weight loss might help this    (N40.1,  R33.8) Enlarged prostate with urinary retention  Comment:   Plan: Urinalysis w/reflex protein, bili ([facility])        Per urology    (J30.0) VMR (vasomotor rhinitis)  Comment:   Plan: refill    (G25.81) Restless leg syndrome  Comment:   Plan: refill, CBC    (M47.812) Cervical spine arthritis  Comment: stable  Plan: per ortho    (R60.0) Localized edema  Comment: minimal today  Plan: continue cares    (Z12.5) Screening for prostate cancer  Comment:   Plan: PSA Serum ([facility])            (L57.0) Actinic keratosis  Comment:   Plan: DESTRUCT PREMALIGNANT LESION, 2-14        F/u with dermatology as planned    (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 Check with insurance for coverage, then if desired get on the list at the pharmacy.    Estimated body mass index is 45.29 kg/m  as calculated from the following:    Height as of 2/7/18: 1.676 m (5' 6\").    " Weight as of this encounter: 127.3 kg (280 lb 9.6 oz).  Weight loss is recommended              Nena Pablo MD  [facility]

## 2019-04-17 LAB
A/C RATIO MG/G: ABNORMAL MG/G
ALBUMIN (URINE) MG/L: 30 MG/L
ALBUMIN SERPL-MCNC: 4.3 G/DL (ref 3.5–4.7)
ALP SERPL-CCNC: 123 IU/L (ref 39–117)
ALT SERPL-CCNC: 16 IU/L (ref 0–44)
AST SERPL-CCNC: 24 IU/L (ref 0–40)
BILIRUB SERPL-MCNC: 0.6 MG/DL (ref 0–1.2)
BILIRUBIN, DIRECT: 0.22 MG/DL (ref 0–0.4)
BUN SERPL-MCNC: 22 MG/DL (ref 8–27)
BUN/CREATININE RATIO: 26 (ref 10–24)
CALCIUM SERPL-MCNC: 9.7 MG/DL (ref 8.6–10.2)
CHLORIDE SERPLBLD-SCNC: 101 MMOL/L (ref 96–106)
CHOLEST SERPL-MCNC: 121 MG/DL (ref 100–199)
CREAT SERPL-MCNC: 0.85 MG/DL (ref 0.76–1.27)
DIGOXIN SERPL IA-MCNC: 0.6 NG/ML (ref 0.5–0.9)
EGFR IF AFRICN AM: 92 ML/MIN/1.73
EGFR IF NONAFRICN AM: 80 ML/MIN/1.73
GLUCOSE SERPL-MCNC: 99 MG/DL (ref 65–99)
HBA1C MFR BLD: 5.8 % (ref 4.8–5.6)
HDLC SERPL-MCNC: 38 MG/DL
INTERPRETATION: ABNORMAL
LDL/HDL RATIO: 1.3 RATIO (ref 0–3.6)
LDLC SERPL CALC-MCNC: 49 MG/DL (ref 0–99)
POTASSIUM SERPL-SCNC: 4.3 MMOL/L (ref 3.5–5.2)
PROT SERPL-MCNC: 7.2 G/DL (ref 6–8.5)
PSA NG/ML: 2.4 NG/ML (ref 0–4)
SODIUM SERPL-SCNC: 142 MMOL/L (ref 134–144)
T4 FREE SERPL-MCNC: 0.93 NG/DL (ref 0.82–1.77)
TOTAL CO2: 28 MMOL/L (ref 20–29)
TRIGL SERPL-MCNC: 171 MG/DL (ref 0–149)
TSH BLD-ACNC: 3.87 UIU/ML (ref 0.45–4.5)
URATE SERPL-MCNC: 5.7 MG/DL (ref 3.7–8.6)
URINE CREATININE MG/DL - QUEST: 50 MG/DL
VITAMIN D, 25-HYDROXY: 26.5 NG/ML (ref 30–100)
VLDLC SERPL CALC-MCNC: 34 MG/DL (ref 5–40)

## 2019-04-22 ENCOUNTER — OFFICE VISIT (OUTPATIENT)
Dept: CARDIOLOGY | Facility: CLINIC | Age: 84
End: 2019-04-22
Attending: INTERNAL MEDICINE
Payer: MEDICARE

## 2019-04-22 VITALS
WEIGHT: 281 LBS | DIASTOLIC BLOOD PRESSURE: 80 MMHG | HEART RATE: 76 BPM | HEIGHT: 66 IN | SYSTOLIC BLOOD PRESSURE: 139 MMHG | BODY MASS INDEX: 45.16 KG/M2

## 2019-04-22 DIAGNOSIS — I48.20 CHRONIC ATRIAL FIBRILLATION (H): Primary | ICD-10-CM

## 2019-04-22 DIAGNOSIS — I51.89 DIASTOLIC DYSFUNCTION: ICD-10-CM

## 2019-04-22 DIAGNOSIS — I25.10 CORONARY ARTERY DISEASE INVOLVING NATIVE CORONARY ARTERY OF NATIVE HEART WITHOUT ANGINA PECTORIS: ICD-10-CM

## 2019-04-22 DIAGNOSIS — I10 ESSENTIAL HYPERTENSION: ICD-10-CM

## 2019-04-22 PROCEDURE — 99214 OFFICE O/P EST MOD 30 MIN: CPT | Performed by: INTERNAL MEDICINE

## 2019-04-22 RX ORDER — FUROSEMIDE 40 MG
40 TABLET ORAL DAILY
COMMUNITY
End: 2020-08-21 | Stop reason: DRUGHIGH

## 2019-04-22 ASSESSMENT — MIFFLIN-ST. JEOR: SCORE: 1907.36

## 2019-04-22 NOTE — PROGRESS NOTES
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       With rhythm restoration and the use of amiodarone therapy, his LV function rebounded to 60%-65%.  Then he reverted out of rhythm on amiodarone therapy, and we have been attempting rate control.      His rate control is now done by metoprolol and digoxin which is tolerating well.    He denies any cardiac symptoms.  He has no chest pain palpitations or shortness of breath.  He understands his weight gain over the last few years but is having trouble trying to decrease calories and reduce it.  He denies any orthopnea or PND.    His main concern is right lower extremity discomfort with walking which has decreased his ambulation.  He believes there is some degenerative arthritis of his spine and may need a shot eventually.     His last echocardiogram has shown ejection fraction is 50%-55%, which is stable, comparatively.  His right heart looks slightly more dilated.  He is wearing his CPAP every night all night long.  He has no significant valvular disease;      His primary care physician Dr. Colbert is retired and now he sees Dr. Pablo.      PHYSICAL EXAMINATION:    See below    IMPRESSION AND PLAN:   1.  Persistent, if not permanent atrial fibrillation.   Continue rate control approach.  He is doing well.  We will check a Holter next year to assess adequacy of rate control.  On Coumadin for stroke prophylaxis.                  2.   Congestive heart failure, diastolic dysfunction   On Lasix 60 mg per day. Part of the edema is related to venous insufficiency.  Has followed in the venous clinic before.  Repeat echocardiogram next year.        3.  Sleep apnea, treated.   Continue C PAP.                         4.  No flow-limiting coronary artery disease on  angiogram in 2010.      5.  Hypertension, treated to goal.      6. .  Dyslipidemia, treated to goal.     Return to clinic in 1 year.      Sincerely,    Diomedes Menendez    No orders of the defined types were placed in this encounter.      Orders Placed This Encounter   Medications     furosemide (LASIX) 40 MG tablet     Sig: Take 40 mg by mouth daily       Medications Discontinued During This Encounter   Medication Reason     furosemide (LASIX) 40 MG tablet Discontinued by another Health Care Provider     Metoprolol Tartrate (LOPRESSOR PO) Medication Reconciliation Clean Up         Encounter Diagnoses   Name Primary?     CAD (coronary artery disease)      Chronic atrial fibrillation (H) Yes     Essential hypertension      Diastolic dysfunction        CURRENT MEDICATIONS:  Current Outpatient Medications   Medication Sig Dispense Refill     allopurinol (ZYLOPRIM) 300 MG tablet Take 1 tablet (300 mg) by mouth daily 90 tablet 3     digoxin (LANOXIN) 125 MCG tablet Take 1 tablet (125 mcg) by mouth daily 90 tablet 3     Finasteride (PROSCAR PO) Take 5 mg by mouth daily       furosemide (LASIX) 40 MG tablet Take 40 mg by mouth daily       ipratropium (ATROVENT) 0.06 % nasal spray USE 2 SPRAYS IN EACH NOSTRIL FOUR TIMES DAILY AS NEEDED FOR RHINITIS 135 mL 3     losartan (COZAAR) 50 MG tablet Take 1 tablet (50 mg) by mouth daily 90 tablet 3     metoprolol tartrate (LOPRESSOR) 50 MG tablet TAKE 2 TABLETS(100 MG) BY MOUTH TWICE DAILY 360 tablet 3     Multiple Vitamin (MULTI-VITAMIN) per tablet Take 1 tablet by mouth daily. 100 tablet 12     simvastatin (ZOCOR) 10 MG tablet TAKE 1 TABLET(10 MG) BY MOUTH AT BEDTIME 90 tablet 3     warfarin (COUMADIN) 2 MG tablet TAKE 1 1/2 TABLETS BY MOUTH DAILY 135 tablet 3     warfarin (COUMADIN) 2 MG tablet Take 2 mg by mouth daily Taking 1 1/2 pills (2.5mg) daily         ALLERGIES     Allergies   Allergen Reactions     Cardizem [Diltiazem]      Swelling and poor rate control     Lisinopril Cough        PAST MEDICAL HISTORY:  Past Medical History:   Diagnosis Date     Atrial fibrillation (H)     became chronic afib in 2016,paroxysmal,was on amiodarone but went into persistent afib in april 2106 and amiodrone was d/cd. now on BBLK and considering cardioversion.(5/2106).In Sept 2016 plan is rate control which has been a challenge and Holter in 6 months     CAD (coronary artery disease)     mild stenosis per cath     Cardiomyopathy (H)      Chronic cough 2017    Eval with Dr Sarai Oro - [facility] - rec speech therapy, ct for eval of crackles No obstruction or copd     Colon polyp 2010     Gout      History of BPH     laser TURP in 2016 Dr Lima     HTN (hypertension)      Hyperlipidemia      Obesity (BMI 35.0-39.9 without comorbidity)      SUJEY on CPAP      Venous insufficiency of right leg     no trteatment recommended by Judy - option to treat is there Dr Zapata       PAST SURGICAL HISTORY:  Past Surgical History:   Procedure Laterality Date     CT release       CYSTOSCOPY N/A 4/7/2016    Procedure: CYSTOSCOPY;  Surgeon: Lokesh Lima MD;  Location:  OR     CYSTOSCOPY, FULGURATE BLEEDERS, EVACUATE CLOT(S), COMBINED N/A 1/16/2018    Procedure: COMBINED CYSTOSCOPY, FULGURATE BLEEDERS, EVACUATE CLOT(S);  CYSTOSCOPY, FULGERATION;  Surgeon: Lokesh Lima MD;  Location:  OR     HC LEFT HEART CATHETERIZATION  3/2010    Mild CAD     LASER KTP TRANSURETHRAL RESECTION (TUR) PROSTATE N/A 4/7/2016    Procedure: LASER KTP TRANSURETHRAL RESECTION (TUR) PROSTATE;  Surgeon: Lokesh Lima MD;  Location:  OR     ulnar reroute      right        FAMILY HISTORY:  Family History   Problem Relation Age of Onset     Heart Disease Mother 92        Heart failure     Heart Disease Father 92     C.A.D. Brother 70        MI     Myocardial Infarction Brother      Family History Negative Sister      Family History Negative Brother      Family History Negative Brother      Family History Negative Brother      Family  History Negative Sister        SOCIAL HISTORY:  Social History     Socioeconomic History     Marital status:      Spouse name: None     Number of children: None     Years of education: None     Highest education level: None   Occupational History     None   Social Needs     Financial resource strain: None     Food insecurity:     Worry: None     Inability: None     Transportation needs:     Medical: None     Non-medical: None   Tobacco Use     Smoking status: Former Smoker     Packs/day: 1.00     Years: 5.00     Pack years: 5.00     Types: Cigarettes     Last attempt to quit: 4/3/1977     Years since quittin.0     Smokeless tobacco: Former User     Quit date: 1960   Substance and Sexual Activity     Alcohol use: No     Alcohol/week: 0.0 oz     Drug use: No     Sexual activity: Yes   Lifestyle     Physical activity:     Days per week: None     Minutes per session: None     Stress: None   Relationships     Social connections:     Talks on phone: None     Gets together: None     Attends Muslim service: None     Active member of club or organization: None     Attends meetings of clubs or organizations: None     Relationship status: None     Intimate partner violence:     Fear of current or ex partner: None     Emotionally abused: None     Physically abused: None     Forced sexual activity: None   Other Topics Concern     Parent/sibling w/ CABG, MI or angioplasty before 65F 55M? No      Service Not Asked     Blood Transfusions Not Asked     Caffeine Concern Yes     Comment: 8 cups of  decaf coffee per day     Occupational Exposure Not Asked     Hobby Hazards Not Asked     Sleep Concern Yes     Comment: sleep apnea, wears CPAP     Stress Concern No     Weight Concern Yes     Comment: Weight gain     Special Diet No     Back Care Not Asked     Exercise No     Bike Helmet Not Asked     Seat Belt Yes     Self-Exams Not Asked   Social History Narrative     None       Review of Systems:  Skin:   "Negative       Eyes:  Positive for glasses    ENT:  Positive for hearing loss    Respiratory:  Positive for sleep apnea;CPAP     Cardiovascular:    Positive for;dizziness;lightheadedness;edema    Gastroenterology: Negative      Genitourinary:  not assessed nocturia    Musculoskeletal:  Positive for joint pain    Neurologic:  Negative      Psychiatric:  Positive for sleep disturbances    Heme/Lymph/Imm:  Negative easy bruising;allergies    Endocrine:  Negative        Physical Exam:  Vitals: /80   Pulse 76   Ht 1.676 m (5' 6\")   Wt 127.5 kg (281 lb)   BMI 45.35 kg/m      Constitutional:  cooperative obese      Skin:  warm and dry to the touch          Head:  normocephalic        Eyes:  pupils equal and round        Lymph:      ENT:  no pallor or cyanosis        Neck:  carotid pulses are full and equal bilaterally        Respiratory:  clear to auscultation         Cardiac: regular rhythm;normal S1 and S2 irregularly irregular rhythm;tachycardic distant heart sounds no presence of murmur       heart rate 78 with resting; 110- hallway walk  not assessed this visit                                        GI:  not assessed this visit        Extremities and Muscular Skeletal:  no edema   bilateral LE edema;trace     No venous stasis changes, telangiectasia or ulcerations    Neurological:  no gross motor deficits;affect appropriate        Psych:           CC  Diomedes Menendez MD  [address]  [id number]  [place] [id number]              "

## 2019-04-22 NOTE — LETTER
4/22/2019    Nena Pablo MD  [address]  [facility] [address]    RE: Vasiliy Corbin       Dear Colleague,    I had the pleasure of seeing Vasiliy Corbin in the [facility].    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       With rhythm restoration and the use of amiodarone therapy, his LV function rebounded to 60%-65%.  Then he reverted out of rhythm on amiodarone therapy, and we have been attempting rate control.      His rate control is now done by metoprolol and digoxin which is tolerating well.    He denies any cardiac symptoms.  He has no chest pain palpitations or shortness of breath.  He understands his weight gain over the last few years but is having trouble trying to decrease calories and reduce it.  He denies any orthopnea or PND.    His main concern is right lower extremity discomfort with walking which has decreased his ambulation.  He believes there is some degenerative arthritis of his spine and may need a shot eventually.     His last echocardiogram has shown ejection fraction is 50%-55%, which is stable, comparatively.  His right heart looks slightly more dilated.  He is wearing his CPAP every night all night long.  He has no significant valvular disease;      His primary care physician Dr. Colbert is retired and now he sees Dr. Pablo.      PHYSICAL EXAMINATION:    See below    IMPRESSION AND PLAN:   1.  Persistent, if not permanent atrial fibrillation.   Continue rate control approach.  He is doing well.  We will check a Holter next year to assess adequacy of rate control.  On Coumadin for stroke prophylaxis.                  2.   Congestive heart failure, diastolic dysfunction   On Lasix 60 mg per day. Part of the edema is related  to venous insufficiency.  Has followed in the venous clinic before.  Repeat echocardiogram next year.        3.  Sleep apnea, treated.   Continue C PAP.                         4.  No flow-limiting coronary artery disease on angiogram in 2010.      5.  Hypertension, treated to goal.      6. .  Dyslipidemia, treated to goal.     Return to clinic in 1 year.      Sincerely,    Diomedes Menendez    No orders of the defined types were placed in this encounter.      Orders Placed This Encounter   Medications     furosemide (LASIX) 40 MG tablet     Sig: Take 40 mg by mouth daily       Medications Discontinued During This Encounter   Medication Reason     furosemide (LASIX) 40 MG tablet Discontinued by another Health Care Provider     Metoprolol Tartrate (LOPRESSOR PO) Medication Reconciliation Clean Up         Encounter Diagnoses   Name Primary?     CAD (coronary artery disease)      Chronic atrial fibrillation (H) Yes     Essential hypertension      Diastolic dysfunction        CURRENT MEDICATIONS:  Current Outpatient Medications   Medication Sig Dispense Refill     allopurinol (ZYLOPRIM) 300 MG tablet Take 1 tablet (300 mg) by mouth daily 90 tablet 3     digoxin (LANOXIN) 125 MCG tablet Take 1 tablet (125 mcg) by mouth daily 90 tablet 3     Finasteride (PROSCAR PO) Take 5 mg by mouth daily       furosemide (LASIX) 40 MG tablet Take 40 mg by mouth daily       ipratropium (ATROVENT) 0.06 % nasal spray USE 2 SPRAYS IN EACH NOSTRIL FOUR TIMES DAILY AS NEEDED FOR RHINITIS 135 mL 3     losartan (COZAAR) 50 MG tablet Take 1 tablet (50 mg) by mouth daily 90 tablet 3     metoprolol tartrate (LOPRESSOR) 50 MG tablet TAKE 2 TABLETS(100 MG) BY MOUTH TWICE DAILY 360 tablet 3     Multiple Vitamin (MULTI-VITAMIN) per tablet Take 1 tablet by mouth daily. 100 tablet 12     simvastatin (ZOCOR) 10 MG tablet TAKE 1 TABLET(10 MG) BY MOUTH AT BEDTIME 90 tablet 3     warfarin (COUMADIN) 2 MG tablet TAKE 1 1/2 TABLETS BY MOUTH DAILY 135 tablet 3      warfarin (COUMADIN) 2 MG tablet Take 2 mg by mouth daily Taking 1 1/2 pills (2.5mg) daily         ALLERGIES     Allergies   Allergen Reactions     Cardizem [Diltiazem]      Swelling and poor rate control     Lisinopril Cough       PAST MEDICAL HISTORY:  Past Medical History:   Diagnosis Date     Atrial fibrillation (H)     became chronic afib in 2016,paroxysmal,was on amiodarone but went into persistent afib in april 2106 and amiodrone was d/cd. now on BBLK and considering cardioversion.(5/2106).In Sept 2016 plan is rate control which has been a challenge and Holter in 6 months     CAD (coronary artery disease)     mild stenosis per cath     Cardiomyopathy (H)      Chronic cough 2017    Eval with Dr Sarai Oro - [facility] - rec speech therapy, ct for eval of crackles No obstruction or copd     Colon polyp 2010     Gout      History of BPH     laser TURP in 2016 Dr Lmia     HTN (hypertension)      Hyperlipidemia      Obesity (BMI 35.0-39.9 without comorbidity)      SUJEY on CPAP      Venous insufficiency of right leg     no trteatment recommended by Judy - option to treat is there Dr Zapata       PAST SURGICAL HISTORY:  Past Surgical History:   Procedure Laterality Date     CT release       CYSTOSCOPY N/A 4/7/2016    Procedure: CYSTOSCOPY;  Surgeon: Lkoesh Lima MD;  Location:  OR     CYSTOSCOPY, FULGURATE BLEEDERS, EVACUATE CLOT(S), COMBINED N/A 1/16/2018    Procedure: COMBINED CYSTOSCOPY, FULGURATE BLEEDERS, EVACUATE CLOT(S);  CYSTOSCOPY, FULGERATION;  Surgeon: Lokesh Lima MD;  Location:  OR     HC LEFT HEART CATHETERIZATION  3/2010    Mild CAD     LASER KTP TRANSURETHRAL RESECTION (TUR) PROSTATE N/A 4/7/2016    Procedure: LASER KTP TRANSURETHRAL RESECTION (TUR) PROSTATE;  Surgeon: Lokesh Lima MD;  Location:  OR     ulnar reroute      right        FAMILY HISTORY:  Family History   Problem Relation Age of Onset     Heart Disease Mother 92        Heart failure     Heart Disease Father 92      FELIX Brother 70        MI     Myocardial Infarction Brother      Family History Negative Sister      Family History Negative Brother      Family History Negative Brother      Family History Negative Brother      Family History Negative Sister        SOCIAL HISTORY:  Social History     Socioeconomic History     Marital status:      Spouse name: None     Number of children: None     Years of education: None     Highest education level: None   Occupational History     None   Social Needs     Financial resource strain: None     Food insecurity:     Worry: None     Inability: None     Transportation needs:     Medical: None     Non-medical: None   Tobacco Use     Smoking status: Former Smoker     Packs/day: 1.00     Years: 5.00     Pack years: 5.00     Types: Cigarettes     Last attempt to quit: 4/3/1977     Years since quittin.0     Smokeless tobacco: Former User     Quit date: 1960   Substance and Sexual Activity     Alcohol use: No     Alcohol/week: 0.0 oz     Drug use: No     Sexual activity: Yes   Lifestyle     Physical activity:     Days per week: None     Minutes per session: None     Stress: None   Relationships     Social connections:     Talks on phone: None     Gets together: None     Attends Rastafarian service: None     Active member of club or organization: None     Attends meetings of clubs or organizations: None     Relationship status: None     Intimate partner violence:     Fear of current or ex partner: None     Emotionally abused: None     Physically abused: None     Forced sexual activity: None   Other Topics Concern     Parent/sibling w/ CABG, MI or angioplasty before 65F 55M? No      Service Not Asked     Blood Transfusions Not Asked     Caffeine Concern Yes     Comment: 8 cups of  decaf coffee per day     Occupational Exposure Not Asked     Hobby Hazards Not Asked     Sleep Concern Yes     Comment: sleep apnea, wears CPAP     Stress Concern No     Weight Concern Yes      "Comment: Weight gain     Special Diet No     Back Care Not Asked     Exercise No     Bike Helmet Not Asked     Seat Belt Yes     Self-Exams Not Asked   Social History Narrative     None       Review of Systems:  Skin:  Negative       Eyes:  Positive for glasses    ENT:  Positive for hearing loss    Respiratory:  Positive for sleep apnea;CPAP     Cardiovascular:    Positive for;dizziness;lightheadedness;edema    Gastroenterology: Negative      Genitourinary:  not assessed nocturia    Musculoskeletal:  Positive for joint pain    Neurologic:  Negative      Psychiatric:  Positive for sleep disturbances    Heme/Lymph/Imm:  Negative easy bruising;allergies    Endocrine:  Negative        Physical Exam:  Vitals: /80   Pulse 76   Ht 1.676 m (5' 6\")   Wt 127.5 kg (281 lb)   BMI 45.35 kg/m       Constitutional:  cooperative obese      Skin:  warm and dry to the touch          Head:  normocephalic        Eyes:  pupils equal and round        Lymph:      ENT:  no pallor or cyanosis        Neck:  carotid pulses are full and equal bilaterally        Respiratory:  clear to auscultation         Cardiac: regular rhythm;normal S1 and S2 irregularly irregular rhythm;tachycardic distant heart sounds no presence of murmur       heart rate 78 with resting; 110- hallway walk  not assessed this visit                                        GI:  not assessed this visit        Extremities and Muscular Skeletal:  no edema   bilateral LE edema;trace     No venous stasis changes, telangiectasia or ulcerations    Neurological:  no gross motor deficits;affect appropriate        Psych:           CC  Diomedes Menendez MD  [address]  [id number]  [place] [id number]                Thank you for allowing me to participate in the care of your patient.      Sincerely,     Diomedes Menendez MD     [facility]    cc:   Diomedes Menendez MD  [address]  [id number]  [place] [id number]        "

## 2019-04-22 NOTE — LETTER
4/22/2019    Nena Pablo MD  [address]  [facility] [address]    RE: Vasiliy Corbin       Dear Colleague,    I had the pleasure of seeing Vasiliy Corbin in the [facility].    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       With rhythm restoration and the use of amiodarone therapy, his LV function rebounded to 60%-65%.  Then he reverted out of rhythm on amiodarone therapy, and we have been attempting rate control.      His rate control is now done by metoprolol and digoxin which is tolerating well.    He denies any cardiac symptoms.  He has no chest pain palpitations or shortness of breath.  He understands his weight gain over the last few years but is having trouble trying to decrease calories and reduce it.  He denies any orthopnea or PND.    His main concern is right lower extremity discomfort with walking which has decreased his ambulation.  He believes there is some degenerative arthritis of his spine and may need a shot eventually.     His last echocardiogram has shown ejection fraction is 50%-55%, which is stable, comparatively.  His right heart looks slightly more dilated.  He is wearing his CPAP every night all night long.  He has no significant valvular disease;      His primary care physician Dr. Colbert is retired and now he sees Dr. Pablo.      PHYSICAL EXAMINATION:    See below    IMPRESSION AND PLAN:   1.  Persistent, if not permanent atrial fibrillation.   Continue rate control approach.  He is doing well.  We will check a Holter next year to assess adequacy of rate control.  On Coumadin for stroke prophylaxis.                  2.   Congestive heart failure, diastolic dysfunction   On Lasix 60 mg per day. Part of the edema is related  to venous insufficiency.  Has followed in the venous clinic before.  Repeat echocardiogram next year.        3.  Sleep apnea, treated.   Continue C PAP.                         4.  No flow-limiting coronary artery disease on angiogram in 2010.      5.  Hypertension, treated to goal.      6. .  Dyslipidemia, treated to goal.     Return to clinic in 1 year.      Sincerely,    Diomedes Menendez    No orders of the defined types were placed in this encounter.      Orders Placed This Encounter   Medications     furosemide (LASIX) 40 MG tablet     Sig: Take 40 mg by mouth daily       Medications Discontinued During This Encounter   Medication Reason     furosemide (LASIX) 40 MG tablet Discontinued by another Health Care Provider     Metoprolol Tartrate (LOPRESSOR PO) Medication Reconciliation Clean Up         Encounter Diagnoses   Name Primary?     CAD (coronary artery disease)      Chronic atrial fibrillation (H) Yes     Essential hypertension      Diastolic dysfunction        CURRENT MEDICATIONS:  Current Outpatient Medications   Medication Sig Dispense Refill     allopurinol (ZYLOPRIM) 300 MG tablet Take 1 tablet (300 mg) by mouth daily 90 tablet 3     digoxin (LANOXIN) 125 MCG tablet Take 1 tablet (125 mcg) by mouth daily 90 tablet 3     Finasteride (PROSCAR PO) Take 5 mg by mouth daily       furosemide (LASIX) 40 MG tablet Take 40 mg by mouth daily       ipratropium (ATROVENT) 0.06 % nasal spray USE 2 SPRAYS IN EACH NOSTRIL FOUR TIMES DAILY AS NEEDED FOR RHINITIS 135 mL 3     losartan (COZAAR) 50 MG tablet Take 1 tablet (50 mg) by mouth daily 90 tablet 3     metoprolol tartrate (LOPRESSOR) 50 MG tablet TAKE 2 TABLETS(100 MG) BY MOUTH TWICE DAILY 360 tablet 3     Multiple Vitamin (MULTI-VITAMIN) per tablet Take 1 tablet by mouth daily. 100 tablet 12     simvastatin (ZOCOR) 10 MG tablet TAKE 1 TABLET(10 MG) BY MOUTH AT BEDTIME 90 tablet 3     warfarin (COUMADIN) 2 MG tablet TAKE 1 1/2 TABLETS BY MOUTH DAILY 135 tablet 3      warfarin (COUMADIN) 2 MG tablet Take 2 mg by mouth daily Taking 1 1/2 pills (2.5mg) daily         ALLERGIES     Allergies   Allergen Reactions     Cardizem [Diltiazem]      Swelling and poor rate control     Lisinopril Cough       PAST MEDICAL HISTORY:  Past Medical History:   Diagnosis Date     Atrial fibrillation (H)     became chronic afib in 2016,paroxysmal,was on amiodarone but went into persistent afib in april 2106 and amiodrone was d/cd. now on BBLK and considering cardioversion.(5/2106).In Sept 2016 plan is rate control which has been a challenge and Holter in 6 months     CAD (coronary artery disease)     mild stenosis per cath     Cardiomyopathy (H)      Chronic cough 2017    Eval with Dr Sarai Oro - [facility] - rec speech therapy, ct for eval of crackles No obstruction or copd     Colon polyp 2010     Gout      History of BPH     laser TURP in 2016 Dr Lima     HTN (hypertension)      Hyperlipidemia      Obesity (BMI 35.0-39.9 without comorbidity)      SUJEY on CPAP      Venous insufficiency of right leg     no trteatment recommended by Judy - option to treat is there Dr Zapata       PAST SURGICAL HISTORY:  Past Surgical History:   Procedure Laterality Date     CT release       CYSTOSCOPY N/A 4/7/2016    Procedure: CYSTOSCOPY;  Surgeon: Lokesh Lima MD;  Location:  OR     CYSTOSCOPY, FULGURATE BLEEDERS, EVACUATE CLOT(S), COMBINED N/A 1/16/2018    Procedure: COMBINED CYSTOSCOPY, FULGURATE BLEEDERS, EVACUATE CLOT(S);  CYSTOSCOPY, FULGERATION;  Surgeon: Lokesh Lima MD;  Location:  OR     HC LEFT HEART CATHETERIZATION  3/2010    Mild CAD     LASER KTP TRANSURETHRAL RESECTION (TUR) PROSTATE N/A 4/7/2016    Procedure: LASER KTP TRANSURETHRAL RESECTION (TUR) PROSTATE;  Surgeon: Lokesh Lima MD;  Location:  OR     ulnar reroute      right        FAMILY HISTORY:  Family History   Problem Relation Age of Onset     Heart Disease Mother 92        Heart failure     Heart Disease Father 92      FELIX Brother 70        MI     Myocardial Infarction Brother      Family History Negative Sister      Family History Negative Brother      Family History Negative Brother      Family History Negative Brother      Family History Negative Sister        SOCIAL HISTORY:  Social History     Socioeconomic History     Marital status:      Spouse name: None     Number of children: None     Years of education: None     Highest education level: None   Occupational History     None   Social Needs     Financial resource strain: None     Food insecurity:     Worry: None     Inability: None     Transportation needs:     Medical: None     Non-medical: None   Tobacco Use     Smoking status: Former Smoker     Packs/day: 1.00     Years: 5.00     Pack years: 5.00     Types: Cigarettes     Last attempt to quit: 4/3/1977     Years since quittin.0     Smokeless tobacco: Former User     Quit date: 1960   Substance and Sexual Activity     Alcohol use: No     Alcohol/week: 0.0 oz     Drug use: No     Sexual activity: Yes   Lifestyle     Physical activity:     Days per week: None     Minutes per session: None     Stress: None   Relationships     Social connections:     Talks on phone: None     Gets together: None     Attends [employer] service: None     Active member of club or organization: None     Attends meetings of clubs or organizations: None     Relationship status: None     Intimate partner violence:     Fear of current or ex partner: None     Emotionally abused: None     Physically abused: None     Forced sexual activity: None   Other Topics Concern     Parent/sibling w/ CABG, MI or angioplasty before 65F 55M? No      Service Not Asked     Blood Transfusions Not Asked     Caffeine Concern Yes     Comment: 8 cups of  decaf coffee per day     Occupational Exposure Not Asked     Hobby Hazards Not Asked     Sleep Concern Yes     Comment: sleep apnea, wears CPAP     Stress Concern No     Weight Concern Yes      "Comment: Weight gain     Special Diet No     Back Care Not Asked     Exercise No     Bike Helmet Not Asked     Seat Belt Yes     Self-Exams Not Asked   Social History Narrative     None       Review of Systems:  Skin:  Negative       Eyes:  Positive for glasses    ENT:  Positive for hearing loss    Respiratory:  Positive for sleep apnea;CPAP     Cardiovascular:    Positive for;dizziness;lightheadedness;edema    Gastroenterology: Negative      Genitourinary:  not assessed nocturia    Musculoskeletal:  Positive for joint pain    Neurologic:  Negative      Psychiatric:  Positive for sleep disturbances    Heme/Lymph/Imm:  Negative easy bruising;allergies    Endocrine:  Negative        Physical Exam:  Vitals: /80   Pulse 76   Ht 1.676 m (5' 6\")   Wt 127.5 kg (281 lb)   BMI 45.35 kg/m       Constitutional:  cooperative obese      Skin:  warm and dry to the touch          Head:  normocephalic        Eyes:  pupils equal and round        Lymph:      ENT:  no pallor or cyanosis        Neck:  carotid pulses are full and equal bilaterally        Respiratory:  clear to auscultation         Cardiac: regular rhythm;normal S1 and S2 irregularly irregular rhythm;tachycardic distant heart sounds no presence of murmur       heart rate 78 with resting; 110- hallway walk  not assessed this visit                                        GI:  not assessed this visit        Extremities and Muscular Skeletal:  no edema   bilateral LE edema;trace     No venous stasis changes, telangiectasia or ulcerations    Neurological:  no gross motor deficits;affect appropriate        Psych:           CC  Diomedes Menendez MD  [address]  [address]  [place] [address]            Thank you for allowing me to participate in the care of your patient.    Sincerely,     Diomedes Menendez MD     [facility]    "

## 2019-05-03 VITALS — BODY MASS INDEX: 45.52 KG/M2 | DIASTOLIC BLOOD PRESSURE: 70 MMHG | WEIGHT: 282 LBS | SYSTOLIC BLOOD PRESSURE: 138 MMHG

## 2019-05-03 DIAGNOSIS — I48.20 CHRONIC ATRIAL FIBRILLATION (H): ICD-10-CM

## 2019-05-03 DIAGNOSIS — Z79.01 LONG TERM CURRENT USE OF ANTICOAGULANT THERAPY: ICD-10-CM

## 2019-05-03 LAB — INR PPP: 2 (ref 2–3)

## 2019-05-03 PROCEDURE — 36415 COLL VENOUS BLD VENIPUNCTURE: CPT | Performed by: FAMILY MEDICINE

## 2019-05-03 PROCEDURE — 85610 PROTHROMBIN TIME: CPT | Performed by: FAMILY MEDICINE

## 2019-05-31 DIAGNOSIS — I48.20 CHRONIC ATRIAL FIBRILLATION (H): ICD-10-CM

## 2019-05-31 DIAGNOSIS — Z79.01 LONG TERM CURRENT USE OF ANTICOAGULANT THERAPY: ICD-10-CM

## 2019-05-31 LAB — INR PPP: 2.3 (ref 2–3)

## 2019-05-31 PROCEDURE — 36415 COLL VENOUS BLD VENIPUNCTURE: CPT | Performed by: FAMILY MEDICINE

## 2019-05-31 PROCEDURE — 85610 PROTHROMBIN TIME: CPT | Performed by: FAMILY MEDICINE

## 2019-06-06 ENCOUNTER — TRANSFERRED RECORDS (OUTPATIENT)
Dept: FAMILY MEDICINE | Facility: CLINIC | Age: 84
End: 2019-06-06

## 2019-06-11 ENCOUNTER — TRANSFERRED RECORDS (OUTPATIENT)
Dept: FAMILY MEDICINE | Facility: CLINIC | Age: 84
End: 2019-06-11

## 2019-07-02 ENCOUNTER — OFFICE VISIT (OUTPATIENT)
Dept: FAMILY MEDICINE | Facility: CLINIC | Age: 84
End: 2019-07-02

## 2019-07-02 VITALS
DIASTOLIC BLOOD PRESSURE: 86 MMHG | WEIGHT: 279 LBS | SYSTOLIC BLOOD PRESSURE: 110 MMHG | RESPIRATION RATE: 20 BRPM | HEART RATE: 68 BPM | BODY MASS INDEX: 45.03 KG/M2 | TEMPERATURE: 98.4 F | OXYGEN SATURATION: 93 %

## 2019-07-02 DIAGNOSIS — I48.20 CHRONIC ATRIAL FIBRILLATION (H): Primary | ICD-10-CM

## 2019-07-02 DIAGNOSIS — R19.09 LEFT GROIN MASS: ICD-10-CM

## 2019-07-02 DIAGNOSIS — D17.30 LIPOMA OF SKIN AND SUBCUTANEOUS TISSUE: ICD-10-CM

## 2019-07-02 LAB — INR PPP: 2 (ref 2–3)

## 2019-07-02 PROCEDURE — 85610 PROTHROMBIN TIME: CPT | Performed by: FAMILY MEDICINE

## 2019-07-02 PROCEDURE — 36415 COLL VENOUS BLD VENIPUNCTURE: CPT | Performed by: FAMILY MEDICINE

## 2019-07-02 PROCEDURE — 99214 OFFICE O/P EST MOD 30 MIN: CPT | Performed by: FAMILY MEDICINE

## 2019-07-02 NOTE — PROGRESS NOTES
Pt states he has a 'bump' in his groin-left side and another one on his right forearm. The bump on his right arm has been present for one month is non tender, and the one in the groin has been present for 6 months. The one in the groin is getting slightly larger and the bump on the arm has reduced in size. Pt states he has mild groin pain and no other symptoms.   He had a CT scan at his urologist's office that demonstrated a R sided hernia but no left sided findings.   Straining doesn't seem to make it bigger   BM normal   Urine normal    Due for INR    ROS otherwise negative including sleep, neuro, CV, skin or GI     /86   Pulse 68   Temp 98.4  F (36.9  C) (Temporal)   Resp 20   Wt 126.6 kg (279 lb)   SpO2 93%   BMI 45.03 kg/m      R forearm growth cw lipoma, non tender  NV intact  L groin with firm mass, 2-3 cm in the inguinal area  Skin is normal    ASSESSMENT:  1. Chronic atrial fibrillation (H)  See flowsheet  - Prothrombin - INR ([facility])    2. Lipoma of skin and subcutaneous tissue  Reassure   Offered removal electively    declined    3. Left groin mass  Reviewed CT scan for correct sidedness for mass    Is indeed R sided for the hernia and this mass wasn't seen    Arrange for ultrasound of the area to further evaluate the lesion

## 2019-07-03 DIAGNOSIS — I10 ESSENTIAL HYPERTENSION: ICD-10-CM

## 2019-07-03 RX ORDER — LOSARTAN POTASSIUM 50 MG/1
50 TABLET ORAL DAILY
Qty: 90 TABLET | Refills: 3 | Status: SHIPPED | OUTPATIENT
Start: 2019-07-03 | End: 2020-05-01

## 2019-07-05 ENCOUNTER — TRANSFERRED RECORDS (OUTPATIENT)
Dept: FAMILY MEDICINE | Facility: CLINIC | Age: 84
End: 2019-07-05

## 2019-08-02 VITALS — SYSTOLIC BLOOD PRESSURE: 126 MMHG | BODY MASS INDEX: 45.23 KG/M2 | DIASTOLIC BLOOD PRESSURE: 76 MMHG | WEIGHT: 280.2 LBS

## 2019-08-02 DIAGNOSIS — Z79.01 LONG TERM CURRENT USE OF ANTICOAGULANT THERAPY: ICD-10-CM

## 2019-08-02 DIAGNOSIS — I48.20 CHRONIC ATRIAL FIBRILLATION (H): ICD-10-CM

## 2019-08-02 LAB — INR PPP: 2.3 (ref 2–3)

## 2019-08-02 PROCEDURE — 85610 PROTHROMBIN TIME: CPT | Performed by: FAMILY MEDICINE

## 2019-08-02 PROCEDURE — 36415 COLL VENOUS BLD VENIPUNCTURE: CPT | Performed by: FAMILY MEDICINE

## 2019-08-30 VITALS — BODY MASS INDEX: 44.61 KG/M2 | SYSTOLIC BLOOD PRESSURE: 132 MMHG | WEIGHT: 276.4 LBS | DIASTOLIC BLOOD PRESSURE: 84 MMHG

## 2019-08-30 DIAGNOSIS — I48.20 CHRONIC ATRIAL FIBRILLATION (H): ICD-10-CM

## 2019-08-30 DIAGNOSIS — Z79.01 LONG TERM CURRENT USE OF ANTICOAGULANT THERAPY: ICD-10-CM

## 2019-08-30 LAB — INR PPP: 2.6 (ref 2–3)

## 2019-08-30 PROCEDURE — 36415 COLL VENOUS BLD VENIPUNCTURE: CPT | Performed by: FAMILY MEDICINE

## 2019-08-30 PROCEDURE — 85610 PROTHROMBIN TIME: CPT | Performed by: FAMILY MEDICINE

## 2019-09-23 DIAGNOSIS — Z76.0 ENCOUNTER FOR MEDICATION REFILL: ICD-10-CM

## 2019-09-23 NOTE — TELEPHONE ENCOUNTER
Refill medication Allopurinol  LOV 07/02/2019  Labs 04/16/2019  Bhavya Chaudhry MA on 9/23/2019 at 3:38 PM

## 2019-09-25 RX ORDER — ALLOPURINOL 300 MG/1
TABLET ORAL
Qty: 90 TABLET | Refills: 0 | Status: SHIPPED | OUTPATIENT
Start: 2019-09-25 | End: 2019-12-19

## 2019-09-26 NOTE — TELEPHONE ENCOUNTER
It is important for us to see you annually for lab monitoring while you are taking this medication, please make an appointment within the next three months.

## 2019-09-27 VITALS — WEIGHT: 273.25 LBS | BODY MASS INDEX: 44.1 KG/M2

## 2019-09-27 DIAGNOSIS — I48.20 CHRONIC ATRIAL FIBRILLATION (H): Primary | ICD-10-CM

## 2019-09-27 LAB — INR PPP: 2.6 (ref 2–3)

## 2019-09-27 PROCEDURE — 36415 COLL VENOUS BLD VENIPUNCTURE: CPT | Performed by: NURSE PRACTITIONER

## 2019-09-27 PROCEDURE — 85610 PROTHROMBIN TIME: CPT | Performed by: NURSE PRACTITIONER

## 2019-10-25 ENCOUNTER — OFFICE VISIT (OUTPATIENT)
Dept: FAMILY MEDICINE | Facility: CLINIC | Age: 84
End: 2019-10-25

## 2019-10-25 VITALS
OXYGEN SATURATION: 96 % | SYSTOLIC BLOOD PRESSURE: 124 MMHG | HEART RATE: 60 BPM | DIASTOLIC BLOOD PRESSURE: 82 MMHG | BODY MASS INDEX: 43.8 KG/M2 | WEIGHT: 271.38 LBS | RESPIRATION RATE: 16 BRPM

## 2019-10-25 DIAGNOSIS — I48.20 CHRONIC ATRIAL FIBRILLATION (H): ICD-10-CM

## 2019-10-25 DIAGNOSIS — G56.22 NEURITIS OF LEFT ULNAR NERVE: Primary | ICD-10-CM

## 2019-10-25 DIAGNOSIS — Z79.01 LONG TERM CURRENT USE OF ANTICOAGULANT THERAPY: ICD-10-CM

## 2019-10-25 DIAGNOSIS — R05.3 CHRONIC COUGH: ICD-10-CM

## 2019-10-25 LAB — INR PPP: 2.2 (ref 2–3)

## 2019-10-25 PROCEDURE — 36415 COLL VENOUS BLD VENIPUNCTURE: CPT | Performed by: FAMILY MEDICINE

## 2019-10-25 PROCEDURE — 99214 OFFICE O/P EST MOD 30 MIN: CPT | Performed by: FAMILY MEDICINE

## 2019-10-25 PROCEDURE — 85610 PROTHROMBIN TIME: CPT | Performed by: FAMILY MEDICINE

## 2019-10-25 NOTE — PROGRESS NOTES
SUBJECTIVE:  Cough, L elbow pain, INR    Vasiliy Corbin is a 84 year old male who presents with a chief complaint of left elbow pain and numbness into the ulnar hand  Symptoms began 6 week(s) ago, are moderate and gradual onset and still present  Context:  Injury:No.  Pain exacerbated by flexion/extension Relieved by rest. S/p R ulnar nerve release for similar issue remotely     R handed    Ongoing cough for several months.    Has been evaluated by lung in the past.   Denies gerd sx, but has nasal drip that is treated with nasal steroid   Non productive    Former smoker as a teenager   cxr 2017 neg   Previously on amio as well    Past Medical History:   Diagnosis Date     Atrial fibrillation (H)     became chronic afib in 2016,paroxysmal,was on amiodarone but went into persistent afib in april 2106 and amiodrone was d/cd. now on BBLK and considering cardioversion.(5/2106).In Sept 2016 plan is rate control which has been a challenge and Holter in 6 months     CAD (coronary artery disease)     mild stenosis per cath     Cardiomyopathy (H)      Chronic cough 2017    Eval with Dr Sarai Oro - [facility] - rec speech therapy, ct for eval of crackles No obstruction or copd     Colon polyp 2010     Gout      History of BPH     laser TURP in 2016 Dr Lima     HTN (hypertension)      Hyperlipidemia      Obesity (BMI 35.0-39.9 without comorbidity)      SUJEY on CPAP      Venous insufficiency of right leg     no trteatment recommended by [facility] - option to treat is there Dr Zapata     Current Outpatient Medications   Medication Sig Dispense Refill     allopurinol (ZYLOPRIM) 300 MG tablet TAKE 1 TABLET(300 MG) BY MOUTH DAILY 90 tablet 0     digoxin (LANOXIN) 125 MCG tablet Take 1 tablet (125 mcg) by mouth daily 90 tablet 3     Finasteride (PROSCAR PO) Take 5 mg by mouth daily       furosemide (LASIX) 40 MG tablet Take 40 mg by mouth daily       ipratropium (ATROVENT) 0.06 % nasal spray USE 2 SPRAYS IN EACH NOSTRIL FOUR  TIMES DAILY AS NEEDED FOR RHINITIS 135 mL 3     losartan (COZAAR) 50 MG tablet Take 1 tablet (50 mg) by mouth daily 90 tablet 3     metoprolol tartrate (LOPRESSOR) 50 MG tablet TAKE 2 TABLETS(100 MG) BY MOUTH TWICE DAILY 360 tablet 3     Multiple Vitamin (MULTI-VITAMIN) per tablet Take 1 tablet by mouth daily. 100 tablet 12     simvastatin (ZOCOR) 10 MG tablet TAKE 1 TABLET(10 MG) BY MOUTH AT BEDTIME 90 tablet 3     warfarin (COUMADIN) 2 MG tablet TAKE 1 1/2 TABLETS BY MOUTH DAILY 135 tablet 3     warfarin (COUMADIN) 2 MG tablet Take 2 mg by mouth daily Taking 1 1/2 pills (2.5mg) daily       Social History     Tobacco Use     Smoking status: Former Smoker     Packs/day: 1.00     Years: 5.00     Pack years: 5.00     Types: Cigarettes     Last attempt to quit: 4/3/1977     Years since quittin.5     Smokeless tobacco: Former User     Quit date: 1960   Substance Use Topics     Alcohol use: No     Alcohol/week: 0.0 standard drinks     ROS:  CONSTITUTIONAL:NEGATIVE for fever, chills, change in weight  INTEGUMENTARY/SKIN: NEGATIVE for worrisome rashes, moles or lesions    EXAM:   /82   Pulse 60   Resp 16   Wt 123.1 kg (271 lb 6 oz)   SpO2 96%   BMI 43.80 kg/m    GENERAL: healthy, alert and no distress  EYES: Eyes grossly normal to inspection, PERRL and conjunctivae and sclerae normal  RESP: lungs clear to auscultation - no rales, rhonchi or wheezes  CV: irreg irreg  MS: no gross musculoskeletal defects noted, no edema  SKIN: no suspicious lesions or rashes  NEURO: + tinnels L elbow for numbness into the pinky  PSYCH: mentation appears normal, affect normal/bright      X-RAY was not done.    ASSESSMENT:  1. Long term current use of anticoagulant therapy  See flowsheet  - Prothrombin - INR ([facility])    2. Chronic atrial fibrillation    - Prothrombin - INR ([facility])    3. Neuritis of left ulnar nerve  Will refer for surgical/injection opinion  - ORTHOPEDICS ADULT REFERRAL     4. Chronic cough  Empiric trial of otc  ppi x 2 weeks for silent reflux   Come back if persists.   cxr at that time

## 2019-11-05 ENCOUNTER — TRANSFERRED RECORDS (OUTPATIENT)
Dept: FAMILY MEDICINE | Facility: CLINIC | Age: 84
End: 2019-11-05

## 2019-11-08 ENCOUNTER — TRANSFERRED RECORDS (OUTPATIENT)
Dept: FAMILY MEDICINE | Facility: CLINIC | Age: 84
End: 2019-11-08

## 2019-11-12 ENCOUNTER — TRANSFERRED RECORDS (OUTPATIENT)
Dept: FAMILY MEDICINE | Facility: CLINIC | Age: 84
End: 2019-11-12

## 2019-11-22 ENCOUNTER — OFFICE VISIT (OUTPATIENT)
Dept: FAMILY MEDICINE | Facility: CLINIC | Age: 84
End: 2019-11-22

## 2019-11-22 VITALS
DIASTOLIC BLOOD PRESSURE: 80 MMHG | WEIGHT: 278.8 LBS | RESPIRATION RATE: 16 BRPM | HEART RATE: 62 BPM | BODY MASS INDEX: 45 KG/M2 | OXYGEN SATURATION: 95 % | SYSTOLIC BLOOD PRESSURE: 130 MMHG

## 2019-11-22 DIAGNOSIS — I25.10 CORONARY ARTERY DISEASE INVOLVING NATIVE CORONARY ARTERY OF NATIVE HEART WITHOUT ANGINA PECTORIS: ICD-10-CM

## 2019-11-22 DIAGNOSIS — Z01.818 PREOP GENERAL PHYSICAL EXAM: Primary | ICD-10-CM

## 2019-11-22 DIAGNOSIS — I48.20 CHRONIC ATRIAL FIBRILLATION (H): ICD-10-CM

## 2019-11-22 DIAGNOSIS — I10 ESSENTIAL HYPERTENSION: ICD-10-CM

## 2019-11-22 DIAGNOSIS — G56.22 ULNAR NEURITIS, LEFT: ICD-10-CM

## 2019-11-22 DIAGNOSIS — E66.01 MORBID OBESITY DUE TO EXCESS CALORIES (H): ICD-10-CM

## 2019-11-22 PROBLEM — M46.92 INFLAMMATORY SPONDYLOPATHY OF CERVICAL REGION (H): Status: RESOLVED | Noted: 2019-04-16 | Resolved: 2019-11-22

## 2019-11-22 PROBLEM — Z76.0 ENCOUNTER FOR MEDICATION REFILL: Status: RESOLVED | Noted: 2017-01-05 | Resolved: 2019-11-22

## 2019-11-22 PROBLEM — R73.03 PRE-DIABETES: Status: RESOLVED | Noted: 2018-01-11 | Resolved: 2019-11-22

## 2019-11-22 PROBLEM — Z79.899 ENCOUNTER FOR LONG-TERM (CURRENT) USE OF MEDICATIONS: Status: RESOLVED | Noted: 2018-01-25 | Resolved: 2019-11-22

## 2019-11-22 LAB
% GRANULOCYTES: 70.7 % (ref 42.2–75.2)
HCT VFR BLD AUTO: 41.5 % (ref 39–51)
HEMOGLOBIN: 14 G/DL (ref 13.4–17.5)
LYMPHOCYTES NFR BLD AUTO: 22 % (ref 20.5–51.1)
MCH RBC QN AUTO: 32.9 PG (ref 27–31)
MCHC RBC AUTO-ENTMCNC: 33.7 G/DL (ref 33–37)
MCV RBC AUTO: 97.6 FL (ref 80–100)
MONOCYTES NFR BLD AUTO: 7.3 % (ref 1.7–9.3)
PLATELET # BLD AUTO: 174 K/UL (ref 140–450)
RBC # BLD AUTO: 4.25 X10/CMM (ref 4.2–5.9)
WBC # BLD AUTO: 7.1 X10/CMM (ref 3.8–11)

## 2019-11-22 PROCEDURE — 36415 COLL VENOUS BLD VENIPUNCTURE: CPT | Performed by: FAMILY MEDICINE

## 2019-11-22 PROCEDURE — G0008 ADMIN INFLUENZA VIRUS VAC: HCPCS | Performed by: FAMILY MEDICINE

## 2019-11-22 PROCEDURE — 90662 IIV NO PRSV INCREASED AG IM: CPT | Performed by: FAMILY MEDICINE

## 2019-11-22 PROCEDURE — 99214 OFFICE O/P EST MOD 30 MIN: CPT | Mod: 25 | Performed by: FAMILY MEDICINE

## 2019-11-22 PROCEDURE — 85025 COMPLETE CBC W/AUTO DIFF WBC: CPT | Performed by: FAMILY MEDICINE

## 2019-11-22 PROCEDURE — 93000 ELECTROCARDIOGRAM COMPLETE: CPT | Mod: 59 | Performed by: FAMILY MEDICINE

## 2019-11-22 NOTE — PROGRESS NOTES
[facility]  [address] [place] [id number] [phone]  Dept: [phone]    PRE-OP EVALUATION:  Today's date: 2019    Vasiliy Corbin (: 1935) presents for pre-operative evaluation assessment as requested by , Nicole Greenfield MD .  He requires evaluation and anesthesia risk assessment prior to undergoing surgery/procedure for treatment of LEFT ELBOW ULNER NERVE SUBMUSCULAR TRANSPOSITION  .    Proposed Surgery/ Procedure: LEFT ELBOW ULNER NERVE SUBMUSCULAR TRANSPOSITION  Date of Surgery/ Procedure: 2019  Time of Surgery/ Procedure: [address] [place] [address]/Surgical Facility:  [facility]  {SURGERY FAX NUMBER:  Primary Physician: Christoph Robison  Type of Anesthesia Anticipated: to be determined    Patient has a Health Care Directive or Living Will:  NO    1. NO - Do you have a history of heart attack, stroke, stent, bypass or surgery on an artery in the head, neck, heart or legs?  2. NO - Do you ever have any pain or discomfort in your chest?  3. NO - Do you have a history of  Heart Failure?  4. NO - Are you troubled by shortness of breath when: walking on the level, up a slight hill or at night?  5. YES - Do you currently have a cold, bronchitis or other respiratory infection?  6. YES- Do you have a cough, shortness of breath or wheezing?  7. NO - Do you sometimes get pains in the calves of your legs when you walk?  8. NO - Do you or anyone in your family have previous history of blood clots?  9. NO - Do you or does anyone in your family have a serious bleeding problem such as prolonged bleeding following surgeries or cuts?  10. NO - Have you ever had problems with anemia or been told to take iron pills?  11. NO - Have you had any abnormal blood loss such as black, tarry or bloody stools, or abnormal vaginal bleeding?  12. NO - Have you ever had a blood transfusion?  13. NO - Have you or any of your relatives ever had problems with anesthesia?  14. YES - Do you  have sleep apnea, excessive snoring or daytime drowsiness?  15. NO - Do you have any prosthetic heart valves?  16. NO - Do you have prosthetic joints?  17. NO - Is there any chance that you may be pregnant?      HPI:     HPI related to upcoming procedure: ongoing issues with L ulnar neuritis.   Similar problem done on the R in the past.   R hand dominant       See problem list for active medical problems.  Problems all longstanding and stable, except as noted/documented.  See ROS for pertinent symptoms related to these conditions.      MEDICAL HISTORY:     Patient Active Problem List    Diagnosis Date Noted     Morbid obesity due to excess calories (H) 08/07/2017     Priority: Medium     Cardiomyopathy, unspecified type (H) 06/26/2017     Priority: Medium     Diastolic dysfunction 04/20/2017     Priority: Medium     Enlarged prostate with urinary retention      Priority: Medium     him started Proscar and Flomax in December and saw        Coronary artery disease involving native coronary artery of native heart without angina pectoris 11/23/2015     Priority: Medium     Chronic atrial fibrillation 10/26/2015     Priority: Medium     Essential hypertension 06/15/2015     Priority: Medium     Health Care Home 08/19/2013     Priority: Medium     State Tier Level:  Tier 1           ACP (advance care planning)      Priority: Medium     VMR (vasomotor rhinitis) 04/04/2013     Priority: Medium     Hyperlipidemia with target LDL less than 100      Priority: Medium     Diagnosis updated by automated process. Provider to review and confirm.       Cervical spine arthritis 03/21/2012     Priority: Medium     Long term current use of anticoagulant therapy 02/29/2012     Priority: Medium     Problem list name updated by automated process. Provider to review       Restless leg syndrome 05/23/2011     Priority: Medium      Past Medical History:   Diagnosis Date     Atrial fibrillation (H)     became chronic afib in  2016,paroxysmal,was on amiodarone but went into persistent afib in april 2106 and amiodrone was d/cd. now on BBLK and considering cardioversion.(5/2106).In Sept 2016 plan is rate control which has been a challenge and Holter in 6 months     CAD (coronary artery disease)     mild stenosis per cath     Cardiomyopathy (H)      Chronic cough 2017    Eval with Dr Sarai Oro - Kaila Ricardo - rec speech therapy, ct for eval of crackles No obstruction or copd     Colon polyp 2010     Gout      History of BPH     laser TURP in 2016 Dr Lima     HTN (hypertension)      Hyperlipidemia      Obesity (BMI 35.0-39.9 without comorbidity)      SUJEY on CPAP      Venous insufficiency of right leg     no trteatment recommended by Judy - option to treat is there Dr Zapata     Past Surgical History:   Procedure Laterality Date     CT release       CYSTOSCOPY N/A 4/7/2016    Procedure: CYSTOSCOPY;  Surgeon: Lokesh Lima MD;  Location: [facility] OR     CYSTOSCOPY, FULGURATE BLEEDERS, EVACUATE CLOT(S), COMBINED N/A 1/16/2018    Procedure: COMBINED CYSTOSCOPY, FULGURATE BLEEDERS, EVACUATE CLOT(S);  CYSTOSCOPY, FULGERATION;  Surgeon: Lokesh Lima MD;  Location: [facility] OR     HC LEFT HEART CATHETERIZATION  3/2010    Mild CAD     LASER KTP TRANSURETHRAL RESECTION (TUR) PROSTATE N/A 4/7/2016    Procedure: LASER KTP TRANSURETHRAL RESECTION (TUR) PROSTATE;  Surgeon: Lokesh Lima MD;  Location: [facility] OR     ulnar reroute      right      Current Outpatient Medications   Medication Sig Dispense Refill     allopurinol (ZYLOPRIM) 300 MG tablet TAKE 1 TABLET(300 MG) BY MOUTH DAILY 90 tablet 0     digoxin (LANOXIN) 125 MCG tablet Take 1 tablet (125 mcg) by mouth daily 90 tablet 3     Finasteride (PROSCAR PO) Take 5 mg by mouth daily       furosemide (LASIX) 40 MG tablet Take 40 mg by mouth daily       ipratropium (ATROVENT) 0.06 % nasal spray USE 2 SPRAYS IN EACH NOSTRIL FOUR TIMES DAILY AS NEEDED FOR RHINITIS 135 mL 3     losartan (COZAAR) 50 MG tablet  Take 1 tablet (50 mg) by mouth daily 90 tablet 3     metoprolol tartrate (LOPRESSOR) 50 MG tablet TAKE 2 TABLETS(100 MG) BY MOUTH TWICE DAILY 360 tablet 3     Multiple Vitamin (MULTI-VITAMIN) per tablet Take 1 tablet by mouth daily. 100 tablet 12     simvastatin (ZOCOR) 10 MG tablet TAKE 1 TABLET(10 MG) BY MOUTH AT BEDTIME 90 tablet 3     warfarin (COUMADIN) 2 MG tablet TAKE 1 1/2 TABLETS BY MOUTH DAILY 135 tablet 3     warfarin (COUMADIN) 2 MG tablet Take 2 mg by mouth daily Taking 1 1/2 pills (2.5mg) daily       OTC products: None, except as noted above    Allergies   Allergen Reactions     Cardizem [Diltiazem]      Swelling and poor rate control     Lisinopril Cough      Latex Allergy: NO    Social History     Tobacco Use     Smoking status: Former Smoker     Packs/day: 1.00     Years: 5.00     Pack years: 5.00     Types: Cigarettes     Last attempt to quit: 4/3/1977     Years since quittin.6     Smokeless tobacco: Former User     Quit date: 1960   Substance Use Topics     Alcohol use: No     Alcohol/week: 0.0 standard drinks     History   Drug Use No       REVIEW OF SYSTEMS:   CONSTITUTIONAL: NEGATIVE for fever, chills, change in weight  ENT/MOUTH: NEGATIVE for ear, mouth and throat problems  RESP: NEGATIVE for significant cough or SOB  CV: NEGATIVE for chest pain, palpitations or peripheral edema    EXAM:   /80   Pulse 62   Resp 16   Wt 126.5 kg (278 lb 12.8 oz)   SpO2 95%   BMI 45.00 kg/m    GENERAL APPEARANCE: alert, no distress and over weight  HENT: ear canals and TM's normal and nose and mouth without ulcers or lesions  RESP: lungs clear to auscultation - no rales, rhonchi or wheezes  CV: irreg irreg  ABDOMEN: soft, nontender, no HSM or masses and bowel sounds normal  NEURO: Normal strength and tone, sensory exam grossly normal, mentation intact and speech normal  MSK: no limping    DIAGNOSTICS:   EKG unchanged from prior    Recent Labs   Lab Test 10/25/19 09/27/19  0851  19  1001  04/16/19  0939  01/16/18  0700 01/09/18  1127 01/09/18  1125 01/09/18  1116   HGB  --   --   --  14.8  --   --  15.0  --   --  14.4   PLT  --   --   --  183  --   --   --   --   --  194   INR 2.2 2.6   < >  --   --    < > 1.18*  --   --   --    NA  --   --   --   --  142  --   --   --  141  --    POTASSIUM  --   --   --   --  4.3  --  4.4  --  4.4  --    CR  --   --   --   --  0.85  --   --   --  0.97  --    A1C  --   --   --   --  5.8*  --   --  6.0*  --   --     < > = values in this interval not displayed.     IMPRESSION:   Reason for surgery/procedure: ulnar neuritis L  Diagnosis/reason for consult: perioperative clearance    The proposed surgical procedure is considered LOW risk.    REVISED CARDIAC RISK INDEX  The patient has the following serious cardiovascular risks for perioperative complications such as (MI, PE, VFib and 3  AV Block):  Coronary Artery Disease (MI, positive stress test, angina, Qs on EKG)  INTERPRETATION: 1 risks: Class II (low risk - 0.9% complication rate)    The patient has the following additional risks for perioperative complications:  No identified additional risks      ICD-10-CM    1. Preop general physical exam Z01.818    2. 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 Morbid obesity due to excess calories (H) E66.01    4. Coronary artery disease involving native coronary artery of native heart without angina pectoris I25.10    5. Chronic atrial fibrillation I48.20    6. Essential hypertension I10        RECOMMENDATIONS:     Cardiovascular Risk  Patient is already on a Beta Blocker. Continue Betablocker therapy after surgery, using Beta blocker order set as necessary for NPO status.      Obstructive Sleep Apnea (or suspected sleep apnea)  Patient is to bring their home CPAP with them on the day of surgery    Anticoagulant or Antiplatelet Medication Use  WARFARIN: off x 5 days per surgeon request        APPROVAL GIVEN to proceed with proposed procedure, without further diagnostic evaluation; if  can be done with a block, may be a better option given patient's comorbidities       Signed Electronically by: Christoph Robison MD    Copy of this evaluation report is provided to requesting physician.    [place] Preop Guidelines    Revised Cardiac Risk Index

## 2019-11-23 LAB
BUN SERPL-MCNC: 24 MG/DL (ref 8–27)
BUN/CREATININE RATIO: 23 (ref 10–24)
CALCIUM SERPL-MCNC: 9.4 MG/DL (ref 8.6–10.2)
CHLORIDE SERPLBLD-SCNC: 103 MMOL/L (ref 96–106)
CREAT SERPL-MCNC: 1.06 MG/DL (ref 0.76–1.27)
EGFR IF AFRICN AM: 74 ML/MIN/1.73
EGFR IF NONAFRICN AM: 64 ML/MIN/1.73
GLUCOSE SERPL-MCNC: 111 MG/DL (ref 65–99)
POTASSIUM SERPL-SCNC: 4.2 MMOL/L (ref 3.5–5.2)
SODIUM SERPL-SCNC: 140 MMOL/L (ref 134–144)
TOTAL CO2: 25 MMOL/L (ref 20–29)

## 2019-11-27 RX ORDER — TAMSULOSIN HYDROCHLORIDE 0.4 MG/1
0.4 CAPSULE ORAL DAILY
Status: ON HOLD | COMMUNITY
End: 2019-11-29

## 2019-11-27 RX ORDER — LISINOPRIL 10 MG/1
10 TABLET ORAL DAILY
Status: ON HOLD | COMMUNITY
End: 2019-11-29

## 2019-11-27 RX ORDER — FLUOCINONIDE 0.5 MG/G
OINTMENT TOPICAL 2 TIMES DAILY
COMMUNITY
End: 2020-02-12

## 2019-11-28 ENCOUNTER — ANESTHESIA EVENT (OUTPATIENT)
Dept: SURGERY | Facility: CLINIC | Age: 84
End: 2019-11-28
Payer: MEDICARE

## 2019-11-28 ASSESSMENT — ENCOUNTER SYMPTOMS: DYSRHYTHMIAS: 1

## 2019-11-29 ENCOUNTER — ANESTHESIA (OUTPATIENT)
Dept: SURGERY | Facility: CLINIC | Age: 84
End: 2019-11-29
Payer: MEDICARE

## 2019-11-29 ENCOUNTER — HOSPITAL ENCOUNTER (OUTPATIENT)
Facility: CLINIC | Age: 84
Discharge: HOME | End: 2019-11-29
Attending: ORTHOPAEDIC SURGERY | Admitting: ORTHOPAEDIC SURGERY
Payer: MEDICARE

## 2019-11-29 VITALS
BODY MASS INDEX: 44.53 KG/M2 | TEMPERATURE: 95.4 F | HEIGHT: 66 IN | SYSTOLIC BLOOD PRESSURE: 131 MMHG | WEIGHT: 277.1 LBS | RESPIRATION RATE: 18 BRPM | DIASTOLIC BLOOD PRESSURE: 68 MMHG | HEART RATE: 70 BPM | OXYGEN SATURATION: 97 %

## 2019-11-29 DIAGNOSIS — G89.18 POSTOPERATIVE PAIN: Primary | ICD-10-CM

## 2019-11-29 LAB
INR PPP: 1.26 (ref 0.86–1.14)
POTASSIUM SERPL-SCNC: 4.4 MMOL/L (ref 3.4–5.3)

## 2019-11-29 PROCEDURE — 71000012 ZZH RECOVERY PHASE 1 LEVEL 1 FIRST HR: Performed by: ORTHOPAEDIC SURGERY

## 2019-11-29 PROCEDURE — 25000128 H RX IP 250 OP 636: Performed by: NURSE ANESTHETIST, CERTIFIED REGISTERED

## 2019-11-29 PROCEDURE — 25000128 H RX IP 250 OP 636: Performed by: PHYSICIAN ASSISTANT

## 2019-11-29 PROCEDURE — 71000013 ZZH RECOVERY PHASE 1 LEVEL 1 EA ADDTL HR: Performed by: ORTHOPAEDIC SURGERY

## 2019-11-29 PROCEDURE — 37000008 ZZH ANESTHESIA TECHNICAL FEE, 1ST 30 MIN: Performed by: ORTHOPAEDIC SURGERY

## 2019-11-29 PROCEDURE — 40000170 ZZH STATISTIC PRE-PROCEDURE ASSESSMENT II: Performed by: ORTHOPAEDIC SURGERY

## 2019-11-29 PROCEDURE — 25000128 H RX IP 250 OP 636: Performed by: ANESTHESIOLOGY

## 2019-11-29 PROCEDURE — 25800030 ZZH RX IP 258 OP 636: Performed by: NURSE ANESTHETIST, CERTIFIED REGISTERED

## 2019-11-29 PROCEDURE — 36000056 ZZH SURGERY LEVEL 3 1ST 30 MIN: Performed by: ORTHOPAEDIC SURGERY

## 2019-11-29 PROCEDURE — 37000009 ZZH ANESTHESIA TECHNICAL FEE, EACH ADDTL 15 MIN: Performed by: ORTHOPAEDIC SURGERY

## 2019-11-29 PROCEDURE — 25000125 ZZHC RX 250: Performed by: NURSE ANESTHETIST, CERTIFIED REGISTERED

## 2019-11-29 PROCEDURE — 25000566 ZZH SEVOFLURANE, EA 15 MIN: Performed by: ORTHOPAEDIC SURGERY

## 2019-11-29 PROCEDURE — 36000058 ZZH SURGERY LEVEL 3 EA 15 ADDTL MIN: Performed by: ORTHOPAEDIC SURGERY

## 2019-11-29 PROCEDURE — 71000027 ZZH RECOVERY PHASE 2 EACH 15 MINS: Performed by: ORTHOPAEDIC SURGERY

## 2019-11-29 PROCEDURE — 25000125 ZZHC RX 250: Performed by: ORTHOPAEDIC SURGERY

## 2019-11-29 PROCEDURE — 85610 PROTHROMBIN TIME: CPT | Performed by: PHYSICIAN ASSISTANT

## 2019-11-29 PROCEDURE — 93005 ELECTROCARDIOGRAM TRACING: CPT

## 2019-11-29 PROCEDURE — 36415 COLL VENOUS BLD VENIPUNCTURE: CPT | Performed by: PHYSICIAN ASSISTANT

## 2019-11-29 PROCEDURE — 93010 ELECTROCARDIOGRAM REPORT: CPT | Performed by: INTERNAL MEDICINE

## 2019-11-29 PROCEDURE — 27210794 ZZH OR GENERAL SUPPLY STERILE: Performed by: ORTHOPAEDIC SURGERY

## 2019-11-29 PROCEDURE — 84132 ASSAY OF SERUM POTASSIUM: CPT | Performed by: PHYSICIAN ASSISTANT

## 2019-11-29 RX ORDER — LIDOCAINE HYDROCHLORIDE 20 MG/ML
INJECTION, SOLUTION INFILTRATION; PERINEURAL PRN
Status: DISCONTINUED | OUTPATIENT
Start: 2019-11-29 | End: 2019-11-29

## 2019-11-29 RX ORDER — OXYCODONE HYDROCHLORIDE 5 MG/1
TABLET ORAL
Qty: 66 TABLET | Refills: 0 | Status: SHIPPED | OUTPATIENT
Start: 2019-11-29 | End: 2020-01-22

## 2019-11-29 RX ORDER — MEPERIDINE HYDROCHLORIDE 25 MG/ML
12.5 INJECTION INTRAMUSCULAR; INTRAVENOUS; SUBCUTANEOUS
Status: DISCONTINUED | OUTPATIENT
Start: 2019-11-29 | End: 2019-11-29 | Stop reason: HOSPADM

## 2019-11-29 RX ORDER — HYDROMORPHONE HYDROCHLORIDE 1 MG/ML
.3-.5 INJECTION, SOLUTION INTRAMUSCULAR; INTRAVENOUS; SUBCUTANEOUS EVERY 10 MIN PRN
Status: DISCONTINUED | OUTPATIENT
Start: 2019-11-29 | End: 2019-11-29 | Stop reason: HOSPADM

## 2019-11-29 RX ORDER — MAGNESIUM HYDROXIDE 1200 MG/15ML
LIQUID ORAL PRN
Status: DISCONTINUED | OUTPATIENT
Start: 2019-11-29 | End: 2019-11-29 | Stop reason: HOSPADM

## 2019-11-29 RX ORDER — CEFAZOLIN SODIUM IN 0.9 % NACL 3 G/100 ML
3 INTRAVENOUS SOLUTION, PIGGYBACK (ML) INTRAVENOUS
Status: COMPLETED | OUTPATIENT
Start: 2019-11-29 | End: 2019-11-29

## 2019-11-29 RX ORDER — DEXAMETHASONE SODIUM PHOSPHATE 4 MG/ML
INJECTION, SOLUTION INTRA-ARTICULAR; INTRALESIONAL; INTRAMUSCULAR; INTRAVENOUS; SOFT TISSUE PRN
Status: DISCONTINUED | OUTPATIENT
Start: 2019-11-29 | End: 2019-11-29

## 2019-11-29 RX ORDER — FENTANYL CITRATE 50 UG/ML
50-100 INJECTION, SOLUTION INTRAMUSCULAR; INTRAVENOUS
Status: COMPLETED | OUTPATIENT
Start: 2019-11-29 | End: 2019-11-29

## 2019-11-29 RX ORDER — EPINEPHRINE 1 MG/ML
INJECTION, SOLUTION INTRAMUSCULAR; SUBCUTANEOUS
Status: DISCONTINUED
Start: 2019-11-29 | End: 2019-11-29 | Stop reason: HOSPADM

## 2019-11-29 RX ORDER — ONDANSETRON 2 MG/ML
INJECTION INTRAMUSCULAR; INTRAVENOUS PRN
Status: DISCONTINUED | OUTPATIENT
Start: 2019-11-29 | End: 2019-11-29

## 2019-11-29 RX ORDER — BUPIVACAINE HYDROCHLORIDE AND EPINEPHRINE 5; 5 MG/ML; UG/ML
INJECTION, SOLUTION PERINEURAL PRN
Status: DISCONTINUED | OUTPATIENT
Start: 2019-11-29 | End: 2019-11-29 | Stop reason: HOSPADM

## 2019-11-29 RX ORDER — BUPIVACAINE HYDROCHLORIDE 5 MG/ML
INJECTION, SOLUTION EPIDURAL; INTRACAUDAL
Status: DISCONTINUED
Start: 2019-11-29 | End: 2019-11-29 | Stop reason: HOSPADM

## 2019-11-29 RX ORDER — SODIUM CHLORIDE, SODIUM LACTATE, POTASSIUM CHLORIDE, CALCIUM CHLORIDE 600; 310; 30; 20 MG/100ML; MG/100ML; MG/100ML; MG/100ML
INJECTION, SOLUTION INTRAVENOUS CONTINUOUS
Status: DISCONTINUED | OUTPATIENT
Start: 2019-11-29 | End: 2019-11-29 | Stop reason: HOSPADM

## 2019-11-29 RX ORDER — ONDANSETRON 4 MG/1
4 TABLET, ORALLY DISINTEGRATING ORAL EVERY 30 MIN PRN
Status: DISCONTINUED | OUTPATIENT
Start: 2019-11-29 | End: 2019-11-29 | Stop reason: HOSPADM

## 2019-11-29 RX ORDER — ONDANSETRON 2 MG/ML
4 INJECTION INTRAMUSCULAR; INTRAVENOUS EVERY 30 MIN PRN
Status: DISCONTINUED | OUTPATIENT
Start: 2019-11-29 | End: 2019-11-29 | Stop reason: HOSPADM

## 2019-11-29 RX ORDER — NALOXONE HYDROCHLORIDE 0.4 MG/ML
.1-.4 INJECTION, SOLUTION INTRAMUSCULAR; INTRAVENOUS; SUBCUTANEOUS
Status: DISCONTINUED | OUTPATIENT
Start: 2019-11-29 | End: 2019-11-29 | Stop reason: HOSPADM

## 2019-11-29 RX ORDER — FENTANYL CITRATE 50 UG/ML
25-50 INJECTION, SOLUTION INTRAMUSCULAR; INTRAVENOUS
Status: DISCONTINUED | OUTPATIENT
Start: 2019-11-29 | End: 2019-11-29 | Stop reason: HOSPADM

## 2019-11-29 RX ORDER — CEFAZOLIN SODIUM 1 G/3ML
1 INJECTION, POWDER, FOR SOLUTION INTRAMUSCULAR; INTRAVENOUS SEE ADMIN INSTRUCTIONS
Status: DISCONTINUED | OUTPATIENT
Start: 2019-11-29 | End: 2019-11-29 | Stop reason: HOSPADM

## 2019-11-29 RX ORDER — PROPOFOL 10 MG/ML
INJECTION, EMULSION INTRAVENOUS PRN
Status: DISCONTINUED | OUTPATIENT
Start: 2019-11-29 | End: 2019-11-29

## 2019-11-29 RX ORDER — SODIUM CHLORIDE, SODIUM LACTATE, POTASSIUM CHLORIDE, CALCIUM CHLORIDE 600; 310; 30; 20 MG/100ML; MG/100ML; MG/100ML; MG/100ML
INJECTION, SOLUTION INTRAVENOUS CONTINUOUS PRN
Status: DISCONTINUED | OUTPATIENT
Start: 2019-11-29 | End: 2019-11-29

## 2019-11-29 RX ADMIN — PHENYLEPHRINE HYDROCHLORIDE 50 MCG: 10 INJECTION INTRAVENOUS at 07:47

## 2019-11-29 RX ADMIN — PHENYLEPHRINE HYDROCHLORIDE 100 MCG: 10 INJECTION INTRAVENOUS at 08:59

## 2019-11-29 RX ADMIN — FENTANYL CITRATE 25 MCG: 50 INJECTION, SOLUTION INTRAMUSCULAR; INTRAVENOUS at 07:53

## 2019-11-29 RX ADMIN — PHENYLEPHRINE HYDROCHLORIDE 0.2 MCG/KG/MIN: 10 INJECTION INTRAVENOUS at 08:03

## 2019-11-29 RX ADMIN — PROPOFOL 30 MG: 10 INJECTION, EMULSION INTRAVENOUS at 08:21

## 2019-11-29 RX ADMIN — Medication 3 G: at 07:43

## 2019-11-29 RX ADMIN — PROPOFOL 30 MG: 10 INJECTION, EMULSION INTRAVENOUS at 09:33

## 2019-11-29 RX ADMIN — PHENYLEPHRINE HYDROCHLORIDE 100 MCG: 10 INJECTION INTRAVENOUS at 07:54

## 2019-11-29 RX ADMIN — PHENYLEPHRINE HYDROCHLORIDE 100 MCG: 10 INJECTION INTRAVENOUS at 09:38

## 2019-11-29 RX ADMIN — SODIUM CHLORIDE, POTASSIUM CHLORIDE, SODIUM LACTATE AND CALCIUM CHLORIDE: 600; 310; 30; 20 INJECTION, SOLUTION INTRAVENOUS at 07:25

## 2019-11-29 RX ADMIN — FENTANYL CITRATE 50 MCG: 50 INJECTION, SOLUTION INTRAMUSCULAR; INTRAVENOUS at 08:23

## 2019-11-29 RX ADMIN — FENTANYL CITRATE 50 MCG: 50 INJECTION, SOLUTION INTRAMUSCULAR; INTRAVENOUS at 07:33

## 2019-11-29 RX ADMIN — PROPOFOL 20 MG: 10 INJECTION, EMULSION INTRAVENOUS at 09:40

## 2019-11-29 RX ADMIN — PHENYLEPHRINE HYDROCHLORIDE 100 MCG: 10 INJECTION INTRAVENOUS at 08:35

## 2019-11-29 RX ADMIN — PHENYLEPHRINE HYDROCHLORIDE 100 MCG: 10 INJECTION INTRAVENOUS at 08:29

## 2019-11-29 RX ADMIN — PROPOFOL 200 MG: 10 INJECTION, EMULSION INTRAVENOUS at 07:34

## 2019-11-29 RX ADMIN — DEXMEDETOMIDINE HYDROCHLORIDE 8 MCG: 100 INJECTION, SOLUTION INTRAVENOUS at 08:16

## 2019-11-29 RX ADMIN — DEXMEDETOMIDINE HYDROCHLORIDE 12 MCG: 100 INJECTION, SOLUTION INTRAVENOUS at 08:21

## 2019-11-29 RX ADMIN — ONDANSETRON 4 MG: 2 INJECTION INTRAMUSCULAR; INTRAVENOUS at 09:31

## 2019-11-29 RX ADMIN — PROPOFOL 30 MG: 10 INJECTION, EMULSION INTRAVENOUS at 09:27

## 2019-11-29 RX ADMIN — DEXAMETHASONE SODIUM PHOSPHATE 4 MG: 4 INJECTION, SOLUTION INTRA-ARTICULAR; INTRALESIONAL; INTRAMUSCULAR; INTRAVENOUS; SOFT TISSUE at 08:19

## 2019-11-29 RX ADMIN — PHENYLEPHRINE HYDROCHLORIDE 100 MCG: 10 INJECTION INTRAVENOUS at 08:41

## 2019-11-29 RX ADMIN — LIDOCAINE HYDROCHLORIDE 100 MG: 20 INJECTION, SOLUTION INFILTRATION; PERINEURAL at 07:34

## 2019-11-29 RX ADMIN — FENTANYL CITRATE 25 MCG: 50 INJECTION, SOLUTION INTRAMUSCULAR; INTRAVENOUS at 08:00

## 2019-11-29 RX ADMIN — PROPOFOL 30 MG: 10 INJECTION, EMULSION INTRAVENOUS at 08:55

## 2019-11-29 ASSESSMENT — MIFFLIN-ST. JEOR: SCORE: 1889.67

## 2019-11-29 NOTE — ANESTHESIA CARE TRANSFER NOTE
Patient: Vasiliy Corbin    Procedure(s):  LEFT ELBOW ULNER NERVE SUBMUSCULAR TRANSPOSITION    Diagnosis: * No pre-op diagnosis entered *  Diagnosis Additional Information: No value filed.    Anesthesia Type:   General, LMA     Note:  Airway :Face Mask  Patient transferred to:PACU  Comments: VSS. Airway and IV patent. Patient comfortable. Report to RN. Stable care transfer.Handoff Report: Identifed the Patient, Identified the Reponsible Provider, Reviewed the pertinent medical history, Discussed the surgical course, Reviewed Intra-OP anesthesia mangement and issues during anesthesia, Set expectations for post-procedure period and Allowed opportunity for questions and acknowledgement of understanding      Vitals: (Last set prior to Anesthesia Care Transfer)    CRNA VITALS  11/29/2019 0924 - 11/29/2019 0958      11/29/2019             Pulse:  80    SpO2:  99 %    Resp Rate (observed):  11                Electronically Signed By: KAYLEE Gil CRNA  November 29, 2019  9:58 AM

## 2019-11-29 NOTE — ANESTHESIA PREPROCEDURE EVALUATION
Anesthesia Pre-Procedure Evaluation    Patient: Vasiliy Corbin   MRN: [id number] : 1935          Preoperative Diagnosis: * No pre-op diagnosis entered *    Procedure(s):  LEFT ELBOW ULNER NERVE SUBMUSCULAR TRANSPOSITION    Past Medical History:   Diagnosis Date     Atrial fibrillation (H)     became chronic afib in ,paroxysmal,was on amiodarone but went into persistent afib in 2106 and amiodrone was d/cd. now on BBLK and considering cardioversion.(2106).In 2016 plan is rate control which has been a challenge and Holter in 6 months     CAD (coronary artery disease)     mild stenosis per cath     Cardiomyopathy (H)      Chronic cough     Eval with Dr Sarai Oro - Guys Ricardo - rec speech therapy, ct for eval of crackles No obstruction or copd     Colon polyp      Gout      Hiatal hernia      Hip pain      History of BPH     laser TURP in  Dr Lima     HTN (hypertension)      Hyperlipidemia      Neuropathy      Obesity (BMI 35.0-39.9 without comorbidity)      SUJEY on CPAP      Shoulder injury      Venous insufficiency of right leg     no trteatment recommended by Judy - option to treat is there Dr Zapata     Wrist swelling      Past Surgical History:   Procedure Laterality Date     CLAVICLE SURGERY Left     as a kid     CT release       CYSTOSCOPY N/A 2016    Procedure: CYSTOSCOPY;  Surgeon: Lokesh Lima MD;  Location:  OR     CYSTOSCOPY, FULGURATE BLEEDERS, EVACUATE CLOT(S), COMBINED N/A 2018    Procedure: COMBINED CYSTOSCOPY, FULGURATE BLEEDERS, EVACUATE CLOT(S);  CYSTOSCOPY, FULGERATION;  Surgeon: Lokesh Lima MD;  Location:  OR      LEFT HEART CATHETERIZATION  3/2010    Mild CAD     LASER KTP TRANSURETHRAL RESECTION (TUR) PROSTATE N/A 2016    Procedure: LASER KTP TRANSURETHRAL RESECTION (TUR) PROSTATE;  Surgeon: Lokesh Lima MD;  Location:  OR     ulnar reroute      right        Anesthesia Evaluation     . Pt has had prior anesthetic. Type:  General    No history of anesthetic complications          ROS/MED HX    ENT/Pulmonary:     (+)sleep apnea, allergic rhinitis, , . Other pulmonary disease chronic cough.    Neurologic:     (+)neuropathy - ulnar,     Cardiovascular:     (+) Dyslipidemia, hypertension--CAD, --. Taking blood thinners : . . . :. dysrhythmias a-fib, . Previous cardiac testing Echodate:results:Impression  No impression found.      Narrative  [id number]  Formerly [facility]  [id number]  [id number]^FRANCOIS^SIN^HOPELAMARCO      [facility]  [facility]  Echocardiography Laboratory  [address]  [address]  [place] [address]  Phone [phone]  Fax [phone]      Name: AVERY FERRER  MRN: [id number]  : 1935  Study Date: 2018 09:22 AM  Show more    Age: 82 yrs  Gender: Male  Patient Location: [facility]  Reason For Study: Cardiomyopathy  Ordering Physician: SIN PARRISH  Referring Physician: CHARBEL GRANDA  Performed By: Eve Newman RDCS    BSA: 2.3 m2  Height: 66 in  Weight: 274 lb  HR: 71  BP: 150/80 mmHg  _____________________________________________________________________________  __    Procedure  Complete Echo Adult.    _____________________________________________________________________________  __      Interpretation Summary    Left ventricular systolic function is low normal.  The visual ejection fraction is estimated at 50-55%.  There is mild biatrial enlargement.  Compared to prior study, there is no significant change.  _____________________________________________________________________________  __      Left Ventricle  The left ventricle is normal in size. Left ventricular systolic function is  low normal. The visual ejection fraction is estimated at 50-55%. E by E prime  ratio is between 8 and 15, which is indeterminate for assessment of left  ventricular filling pressures. There is borderline global hypokinesia of the  left ventricle.    Right Ventricle  The right  ventricle is normal in structure, function and size.    Atria  The left atrium is mildly dilated. There is mild biatrial enlargement. The  right atrium is mildly dilated.    Mitral Valve  The mitral valve is normal in structure and function. There is trace mitral  regurgitation. There is no mitral valve stenosis.    Tricuspid Valve  The tricuspid valve is not well visualized. There is trace tricuspid  regurgitation. The right ventricular systolic pressure is approximated at 27.5  mmHg plus the right atrial pressure.      Aortic Valve  The aortic valve is not well visualized. No aortic regurgitation is present.  No hemodynamically significant valvular aortic stenosis.    Pulmonic Valve  The pulmonic valve is not well visualized. Normal pulmonic valve velocity.    Vessels  Borderline aortic root dilatation. The IVC is normal in size and reactivity  with respiration, suggesting normal central venous pressure.    Pericardium  There is no pericardial effusion.    _____________________________________________________________________________  __  MMode/2D Measurements & Calculations  IVSd: 1.2 cm  LVIDd: 5.1 cm  LVIDs: 4.5 cm  LVPWd: 1.3 cm  FS: 12.1 %  EDV(Teich): 123.5 ml  ESV(Teich): 91.4 ml  LV mass(C)d: 255.0 grams  LV mass(C)dI: 111.5 grams/m2  Ao root diam: 3.8 cm  asc Aorta Diam: 4.0 cm  LA Volume (BP): 74.5 ml    LA Volume Index (BP): 32.5 ml/m2  RWT: 0.52      date: results: date: results: date: results:         (-) angina, CHF, stent, angina and CABG   METS/Exercise Tolerance:  >4 METS   Hematologic:  - neg hematologic  ROS       Musculoskeletal: Comment: Gout        GI/Hepatic:     (+) hiatal hernia,      (-) GERD and liver disease   Renal/Genitourinary:      (-) renal disease   Endo:     (+) Obesity, .   (-) Type I DM and Type II DM   Psychiatric:         Infectious Disease:         Malignancy:         Other:                          Physical Exam  Normal systems: pulmonary    Airway   Mallampati: II  TM  "distance: >3 FB  Neck ROM: full    Dental   (+) chipped  Comment: Native and stable except a chipped molar.     Cardiovascular   Rhythm and rate: irregular and normal      Pulmonary             Lab Results   Component Value Date    WBC 7.1 11/22/2019    HGB 14.0 11/22/2019    HCT 41.5 11/22/2019     11/22/2019     11/22/2019    POTASSIUM 4.2 11/22/2019    CHLORIDE 103 11/22/2019    CO2 29 05/22/2017    BUN 24 11/22/2019    BUN 23 11/22/2019    CR 1.06 11/22/2019     (H) 11/22/2019    EDUARD 9.4 11/22/2019    MAG 2.0 06/08/2010    ALBUMIN 4.3 04/16/2019    PROTTOTAL 7.2 04/16/2019    ALT 16 04/16/2019    AST 24 04/16/2019    ALKPHOS 123 (H) 04/16/2019    BILITOTAL 0.6 04/16/2019    BILIDIRECT 0.22 04/16/2019    PTT 37 03/18/2010    INR 2.2 10/25/2019    TSH 1.81 12/28/2016    T4 0.93 04/16/2019       Preop Vitals  BP Readings from Last 3 Encounters:   11/22/19 130/80   10/25/19 124/82   08/30/19 132/84    Pulse Readings from Last 3 Encounters:   11/22/19 62   10/25/19 60   07/02/19 68      Resp Readings from Last 3 Encounters:   11/22/19 16   10/25/19 16   07/02/19 20    SpO2 Readings from Last 3 Encounters:   11/22/19 95%   10/25/19 96%   07/02/19 93%      Temp Readings from Last 1 Encounters:   07/02/19 36.9  C (98.4  F) (Temporal)    Ht Readings from Last 1 Encounters:   04/22/19 1.676 m (5' 6\")      Wt Readings from Last 1 Encounters:   11/22/19 126.5 kg (278 lb 12.8 oz)    Estimated body mass index is 45 kg/m  as calculated from the following:    Height as of 4/22/19: 1.676 m (5' 6\").    Weight as of 11/22/19: 126.5 kg (278 lb 12.8 oz).       Anesthesia Plan      History & Physical Review  History and physical reviewed and following examination; no interval change.    ASA Status:  3 .    NPO Status:  > 8 hours    Plan for General and LMA with Intravenous induction. Maintenance will be Inhalation.    PONV prophylaxis:  Ondansetron (or other 5HT-3) and Dexamethasone or Solumedrol   "     Postoperative Care  Postoperative pain management:  IV analgesics and Multi-modal analgesia.      Consents  Anesthetic plan, risks, benefits and alternatives discussed with:  Patient..                 Joss Wu MD

## 2019-11-29 NOTE — ANESTHESIA POSTPROCEDURE EVALUATION
Patient: Vasiliy Corbin    Procedure(s):  LEFT ELBOW ULNER NERVE SUBMUSCULAR TRANSPOSITION    Diagnosis:* No pre-op diagnosis entered *  Diagnosis Additional Information: No value filed.    Anesthesia Type:  General, LMA    Note:  Anesthesia Post Evaluation    Patient location during evaluation: PACU  Patient participation: Able to fully participate in evaluation  Level of consciousness: awake and alert  Pain management: adequate  Airway patency: patent  Cardiovascular status: acceptable  Respiratory status: acceptable  Hydration status: acceptable  PONV: none     Anesthetic complications: None          Last vitals:  Vitals:    11/29/19 1045 11/29/19 1055 11/29/19 1150   BP: 108/70 109/56 131/68   Pulse: 70     Resp: 18 18 18   Temp: 35.1  C (95.2  F) 35.2  C (95.4  F)    SpO2: 93% 96% 97%         Electronically Signed By: Joss Wu MD  November 29, 2019  1:56 PM

## 2019-11-29 NOTE — BRIEF OP NOTE
[facility]    Brief Operative Note    Pre-operative diagnosis: Left Ulnar nerve compression at elbow  Post-operative diagnosis same    Procedure: Procedure(s):  LEFT ELBOW ULNER NERVE SUBMUSCULAR TRANSPOSITION  Surgeon: Surgeon(s) and Role:     * Nicole Bai MD - Primary  Anesthesia: General   Estimated blood loss: Less than 50 ml  Drains: None  Specimens: * No specimens in log *  Findings:   None.  Complications: None.  Implants: * No implants in log *

## 2019-11-29 NOTE — OR NURSING
Pt states ring and little finger numb--states they've been that way since surgery.  Pt states he was mistake earlier when he said it was his first 2 fingers.

## 2019-11-29 NOTE — OP NOTE
SURGEON: Janice Bai MD.  ASSISTANT: none      PRE-OP DIAGNOSIS:  Left ulnar neuritis.    POST-OP DIAGNOSIS:  Left ulnar neuritis.    OPERATION:  Left ulnar nerve decompression at the elbow (45452), and anterior transposition with a flexor pronator slide (83393-20).    ANESTHESIA  General    TOURNIQUET TIME  Under an hour.    INFORMED CONSENT  Informed consent was obtained in the holding area.    SURGICAL SITE SIGNED  The surgical site was signed by me prior to entering the OR.    TIME OUT  A time-out was performed confirming the surgical site prior to incision.    PROPHYLACTIC ANTIBIOTICS  Ancef given before the tourniquet was inflated.    DVT INDICATIONS  SCD's were not applied.    PROCEDURE:  The patient was brought to the operating room and the arm was prepped and draped in a normal standard manner. A time-out was performed confirming the surgical site and that antibiotics had been given before the tourniquet was inflated.  The arm was exsanguinated and the tourniquet was inflated to 150 mm above the systolic pressure.    A 10-cm incision was made 6-cm proximal to the medial epicondyle and 4-cm distal to the medial epicondyle.  The skin was then carefully dissected and the medial antibrachial cutaneous nerve was identified and the small vessel loop was placed around it.  The ulnar nerve was then dissected from behind the intermuscular septum and a small vessel loop was placed around it as well.  I then removed 8 cm of intermuscular septum from the medial epicondyle all the way up to the arcade of [place].  The nerve was then freed up and mobilized proximally.  Diaz's fascia was divided as well as the fascia over the FCU.  The nerve was carefully dissected down to its two motor branches into the FCU.  Once the nerve was freed up and could be mobilized anteriorly, the subcutaneous tissue was elevated off of the flexor pronator mass.  A large vaginal packing clamp was passed beneath the flexor pronator  muscle to exit just proximal to the FCU motor branches.  The flexor pronator muscle was cut on the diagonal.  The proximal portion of the muscle was snapped in two places with Allis clamp.  The ulnar nerve was then transferred anteriorly into the muscle bed and a portion of the muscle fibers were removed in order not to impinge over the nerve.  The muscle was then repaired and shortened for about 1 cm short than the original form and the superior edge of the fascia was translated 1 cm distally.  It was repaired with 2-0 Ethibond sutures.  The remaining portion of the muscle was repaired with a couple of interrupted 3-0 undyed Vicryl.  There are two 3-0 Ethibond sutures placed superficially.  Marcaine with epinephrine was injected.  The tourniquet was let down.  All bleeders were cauterized.  The subcutaneous tissue was closed with 3-0 undyed Vicryl and the skin was closed with a Monocryl.  A sterile dressing was applied and the patient was transferred to the recovery room in stable condition.    Cc: my office

## 2019-11-29 NOTE — DISCHARGE INSTRUCTIONS
**Because you had anesthesia today and your history of sleep apnea, it is extremely important that you use your CPAP machine for the next 24 hours while napping or sleeping.**      **If you have questions or concerns about your procedure,  call Dr. Bai at [phone]**    Same Day Surgery Discharge Instructions for  Sedation and General Anesthesia       It's not unusual to feel dizzy, light-headed or faint for up to 24 hours after surgery or while taking pain medication.  If you have these symptoms: sit for a few minutes before standing and have someone assist you when you get up to walk or use the bathroom.      You should rest and relax for the next 24 hours. We recommend you make arrangements to have an adult stay with you for at least 24 hours after your discharge.  Avoid hazardous and strenuous activity.      DO NOT DRIVE any vehicle or operate mechanical equipment for 24 hours following the end of your surgery.  Even though you may feel normal, your reactions may be affected by the medication you have received.      Do not drink alcoholic beverages for 24 hours following surgery.       Slowly progress to your regular diet as you feel able. It's not unusual to feel nauseated and/or vomit after receiving anesthesia.  If you develop these symptoms, drink clear liquids (apple juice, ginger ale, broth, 7-up, etc. ) until you feel better.  If your nausea and vomiting persists for 24 hours, please notify your surgeon.        All narcotic pain medications, along with inactivity and anesthesia, can cause constipation. Drinking plenty of liquids and increasing fiber intake will help.      For any questions of a medical nature, call your surgeon.      Do not make important decisions for 24 hours.      If you had general anesthesia, you may have a sore throat for a couple of days related to the breathing tube used during surgery.  You may use Cepacol lozenges to help with this discomfort.  If it worsens or if you  develop a fever, contact your surgeon.       If you feel your pain is not well managed with the pain medications prescribed by your surgeon, please contact your surgeon's office to let them know so they can address your concerns.

## 2019-12-06 LAB — INTERPRETATION ECG - MUSE: NORMAL

## 2019-12-10 ENCOUNTER — TRANSFERRED RECORDS (OUTPATIENT)
Dept: FAMILY MEDICINE | Facility: CLINIC | Age: 84
End: 2019-12-10

## 2019-12-19 DIAGNOSIS — Z76.0 ENCOUNTER FOR MEDICATION REFILL: ICD-10-CM

## 2019-12-20 RX ORDER — ALLOPURINOL 300 MG/1
TABLET ORAL
Qty: 90 TABLET | Refills: 0 | Status: SHIPPED | OUTPATIENT
Start: 2019-12-20 | End: 2020-03-25

## 2019-12-23 VITALS — WEIGHT: 278.8 LBS | SYSTOLIC BLOOD PRESSURE: 124 MMHG | DIASTOLIC BLOOD PRESSURE: 74 MMHG | BODY MASS INDEX: 45 KG/M2

## 2019-12-23 DIAGNOSIS — I48.20 CHRONIC ATRIAL FIBRILLATION (H): Primary | ICD-10-CM

## 2019-12-23 DIAGNOSIS — Z79.01 LONG TERM CURRENT USE OF ANTICOAGULANT THERAPY: ICD-10-CM

## 2019-12-23 LAB — INR PPP: 2.4 (ref 2–3)

## 2019-12-23 PROCEDURE — 85610 PROTHROMBIN TIME: CPT | Performed by: FAMILY MEDICINE

## 2019-12-23 PROCEDURE — 36415 COLL VENOUS BLD VENIPUNCTURE: CPT | Performed by: FAMILY MEDICINE

## 2020-01-14 ENCOUNTER — TRANSFERRED RECORDS (OUTPATIENT)
Dept: FAMILY MEDICINE | Facility: CLINIC | Age: 85
End: 2020-01-14

## 2020-01-20 ENCOUNTER — TRANSFERRED RECORDS (OUTPATIENT)
Dept: FAMILY MEDICINE | Facility: CLINIC | Age: 85
End: 2020-01-20

## 2020-01-22 ENCOUNTER — OFFICE VISIT (OUTPATIENT)
Dept: FAMILY MEDICINE | Facility: CLINIC | Age: 85
End: 2020-01-22

## 2020-01-22 VITALS
OXYGEN SATURATION: 97 % | RESPIRATION RATE: 16 BRPM | HEIGHT: 66 IN | WEIGHT: 269 LBS | SYSTOLIC BLOOD PRESSURE: 124 MMHG | HEART RATE: 67 BPM | BODY MASS INDEX: 43.23 KG/M2 | DIASTOLIC BLOOD PRESSURE: 74 MMHG

## 2020-01-22 DIAGNOSIS — Z79.01 LONG TERM CURRENT USE OF ANTICOAGULANT THERAPY: ICD-10-CM

## 2020-01-22 DIAGNOSIS — R33.8 ENLARGED PROSTATE WITH URINARY RETENTION: Primary | ICD-10-CM

## 2020-01-22 DIAGNOSIS — I10 ESSENTIAL HYPERTENSION: ICD-10-CM

## 2020-01-22 DIAGNOSIS — E78.5 HYPERLIPIDEMIA WITH TARGET LDL LESS THAN 100: ICD-10-CM

## 2020-01-22 DIAGNOSIS — N40.1 ENLARGED PROSTATE WITH URINARY RETENTION: Primary | ICD-10-CM

## 2020-01-22 DIAGNOSIS — I48.20 CHRONIC ATRIAL FIBRILLATION (H): ICD-10-CM

## 2020-01-22 DIAGNOSIS — R05.3 CHRONIC COUGH: ICD-10-CM

## 2020-01-22 LAB
% GRANULOCYTES: 73.2 % (ref 42.2–75.2)
HCT VFR BLD AUTO: 40.9 % (ref 39–51)
HEMOGLOBIN: 13.3 G/DL (ref 13.4–17.5)
INR PPP: 2.4 (ref 2–3)
LYMPHOCYTES NFR BLD AUTO: 19.1 % (ref 20.5–51.1)
MCH RBC QN AUTO: 31.7 PG (ref 27–31)
MCHC RBC AUTO-ENTMCNC: 32.4 G/DL (ref 33–37)
MCV RBC AUTO: 97.5 FL (ref 80–100)
MONOCYTES NFR BLD AUTO: 7.7 % (ref 1.7–9.3)
PLATELET # BLD AUTO: 212 K/UL (ref 140–450)
RBC # BLD AUTO: 4.19 X10/CMM (ref 4.2–5.9)
WBC # BLD AUTO: 8.4 X10/CMM (ref 3.8–11)

## 2020-01-22 PROCEDURE — 85025 COMPLETE CBC W/AUTO DIFF WBC: CPT | Performed by: NURSE PRACTITIONER

## 2020-01-22 PROCEDURE — 36415 COLL VENOUS BLD VENIPUNCTURE: CPT | Performed by: NURSE PRACTITIONER

## 2020-01-22 PROCEDURE — 85610 PROTHROMBIN TIME: CPT | Performed by: NURSE PRACTITIONER

## 2020-01-22 PROCEDURE — 99214 OFFICE O/P EST MOD 30 MIN: CPT | Performed by: NURSE PRACTITIONER

## 2020-01-22 RX ORDER — FINASTERIDE 5 MG/1
1 TABLET, FILM COATED ORAL DAILY
COMMUNITY
Start: 2019-11-20 | End: 2020-05-01

## 2020-01-22 ASSESSMENT — MIFFLIN-ST. JEOR: SCORE: 1852.93

## 2020-01-22 NOTE — LETTER
[facility]  [address] [place]  [address]  Phone: [phone]    January 27, 2020      Vasiliy Corbin  [address]  [facility] [address]              Dear Vasiliy,    Your labs are all within normal limits, your hemoglobin is slightly low but you are not anemic. Increase the amount of iron in your diet and lets check this lab at your next annual visit. Please call with any questions or concerns.         Sincerely,     Fany Lockhart CNP    Results for orders placed or performed in visit on 01/22/20   Prothrombin - INR ([facility])     Status: None   Result Value Ref Range    INR 2.4 2.0 - 3.0   CBC with Diff/Plt ([facility])     Status: Abnormal   Result Value Ref Range    WBC x10/cmm 8.4 3.8 - 11.0 x10/cmm    % Lymphocytes 19.1 (A) 20.5 - 51.1 %    % Monocytes 7.7 1.7 - 9.3 %    % Granulocytes 73.2 42.2 - 75.2 %    RBC x10/cmm 4.19 (A) 4.2 - 5.9 x10/cmm    Hemoglobin 13.3 (A) 13.4 - 17.5 g/dl    Hematocrit 40.9 39 - 51 %    MCV 97.5 80 - 100 fL    MCH 31.7 (A) 27.0 - 31.0 pg    MCHC 32.4 (A) 33.0 - 37.0 g/dL    Platelet Count 212 140 - 450 K/uL   Comp. Metabolic Panel (14) ([facility])     Status: Abnormal   Result Value Ref Range    Glucose 105 (H) 65 - 99 mg/dL    Urea Nitrogen 25 8 - 27 mg/dL    Creatinine 1.06 0.76 - 1.27 mg/dL    eGFR If NonAfricn Am 64 >59 mL/min/1.73    eGFR If Africn Am 74 >59 mL/min/1.73    BUN/Creatinine Ratio 24 10 - 24    Sodium 142 134 - 144 mmol/L    Potassium 5.1 3.5 - 5.2 mmol/L    Chloride 104 96 - 106 mmol/L    Total CO2 26 20 - 29 mmol/L    Calcium 9.3 8.6 - 10.2 mg/dL    Protein Total 6.6 6.0 - 8.5 g/dL    Albumin 4.1 3.6 - 4.6 g/dL    Globulin, Total 2.5 1.5 - 4.5 g/dL    A/G Ratio 1.6 1.2 - 2.2    Bilirubin Total 0.6 0.0 - 1.2 mg/dL    Alkaline Phosphatase 138 (H) 39 - 117 IU/L    AST 19 0 - 40 IU/L    ALT 18 0 - 44 IU/L    Narrative    Performed at:  01 - [facility] [address], [place], [place]  [id number]  : Amaury Meadows MD, Phone:   [phone]

## 2020-01-22 NOTE — PROGRESS NOTES
Problem(s) Oriented visit        SUBJECTIVE:                                                    Vasiliy Corbin is a 84 year old male who presents to clinic today for the following health issues :  Medication check  Has history of atrial fibrillation.    No palpitations, no dizziness, no dyspnea on exertion, no shortness of breath.  No racing heart, no fast heart rates.    Taking medications as ordered, no side effects reported.   Patient is taking anticoagulation Warfarin per flow sheet according to direction, with no reported side effects.    Has history of hyperlipidemia.  On statin for this, denies any significant side effects of this medication.      Blood presure remains well controlled when checked out of clinic.      The patient denies dysuria POSITIVE for, urinary frequency, nocturia more than once per night, urinary hesitancy and hematuria. Having surgery in the next few months      Reviewed last 6 BP readings in chart:  BP Readings from Last 6 Encounters:   01/22/20 124/74   12/23/19 124/74   11/29/19 131/68   11/22/19 130/80   10/25/19 124/82   08/30/19 132/84       he has not experienced any significant side effects from medications for hypertension.    NO active cardiac complaints or symptoms with exercise.    Latest labs reviewed:    Recent Labs   Lab Test 04/16/19  0939 01/09/18  1125  02/01/12  0000   CHOL 121 119   < >  --    HDL 38* 37*   < > 48   LDL 49 47   < > 71   TRIG 171* 176*   < > 225*   CHOLHDLRATIO  --   --   --  3.4    < > = values in this interval not displayed.        Lab Results   Component Value Date    AST 24 04/16/2019        Problem list, Medication list, Allergies, and Medical/Social/Surgical histories reviewed in [facility] and updated as appropriate.   Additional history: as documented    ROS:  General:  NEGATIVE for fever, chills, change in weight CV:  NEGATIVE for chest pain, palpitations or peripheral edema Resp:  NEGATIVE for significant cough or SOB GI: negative  (male):   "POSITIVE for frequency and hematuria Endocrine: No history of thyroid disease, diabetes or other endocrine disorders        OBJECTIVE:                                                    /74   Pulse 67   Resp 16   Ht 1.676 m (5' 6\")   Wt 122 kg (269 lb)   SpO2 97%   BMI 43.42 kg/m    Body mass index is 43.42 kg/m .   GENERAL: healthy, alert and no distress  EYES: Eyes grossly normal to inspection, PERRL and conjunctivae and sclerae normal  RESP: lungs clear to auscultation - no rales, rhonchi or wheezes  CV: regular rate and rhythm, normal S1 S2, no S3 or S4, no murmur, click or rub, no peripheral edema and peripheral pulses strong  NEURO: Normal strength and tone, mentation intact and speech normal  PSYCH: mentation appears normal, affect normal/bright     ASSESSMENT/PLAN:                                                        Diagnoses and all orders for this visit:    Enlarged prostate with urinary retention  Pt. Will be having surgery in the next few weeks to manage his BPH, which is causing hematuria, and urinary symptoms. Provided education about managing these symptoms.    Chronic atrial fibrillation  -     Prothrombin - INR ([facility])  The indication for anticoagulation was discussed with the patient and/or their family in detail. The pros and cons of using warfarin versus newer anticoagulants was discussed including the need for INR monitoring with warfarin, dietary restrictions with warfarin and low cost of warfarin versus high co-pay for newer anticoagulants and lack of reversal agent with agents like Eliquis and Xarelto. The risk of bleeding including major bleeding and intracranial bleeding was discussed with all these agents and the data of efficacy and safety of these agents versus warfarin was communicated.    Long term current use of anticoagulant therapy  -     Prothrombin - INR ([facility])  -     CBC with Diff/Plt ([facility])  .  Hyperlipidemia with target LDL less than 100  Discussed current lipid " results, previous results (if available) current guidelines (NCEP) for treatment and goals for lipids.  Discussed lifestyle modification, dietary changes (low fat, low simple carb) and regular aerobic exercise.  Discussed the link between dysmetabolic syndrome and impaired glucose tolerance seen in certain patterns of lipids.  Briefly discussed medication used for lipid lowering, including the statins are their possible side effects of myalgias, rhabdomyolysis, and liver toxicity.    Chronic cough  -     Referral to [facility]  -     Referral to [facility] - Pulmonology  [facility], you report a chronic cough for about a year, no resolution with omeprazole, allergie medications or OTC medication. I would like you to follow up with pulmonology.     Essential hypertension  -     Comp. Metabolic Panel (14) ([facility])  Discussed current hypertension treatment guidelines, including indications for treatment and treatment options.  Discussed the importance for aggressive management of HTN to prevent vascular complications later.  Recommended lower fat, lower carbohydrate, and lower sodium (<2000 mg)diet.  Discussed required intervals for follow up on HTN, lab studies.  Recommened pt. follow their blood pressures outside the clinic to ensure that BPs are remaining within guidelines, and to contact me if the readings are not within guidelines on a regular basis so we can adjust treatment as needed.        ASSESSMENT/PLAN:       The following health maintenance items are reviewed in Epic and correct as of today:  Health Maintenance   Topic Date Due     MEDICARE ANNUAL WELLNESS VISIT  04/12/2000     COLONOSCOPY  11/29/2015     ADVANCE CARE PLANNING  05/06/2018     FALL RISK ASSESSMENT  11/08/2018     PHQ-2  01/01/2020     DTAP/TDAP/TD IMMUNIZATION (3 - Td) 06/11/2022     INFLUENZA VACCINE  Completed     PNEUMOCOCCAL IMMUNIZATION 65+ LOW/MEDIUM RISK  Completed      ZOSTER IMMUNIZATION  Completed     IPV IMMUNIZATION  Aged Out     MENINGITIS IMMUNIZATION  Aged Out       Fany Lockhart NP  [facility]  [phone]    For any issues my office # is [phone]

## 2020-01-23 LAB
ALBUMIN SERPL-MCNC: 4.1 G/DL (ref 3.6–4.6)
ALBUMIN/GLOB SERPL: 1.6 {RATIO} (ref 1.2–2.2)
ALP SERPL-CCNC: 138 IU/L (ref 39–117)
ALT SERPL-CCNC: 18 IU/L (ref 0–44)
AST SERPL-CCNC: 19 IU/L (ref 0–40)
BILIRUB SERPL-MCNC: 0.6 MG/DL (ref 0–1.2)
BUN SERPL-MCNC: 25 MG/DL (ref 8–27)
BUN/CREATININE RATIO: 24 (ref 10–24)
CALCIUM SERPL-MCNC: 9.3 MG/DL (ref 8.6–10.2)
CHLORIDE SERPLBLD-SCNC: 104 MMOL/L (ref 96–106)
CREAT SERPL-MCNC: 1.06 MG/DL (ref 0.76–1.27)
EGFR IF AFRICN AM: 74 ML/MIN/1.73
EGFR IF NONAFRICN AM: 64 ML/MIN/1.73
GLOBULIN, TOTAL: 2.5 G/DL (ref 1.5–4.5)
GLUCOSE SERPL-MCNC: 105 MG/DL (ref 65–99)
POTASSIUM SERPL-SCNC: 5.1 MMOL/L (ref 3.5–5.2)
PROT SERPL-MCNC: 6.6 G/DL (ref 6–8.5)
SODIUM SERPL-SCNC: 142 MMOL/L (ref 134–144)
TOTAL CO2: 26 MMOL/L (ref 20–29)

## 2020-01-29 ENCOUNTER — TRANSFERRED RECORDS (OUTPATIENT)
Dept: FAMILY MEDICINE | Facility: CLINIC | Age: 85
End: 2020-01-29

## 2020-01-31 ENCOUNTER — TRANSFERRED RECORDS (OUTPATIENT)
Dept: FAMILY MEDICINE | Facility: CLINIC | Age: 85
End: 2020-01-31

## 2020-02-10 ENCOUNTER — TRANSFERRED RECORDS (OUTPATIENT)
Dept: FAMILY MEDICINE | Facility: CLINIC | Age: 85
End: 2020-02-10

## 2020-02-12 ENCOUNTER — OFFICE VISIT (OUTPATIENT)
Dept: FAMILY MEDICINE | Facility: CLINIC | Age: 85
End: 2020-02-12

## 2020-02-12 VITALS
HEART RATE: 77 BPM | OXYGEN SATURATION: 95 % | WEIGHT: 275.8 LBS | BODY MASS INDEX: 44.52 KG/M2 | RESPIRATION RATE: 16 BRPM | DIASTOLIC BLOOD PRESSURE: 70 MMHG | SYSTOLIC BLOOD PRESSURE: 122 MMHG

## 2020-02-12 DIAGNOSIS — Z79.01 LONG TERM CURRENT USE OF ANTICOAGULANT THERAPY: ICD-10-CM

## 2020-02-12 DIAGNOSIS — I10 ESSENTIAL HYPERTENSION: ICD-10-CM

## 2020-02-12 DIAGNOSIS — I48.20 CHRONIC ATRIAL FIBRILLATION (H): ICD-10-CM

## 2020-02-12 DIAGNOSIS — Z01.818 PREOP GENERAL PHYSICAL EXAM: Primary | ICD-10-CM

## 2020-02-12 LAB
% GRANULOCYTES: 70.7 % (ref 42.2–75.2)
HCT VFR BLD AUTO: 39.5 % (ref 39–51)
HEMOGLOBIN: 13.2 G/DL (ref 13.4–17.5)
INR PPP: 2.6 (ref 2–3)
LYMPHOCYTES NFR BLD AUTO: 21.9 % (ref 20.5–51.1)
MCH RBC QN AUTO: 31.6 PG (ref 27–31)
MCHC RBC AUTO-ENTMCNC: 33.5 G/DL (ref 33–37)
MCV RBC AUTO: 94.1 FL (ref 80–100)
MONOCYTES NFR BLD AUTO: 7.4 % (ref 1.7–9.3)
PLATELET # BLD AUTO: 214 K/UL (ref 140–450)
RBC # BLD AUTO: 4.19 X10/CMM (ref 4.2–5.9)
WBC # BLD AUTO: 7.9 X10/CMM (ref 3.8–11)

## 2020-02-12 PROCEDURE — 85025 COMPLETE CBC W/AUTO DIFF WBC: CPT | Performed by: NURSE PRACTITIONER

## 2020-02-12 PROCEDURE — 36415 COLL VENOUS BLD VENIPUNCTURE: CPT | Performed by: NURSE PRACTITIONER

## 2020-02-12 PROCEDURE — 85610 PROTHROMBIN TIME: CPT | Performed by: NURSE PRACTITIONER

## 2020-02-12 PROCEDURE — 99215 OFFICE O/P EST HI 40 MIN: CPT | Performed by: NURSE PRACTITIONER

## 2020-02-12 RX ORDER — OMEPRAZOLE 40 MG/1
40 CAPSULE, DELAYED RELEASE ORAL 2 TIMES DAILY
COMMUNITY
Start: 2020-01-31 | End: 2020-04-10

## 2020-02-12 NOTE — LETTER
[facility]  [address] [place]  [address]  Phone: [phone]    February 14, 2020      Vasiliy Corbin  [address]  [facility] [address]              Dear Vasiliy,    The results from your recent visit showed Your HGB is low; however this is to be expected with the bleeding you have described. I would expect this to resolve after your surgery. We will continue to monitor this lab.        Sincerely,     Fany Lockhart CNP      Results for orders placed or performed in visit on 02/12/20   CBC with Diff/Plt ([facility])     Status: Abnormal   Result Value Ref Range    WBC x10/cmm 7.9 3.8 - 11.0 x10/cmm    % Lymphocytes 21.9 20.5 - 51.1 %    % Monocytes 7.4 1.7 - 9.3 %    % Granulocytes 70.7 42.2 - 75.2 %    RBC x10/cmm 4.19 (A) 4.2 - 5.9 x10/cmm    Hemoglobin 13.2 (A) 13.4 - 17.5 g/dl    Hematocrit 39.5 39 - 51 %    MCV 94.1 80 - 100 fL    MCH 31.6 (A) 27.0 - 31.0 pg    MCHC 33.5 33.0 - 37.0 g/dL    Platelet Count 214 140 - 450 K/uL   Alkaline Phosphatase  S ([facility])     Status: Abnormal   Result Value Ref Range    Alkaline Phosphatase 154 (H) 39 - 117 IU/L    Narrative    Performed at:  01 - [facility] [address], [place], [place]  [id number]  : Amaury Meadows MD, Phone:  [phone]   Prothrombin - INR ([facility])     Status: None   Result Value Ref Range    INR 2.6 2.0 - 3.0

## 2020-02-12 NOTE — PROGRESS NOTES
[facility]  [address] [place] [address] [phone]  Dept: [phone]    PRE-OP EVALUATION:  Today's date: 2020    Vasiliy Corbin (: 1935) presents for pre-operative evaluation assessment as requested by Lokesh Camacho MD .  He requires evaluation and anesthesia risk assessment prior to undergoing surgery/procedure for treatment of CYSTOSCOPY; TRANSURETHRAL RESECTION OF THE PROSTATE  .    Proposed Surgery/ Procedure: CYSTOSCOPY; TRANSURETHRAL RESECTION OF THE PROSTATE  Date of Surgery/ Procedure: 2020  Time of Surgery/ Procedure: 730  Hospital/Surgical Facility: [facility]  {SURGERY FAX NUMBER:  Primary Physician: Fany Lockhart  Type of Anesthesia Anticipated: to be determined    Patient has a Health Care Directive or Living Will: NO    1. NO - Do you have a history of heart attack, stroke, stent, bypass or surgery on an artery in the head, neck, heart or legs?  2. NO - Do you ever have any pain or discomfort in your chest?  3. NO - Do you have a history of  Heart Failure?  4. NO - Are you troubled by shortness of breath when: walking on the level, up a slight hill or at night?  5. NO - Do you currently have a cold, bronchitis or other respiratory infection?  6. YES - Do you have a cough, shortness of breath or wheezing? Has a cough related to GERD  7. NO - Do you sometimes get pains in the calves of your legs when you walk?  8. NO - Do you or anyone in your family have previous history of blood clots?  9. NO - Do you or does anyone in your family have a serious bleeding problem such as prolonged bleeding following surgeries or cuts?  10. NO - Have you ever had problems with anemia or been told to take iron pills?  11. NO - Have you had any abnormal blood loss such as black, tarry or bloody stools, or abnormal vaginal bleeding?  12. NO - Have you ever had a blood transfusion?  13. NO - Have you or any of your relatives ever had problems with  anesthesia?  14. YES - Do you have sleep apnea, excessive snoring or daytime drowsiness? Uses C-pap machine  15. NO - Do you have any prosthetic heart valves?  16. NO - Do you have prosthetic joints?  17. NO - Is there any chance that you may be pregnant?      HPI:     HPI related to upcoming procedure: Intermittent bleeding due to enlarged prostate, will have prostate surgically reduced.       A-FIB - Patient has a longstanding history of chronic A-fib currently on rate control. Current treatment regimen includes Warfarin for stroke prevention and denies significant symptoms of lightheadedness, palpitations or dyspnea.     CAD- history of coronary artery disease     HYPERLIPIDEMIA - Patient has a long history of significant Hyperlipidemia requiring medication for treatment with recent good control. Patient reports no problems or side effects with the medication.     HYPERTENSION - Patient has longstanding history of HTN , currently denies any symptoms referable to elevated blood pressure. Specifically denies chest pain, palpitations, dyspnea, orthopnea, PND or peripheral edema. Blood pressure readings have been in normal range. Current medication regimen is as listed below. Patient denies any side effects of medication.     SLEEP PROBLEM - Patient has a longstanding history of SUJEY with good control with use of C-Pap    MEDICAL HISTORY:     Patient Active Problem List    Diagnosis Date Noted     Chronic cough 01/22/2020     Priority: Medium     Morbid obesity due to excess calories (H) 08/07/2017     Priority: Medium     Cardiomyopathy, unspecified type (H) 06/26/2017     Priority: Medium     Diastolic dysfunction 04/20/2017     Priority: Medium     Enlarged prostate with urinary retention      Priority: Medium     him started Proscar and Flomax in December and saw        Coronary artery disease involving native coronary artery of native heart without angina pectoris 11/23/2015     Priority: Medium      Chronic atrial fibrillation 10/26/2015     Priority: Medium     Essential hypertension 06/15/2015     Priority: Medium     Health Care Home 08/19/2013     Priority: Medium     State Tier Level:  Tier 1           ACP (advance care planning)      Priority: Medium     VMR (vasomotor rhinitis) 04/04/2013     Priority: Medium     Hyperlipidemia with target LDL less than 100      Priority: Medium     Diagnosis updated by automated process. Provider to review and confirm.       Cervical spine arthritis 03/21/2012     Priority: Medium     Long term current use of anticoagulant therapy 02/29/2012     Priority: Medium     Problem list name updated by automated process. Provider to review       Restless leg syndrome 05/23/2011     Priority: Medium      Past Medical History:   Diagnosis Date     Atrial fibrillation (H)     became chronic afib in 2016,paroxysmal,was on amiodarone but went into persistent afib in april 2106 and amiodrone was d/cd. now on BBLK and considering cardioversion.(5/2106).In Sept 2016 plan is rate control which has been a challenge and Holter in 6 months     CAD (coronary artery disease)     mild stenosis per cath     Cardiomyopathy (H)      Chronic cough 2017    Eval with Dr Sarai Oro - [place] [facility] - rec speech therapy, ct for eval of crackles No obstruction or copd     Colon polyp 2010     Gout      Hiatal hernia      Hip pain      History of BPH     laser TURP in 2016 Dr Lima     HTN (hypertension)      Hyperlipidemia      Neuropathy     numbness & tingling R. 4th & 5th finger left hand     Obesity (BMI 35.0-39.9 without comorbidity)      SUJEY on CPAP      Shoulder injury      Venous insufficiency of right leg     no trteatment recommended by [facility] - option to treat is there Dr Zapata     Wrist swelling      Past Surgical History:   Procedure Laterality Date     CLAVICLE SURGERY Left     as a kid     CT release       CYSTOSCOPY N/A 4/7/2016    Procedure: CYSTOSCOPY;  Surgeon: Lokesh Lima MD;   Location: [facility] OR     CYSTOSCOPY, FULGURATE BLEEDERS, EVACUATE CLOT(S), COMBINED N/A 1/16/2018    Procedure: COMBINED CYSTOSCOPY, FULGURATE BLEEDERS, EVACUATE CLOT(S);  CYSTOSCOPY, FULGERATION;  Surgeon: Lokesh Lima MD;  Location: [facility] OR     HC LEFT HEART CATHETERIZATION  3/2010    Mild CAD     LASER KTP TRANSURETHRAL RESECTION (TUR) PROSTATE N/A 4/7/2016    Procedure: LASER KTP TRANSURETHRAL RESECTION (TUR) PROSTATE;  Surgeon: Lokesh Lima MD;  Location: [facility] OR     TRANSPOSITION ULNAR NERVE (ELBOW) Left 11/29/2019    Procedure: LEFT ELBOW ULNER NERVE SUBMUSCULAR TRANSPOSITION;  Surgeon: Nicole Bai MD;  Location: [facility] OR     ulnar reroute      right      Current Outpatient Medications   Medication Sig Dispense Refill     allopurinol (ZYLOPRIM) 300 MG tablet TAKE 1 TABLET(300 MG) BY MOUTH DAILY 90 tablet 0     digoxin (LANOXIN) 125 MCG tablet Take 1 tablet (125 mcg) by mouth daily 90 tablet 3     Finasteride (PROSCAR PO) Take 5 mg by mouth daily       finasteride (PROSCAR) 5 MG tablet Take 1 tablet by mouth daily       furosemide (LASIX) 40 MG tablet Take 40 mg by mouth daily       ipratropium (ATROVENT) 0.06 % nasal spray USE 2 SPRAYS IN EACH NOSTRIL FOUR TIMES DAILY AS NEEDED FOR RHINITIS 135 mL 3     losartan (COZAAR) 50 MG tablet Take 1 tablet (50 mg) by mouth daily 90 tablet 3     metoprolol tartrate (LOPRESSOR) 50 MG tablet TAKE 2 TABLETS(100 MG) BY MOUTH TWICE DAILY (Patient taking differently: Take 100 mg by mouth 2 times daily TAKE 2 TABLETS(100 MG) BY MOUTH TWICE DAILY) 360 tablet 3     Multiple Vitamin (MULTI-VITAMIN) per tablet Take 1 tablet by mouth daily. 100 tablet 12     omeprazole (PRILOSEC) 40 MG DR capsule        simvastatin (ZOCOR) 10 MG tablet TAKE 1 TABLET(10 MG) BY MOUTH AT BEDTIME 90 tablet 3     warfarin (COUMADIN) 2 MG tablet TAKE 1 1/2 TABLETS BY MOUTH DAILY 135 tablet 3     warfarin (COUMADIN) 2 MG tablet Take 2 mg by mouth daily Taking 1 1/2 pills (2.5mg) daily        OTC products: none    Allergies   Allergen Reactions     Cardizem [Diltiazem]      Swelling and poor rate control     Lisinopril Cough      Latex Allergy: NO    Social History     Tobacco Use     Smoking status: Former Smoker     Packs/day: 1.00     Years: 5.00     Pack years: 5.00     Types: Cigarettes     Last attempt to quit: 4/3/1977     Years since quittin.8     Smokeless tobacco: Never Used   Substance Use Topics     Alcohol use: No     Alcohol/week: 0.0 standard drinks     History   Drug Use No       REVIEW OF SYSTEMS:   CONSTITUTIONAL: NEGATIVE for fever, chills, change in weight  INTEGUMENTARY/SKIN: NEGATIVE for worrisome rashes, moles or lesions  EYES: NEGATIVE for vision changes or irritation  ENT/MOUTH: NEGATIVE for ear, mouth and throat problems  RESP: NEGATIVE for significant cough or SOB  CV: NEGATIVE for chest pain, palpitations or peripheral edema  GI: POSITIVE for Hx GERD and NEGATIVE for constipation, diarrhea, gas or bloating, hematemesis, hematochezia, hemorrhoids, melena, nausea, vomiting and weight loss  : NEGATIVE for frequency, dysuria, or hematuria  MUSCULOSKELETAL: NEGATIVE for significant arthralgias or myalgia  NEURO: NEGATIVE for weakness, dizziness or paresthesias  ENDOCRINE: NEGATIVE for temperature intolerance, skin/hair changes  HEME: NEGATIVE for bleeding problems  PSYCHIATRIC: NEGATIVE for changes in mood or affect    EXAM:   /70   Pulse 77   Resp 16   Wt 125.1 kg (275 lb 12.8 oz)   SpO2 95%   BMI 44.52 kg/m      GENERAL APPEARANCE: healthy, alert and no distress     EYES: EOMI,  PERRL     HENT: ear canals and TM's normal and nose and mouth without ulcers or lesions     NECK: no adenopathy, no asymmetry, masses, or scars and thyroid normal to palpation     RESP: lungs clear to auscultation - no rales, rhonchi or wheezes     CV: regular rates and rhythm, normal S1 S2, no S3 or S4 and no murmur, click or rub     ABDOMEN:  soft, nontender, no HSM or masses and  bowel sounds normal     MS: extremities normal- no gross deformities noted, no evidence of inflammation in joints, FROM in all extremities.     SKIN: no suspicious lesions or rashes     NEURO: Normal strength and tone, sensory exam grossly normal, mentation intact and speech normal     PSYCH: mentation appears normal. and affect normal/bright     LYMPHATICS: No cervical adenopathy    DIAGNOSTICS:   EKG: appears normal, NSR, normal axis, normal intervals, no acute ST/T changes c/w ischemia, no LVH by voltage criteria, unchanged from previous tracings    Recent Labs   Lab Test 01/22/20  1110 01/22/20  1100 01/22/20 12/23/19  0934 11/29/19  0615 11/22/19  0932 11/22/19 04/16/19  0939  01/09/18  1127   HGB 13.3*  --   --   --   --   --  14.0   < >  --    < >  --      --   --   --   --   --  174   < >  --   --   --    INR  --   --  2.4 2.4 1.26*  --   --    < >  --    < >  --    NA  --  142  --   --   --  140  --   --  142  --   --    POTASSIUM  --  5.1  --   --  4.4 4.2  --   --  4.3   < >  --    CR  --  1.06  --   --   --  1.06  --   --  0.85  --   --    A1C  --   --   --   --   --   --   --   --  5.8*  --  6.0*    < > = values in this interval not displayed.        IMPRESSION:   Reason for surgery/procedure:  CYSTOSCOPY; TRANSURETHRAL RESECTION OF THE PROSTATE  Diagnosis/reason for consult: Preoperative exam     The proposed surgical procedure is considered LOW risk.    REVISED CARDIAC RISK INDEX  The patient has the following serious cardiovascular risks for perioperative complications such as (MI, PE, VFib and 3  AV Block):  Coronary Artery Disease (MI, positive stress test, angina, Qs on EKG)  INTERPRETATION: 1 risks: Class II (low risk - 0.9% complication rate)    The patient has the following additional risks for perioperative complications:  No identified additional risks      ICD-10-CM    1. Preop general physical exam Z01.818    2. Chronic atrial fibrillation I48.20    3. Long term current use of  anticoagulant therapy Z79.01 Prothrombin - INR ([facility])   4. Essential hypertension I10 CBC with Diff/Plt ([facility])     Alkaline Phosphatase  S ([facility])       RECOMMENDATIONS:     --Consult hospital rounder / IM to assist post-op medical management    Cardiovascular Risk  Patient is already on a Beta Blocker. Continue Betablocker therapy after surgery, using Beta blocker order set as necessary for NPO status.      Obstructive Sleep Apnea (or suspected sleep apnea)  Patient is to bring their home CPAP with them on the day of surgery  Patient is clearly advised to use their home CPAP when released from surgery      Please follow up with surgical team to determine what medications should be stopped before and the day of surgery.     APPROVAL GIVEN to proceed with proposed procedure, without further diagnostic evaluation.     Hgb is 13.2, please review with surgeon    Signed Electronically by: Fany Lockhart NP    Copy of this evaluation report is provided to requesting physician.    [facility] Preop Guidelines    Revised Cardiac Risk Index

## 2020-02-13 DIAGNOSIS — I48.20 CHRONIC ATRIAL FIBRILLATION (H): ICD-10-CM

## 2020-02-13 LAB — ALP SERPL-CCNC: 154 IU/L (ref 39–117)

## 2020-02-14 RX ORDER — DIGOXIN 125 MCG
125 TABLET ORAL DAILY
Qty: 90 TABLET | Refills: 3 | Status: SHIPPED | OUTPATIENT
Start: 2020-02-14 | End: 2020-09-03

## 2020-02-25 ENCOUNTER — ANESTHESIA (OUTPATIENT)
Dept: SURGERY | Facility: CLINIC | Age: 85
End: 2020-02-25
Payer: MEDICARE

## 2020-02-25 ENCOUNTER — ANESTHESIA EVENT (OUTPATIENT)
Dept: SURGERY | Facility: CLINIC | Age: 85
End: 2020-02-25
Payer: MEDICARE

## 2020-02-25 ENCOUNTER — HOSPITAL ENCOUNTER (OUTPATIENT)
Facility: CLINIC | Age: 85
Discharge: HOME | End: 2020-02-26
Attending: UROLOGY | Admitting: UROLOGY
Payer: MEDICARE

## 2020-02-25 DIAGNOSIS — I48.20 CHRONIC ATRIAL FIBRILLATION (H): ICD-10-CM

## 2020-02-25 PROBLEM — N40.0 BPH (BENIGN PROSTATIC HYPERPLASIA): Status: ACTIVE | Noted: 2020-02-25

## 2020-02-25 LAB
ALBUMIN UR-MCNC: 100 MG/DL
APPEARANCE UR: ABNORMAL
BILIRUB UR QL STRIP: NEGATIVE
COLOR UR AUTO: ABNORMAL
GLUCOSE UR STRIP-MCNC: NEGATIVE MG/DL
HGB BLD-MCNC: 13.5 G/DL (ref 13.3–17.7)
HGB UR QL STRIP: ABNORMAL
INR PPP: 1.45 (ref 0.86–1.14)
KETONES UR STRIP-MCNC: NEGATIVE MG/DL
LEUKOCYTE ESTERASE UR QL STRIP: ABNORMAL
NITRATE UR QL: NEGATIVE
PH UR STRIP: 6.5 PH (ref 5–7)
POTASSIUM SERPL-SCNC: 4.1 MMOL/L (ref 3.4–5.3)
RBC #/AREA URNS AUTO: >182 /HPF (ref 0–2)
SOURCE: ABNORMAL
SP GR UR STRIP: 1.01 (ref 1–1.03)
UROBILINOGEN UR STRIP-MCNC: NORMAL MG/DL (ref 0–2)
WBC #/AREA URNS AUTO: 77 /HPF (ref 0–5)

## 2020-02-25 PROCEDURE — 87086 URINE CULTURE/COLONY COUNT: CPT | Performed by: UROLOGY

## 2020-02-25 PROCEDURE — 37000008 ZZH ANESTHESIA TECHNICAL FEE, 1ST 30 MIN: Performed by: UROLOGY

## 2020-02-25 PROCEDURE — 88305 TISSUE EXAM BY PATHOLOGIST: CPT | Performed by: UROLOGY

## 2020-02-25 PROCEDURE — 37000009 ZZH ANESTHESIA TECHNICAL FEE, EACH ADDTL 15 MIN: Performed by: UROLOGY

## 2020-02-25 PROCEDURE — 40000170 ZZH STATISTIC PRE-PROCEDURE ASSESSMENT II: Performed by: UROLOGY

## 2020-02-25 PROCEDURE — 25800030 ZZH RX IP 258 OP 636: Performed by: ANESTHESIOLOGY

## 2020-02-25 PROCEDURE — 36000058 ZZH SURGERY LEVEL 3 EA 15 ADDTL MIN: Performed by: UROLOGY

## 2020-02-25 PROCEDURE — 25000125 ZZHC RX 250: Performed by: NURSE ANESTHETIST, CERTIFIED REGISTERED

## 2020-02-25 PROCEDURE — 85018 HEMOGLOBIN: CPT | Performed by: ANESTHESIOLOGY

## 2020-02-25 PROCEDURE — 25800030 ZZH RX IP 258 OP 636: Performed by: PHYSICIAN ASSISTANT

## 2020-02-25 PROCEDURE — 88305 TISSUE EXAM BY PATHOLOGIST: CPT | Mod: 26 | Performed by: UROLOGY

## 2020-02-25 PROCEDURE — 25000566 ZZH SEVOFLURANE, EA 15 MIN: Performed by: UROLOGY

## 2020-02-25 PROCEDURE — 99207 ZZC CDG-CODE CATEGORY CHANGED: CPT | Performed by: PHYSICIAN ASSISTANT

## 2020-02-25 PROCEDURE — 25000132 ZZH RX MED GY IP 250 OP 250 PS 637: Mod: GY | Performed by: PHYSICIAN ASSISTANT

## 2020-02-25 PROCEDURE — 85610 PROTHROMBIN TIME: CPT | Performed by: ANESTHESIOLOGY

## 2020-02-25 PROCEDURE — 25000128 H RX IP 250 OP 636: Performed by: NURSE ANESTHETIST, CERTIFIED REGISTERED

## 2020-02-25 PROCEDURE — 27210794 ZZH OR GENERAL SUPPLY STERILE: Performed by: UROLOGY

## 2020-02-25 PROCEDURE — 81001 URINALYSIS AUTO W/SCOPE: CPT | Performed by: UROLOGY

## 2020-02-25 PROCEDURE — 25000128 H RX IP 250 OP 636: Performed by: UROLOGY

## 2020-02-25 PROCEDURE — 36000056 ZZH SURGERY LEVEL 3 1ST 30 MIN: Performed by: UROLOGY

## 2020-02-25 PROCEDURE — 25800025 ZZH RX 258: Performed by: UROLOGY

## 2020-02-25 PROCEDURE — 36415 COLL VENOUS BLD VENIPUNCTURE: CPT | Performed by: ANESTHESIOLOGY

## 2020-02-25 PROCEDURE — 84132 ASSAY OF SERUM POTASSIUM: CPT | Performed by: ANESTHESIOLOGY

## 2020-02-25 PROCEDURE — 99225 ZZC SUBSEQUENT OBSERVATION CARE,LEVEL II: CPT | Performed by: PHYSICIAN ASSISTANT

## 2020-02-25 PROCEDURE — 71000012 ZZH RECOVERY PHASE 1 LEVEL 1 FIRST HR: Performed by: UROLOGY

## 2020-02-25 PROCEDURE — 25000125 ZZHC RX 250: Performed by: UROLOGY

## 2020-02-25 PROCEDURE — 25800030 ZZH RX IP 258 OP 636: Performed by: NURSE ANESTHETIST, CERTIFIED REGISTERED

## 2020-02-25 RX ORDER — GLYCINE 1.5 G/100ML
SOLUTION IRRIGATION PRN
Status: DISCONTINUED | OUTPATIENT
Start: 2020-02-25 | End: 2020-02-25 | Stop reason: HOSPADM

## 2020-02-25 RX ORDER — SODIUM CHLORIDE, SODIUM LACTATE, POTASSIUM CHLORIDE, CALCIUM CHLORIDE 600; 310; 30; 20 MG/100ML; MG/100ML; MG/100ML; MG/100ML
INJECTION, SOLUTION INTRAVENOUS CONTINUOUS
Status: DISCONTINUED | OUTPATIENT
Start: 2020-02-25 | End: 2020-02-26 | Stop reason: HOSPADM

## 2020-02-25 RX ORDER — ONDANSETRON 4 MG/1
4 TABLET, ORALLY DISINTEGRATING ORAL EVERY 30 MIN PRN
Status: DISCONTINUED | OUTPATIENT
Start: 2020-02-25 | End: 2020-02-25 | Stop reason: HOSPADM

## 2020-02-25 RX ORDER — PROCHLORPERAZINE MALEATE 5 MG
5 TABLET ORAL EVERY 6 HOURS PRN
Status: DISCONTINUED | OUTPATIENT
Start: 2020-02-25 | End: 2020-02-26 | Stop reason: HOSPADM

## 2020-02-25 RX ORDER — SODIUM CHLORIDE, SODIUM LACTATE, POTASSIUM CHLORIDE, CALCIUM CHLORIDE 600; 310; 30; 20 MG/100ML; MG/100ML; MG/100ML; MG/100ML
INJECTION, SOLUTION INTRAVENOUS CONTINUOUS
Status: DISCONTINUED | OUTPATIENT
Start: 2020-02-25 | End: 2020-02-25 | Stop reason: HOSPADM

## 2020-02-25 RX ORDER — ALLOPURINOL 300 MG/1
300 TABLET ORAL DAILY
Status: DISCONTINUED | OUTPATIENT
Start: 2020-02-26 | End: 2020-02-26 | Stop reason: HOSPADM

## 2020-02-25 RX ORDER — FENTANYL CITRATE 0.05 MG/ML
25-50 INJECTION, SOLUTION INTRAMUSCULAR; INTRAVENOUS
Status: DISCONTINUED | OUTPATIENT
Start: 2020-02-25 | End: 2020-02-25 | Stop reason: HOSPADM

## 2020-02-25 RX ORDER — HYDROMORPHONE HYDROCHLORIDE 1 MG/ML
.2-.3 INJECTION, SOLUTION INTRAMUSCULAR; INTRAVENOUS; SUBCUTANEOUS
Status: DISCONTINUED | OUTPATIENT
Start: 2020-02-25 | End: 2020-02-26 | Stop reason: HOSPADM

## 2020-02-25 RX ORDER — PROPOFOL 10 MG/ML
INJECTION, EMULSION INTRAVENOUS PRN
Status: DISCONTINUED | OUTPATIENT
Start: 2020-02-25 | End: 2020-02-25

## 2020-02-25 RX ORDER — LOSARTAN POTASSIUM 50 MG/1
50 TABLET ORAL DAILY
Status: DISCONTINUED | OUTPATIENT
Start: 2020-02-26 | End: 2020-02-26 | Stop reason: HOSPADM

## 2020-02-25 RX ORDER — GLYCOPYRROLATE 0.2 MG/ML
INJECTION, SOLUTION INTRAMUSCULAR; INTRAVENOUS PRN
Status: DISCONTINUED | OUTPATIENT
Start: 2020-02-25 | End: 2020-02-25

## 2020-02-25 RX ORDER — LIDOCAINE HYDROCHLORIDE 20 MG/ML
JELLY TOPICAL PRN
Status: DISCONTINUED | OUTPATIENT
Start: 2020-02-25 | End: 2020-02-25 | Stop reason: HOSPADM

## 2020-02-25 RX ORDER — MEPERIDINE HYDROCHLORIDE 25 MG/ML
12.5 INJECTION INTRAMUSCULAR; INTRAVENOUS; SUBCUTANEOUS
Status: DISCONTINUED | OUTPATIENT
Start: 2020-02-25 | End: 2020-02-25 | Stop reason: HOSPADM

## 2020-02-25 RX ORDER — ONDANSETRON 2 MG/ML
INJECTION INTRAMUSCULAR; INTRAVENOUS PRN
Status: DISCONTINUED | OUTPATIENT
Start: 2020-02-25 | End: 2020-02-25

## 2020-02-25 RX ORDER — ONDANSETRON 2 MG/ML
4 INJECTION INTRAMUSCULAR; INTRAVENOUS EVERY 30 MIN PRN
Status: DISCONTINUED | OUTPATIENT
Start: 2020-02-25 | End: 2020-02-25 | Stop reason: HOSPADM

## 2020-02-25 RX ORDER — GENTAMICIN SULFATE 80 MG/100ML
80 INJECTION, SOLUTION INTRAVENOUS EVERY 8 HOURS
Status: DISCONTINUED | OUTPATIENT
Start: 2020-02-25 | End: 2020-02-25 | Stop reason: HOSPADM

## 2020-02-25 RX ORDER — FUROSEMIDE 40 MG
40 TABLET ORAL DAILY
Status: DISCONTINUED | OUTPATIENT
Start: 2020-02-26 | End: 2020-02-26 | Stop reason: HOSPADM

## 2020-02-25 RX ORDER — NEOSTIGMINE METHYLSULFATE 1 MG/ML
VIAL (ML) INJECTION PRN
Status: DISCONTINUED | OUTPATIENT
Start: 2020-02-25 | End: 2020-02-25

## 2020-02-25 RX ORDER — SIMVASTATIN 10 MG
10 TABLET ORAL AT BEDTIME
Status: DISCONTINUED | OUTPATIENT
Start: 2020-02-25 | End: 2020-02-26 | Stop reason: HOSPADM

## 2020-02-25 RX ORDER — DEXAMETHASONE SODIUM PHOSPHATE 4 MG/ML
INJECTION, SOLUTION INTRA-ARTICULAR; INTRALESIONAL; INTRAMUSCULAR; INTRAVENOUS; SOFT TISSUE PRN
Status: DISCONTINUED | OUTPATIENT
Start: 2020-02-25 | End: 2020-02-25

## 2020-02-25 RX ORDER — FENTANYL CITRATE 50 UG/ML
INJECTION, SOLUTION INTRAMUSCULAR; INTRAVENOUS PRN
Status: DISCONTINUED | OUTPATIENT
Start: 2020-02-25 | End: 2020-02-25

## 2020-02-25 RX ORDER — HYDROMORPHONE HYDROCHLORIDE 1 MG/ML
.3-.5 INJECTION, SOLUTION INTRAMUSCULAR; INTRAVENOUS; SUBCUTANEOUS EVERY 10 MIN PRN
Status: DISCONTINUED | OUTPATIENT
Start: 2020-02-25 | End: 2020-02-25 | Stop reason: HOSPADM

## 2020-02-25 RX ORDER — SODIUM CHLORIDE, SODIUM LACTATE, POTASSIUM CHLORIDE, CALCIUM CHLORIDE 600; 310; 30; 20 MG/100ML; MG/100ML; MG/100ML; MG/100ML
INJECTION, SOLUTION INTRAVENOUS CONTINUOUS PRN
Status: DISCONTINUED | OUTPATIENT
Start: 2020-02-25 | End: 2020-02-25

## 2020-02-25 RX ORDER — ACETAMINOPHEN 325 MG/1
975 TABLET ORAL EVERY 6 HOURS
Status: DISCONTINUED | OUTPATIENT
Start: 2020-02-25 | End: 2020-02-26 | Stop reason: HOSPADM

## 2020-02-25 RX ORDER — METOPROLOL TARTRATE 100 MG
100 TABLET ORAL 2 TIMES DAILY
Status: DISCONTINUED | OUTPATIENT
Start: 2020-02-25 | End: 2020-02-26 | Stop reason: HOSPADM

## 2020-02-25 RX ORDER — IPRATROPIUM BROMIDE 42 UG/1
2 SPRAY, METERED NASAL 4 TIMES DAILY PRN
Status: DISCONTINUED | OUTPATIENT
Start: 2020-02-25 | End: 2020-02-26 | Stop reason: HOSPADM

## 2020-02-25 RX ORDER — ONDANSETRON 4 MG/1
4 TABLET, ORALLY DISINTEGRATING ORAL EVERY 6 HOURS PRN
Status: DISCONTINUED | OUTPATIENT
Start: 2020-02-25 | End: 2020-02-26 | Stop reason: HOSPADM

## 2020-02-25 RX ORDER — NALOXONE HYDROCHLORIDE 0.4 MG/ML
.1-.4 INJECTION, SOLUTION INTRAMUSCULAR; INTRAVENOUS; SUBCUTANEOUS
Status: DISCONTINUED | OUTPATIENT
Start: 2020-02-25 | End: 2020-02-26 | Stop reason: HOSPADM

## 2020-02-25 RX ORDER — DIGOXIN 125 MCG
125 TABLET ORAL DAILY
Status: DISCONTINUED | OUTPATIENT
Start: 2020-02-26 | End: 2020-02-26 | Stop reason: HOSPADM

## 2020-02-25 RX ORDER — NALOXONE HYDROCHLORIDE 0.4 MG/ML
.1-.4 INJECTION, SOLUTION INTRAMUSCULAR; INTRAVENOUS; SUBCUTANEOUS
Status: DISCONTINUED | OUTPATIENT
Start: 2020-02-25 | End: 2020-02-25 | Stop reason: HOSPADM

## 2020-02-25 RX ORDER — ATROPA BELLADONNA AND OPIUM 16.2; 3 MG/1; MG/1
SUPPOSITORY RECTAL PRN
Status: DISCONTINUED | OUTPATIENT
Start: 2020-02-25 | End: 2020-02-25 | Stop reason: HOSPADM

## 2020-02-25 RX ORDER — LIDOCAINE 40 MG/G
CREAM TOPICAL
Status: DISCONTINUED | OUTPATIENT
Start: 2020-02-25 | End: 2020-02-26 | Stop reason: HOSPADM

## 2020-02-25 RX ORDER — ONDANSETRON 2 MG/ML
4 INJECTION INTRAMUSCULAR; INTRAVENOUS EVERY 6 HOURS PRN
Status: DISCONTINUED | OUTPATIENT
Start: 2020-02-25 | End: 2020-02-26 | Stop reason: HOSPADM

## 2020-02-25 RX ORDER — LIDOCAINE HYDROCHLORIDE 20 MG/ML
INJECTION, SOLUTION INFILTRATION; PERINEURAL PRN
Status: DISCONTINUED | OUTPATIENT
Start: 2020-02-25 | End: 2020-02-25

## 2020-02-25 RX ORDER — FUROSEMIDE 10 MG/ML
INJECTION INTRAMUSCULAR; INTRAVENOUS PRN
Status: DISCONTINUED | OUTPATIENT
Start: 2020-02-25 | End: 2020-02-25

## 2020-02-25 RX ADMIN — ONDANSETRON 4 MG: 2 INJECTION INTRAMUSCULAR; INTRAVENOUS at 07:36

## 2020-02-25 RX ADMIN — DEXAMETHASONE SODIUM PHOSPHATE 4 MG: 4 INJECTION, SOLUTION INTRA-ARTICULAR; INTRALESIONAL; INTRAMUSCULAR; INTRAVENOUS; SOFT TISSUE at 07:36

## 2020-02-25 RX ADMIN — FENTANYL CITRATE 50 MCG: 50 INJECTION, SOLUTION INTRAMUSCULAR; INTRAVENOUS at 07:36

## 2020-02-25 RX ADMIN — OMEPRAZOLE 40 MG: 20 CAPSULE, DELAYED RELEASE ORAL at 21:01

## 2020-02-25 RX ADMIN — FENTANYL CITRATE 25 MCG: 50 INJECTION, SOLUTION INTRAMUSCULAR; INTRAVENOUS at 07:41

## 2020-02-25 RX ADMIN — HYDROMORPHONE HYDROCHLORIDE 0.5 MG: 1 INJECTION, SOLUTION INTRAMUSCULAR; INTRAVENOUS; SUBCUTANEOUS at 08:05

## 2020-02-25 RX ADMIN — SIMVASTATIN 10 MG: 10 TABLET, FILM COATED ORAL at 21:01

## 2020-02-25 RX ADMIN — FUROSEMIDE 10 MG: 10 INJECTION, SOLUTION INTRAVENOUS at 08:35

## 2020-02-25 RX ADMIN — METOPROLOL TARTRATE 100 MG: 100 TABLET, FILM COATED ORAL at 21:01

## 2020-02-25 RX ADMIN — PHENYLEPHRINE HYDROCHLORIDE 100 MCG: 10 INJECTION INTRAVENOUS at 08:34

## 2020-02-25 RX ADMIN — GENTAMICIN SULFATE 80 MG: 80 INJECTION, SOLUTION INTRAVENOUS at 07:40

## 2020-02-25 RX ADMIN — ACETAMINOPHEN 975 MG: 325 TABLET, FILM COATED ORAL at 14:00

## 2020-02-25 RX ADMIN — FENTANYL CITRATE 25 MCG: 50 INJECTION, SOLUTION INTRAMUSCULAR; INTRAVENOUS at 08:01

## 2020-02-25 RX ADMIN — SODIUM CHLORIDE, POTASSIUM CHLORIDE, SODIUM LACTATE AND CALCIUM CHLORIDE: 600; 310; 30; 20 INJECTION, SOLUTION INTRAVENOUS at 16:45

## 2020-02-25 RX ADMIN — NEOSTIGMINE METHYLSULFATE 4 MG: 1 INJECTION, SOLUTION INTRAVENOUS at 08:43

## 2020-02-25 RX ADMIN — GLYCOPYRROLATE 0.8 MG: 0.2 INJECTION, SOLUTION INTRAMUSCULAR; INTRAVENOUS at 08:43

## 2020-02-25 RX ADMIN — SODIUM CHLORIDE, POTASSIUM CHLORIDE, SODIUM LACTATE AND CALCIUM CHLORIDE: 600; 310; 30; 20 INJECTION, SOLUTION INTRAVENOUS at 09:49

## 2020-02-25 RX ADMIN — SODIUM CHLORIDE, POTASSIUM CHLORIDE, SODIUM LACTATE AND CALCIUM CHLORIDE: 600; 310; 30; 20 INJECTION, SOLUTION INTRAVENOUS at 07:32

## 2020-02-25 RX ADMIN — ACETAMINOPHEN 975 MG: 325 TABLET, FILM COATED ORAL at 21:02

## 2020-02-25 RX ADMIN — ROCURONIUM BROMIDE 50 MG: 10 INJECTION INTRAVENOUS at 07:36

## 2020-02-25 RX ADMIN — LIDOCAINE HYDROCHLORIDE 60 MG: 20 INJECTION, SOLUTION INFILTRATION; PERINEURAL at 07:36

## 2020-02-25 RX ADMIN — PROPOFOL 150 MG: 10 INJECTION, EMULSION INTRAVENOUS at 07:36

## 2020-02-25 ASSESSMENT — MIFFLIN-ST. JEOR: SCORE: 1877.87

## 2020-02-25 ASSESSMENT — COPD QUESTIONNAIRES: COPD: 0

## 2020-02-25 NOTE — ANESTHESIA CARE TRANSFER NOTE
Patient: Vasiliy Corbin    Procedure(s):  CYSTOSCOPY; TRANSURETHRAL RESECTION OF THE PROSTATE    Diagnosis: Enlarged prostate [N40.0]  Hematuria syndrome [R31.9]  Diagnosis Additional Information: No value filed.    Anesthesia Type:   General, ETT     Note:  Airway :Face Mask  Patient transferred to:PACU  Handoff Report: Identifed the Patient, Identified the Reponsible Provider, Reviewed the pertinent medical history, Discussed the surgical course, Reviewed Intra-OP anesthesia mangement and issues during anesthesia, Set expectations for post-procedure period and Allowed opportunity for questions and acknowledgement of understanding      Vitals: (Last set prior to Anesthesia Care Transfer)    CRNA VITALS  2/25/2020 0827 - 2/25/2020 0903      2/25/2020             Pulse:  90    SpO2:  98 %    Resp Rate (set):  10                Electronically Signed By: KAYLEE Casillas CRNA  February 25, 2020  9:03 AM

## 2020-02-25 NOTE — PLAN OF CARE
7a-3p shift report  Arrived from PACU at approx 1100. Has 3 way 24fr urinary catheter to pressley bag - drains pink urine at slow CBI rate. CBI required titration to med rate to keep CBI returns clear-pale yellow. No clots noted in urine. Urine output good. Has tolerable abdominal discomfort rate at 6 on 10 pain scale. Is on XS Scheduled Tylenol  given x1. States pain <7 is tolerable for him. VSS, Stayed in bed this shift. Educated verbally on POC, CAPN, C&DB. Will need education on and the apparatus for IS by angelito reyse. Wife visited and is supportive. Pt uses CPAP at night and can't sleep without it. Resource Nurse received order for CAPNO to be off while CPAP is on. A&O.   Continue to monitor.

## 2020-02-25 NOTE — PROGRESS NOTES
Medication History Completed by Medication Scribe  Admission medication history interview status for the 2/25/2020  admission is complete. See EPIC admission navigator for prior to admission medications     Medication history sources: Patient, Magdalena, H&P and Patient's home med list  Medication history source reliability: Good  Adherence assessment: N/A Not Observed    Significant changes made to the medication list:  None      Additional medication history information:   Patient brought own home meds: Atrovent nasal spray    Medication reconciliation completed by provider prior to medication history? No    Time spent in this activity: 35 minutes      Prior to Admission medications    Medication Sig Last Dose Taking? Auth Provider   allopurinol (ZYLOPRIM) 300 MG tablet TAKE 1 TABLET(300 MG) BY MOUTH DAILY 2/25/2020 at 0330 Yes Fany Lockhart NP   digoxin (LANOXIN) 125 MCG tablet Take 1 tablet (125 mcg) by mouth daily 2/25/2020 at 0330 Yes Kayla Lyman MD   finasteride (PROSCAR) 5 MG tablet Take 1 tablet by mouth daily 2/25/2020 at 0330 Yes Reported, Patient   furosemide (LASIX) 40 MG tablet Take 40 mg by mouth daily 2/25/2020 at 0330 Yes Reported, Patient   ipratropium (ATROVENT) 0.06 % nasal spray USE 2 SPRAYS IN EACH NOSTRIL FOUR TIMES DAILY AS NEEDED FOR RHINITIS 2/25/2020 at 0330 Yes Nena Pablo MD   losartan (COZAAR) 50 MG tablet Take 1 tablet (50 mg) by mouth daily 2/25/2020 at 0330 Yes Diomedes Menendez MD   metoprolol tartrate (LOPRESSOR) 50 MG tablet TAKE 2 TABLETS(100 MG) BY MOUTH TWICE DAILY  Patient taking differently: Take 100 mg by mouth 2 times daily TAKE 2 TABLETS(100 MG) BY MOUTH TWICE DAILY 2/25/2020 at 0330 Yes Nena Pablo MD   Multiple Vitamin (MULTI-VITAMIN) per tablet Take 1 tablet by mouth daily. 2/25/2020 at 0330 Yes Heriberto Haile MD   omeprazole (PRILOSEC) 40 MG DR capsule Take 40 mg by mouth 2 times daily  2/24/2020 at pm Yes Reported, Patient    simvastatin (ZOCOR) 10 MG tablet TAKE 1 TABLET(10 MG) BY MOUTH AT BEDTIME 2/24/2020 at pm Yes Nena Pablo MD   warfarin (COUMADIN) 2 MG tablet TAKE 1 1/2 TABLETS BY MOUTH DAILY 2/20/2020 Yes Nena Pablo MD

## 2020-02-25 NOTE — ANESTHESIA POSTPROCEDURE EVALUATION
Patient: Vasiliy Corbin    Procedure(s):  CYSTOSCOPY; TRANSURETHRAL RESECTION OF THE PROSTATE    Diagnosis:Enlarged prostate [N40.0]  Hematuria syndrome [R31.9]  Diagnosis Additional Information: No value filed.    Anesthesia Type:  General, ETT    Note:  Anesthesia Post Evaluation    Patient location during evaluation: PACU  Patient participation: Able to fully participate in evaluation  Level of consciousness: awake, awake and alert and responsive to verbal stimuli  Pain management: adequate  Airway patency: patent  Cardiovascular status: acceptable  Respiratory status: acceptable  Hydration status: acceptable  PONV: none     Anesthetic complications: None          Last vitals:  Vitals:    02/25/20 1049 02/25/20 1100 02/25/20 1122   BP: (!) 153/95  (!) 164/86   Pulse:      Resp: 16 16 14   Temp: 36  C (96.8  F)  35.1  C (95.2  F)   SpO2: 96%           Electronically Signed By: Cris Del Toro  February 25, 2020  12:06 PM

## 2020-02-25 NOTE — CONSULTS
HOSPITALIST CONSULTATION    [facility]    Consult Date:  02/25/2020      PRIMARY CARE PROVIDER:  Fany Lockhart CNP      CHIEF COMPLAINT:  Hematuria.      HISTORY OF PRESENT ILLNESS:    Mr. Vasiliy Corbin is a very pleasant 84-year-old gentleman with a past medical history significant for chronic atrial fibrillation on warfarin, hypertension, hyperlipidemia, mild CAD, history of cardiomyopathy, BPH, morbid obesity, obstructive sleep apnea on CPAP, among others, who was admitted by Urology for elective transurethral resection of prostate and cystoscopy on 02/25/2020 with Dr. Lokesh Lima.  Hospitalist Service was consulted for postoperative medical comanagement.  Surgery appeared uneventful with EBL 50 mL.  General anesthesia.  Postoperative vital signs have been stable and weaned to room air.  A little hypertensive with systolic blood pressures 150s-160s.  Preoperative H and P by primary care and has been reviewed.  The patient took all of his morning medications other than in his evening medications last night, other than warfarin, which has been held for the past 5 days.      During my visit, the patient is a well-appearing obese gentleman with wife at bedside who assisted in interview.  He states his pain is controlled and he has no current complaints.  He is breathing comfortably on room air.  He denies any lightheadedness or dizziness.  No abdominal pain.  He confirms the history above.      PAST MEDICAL HISTORY:   1.  Atrial fibrillation.   2.  Coronary artery disease, mild without prior stenting.   3.  Cardiomyopathy.   4.  Chronic cough.   5.  Colon polyps.   6.  Gout.   7.  Hiatal hernia.   8.  History of benign prostatic hypertrophy.   9.  Hypertension.   10.  Hyperlipidemia.   11.  Morbid obesity, BMI 44.   12.  Obstructive sleep apnea on CPAP.      PAST SURGICAL HISTORY:   1.  Transposition of the ulnar nerve.   2.  Laser transurethral resection of the prostate 2016.   3.  Left heart  catheterization in .   4.  Cystoscopy  and .   5.  Carpal tunnel release.   6.  Clavicle surgery as a child.      FAMILY HISTORY:    The patient's mother  and had heart failure at the end of her life.  His brother had myocardial infarction and a history of CAD.      SOCIAL HISTORY:    The patient is a former smoker, smoked approximately 1 pack per day for 5 years and quit in .  He denies any history of alcohol use or illicit drugs.      PRIOR TO ADMISSION MEDICATIONS:    Medications Prior to Admission   Medication Sig Dispense Refill Last Dose     allopurinol (ZYLOPRIM) 300 MG tablet TAKE 1 TABLET(300 MG) BY MOUTH DAILY 90 tablet 0 2020 at 0330     digoxin (LANOXIN) 125 MCG tablet Take 1 tablet (125 mcg) by mouth daily 90 tablet 3 2020 at 0330     finasteride (PROSCAR) 5 MG tablet Take 1 tablet by mouth daily   2020 at 0330     furosemide (LASIX) 40 MG tablet Take 40 mg by mouth daily   2020 at 0330     ipratropium (ATROVENT) 0.06 % nasal spray USE 2 SPRAYS IN EACH NOSTRIL FOUR TIMES DAILY AS NEEDED FOR RHINITIS 135 mL 3 2020 at 0330     losartan (COZAAR) 50 MG tablet Take 1 tablet (50 mg) by mouth daily 90 tablet 3 2020 at 0330     metoprolol tartrate (LOPRESSOR) 50 MG tablet TAKE 2 TABLETS(100 MG) BY MOUTH TWICE DAILY (Patient taking differently: Take 100 mg by mouth 2 times daily TAKE 2 TABLETS(100 MG) BY MOUTH TWICE DAILY) 360 tablet 3 2020 at 0330     Multiple Vitamin (MULTI-VITAMIN) per tablet Take 1 tablet by mouth daily. 100 tablet 12 2020 at 0330     omeprazole (PRILOSEC) 40 MG DR capsule Take 40 mg by mouth 2 times daily    2020 at pm     simvastatin (ZOCOR) 10 MG tablet TAKE 1 TABLET(10 MG) BY MOUTH AT BEDTIME 90 tablet 3 2020 at pm     warfarin (COUMADIN) 2 MG tablet TAKE 1 1/2 TABLETS BY MOUTH DAILY 135 tablet 3 2020       ALLERGIES:  THE PATIENT HAS NO FORMAL ALLERGIES, BUT STATES HE HAS SIDE EFFECTS TO CARDIZEM AND  LISINOPRIL.      REVIEW OF SYSTEMS:  A 10-point review of systems was completed and is negative other than listed in the HPI.      PHYSICAL EXAMINATION:   VITAL SIGNS:  Temperature 95.8, heart rate 86, blood pressure 164/86, respiratory rate 14, breathing 96% on room air.   GENERAL:  The patient is a well-appearing elderly gentleman in no acute distress.   HEENT:  Atraumatic, normocephalic.  Extraocular movements intact.   CARDIOVASCULAR:  Irregularly irregular heart rate.  No murmurs, gallops or rubs.   PULMONARY:  Lungs are clear to auscultation bilaterally to posterior fields.   ABDOMEN:  Obese but nondistended, nontender.  Bowel sounds are present.  Howell catheter in place draining pink urine.   MUSCULOSKELETAL:  Moves all extremities equally.   strength intact.   SKIN:  No obvious lesions or rashes on exposed skin.  No peripheral edema noted to bilateral ankles.  PCDs in place.     NEUROLOGY: AAOx3.      LABORATORY DATA:  Preoperative labs reviewed.  Today's INR 1.45, which is adequate for surgery withholding of warfarin.      IMAGING:  Not applicable.      ASSESSMENT AND PLAN:    Mr. Vasiliy Corbin is a very pleasant 84-year-old gentleman with past medical history significant for chronic atrial fibrillation on warfarin, mild coronary artery disease without prior stenting, hypertension, hyperlipidemia, obstructive sleep apnea on CPAP, gastroesophageal reflux disease, gout, among others, who was admitted by Urology, status post transurethral resection of the prostate on 02/25/2020.  Hospitalist Service is consulted for medical comanagement.     1.  Benign prostatic hypertrophy, status post transurethral resection of the prostate 02/25/2020.   -- Postoperative care as per primary service, Urology, including IV fluids, pain control, DVT prophylaxis, and disposition.   -- PT/OT/mobilization per primary service.   -- Incentive spirometry use frequently.     2.  Mild non-obstructive coronary artery disease.   3.   Hypertension.   4.  Hyperlipidemia.    No history of coronary stenting. Per chart review, last echo was 2018 with EF 50% to 55% with mild biatrial enlargement.  He has known chronic atrial fibrillation in which he takes digoxin 125 mcg daily as well as Lopressor 100 mg b.i.d.   -- Resume PTA digoxin, beta blocker, Losartan 50 mg daily with hold parameters, Simvastatin 10 mg at bedtime.   -- Resume PTA furosemide 40mg on POD #1 with hold parameters    5.  Chronic atrial fibrillation.   -- As above, resume his prior to admission beta blocker for rate control.   -- We are holding off on warfarin due to surgery  Will need to check with the urologist when this can be resumed.     6.  Obstructive sleep apnea on CPAP.  The patient has brought in his CPAP to utilize overnight.     7.  Gastroesophageal reflux disease.  Resume prior to admission Omeprazole 40 mg b.i.d.     8.  Gout.   Resume prior to admission allopurinol 300 mg daily.     Deep venous thrombosis prophylaxis. PCDs in place. Defer to primary service to when Warfarin can be resumed.      CODE STATUS:  The patient is full code, which I discussed with the patient and his wife during this consultation.      This patient was discussed with Dr. Stoney Banks of the Hospitalist Service who agrees with current plans as outlined above.    Paulina Alejandro PA-C ([place])  Hospitalist APRYL       STONEY BANKS MD       As dictated by PAULINA FONSECA PA-C            D: 2020   T: 2020   MT: ALETHEA      Name:     AVERY FERRER   MRN:      [id number]        Account:       [id number]   :      1935           Consult Date:  2020      Document: [id number]       cc: LISSETTE ORTIZ CNP

## 2020-02-25 NOTE — OR NURSING
PNDS Criteria met.  Order received per Dr. Del Toro to transfer to [facility] for continued recovery.  Hand-off report to Abiola LIZARRAGA.  To 812-1 per cart wit all belongings.  Will transport with Capnography monitoring.  Patient has personal CPAP with with, but it is in his car; wife will bring it in.

## 2020-02-25 NOTE — PROGRESS NOTES
Urology Brief Note    Late entry.  Met pt with Dr. Lima in PACU earlier today.  Pt POD#0 TURP with Dr. Lima for BPH.  On CBI with Howell to light traction.  Scheduled tylenol with PRN dilaudid available for pain.  OK to ADAT.  Dr. Lima prefers to hold coumadin until follow up appt in clinic this Friday 2/28.    Will see patient again in the morning.    Marie Bonilla PA-C  [facility], a division of [facility]  Office Phone: [phone]  After 4pm and on weekends, please call [phone]

## 2020-02-25 NOTE — ANESTHESIA PREPROCEDURE EVALUATION
Anesthesia Pre-Procedure Evaluation    Patient: Vasiliy Corbin   MRN: [id number] : 1935          Preoperative Diagnosis: Enlarged prostate [N40.0]  Hematuria syndrome [R31.9]    Procedure(s):  CYSTOSCOPY; TRANSURETHRAL RESECTION OF THE PROSTATE    Past Medical History:   Diagnosis Date     Atrial fibrillation (H)     became chronic afib in ,paroxysmal,was on amiodarone but went into persistent afib in 2106 and amiodrone was d/cd. now on BBLK and considering cardioversion.(2106).In 2016 plan is rate control which has been a challenge and Holter in 6 months     CAD (coronary artery disease)     mild stenosis per cath     Cardiomyopathy (H)      Chronic cough     Eval with Dr Sarai Oro - [place] [facility] - rec speech therapy, ct for eval of crackles No obstruction or copd     Colon polyp      Gout      Hiatal hernia      Hip pain      History of BPH     laser TURP in  Dr Lima     HTN (hypertension)      Hyperlipidemia      Neuropathy     numbness & tingling R. 4th & 5th finger left hand     Obesity (BMI 35.0-39.9 without comorbidity)      SUJEY on CPAP      Shoulder injury      Venous insufficiency of right leg     no trteatment recommended by [facility] - option to treat is there Dr Zapata     Wrist swelling      Past Surgical History:   Procedure Laterality Date     CLAVICLE SURGERY Left     as a kid     CT release       CYSTOSCOPY N/A 2016    Procedure: CYSTOSCOPY;  Surgeon: Lokesh Lima MD;  Location:  OR     CYSTOSCOPY, FULGURATE BLEEDERS, EVACUATE CLOT(S), COMBINED N/A 2018    Procedure: COMBINED CYSTOSCOPY, FULGURATE BLEEDERS, EVACUATE CLOT(S);  CYSTOSCOPY, FULGERATION;  Surgeon: Lokesh Lima MD;  Location:  OR     HC LEFT HEART CATHETERIZATION  3/2010    Mild CAD     LASER KTP TRANSURETHRAL RESECTION (TUR) PROSTATE N/A 2016    Procedure: LASER KTP TRANSURETHRAL RESECTION (TUR) PROSTATE;  Surgeon: Lokesh Lima MD;  Location:  OR     TRANSPOSITION  ULNAR NERVE (ELBOW) Left 11/29/2019    Procedure: LEFT ELBOW ULNER NERVE SUBMUSCULAR TRANSPOSITION;  Surgeon: Nicole Bai MD;  Location: [facility] OR     ulnar reroute      right        Anesthesia Evaluation     .             ROS/MED HX    ENT/Pulmonary:     (+)sleep apnea, uses CPAP , . .   (-) asthma and COPD   Neurologic:     (+)neuropathy    (-) CVA   Cardiovascular:     (+) Dyslipidemia, hypertension--CAD, --. : . CHF Last EF: 50-55% . . :. .       METS/Exercise Tolerance:     Hematologic:         Musculoskeletal:   (+) arthritis,  -       GI/Hepatic:     (+) GERD Asymptomatic on medication, hiatal hernia,       Renal/Genitourinary:     (+) BPH,       Endo:     (+) Obesity, .      Psychiatric:         Infectious Disease:         Malignancy:         Other:                          Physical Exam  Normal systems: dental    Airway   Mallampati: III  TM distance: >3 FB  Neck ROM: full    Dental     Cardiovascular   Rhythm and rate: regular      Pulmonary    breath sounds clear to auscultation            Lab Results   Component Value Date    WBC 7.9 02/12/2020    HGB 13.5 02/25/2020    HCT 39.5 02/12/2020     02/12/2020     01/22/2020    POTASSIUM 4.1 02/25/2020    CHLORIDE 104 01/22/2020    CO2 29 05/22/2017    BUN 25 01/22/2020    BUN 24 01/22/2020    CR 1.06 01/22/2020     (H) 01/22/2020    EDUARD 9.3 01/22/2020    MAG 2.0 06/08/2010    ALBUMIN 4.1 01/22/2020    PROTTOTAL 6.6 01/22/2020    ALT 18 01/22/2020    AST 19 01/22/2020    ALKPHOS 154 (H) 02/12/2020    BILITOTAL 0.6 01/22/2020    BILIDIRECT 0.22 04/16/2019    PTT 37 03/18/2010    INR 1.45 (H) 02/25/2020    TSH 1.81 12/28/2016    T4 0.93 04/16/2019       Preop Vitals  BP Readings from Last 3 Encounters:   02/25/20 (!) 164/72   02/12/20 122/70   01/22/20 124/74    Pulse Readings from Last 3 Encounters:   02/12/20 77   01/22/20 67   11/29/19 70      Resp Readings from Last 3 Encounters:   02/25/20 16   02/12/20 16   01/22/20 16     "SpO2 Readings from Last 3 Encounters:   02/25/20 94%   02/12/20 95%   01/22/20 97%      Temp Readings from Last 1 Encounters:   02/25/20 36.2  C (97.2  F) (Oral)    Ht Readings from Last 1 Encounters:   02/25/20 1.676 m (5' 6\")      Wt Readings from Last 1 Encounters:   02/25/20 124.5 kg (274 lb 8 oz)    Estimated body mass index is 44.31 kg/m  as calculated from the following:    Height as of this encounter: 1.676 m (5' 6\").    Weight as of this encounter: 124.5 kg (274 lb 8 oz).       Anesthesia Plan      History & Physical Review  History and physical reviewed and following examination; no interval change.    ASA Status:  3 .    NPO Status:  > 8 hours    Plan for General and ETT   PONV prophylaxis:  Ondansetron (or other 5HT-3) and Dexamethasone or Solumedrol  Additional equipment: Videolaryngoscope      Postoperative Care      Consents  Anesthetic plan, risks, benefits and alternatives discussed with:  Patient..                 Cris Del Toro  "

## 2020-02-25 NOTE — PROGRESS NOTES
UROLOGY BRIEF OP NOTE  PREOP DX GROSS HEMATURIA, BPH, CHRONIC WARFARIN  POSTOP DX SAME  PROCEDURE -CYSTO, TURP  ANES-GEN  SURGEON - BON  EBL 50CC  FINDINGS- BILOBAR PROSTATE  FINAL DX - HEMATURIA, BPH, FINAL PATH PENDING  ADMIT OBSERVATION BED. HOME AM WITH [facility], HOLD WARFARIN

## 2020-02-25 NOTE — OP NOTE
Procedure Date: 02/25/2020      PREOPERATIVE DIAGNOSES:   1.  Recurrent gross hematuria.   2.  Benign prostatic hypertrophy.      POSTOPERATIVE DIAGNOSES:   1.  Recurrent gross hematuria.   2.  Benign prostatic hypertrophy.      PROCEDURES:  Cystoscopy, transurethral resection of the prostate with fulguration.      ANESTHESIA:  General.      SURGEON:  Lokesh Lima MD      ESTIMATED BLOOD LOSS:  Less than 50 mL.      SUMMARY OF FINDINGS:  Obstructing bilobar prostate, very fragile mucosa, irritated and trabeculated bladder.      BRIEF HISTORY AND PHYSICAL:  The patient is an 84-year-old male with a history of BPH, recurrent episodes of gross hematuria.  He is on chronic warfarin.  The patient did undergo a previous fulguration in the past.  He has continued to have problems and has decided now to undergo a more formal procedure.  The procedure was thoroughly explained to the patient including possible complications and realistic expectations and he has elected to proceed.      DESCRIPTION OF PROCEDURE:  Proper permits were obtained and signed.  The patient was taken to the operating room after general anesthesia.  He was prepped and draped in the usual sterile fashion.  A 22-Thai Storz visually passed through urethra.  There was some slight narrowing at the fossa navicularis.  The prostate itself demonstrated bilobar hypertrophy and somewhat high bladder neck, very vascular and was bleeding just by passing the scope.  The bladder also was significantly irritated.  A urine sample was sent for culture.  He did receive intravenous antibiotics.  There was no evidence of any obvious masses, although visualization was not optimal.      The patient underwent distal urethral dilation.  The 26-Thai continuous flow resectoscope then was visually passed into the bladder with the Almaraz apparatus.  The patient underwent resection of his prostate starting at the bladder neck and extending to the left lobe of the prostate  from the bladder neck to the level of the verumontanum.  Resection was continued from approximately the 2 o'clock position to the 6 o'clock position and then on the right side in a similar fashion, again extending from the bladder neck from the 11 o'clock to the 6 o'clock position and then completed from the 11 o'clock to the 1 o'clock position extending and including the entire roof.  This extended to the level of the verumontanum.  The fossa did extend somewhat beyond the verumontanum.  Care was taken not to go beyond the veru.  Resection was taken until capsular fibers could be seen.  There was some residual tissue at the level of the apex.  He did undergo fulguration with the rollerball.  Prostatic chips were sent for pathological diagnosis.  Estimated blood loss approximately 50 mL.  A 24 3-way Howell catheter was placed and filled with 40 mL and put on light traction.  He was given 10 mg of Lasix and a B&O suppository and taken to the recovery room in stable condition.         OSMANI CROCKETT MD             D: 2020   T: 2020   MT: MAHESH      Name:     AVERY FERRER   MRN:      [id number]        Account:        [id number]   :      1935           Procedure Date: 2020      Document: [id number]

## 2020-02-26 VITALS
SYSTOLIC BLOOD PRESSURE: 115 MMHG | TEMPERATURE: 97.4 F | HEIGHT: 66 IN | DIASTOLIC BLOOD PRESSURE: 68 MMHG | WEIGHT: 274.5 LBS | RESPIRATION RATE: 18 BRPM | HEART RATE: 77 BPM | OXYGEN SATURATION: 98 % | BODY MASS INDEX: 44.11 KG/M2

## 2020-02-26 LAB
ALBUMIN SERPL-MCNC: 3.5 G/DL (ref 3.4–5)
ALP SERPL-CCNC: 133 U/L (ref 40–150)
ALT SERPL W P-5'-P-CCNC: 22 U/L (ref 0–70)
ANION GAP SERPL CALCULATED.3IONS-SCNC: 4 MMOL/L (ref 3–14)
AST SERPL W P-5'-P-CCNC: 18 U/L (ref 0–45)
BACTERIA SPEC CULT: NORMAL
BILIRUB SERPL-MCNC: 1.2 MG/DL (ref 0.2–1.3)
BUN SERPL-MCNC: 30 MG/DL (ref 7–30)
CALCIUM SERPL-MCNC: 9.3 MG/DL (ref 8.5–10.1)
CHLORIDE SERPL-SCNC: 103 MMOL/L (ref 94–109)
CO2 SERPL-SCNC: 30 MMOL/L (ref 20–32)
COPATH REPORT: NORMAL
CREAT SERPL-MCNC: 1.08 MG/DL (ref 0.66–1.25)
ERYTHROCYTE [DISTWIDTH] IN BLOOD BY AUTOMATED COUNT: 14.2 % (ref 10–15)
GFR SERPL CREATININE-BSD FRML MDRD: 62 ML/MIN/{1.73_M2}
GLUCOSE SERPL-MCNC: 117 MG/DL (ref 70–99)
HCT VFR BLD AUTO: 38.5 % (ref 40–53)
HGB BLD-MCNC: 13 G/DL (ref 13.3–17.7)
INR PPP: 1.25 (ref 0.86–1.14)
Lab: NORMAL
MCH RBC QN AUTO: 32 PG (ref 26.5–33)
MCHC RBC AUTO-ENTMCNC: 33.8 G/DL (ref 31.5–36.5)
MCV RBC AUTO: 95 FL (ref 78–100)
PLATELET # BLD AUTO: 195 10E9/L (ref 150–450)
POTASSIUM SERPL-SCNC: 4.2 MMOL/L (ref 3.4–5.3)
PROT SERPL-MCNC: 7.5 G/DL (ref 6.8–8.8)
RBC # BLD AUTO: 4.06 10E12/L (ref 4.4–5.9)
SODIUM SERPL-SCNC: 137 MMOL/L (ref 133–144)
SPECIMEN SOURCE: NORMAL
WBC # BLD AUTO: 12.7 10E9/L (ref 4–11)

## 2020-02-26 PROCEDURE — 25800030 ZZH RX IP 258 OP 636: Performed by: PHYSICIAN ASSISTANT

## 2020-02-26 PROCEDURE — 99207 ZZC CDG-CODE CATEGORY CHANGED: CPT | Performed by: HOSPITALIST

## 2020-02-26 PROCEDURE — 85027 COMPLETE CBC AUTOMATED: CPT | Performed by: HOSPITALIST

## 2020-02-26 PROCEDURE — 25000132 ZZH RX MED GY IP 250 OP 250 PS 637: Mod: GY | Performed by: PHYSICIAN ASSISTANT

## 2020-02-26 PROCEDURE — 36415 COLL VENOUS BLD VENIPUNCTURE: CPT | Performed by: HOSPITALIST

## 2020-02-26 PROCEDURE — 85018 HEMOGLOBIN: CPT | Performed by: HOSPITALIST

## 2020-02-26 PROCEDURE — 99225 ZZC SUBSEQUENT OBSERVATION CARE,LEVEL II: CPT | Performed by: HOSPITALIST

## 2020-02-26 PROCEDURE — 85610 PROTHROMBIN TIME: CPT | Performed by: HOSPITALIST

## 2020-02-26 PROCEDURE — 80053 COMPREHEN METABOLIC PANEL: CPT | Performed by: HOSPITALIST

## 2020-02-26 RX ORDER — WARFARIN SODIUM 2 MG/1
TABLET ORAL
Qty: 135 TABLET | Refills: 3 | Status: SHIPPED | OUTPATIENT
Start: 2020-02-28 | End: 2020-05-01

## 2020-02-26 RX ORDER — WARFARIN SODIUM 2 MG/1
TABLET ORAL
Qty: 135 TABLET | Refills: 3 | COMMUNITY
Start: 2020-02-28 | End: 2020-02-26

## 2020-02-26 RX ADMIN — OMEPRAZOLE 40 MG: 20 CAPSULE, DELAYED RELEASE ORAL at 10:10

## 2020-02-26 RX ADMIN — LOSARTAN POTASSIUM 50 MG: 50 TABLET, FILM COATED ORAL at 10:11

## 2020-02-26 RX ADMIN — DIGOXIN 125 MCG: 125 TABLET ORAL at 10:10

## 2020-02-26 RX ADMIN — ACETAMINOPHEN 975 MG: 325 TABLET, FILM COATED ORAL at 10:09

## 2020-02-26 RX ADMIN — ALLOPURINOL 300 MG: 300 TABLET ORAL at 10:10

## 2020-02-26 RX ADMIN — METOPROLOL TARTRATE 100 MG: 100 TABLET, FILM COATED ORAL at 10:10

## 2020-02-26 RX ADMIN — SODIUM CHLORIDE, POTASSIUM CHLORIDE, SODIUM LACTATE AND CALCIUM CHLORIDE: 600; 310; 30; 20 INJECTION, SOLUTION INTRAVENOUS at 02:13

## 2020-02-26 RX ADMIN — FUROSEMIDE 40 MG: 40 TABLET ORAL at 10:10

## 2020-02-26 RX ADMIN — ACETAMINOPHEN 975 MG: 325 TABLET, FILM COATED ORAL at 02:13

## 2020-02-26 NOTE — DISCHARGE INSTRUCTIONS
Discharge Instructions following   Transurethral Resection of the Prostate (TURP)  [facility]    Diet:    Diet as tolerated.    Drink at least 6-8 glasses of liquid a day.  At least 3 glasses should be water.  Activity:    Avoid heavy lifting and running.  Check at your first follow-up appointment for increased activity.    Short walks and stair climbing are ok.    Showering is ok.  Care after surgery:    Do not hold urine in your bladder.  Always empty your bladder when you have the urge to urinate.    Do not strain to have a bowel movement.  If you are constipated, take an over-the-counter stool softener until stools become normal or soft.  This may take several days.    Do not drive a car or have intercourse until cleared by your doctor.    It is not unusual to pass small clots or have red-tinged urine.  If this occurs, decrease activity, and increase your fluid intake.  Generally, the urine will clear of blood within a day or two.  Notify your surgeon for the following signs and symptoms:    Painful urination or inability to urinate.    Loss of bladder control or increase frequency of voiding.    Chills or a temperature greater than 101 oF.    You may expect to have some blood for at least 3-4 weeks, particularly at the beginning or end of urination. If there is dark, thick blood with difficulty urinating, call your surgeon.      With any questions or concerns.

## 2020-02-26 NOTE — PROGRESS NOTES
[facility]  Hospitalist Progress Note   02/26/2020          Assessment and Plan:       Vasiliy Corbin is a 84 year old male admitted on 2/25/2020 by Urology, status post transurethral resection of the prostate on 02/25/2020.  Hospitalist Service consulted for medical comanagement.      Benign prostatic hypertrophy, status post transurethral resection of the prostate 02/25/2020.   Postoperative care as per primary service, Urology, pain control, Howell catheter, DVT prophylaxis, and disposition.   Defer restarting Coumadin postprocedure to urology.  PT/OT/mobilization per primary service.   Encourage early ambulation, aggressive incentive spirometry.  Bowel meds as needed while on pain medication.     Mild non-obstructive coronary artery disease.   Hypertension.   Hyperlipidemia.    No history of coronary stenting. Per chart review, last echo was 01/2018 with EF 50% to 55% with mild biatrial enlargement.  He has known chronic atrial fibrillation in which he takes digoxin 125 mcg daily as well as Lopressor 100 mg b.i.d.   Continue PTA digoxin, beta blocker, Losartan 50 mg daily, Simvastatin 10 mg at bedtime.   Restarted PTA furosemide 40mg at time of discharge.     Chronic atrial fibrillation.   Continue PTA digoxin, beta-blocker.  Coumadin on hold per urology, scheduled to follow-up with urology in 2/28 and then discuss on restarting.  Discussed risks and benefits of holding anticoagulation with patient, expressed understanding.    Obstructive sleep apnea on CPAP.    Continue PTA CPAP     Gastroesophageal reflux disease.  Continue prior to admission Omeprazole 40 mg b.i.d.      Gout.     Continue prior to admission allopurinol 300 mg daily.     Medically complicated obesity with a BMI of 44.31.  We will need to consider lifestyle modification with diet and exercise as able to.       Orders Placed This Encounter      Advance Diet as Tolerated: Regular Diet Adult      Diet      DVT Prophylaxis:  SCD, anticoagulation on hold per urology.  Code Status: Full Code  Disposition: Expected discharge later today per urology.    Discussed with patient, bedside RN    Yessi Ye MD        Interval History:      Patient appears comfortable lying in bed.  Denies any chest pain or shortness of breath.  No nausea vomiting.  No headache or dizziness.  Draining slightly pinkish urine through Howell catheter.  PTA Coumadin on hold per urology.  Has been ambulating, no acute issues overnight.       Physical Exam:        Physical Exam   Temp:  [95.2  F (35.1  C)-97.4  F (36.3  C)] 97.4  F (36.3  C)  Heart Rate:  [79-93] 79  Resp:  [14-18] 18  BP: (115-164)/(68-95) 115/68  SpO2:  [95 %-98 %] 98 %    Intake/Output Summary (Last 24 hours) at 2/26/2020 1039  Last data filed at 2/26/2020 0636  Gross per 24 hour   Intake 694 ml   Output 54005 ml   Net -95499 ml       Admission Weight: 124.5 kg (274 lb 8 oz)  Current Weight: 124.5 kg (274 lb 8 oz)    PHYSICAL EXAM  GENERAL: Patient is in no distress. Alert and oriented.  HEART: Regular rate and rhythm. S1S2. No murmurs  LUNGS: Bilateral slightly decreased breath sounds, no wheezing no crackles.  ABDOMEN: Soft, no abdominal tenderness, bowel sounds heard   NEURO: Moving all extremities, no focal weakness.  Denied a urinary Howell catheter in place, draining pinkish colored urine.  EXTREMITIES: No pedal edema.  SKIN: Warm, dry. No rash or bruising.  PSYCHIATRY Cooperative       Medications:          acetaminophen  975 mg Oral Q6H     allopurinol  300 mg Oral Daily     digoxin  125 mcg Oral Daily     furosemide  40 mg Oral Daily     losartan  50 mg Oral Daily     metoprolol tartrate  100 mg Oral BID     omeprazole  40 mg Oral BID     simvastatin  10 mg Oral At Bedtime     sodium chloride (PF)  3 mL Intracatheter Q8H     HYDROmorphone, ipratropium, lidocaine 4%, lidocaine (buffered or not buffered), naloxone, ondansetron **OR** ondansetron, prochlorperazine **OR** prochlorperazine,  sodium chloride (PF)         Data:      All new lab and imaging data was reviewed.

## 2020-02-26 NOTE — PROGRESS NOTES
UROLOGY PROGRESS NOTE    February 26, 2020  Post-op Day # 1 TURP    SUBJECTIVE:  Patient doing well.  Denies any pain.  Ambulated last night.  Tolerating regular diet, denies N/V.  Discussed with RN-- she clamped CBI at 0715.  UOP clear, light josé miguel, no blood or clots at present.    OBJECTIVE:  Temp:  [95.2  F (35.1  C)-97.4  F (36.3  C)] 97.4  F (36.3  C)  Pulse:  [67-77] 77  Heart Rate:  [62-93] 79  Resp:  [11-18] 18  BP: (115-177)/() 115/68  SpO2:  [95 %-99 %] 98 %    Intake/Output Summary (Last 24 hours) at 2/26/2020 0805  Last data filed at 2/26/2020 0636  Gross per 24 hour   Intake 1864 ml   Output 71909 ml   Net -9331 ml       GENERAL:  Awake, alert, no acute distress, resting comfortably in bed  HEAD: Normocephalic atraumatic  SCLERA: Anicteric  EXTREMITIES: Warm and well perfused  ABDOMEN:  Soft, non-tender, non-distended. No guarding, rigidity, or peritoneal signs.   : Howell in place draining clear light josé miguel urine without blood or clots; CBI off    LABS:  Lab Results   Component Value Date    WBC 12.7 02/26/2020     Lab Results   Component Value Date    HGB 13.0 02/26/2020     Lab Results   Component Value Date    HCT 38.5 02/26/2020     Lab Results   Component Value Date     02/26/2020     Last Basic Metabolic Panel:  Lab Results   Component Value Date     02/26/2020      Lab Results   Component Value Date    POTASSIUM 4.2 02/26/2020     Lab Results   Component Value Date    CHLORIDE 103 02/26/2020     Lab Results   Component Value Date    EDUARD 9.3 02/26/2020     Lab Results   Component Value Date    CO2 30 02/26/2020     Lab Results   Component Value Date    BUN 30 02/26/2020     Lab Results   Component Value Date    CR 1.08 02/26/2020     Lab Results   Component Value Date     02/26/2020       ASSESSMENT/PLAN: 83 yo man POD# 1 TURP.  AVSS.  Hgb 13.5 -> 13. Discussed with RN-- she clamped CBI at 0715.  UOP clear, light josé miguel, no blood or clots at present.    1. Regular  diet  2. Continue current pain regimen (sched tylenol, PRN dilaudid)-- pt reporting no pain at present  3. Hold warfarin-- Dr. Lima prefers to hold until f/u visit on Friday 2/28  4. Continue Howell-- will discharge with Howell in place  5. Dispo: Discharge to home today.  F/u with Dr. Lima Friday 2/28 in clinic.      Marie Bonilla PA-C  [facility], a division of [facility]  Office Phone: [phone]  After 4pm and on weekends, please call [phone]

## 2020-02-26 NOTE — PLAN OF CARE
Pt A/O.  Education on catheter care at home given and discharge instructions.  Pt and wife demonstrate understanding.  Discharged off unit via wheelchair with belongings to home.

## 2020-02-26 NOTE — PLAN OF CARE
Pt A/Ox4.  Vitally stable.  Capno on.  CBI increased to a fast rate briefly when urine turned to a cherry color (no clots), now weaned back to a moderate rate and it is pink.  Tolerating full liquid diet, no flatus yet.  Pt states his thinks he might be going home with catheter, but has had one previously.  Will give patient handout on pressley care at home

## 2020-02-26 NOTE — PLAN OF CARE
Alert and oriented x 4, calm and cooperative. Post-op day 1 from a TURP. Irrigation pressley in place and running. Urine at a pale pink. Patient going home with pressley in-place. Stand-by to 1 x assist. Advanced diet to regular for breakfast. Discharge to be determined.

## 2020-02-28 ENCOUNTER — TRANSFERRED RECORDS (OUTPATIENT)
Dept: FAMILY MEDICINE | Facility: CLINIC | Age: 85
End: 2020-02-28

## 2020-03-13 ENCOUNTER — OFFICE VISIT (OUTPATIENT)
Dept: FAMILY MEDICINE | Facility: CLINIC | Age: 85
End: 2020-03-13

## 2020-03-13 VITALS
SYSTOLIC BLOOD PRESSURE: 122 MMHG | HEART RATE: 52 BPM | DIASTOLIC BLOOD PRESSURE: 68 MMHG | OXYGEN SATURATION: 97 % | BODY MASS INDEX: 45.19 KG/M2 | RESPIRATION RATE: 16 BRPM | WEIGHT: 280 LBS

## 2020-03-13 DIAGNOSIS — R33.8 ENLARGED PROSTATE WITH URINARY RETENTION: ICD-10-CM

## 2020-03-13 DIAGNOSIS — N40.1 ENLARGED PROSTATE WITH URINARY RETENTION: ICD-10-CM

## 2020-03-13 DIAGNOSIS — I48.20 CHRONIC ATRIAL FIBRILLATION (H): Primary | ICD-10-CM

## 2020-03-13 DIAGNOSIS — Z79.01 LONG TERM CURRENT USE OF ANTICOAGULANT THERAPY: ICD-10-CM

## 2020-03-13 DIAGNOSIS — I50.9 HEART FAILURE, UNSPECIFIED HF CHRONICITY, UNSPECIFIED HEART FAILURE TYPE (H): ICD-10-CM

## 2020-03-13 DIAGNOSIS — E66.01 MORBID OBESITY DUE TO EXCESS CALORIES (H): ICD-10-CM

## 2020-03-13 LAB — INR PPP: 2.1 (ref 2–3)

## 2020-03-13 PROCEDURE — 85610 PROTHROMBIN TIME: CPT | Performed by: FAMILY MEDICINE

## 2020-03-13 PROCEDURE — 36415 COLL VENOUS BLD VENIPUNCTURE: CPT | Performed by: FAMILY MEDICINE

## 2020-03-13 PROCEDURE — 99214 OFFICE O/P EST MOD 30 MIN: CPT | Performed by: FAMILY MEDICINE

## 2020-03-13 NOTE — PROGRESS NOTES
SUBJECTIVE:                                                      Patient presents for Hospital Followup Visit:    Hospital:  [facility]      Date of Admission: 2/25/2020  Date of Discharge: 2/26/2020  Transitional Care Management Phone Call:   Reason(s) for Admission: TURP Procedure            Problems taking medications regularly:  None       Medication changes since discharge: None       Problems adhering to non-medication therapy:  None    Summary of hospitalization:  [facility] discharge summary reviewed  Diagnostic Tests/Treatments reviewed.  Follow up needed: none  Other Healthcare Providers Involved in Patient s Care:         None  Update since discharge: improved. Still bleeding a little  The patient has had a history of ongoing obesity.  Reviewed the weigth curves.   Their current BMI is:  Body mass index is 45.19 kg/m .  Reviewed previous attempts at weight loss which have not been successful in producing prolonged weigth loss.   Discussed current eating and exercise habits.     Reviewed weights in chart:  Wt Readings from Last 10 Encounters:   03/13/20 127 kg (280 lb)   02/25/20 124.5 kg (274 lb 8 oz)   02/12/20 125.1 kg (275 lb 12.8 oz)   01/22/20 122 kg (269 lb)   12/23/19 126.5 kg (278 lb 12.8 oz)   11/29/19 125.7 kg (277 lb 1.6 oz)   11/22/19 126.5 kg (278 lb 12.8 oz)   10/25/19 123.1 kg (271 lb 6 oz)   09/27/19 123.9 kg (273 lb 4 oz)   08/30/19 125.4 kg (276 lb 6.4 oz)      BMI Readings from Last 10 Encounters:   03/13/20 45.19 kg/m    02/25/20 44.31 kg/m    02/12/20 44.52 kg/m    01/22/20 43.42 kg/m    12/23/19 45.00 kg/m    11/29/19 44.73 kg/m    11/22/19 45.00 kg/m    10/25/19 43.80 kg/m    09/27/19 44.10 kg/m    08/30/19 44.61 kg/m        ROS:  Constitutional, HEENT, cardiovascular, pulmonary, gi and gu systems are negative, except as otherwise noted.    OBJECTIVE:                                                      APPEARANCE: = Relaxed and in no distress  Conj/Eyelids =  noninjected and lids and lashes are without inflammation  PERRLA/Irises = Pupils Round Reactive to Light and Irisis without inflammation  Neck = No anterior or posterior adenopathy appreciated.  Thyroid = Not enlarged and no masses felt  Resp effort = Calm regular breathing  Breath Sounds = Good air movement with no rales or rhonchi in any lung fields  Heart Rate, Rhythm, & sounds (no Murm)  = irregularly irregular rhythm  Carotid Art's = Pulses full and equal and no bruits appreciated  Abdomen = Soft, nontender, no masses, & bowel sounds in all quadrants  Liver/Spleen = Normal span and no splenomegaly noted  Digits and Nails = FROM in all finger joints, no nail dystrophy  Ext (edema) = No pretibial edema noted or elsewhere  Musculsktl =  Strength and ROM of major joints are within normal limits  SKIN = absent significant rashes or lesions   Recent/Remote Memory = Alert and Oriented x 3  Mood/Affect = Cooperative and interested        ASSESSMENT/PLAN:                                                      Assessment/Plan:  Vasiliy was seen today for hospital f/u and inr followup.    Diagnoses and all orders for this visit:    Chronic atrial fibrillation  -     Prothrombin - INR ([facility])    Long term current use of anticoagulant therapy  -     Prothrombin - INR ([facility])    Enlarged prostate with urinary retention    Heart failure, unspecified HF chronicity, unspecified heart failure type (H)  Seeing cardiology in a few weeks.    Morbid obesity due to excess calories (H)  The patient's current BMI is: Body mass index is 45.19 kg/m ..  Reviewed their weight patterns.   Discussed obesity as a biological preventable and treatable disease, which is associated with significantly increased risk of many acute and chronic health conditions. Obesity has now been recognized as a chronic disease by the American Medical Association.  Discussed serious co-morbidities associated with obesity including increased risk for hypertension, stroke,  coronary artery disease, dyslipidemia, Type II diabetes, depression, sleep apnea, cancers of the colon, breast and endometrium, obstructive sleep apnea, osteoarthritis and female infertility.  Recommended regular aerobic exercise, recommended improved diet aiming at lowering amount of processed foods, lower sugars, and lower wheat products, and moderation carbs and fat in the diet, establishing more regular meal times, always eating breakfast, front loading some of the calories and adding more protein to the diet.     Briefly discussed bariatric surgery as a consideration, especially in patients with BMI greater than or equal to 35 kg/m2 with multiple complications.        Malvin Rosen MD MD  [facility]  [phone]            3. BPH, feeling much better post the TURP!    Post Discharge Medication Reconciliation: discharge medications reconciled and changed, per note/orders (see AVS).  Issues to address: No issues identified.  Plan of care communicated with patient

## 2020-03-25 DIAGNOSIS — Z76.0 ENCOUNTER FOR MEDICATION REFILL: ICD-10-CM

## 2020-03-25 RX ORDER — ALLOPURINOL 300 MG/1
TABLET ORAL
Qty: 90 TABLET | Refills: 0 | Status: SHIPPED | OUTPATIENT
Start: 2020-03-25 | End: 2020-06-21

## 2020-03-25 NOTE — TELEPHONE ENCOUNTER
ALLOPURINOL  LOV 3/13/19  LAST LABS 4/16/19    Uric Acid   Date Value Ref Range Status   04/16/2019 5.7 3.7 - 8.6 mg/dL Final     Comment:                Therapeutic target for gout patients: <6.0

## 2020-03-30 ENCOUNTER — TRANSFERRED RECORDS (OUTPATIENT)
Dept: FAMILY MEDICINE | Facility: CLINIC | Age: 85
End: 2020-03-30

## 2020-04-06 ENCOUNTER — TRANSFERRED RECORDS (OUTPATIENT)
Dept: FAMILY MEDICINE | Facility: CLINIC | Age: 85
End: 2020-04-06

## 2020-04-07 ENCOUNTER — VIRTUAL VISIT (OUTPATIENT)
Dept: CARDIOLOGY | Facility: CLINIC | Age: 85
End: 2020-04-07
Attending: INTERNAL MEDICINE
Payer: MEDICARE

## 2020-04-07 VITALS
HEART RATE: 62 BPM | BODY MASS INDEX: 43.42 KG/M2 | DIASTOLIC BLOOD PRESSURE: 76 MMHG | SYSTOLIC BLOOD PRESSURE: 122 MMHG | WEIGHT: 269 LBS

## 2020-04-07 DIAGNOSIS — E66.01 MORBID OBESITY DUE TO EXCESS CALORIES (H): ICD-10-CM

## 2020-04-07 DIAGNOSIS — I25.10 CORONARY ARTERY DISEASE INVOLVING NATIVE CORONARY ARTERY OF NATIVE HEART WITHOUT ANGINA PECTORIS: Primary | ICD-10-CM

## 2020-04-07 DIAGNOSIS — I10 ESSENTIAL HYPERTENSION: ICD-10-CM

## 2020-04-07 DIAGNOSIS — I48.11 LONGSTANDING PERSISTENT ATRIAL FIBRILLATION (H): ICD-10-CM

## 2020-04-07 DIAGNOSIS — I48.20 CHRONIC ATRIAL FIBRILLATION (H): ICD-10-CM

## 2020-04-07 PROCEDURE — 99441 ZZC PHYSICIAN TELEPHONE EVALUATION 5-10 MIN: CPT | Performed by: INTERNAL MEDICINE

## 2020-04-07 NOTE — PROGRESS NOTES
"Vasiliy Corbin is a 84 year old male who is being evaluated via a billable telephone visit.      The patient has been notified of following:     \"This telephone visit will be conducted via a call between you and your physician/provider. We have found that certain health care needs can be provided without the need for a physical exam.  This service lets us provide the care you need with a short phone conversation.  If a prescription is necessary we can send it directly to your pharmacy.  If lab work is needed we can place an order for that and you can then stop by our lab to have the test done at a later time.    Telephone visits are billed at different rates depending on your insurance coverage. During this emergency period, for some insurers they may be billed the same as an in-person visit.  Please reach out to your insurance provider with any questions.    If during the course of the call the physician/provider feels a telephone visit is not appropriate, you will not be charged for this service.\"    Patient has given verbal consent for Telephone visit?  YES    4/07/20  - Writer spoke w/pt via phone.  He is expecting the phone visit w/Dr. Menendez  - Pt would like his Echo and Holter monitor scheduled for a later date and time.    TABATHA Sutton    Vasiliy Corbin complains of  No chief complaint on file.      I have reviewed and updated the patient's Past Medical History, Social History, Family History and Medication List.    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  Then he reverted out of rhythm on amiodarone therapy, and we have been attempting rate control.      His " rate control is now done by metoprolol and digoxin which is tolerating well.     He denies any cardiac symptoms.  He has no chest pain palpitations or shortness of breath.  He understands his weight gain over the last few years but is having trouble trying to decrease calories and reduce it.  He denies any orthopnea or PND.     Edema has improved.      His last echocardiogram has shown ejection fraction is 50%-55%, which is stable, comparatively.   He is wearing his CPAP every night all night long.  He has no significant valvular disease;          PHYSICAL EXAMINATION:    Not performed     IMPRESSION AND PLAN:   1.  Persistent, if not permanent atrial fibrillation.   Continue rate control approach.  He is doing well.  We will check a Holter in 3-4m to assess adequacy of rate control.  On Coumadin for stroke prophylaxis.                  2.   Congestive heart failure, diastolic dysfunction   On Lasix 60 mg per day. Part of the edema is related to venous insufficiency.  Has followed in the venous clinic before.  Repeat echocardiogram in 4m.        3.  Sleep apnea, treated.   Continue C PAP.                         4.  Non flow-limiting coronary artery disease on angiogram in 2010.      5.  Hypertension, treated to goal.      6. .  Dyslipidemia, treated to goal.      Return to clinic in 4m      Sincerely,     Diomedes Menendez    Phone call duration:10 minutes     Sincerely,    Diomedes Menendez

## 2020-04-10 ENCOUNTER — TELEPHONE (OUTPATIENT)
Dept: CARDIOLOGY | Facility: CLINIC | Age: 85
End: 2020-04-10

## 2020-04-10 VITALS
SYSTOLIC BLOOD PRESSURE: 136 MMHG | DIASTOLIC BLOOD PRESSURE: 74 MMHG | WEIGHT: 282.6 LBS | BODY MASS INDEX: 45.61 KG/M2 | TEMPERATURE: 97.7 F

## 2020-04-10 DIAGNOSIS — I48.20 CHRONIC ATRIAL FIBRILLATION (H): ICD-10-CM

## 2020-04-10 DIAGNOSIS — Z79.01 LONG TERM CURRENT USE OF ANTICOAGULANT THERAPY: ICD-10-CM

## 2020-04-10 LAB — INR PPP: 2.4 (ref 2–3)

## 2020-04-10 PROCEDURE — 85610 PROTHROMBIN TIME: CPT | Performed by: NURSE PRACTITIONER

## 2020-04-10 PROCEDURE — 36415 COLL VENOUS BLD VENIPUNCTURE: CPT | Performed by: NURSE PRACTITIONER

## 2020-04-10 NOTE — TELEPHONE ENCOUNTER
APPOINTMENT RESCHEDULING NOTE    Procedure  Procedure to be rescheduled:Yes  Ordering provider name: Gemma Hercules MD  Ordering provider phone number: [phone]  Ordering provider confirmation to reschedule/guidance with timeframe to reschedule: Yes 3 months  Instructions for Patient  Patient informed to call the ordering provider if symptoms worsen or they have concerns.     Scheduling Phone Numbers  Central Scheduling (Radiology) Phone Number: [phone]  Cardiology Scheduling Phone Number: [phone]    Concerns of COVID  Patient informed to call [facility] (virtual visit) if they are concerned about COVID symptoms (fever, rash, cough, shortness of breath) or exposure.      [facility] instructions:  1. Go to the website [email or web address]  2. Create an account (you will need your insurance information)  3. Start a new visit  4. Choose your diagnosis (e.g. COVID19)  5. Fill out the information about your symptoms  6. A provider will reach out to you by text, phone call or video visit based on your request

## 2020-04-27 DIAGNOSIS — I48.20 CHRONIC ATRIAL FIBRILLATION (H): ICD-10-CM

## 2020-04-27 DIAGNOSIS — E78.5 HYPERLIPIDEMIA WITH TARGET LDL LESS THAN 100: ICD-10-CM

## 2020-04-27 DIAGNOSIS — Z76.0 ENCOUNTER FOR MEDICATION REFILL: Primary | ICD-10-CM

## 2020-04-27 NOTE — TELEPHONE ENCOUNTER
METOPROLOL  LOV 3/13/2020  LAST LABS 2/26/2020    CPX SCHEDULED FOR 5/1/2020    BP Readings from Last 3 Encounters:   04/10/20 136/74   04/07/20 122/76   03/13/20 122/68     Last Comprehensive Metabolic Panel:  Sodium   Date Value Ref Range Status   02/26/2020 137 133 - 144 mmol/L Final     Potassium   Date Value Ref Range Status   02/26/2020 4.2 3.4 - 5.3 mmol/L Final     Chloride   Date Value Ref Range Status   02/26/2020 103 94 - 109 mmol/L Final     Carbon Dioxide   Date Value Ref Range Status   02/26/2020 30 20 - 32 mmol/L Final     Anion Gap   Date Value Ref Range Status   02/26/2020 4 3 - 14 mmol/L Final     Glucose   Date Value Ref Range Status   02/26/2020 117 (H) 70 - 99 mg/dL Final     Urea Nitrogen   Date Value Ref Range Status   02/26/2020 30 7 - 30 mg/dL Final     Creatinine   Date Value Ref Range Status   02/26/2020 1.08 0.66 - 1.25 mg/dL Final     GFR Estimate   Date Value Ref Range Status   02/26/2020 62 >60 mL/min/[1.73_m2] Final     Comment:     Non  GFR Calc  Starting 12/18/2018, serum creatinine based estimated GFR (eGFR) will be   calculated using the Chronic Kidney Disease Epidemiology Collaboration   (CKD-EPI) equation.       Calcium   Date Value Ref Range Status   02/26/2020 9.3 8.5 - 10.1 mg/dL Final

## 2020-04-28 RX ORDER — METOPROLOL TARTRATE 50 MG
TABLET ORAL
Qty: 360 TABLET | Refills: 3 | Status: SHIPPED | OUTPATIENT
Start: 2020-04-28 | End: 2021-04-21

## 2020-04-28 RX ORDER — OMEPRAZOLE 40 MG/1
CAPSULE, DELAYED RELEASE ORAL
Qty: 180 CAPSULE | Refills: 1 | Status: SHIPPED | OUTPATIENT
Start: 2020-04-28 | End: 2021-01-27

## 2020-04-28 RX ORDER — SIMVASTATIN 10 MG
TABLET ORAL
Qty: 90 TABLET | Refills: 3 | Status: SHIPPED | OUTPATIENT
Start: 2020-04-28 | End: 2021-05-02

## 2020-05-01 ENCOUNTER — OFFICE VISIT (OUTPATIENT)
Dept: FAMILY MEDICINE | Facility: CLINIC | Age: 85
End: 2020-05-01

## 2020-05-01 VITALS
HEIGHT: 66 IN | BODY MASS INDEX: 44.52 KG/M2 | SYSTOLIC BLOOD PRESSURE: 144 MMHG | TEMPERATURE: 98.4 F | RESPIRATION RATE: 16 BRPM | HEART RATE: 72 BPM | OXYGEN SATURATION: 95 % | DIASTOLIC BLOOD PRESSURE: 76 MMHG | WEIGHT: 277 LBS

## 2020-05-01 DIAGNOSIS — I42.9 CARDIOMYOPATHY, UNSPECIFIED TYPE (H): ICD-10-CM

## 2020-05-01 DIAGNOSIS — I10 ESSENTIAL HYPERTENSION: ICD-10-CM

## 2020-05-01 DIAGNOSIS — R05.3 CHRONIC COUGH: ICD-10-CM

## 2020-05-01 DIAGNOSIS — Z79.899 ENCOUNTER FOR LONG-TERM (CURRENT) USE OF MEDICATIONS: ICD-10-CM

## 2020-05-01 DIAGNOSIS — I48.20 CHRONIC ATRIAL FIBRILLATION (H): ICD-10-CM

## 2020-05-01 DIAGNOSIS — I48.21 PERMANENT ATRIAL FIBRILLATION (H): ICD-10-CM

## 2020-05-01 DIAGNOSIS — N40.0 BENIGN PROSTATIC HYPERPLASIA WITHOUT LOWER URINARY TRACT SYMPTOMS: ICD-10-CM

## 2020-05-01 DIAGNOSIS — Z00.00 ENCOUNTER FOR MEDICARE ANNUAL WELLNESS EXAM: Primary | ICD-10-CM

## 2020-05-01 LAB — INR PPP: 2.9 (ref 2–3)

## 2020-05-01 PROCEDURE — 99214 OFFICE O/P EST MOD 30 MIN: CPT | Mod: 25 | Performed by: FAMILY MEDICINE

## 2020-05-01 PROCEDURE — G0439 PPPS, SUBSEQ VISIT: HCPCS | Performed by: FAMILY MEDICINE

## 2020-05-01 PROCEDURE — 85610 PROTHROMBIN TIME: CPT | Performed by: FAMILY MEDICINE

## 2020-05-01 PROCEDURE — 36415 COLL VENOUS BLD VENIPUNCTURE: CPT | Performed by: FAMILY MEDICINE

## 2020-05-01 PROCEDURE — 93050 ART PRESSURE WAVEFORM ANALYS: CPT | Performed by: FAMILY MEDICINE

## 2020-05-01 RX ORDER — FINASTERIDE 5 MG/1
1 TABLET, FILM COATED ORAL DAILY
Qty: 90 TABLET | Refills: 3 | Status: SHIPPED | OUTPATIENT
Start: 2020-05-01 | End: 2021-05-18

## 2020-05-01 RX ORDER — WARFARIN SODIUM 2 MG/1
TABLET ORAL
Qty: 135 TABLET | Refills: 3 | Status: SHIPPED | OUTPATIENT
Start: 2020-05-01 | End: 2020-05-04

## 2020-05-01 RX ORDER — LOSARTAN POTASSIUM 50 MG/1
50 TABLET ORAL DAILY
Qty: 90 TABLET | Refills: 3 | Status: SHIPPED | OUTPATIENT
Start: 2020-05-01 | End: 2021-06-17

## 2020-05-01 RX ORDER — IPRATROPIUM BROMIDE 42 UG/1
SPRAY, METERED NASAL
Qty: 135 ML | Refills: 3 | Status: SHIPPED | OUTPATIENT
Start: 2020-05-01 | End: 2021-05-18

## 2020-05-01 ASSESSMENT — MIFFLIN-ST. JEOR: SCORE: 1884.21

## 2020-05-01 NOTE — PROGRESS NOTES
"  SUBJECTIVE:   Vasiliy Corbin is a 85 year old male who presents for Preventive Visit.      Are you in the first 12 months of your Medicare Part B coverage?  No    Physical Health:    In general, how would you rate your overall physical health? good    Outside of work, how many days during the week do you exercise? 2-3 days/week    Outside of work, approximately how many minutes a day do you exercise?15-30 minutes    If you drink alcohol do you typically have >3 drinks per day or >7 drinks per week? No    Do you usually eat at least 4 servings of fruit and vegetables a day, include whole grains & fiber and avoid regularly eating high fat or \"junk\" foods? Yes    Do you have any problems taking medications regularly?  No    Do you have any side effects from medications? none    Needs assistance for the following daily activities: no assistance needed    Which of the following safety concerns are present in your home?  Lack of grab bars in bathroom    Hearing impairment: Yes, Difficulty following a conversation in a noisy restaurant or crowded room.    In the past 6 months, have you been bothered by leaking of urine? no    Mental Health:    In general, how would you rate your overall mental or emotional health? good  PHQ-2 Score:      Do you feel safe in your environment? Yes    Have you ever done Advance Care Planning? (For example, a Health Directive, POLST, or a discussion with a medical provider or your loved ones about your wishes): Yes, advance care planning is on file.    Additional concerns to address?  No    Fall risk:None       Cognitive Screenin) Repeat 3 items (Leader, Season, Table)    2) Clock draw: NORMAL  3) 3 item recall: Recalls 2 objects   Results: NORMAL clock, 1-2 items recalled: COGNITIVE IMPAIRMENT LESS LIKELY    Mini-CogTM Copyright S Carol. Licensed by the author for use in [facility]; reprinted with permission (ranjeet@.[facility]). All rights reserved.      Sleep " Apnea-Yes  Has known obstructive sleep apnea.  Has been prescribed CPAP, uses it almost every night.  They feel it helps, and generally awakens feeling relatively refreshed, no daytime somnolence.    No recent gout flares, tolerating the allopurinol well with no side effects.    Has history of atrial fibrillation.    No palpitations, no dizziness, no dyspnea on exertion, no shortness of breath.  No racing heart, no fast heart rates.    Taking medications as ordered, no side effects reported.   Patient is taking anticoagulation Warfarin per flow sheet according to direction, with no reported side effects.    GERD stable.  Taking meds as ordered, no reported side effects from medicines.  Eating properly to avoid sx.   No melena, no hematochezia, no diarrhea.  No unintended weigth loss.    Hyperlipidemia:  Has history of hyperlipidemia.    The patient is taking a medication for this.  Denies any significant side effects from his medication.      Latest labs reviewed:    Recent Labs   Lab Test 04/16/19  0939 01/09/18  1125   CHOL 121 119   HDL 38* 37*   LDL 49 47   TRIG 171* 176*        Lab Results   Component Value Date    AST 18 02/26/2020      The patient has had a history of ongoing obesity.  Reviewed the weigth curves.   Their current BMI is:  Body mass index is 44.71 kg/m .  Reviewed previous attempts at weight loss which have not been successful in producing prolonged weigth loss.   Discussed current eating and exercise habits.     Reviewed weights in chart:  Wt Readings from Last 10 Encounters:   05/01/20 125.6 kg (277 lb)   04/10/20 128.2 kg (282 lb 9.6 oz)   04/07/20 122 kg (269 lb)   03/13/20 127 kg (280 lb)   02/25/20 124.5 kg (274 lb 8 oz)   02/12/20 125.1 kg (275 lb 12.8 oz)   01/22/20 122 kg (269 lb)   12/23/19 126.5 kg (278 lb 12.8 oz)   11/29/19 125.7 kg (277 lb 1.6 oz)   11/22/19 126.5 kg (278 lb 12.8 oz)      BMI Readings from Last 10 Encounters:   05/01/20 44.71 kg/m    04/10/20 45.61 kg/m     20 43.42 kg/m    20 45.19 kg/m    20 44.31 kg/m    20 44.52 kg/m    20 43.42 kg/m    19 45.00 kg/m    19 44.73 kg/m    19 45.00 kg/m        Blood presure remains well controlled when checked out of clinic.    Reviewed last 6 BP readings in chart:  BP Readings from Last 6 Encounters:   20 (!) 144/76   04/10/20 136/74   20 122/76   20 122/68   20 115/68   20 122/70       he has not experienced any significant side effects from medications for hypertension.    NO active cardiac complaints or symptoms with exercise.    Cardiomyopathy:  Last echo:18  Left ventricular systolic function is low normal.  The visual ejection fraction is estimated at 50-55%.  There is mild biatrial enlargement.  Compared to prior study, there is no significant change.  _____________________________________________________________________________      Reviewed and updated as needed this visit by clinical staff  Tobacco  Allergies  Meds         Reviewed and updated as needed this visit by Provider        Social History     Tobacco Use     Smoking status: Former Smoker     Packs/day: 1.00     Years: 5.00     Pack years: 5.00     Types: Cigarettes     Last attempt to quit: 4/3/1977     Years since quittin.1     Smokeless tobacco: Never Used   Substance Use Topics     Alcohol use: No     Alcohol/week: 0.0 standard drinks                           Current providers sharing in care for this patient include:   Patient Care Team:  Malvin Rosen MD as PCP - General (Family Practice)  Malvin Rosen MD as Assigned PCP    The following health maintenance items are reviewed in Epic and correct as of today:  Health Maintenance   Topic Date Due     MEDICARE ANNUAL WELLNESS VISIT  2000     ADVANCE CARE PLANNING  2018     FALL RISK ASSESSMENT  04/10/2021     DTAP/TDAP/TD IMMUNIZATION (3 - Td) 2022     PHQ-2  Completed     INFLUENZA  VACCINE  Completed     PNEUMOCOCCAL IMMUNIZATION 65+ LOW/MEDIUM RISK  Completed     ZOSTER IMMUNIZATION  Completed     IPV IMMUNIZATION  Aged Out     MENINGITIS IMMUNIZATION  Aged Out     Patient Active Problem List   Diagnosis     Restless leg syndrome     Permanent atrial fibrillation     Long term current use of anticoagulant therapy     Cervical spine arthritis     Hyperlipidemia with target LDL less than 100     VMR (vasomotor rhinitis)     ACP (advance care planning)     Health Care Home     Essential hypertension     Chronic atrial fibrillation     Coronary artery disease involving native coronary artery of native heart without angina pectoris     Enlarged prostate with urinary retention     Diastolic dysfunction     Cardiomyopathy, unspecified type (H)     Morbid obesity due to excess calories (H)     Chronic cough     BPH (benign prostatic hyperplasia)     Past Surgical History:   Procedure Laterality Date     CLAVICLE SURGERY Left     as a kid     CT release       CYSTOSCOPY N/A 4/7/2016    Procedure: CYSTOSCOPY;  Surgeon: Lokesh Lima MD;  Location:  OR     CYSTOSCOPY, FULGURATE BLEEDERS, EVACUATE CLOT(S), COMBINED N/A 1/16/2018    Procedure: COMBINED CYSTOSCOPY, FULGURATE BLEEDERS, EVACUATE CLOT(S);  CYSTOSCOPY, FULGERATION;  Surgeon: Lokesh Lima MD;  Location:  OR     CYSTOSCOPY, TRANSURETHRAL RESECTION (TUR) PROSTATE, COMBINED N/A 2/25/2020    Procedure: CYSTOSCOPY; TRANSURETHRAL RESECTION OF THE PROSTATE;  Surgeon: Lokesh Lima MD;  Location:  OR     HC LEFT HEART CATHETERIZATION  3/2010    Mild CAD     LASER KTP TRANSURETHRAL RESECTION (TUR) PROSTATE N/A 4/7/2016    Procedure: LASER KTP TRANSURETHRAL RESECTION (TUR) PROSTATE;  Surgeon: Lokseh Lima MD;  Location:  OR     TRANSPOSITION ULNAR NERVE (ELBOW) Left 11/29/2019    Procedure: LEFT ELBOW ULNER NERVE SUBMUSCULAR TRANSPOSITION;  Surgeon: Nicole Bai MD;  Location:  OR     ulnar reroute      right       "  Social History     Tobacco Use     Smoking status: Former Smoker     Packs/day: 1.00     Years: 5.00     Pack years: 5.00     Types: Cigarettes     Last attempt to quit: 4/3/1977     Years since quittin.1     Smokeless tobacco: Never Used   Substance Use Topics     Alcohol use: No     Alcohol/week: 0.0 standard drinks     Family History   Problem Relation Age of Onset     Heart Disease Mother 92        Heart failure     Heart Disease Father 92     C.A.D. Brother 70        MI     Myocardial Infarction Brother      Family History Negative Sister      Family History Negative Brother      Family History Negative Brother      Family History Negative Brother      Family History Negative Sister      Myocardial Infarction Son      Heart Disease Son      Heart Disease Son            ROS:  Constitutional, HEENT, cardiovascular, pulmonary, GI, , musculoskeletal, neuro, skin, endocrine and psych systems are negative, except as otherwise noted.    OBJECTIVE:   BP (!) 144/76   Pulse 72   Temp 98.4  F (36.9  C)   Resp 16   Ht 1.676 m (5' 6\")   Wt 125.6 kg (277 lb)   SpO2 95%   BMI 44.71 kg/m   Estimated body mass index is 44.71 kg/m  as calculated from the following:    Height as of this encounter: 1.676 m (5' 6\").    Weight as of this encounter: 125.6 kg (277 lb).  EXAM:   GENERAL: healthy, alert and no distress  EYES: Eyes grossly normal to inspection, PERRL and conjunctivae and sclerae normal  HENT: ear canals and TM's normal, nose and mouth without ulcers or lesions  NECK: no adenopathy, no asymmetry, masses, or scars and thyroid normal to palpation  RESP: lungs clear to auscultation - no rales, rhonchi or wheezes  CV: regular rate and rhythm, normal S1 S2, no S3 or S4, no murmur, click or rub, no peripheral edema and peripheral pulses strong  ABDOMEN: soft, nontender, no hepatosplenomegaly, no masses and bowel sounds normal  MS: no gross musculoskeletal defects noted, no edema  SKIN: no suspicious lesions or " rashes  NEURO: Normal strength and tone, mentation intact and speech normal  PSYCH: mentation appears normal, affect normal/bright  Lipoma in the suprapubic fat.    Diagnostic Test Results:  Labs reviewed in Epic    ASSESSMENT / PLAN:   Vasiliy was seen today for physical.    Diagnoses and all orders for this visit:    Encounter for Medicare annual wellness exam    Essential hypertension  Discussed current hypertension treatment guidelines, including indications for treatment and treatment options.  Discussed the importance for aggressive management of HTN to prevent vascular complications later.  Recommended lower fat, lower carbohydrate, and lower sodium (<2000 mg)diet.  Discussed required intervals for follow up on HTN, lab studies.  Recommened pt. follow their blood pressures outside the clinic to ensure that BPs are remaining within guidelines, and to contact me if the readings are not within guidelines on a regular basis so we can adjust treatment as needed.    -     C ART PRESS WAVEFORM ANALYS CENTRAL ART PRESSURE  -     losartan (COZAAR) 50 MG tablet; Take 1 tablet (50 mg) by mouth daily    Permanent atrial fibrillation  The pathophysiology of atrial fibrillation, various causes, possibility of lone atrial fibrillation, risk of thrombus and embolic stroke, need for anticoagulation depending on etiology and Chads Score was discussed with patient and/or relatives. Rate Vs rhythm control approach was discussed including no difference in two strategies in patients enrolled in Affirm and Race trials. In some patients, afib ablation may be an option and was reviewed.  The indication for anticoagulation was discussed with the patient and/or their family in detail. The pros and cons of using warfarin versus newer anticoagulants was discussed including the need for INR monitoring with warfarin, dietary restrictions with warfarin and low cost of warfarin versus high co-pay for newer anticoagulants and lack of reversal  "agent with agents like Eliquis and Xarelto. Agents like Pradaxa has a reversal agent although the reversal agent can cause serious adverse events. The risk of bleeding including major bleeding and intracranial bleeding was discussed with all these agents and the data of efficacy and safety of these agents versus warfarin was communicated.      -     Prothrombin - INR ([facility])    Chronic cough  Old issue    Cardiomyopathy, unspecified type (H)  duje to follow up with cards.    Benign prostatic hyperplasia without lower urinary tract symptoms  These symptoms sound most consistent with bladder irritation from an enlarged prostate (BPH).  Will make sure the PSA is normal and up to date.    Discussed treatment options for this including lifestyle changes ( i.e. restricting water intake close to bedtime or long trips, avoiding caffeine, etc.), medications (including FLOMAX, proscar, saw palmetto, etc), and referral to Urology.    -     finasteride (PROSCAR) 5 MG tablet; Take 1 tablet (5 mg) by mouth daily    Encounter for long-term (current) use of medications  -     ipratropium (ATROVENT) 0.06 % nasal spray; USE 2 SPRAYS IN EACH NOSTRIL FOUR TIMES DAILY AS NEEDED FOR RHINITIS  Needs refill of  -     warfarin ANTICOAGULANT (COUMADIN) 2 MG tablet; Hold until urology f/u visit on Friday 2/28        COUNSELING:  Reviewed preventive health counseling, as reflected in patient instructions       Vision screening       Hearing screening    Estimated body mass index is 44.71 kg/m  as calculated from the following:    Height as of this encounter: 1.676 m (5' 6\").    Weight as of this encounter: 125.6 kg (277 lb).    Weight management plan: Discussed healthy diet and exercise guidelines     reports that he quit smoking about 43 years ago. His smoking use included cigarettes. He has a 5.00 pack-year smoking history. He has never used smokeless tobacco.      Appropriate preventive services were discussed with this patient, including " applicable screening as appropriate for cardiovascular disease, diabetes, osteopenia/osteoporosis, and glaucoma.  As appropriate for age/gender, discussed screening for colorectal cancer, prostate cancer, breast cancer, and cervical cancer. Checklist reviewing preventive services available has been given to the patient.    Reviewed patients plan of care and provided an AVS. The Basic Care Plan (routine screening as documented in Health Maintenance) for Vasiliy meets the Care Plan requirement. This Care Plan has been established and reviewed with the Patient.    Counseling Resources:  ATP IV Guidelines  Pooled Cohorts Equation Calculator  Breast Cancer Risk Calculator  FRAX Risk Assessment  ICSI Preventive Guidelines  Dietary Guidelines for Americans, 2010  USDA's MyPlate  ASA Prophylaxis  Lung CA Screening    Malvin Rosen MD  [facility]

## 2020-05-01 NOTE — PATIENT INSTRUCTIONS
Patient Education   Personalized Prevention Plan  You are due for the preventive services outlined below.  Your care team is available to assist you in scheduling these services.  If you have already completed any of these items, please share that information with your care team to update in your medical record.  Health Maintenance Due   Topic Date Due     Annual Wellness Visit  04/12/2000     Colorectal Cancer Screening  11/29/2015     Discuss Advance Care Planning  05/06/2018

## 2020-05-04 DIAGNOSIS — I48.20 CHRONIC ATRIAL FIBRILLATION (H): ICD-10-CM

## 2020-05-05 RX ORDER — WARFARIN SODIUM 2 MG/1
TABLET ORAL
Qty: 135 TABLET | Refills: 3 | Status: SHIPPED | OUTPATIENT
Start: 2020-05-05 | End: 2021-05-02

## 2020-05-29 VITALS
WEIGHT: 279.8 LBS | TEMPERATURE: 98.3 F | SYSTOLIC BLOOD PRESSURE: 130 MMHG | BODY MASS INDEX: 45.16 KG/M2 | DIASTOLIC BLOOD PRESSURE: 72 MMHG

## 2020-05-29 DIAGNOSIS — Z79.01 LONG TERM CURRENT USE OF ANTICOAGULANT THERAPY: ICD-10-CM

## 2020-05-29 DIAGNOSIS — I48.20 CHRONIC ATRIAL FIBRILLATION (H): ICD-10-CM

## 2020-05-29 LAB — INR PPP: 2.6 (ref 2–3)

## 2020-05-29 PROCEDURE — 85610 PROTHROMBIN TIME: CPT | Performed by: NURSE PRACTITIONER

## 2020-05-29 PROCEDURE — 36415 COLL VENOUS BLD VENIPUNCTURE: CPT | Performed by: NURSE PRACTITIONER

## 2020-06-21 DIAGNOSIS — Z76.0 ENCOUNTER FOR MEDICATION REFILL: ICD-10-CM

## 2020-06-21 RX ORDER — ALLOPURINOL 300 MG/1
TABLET ORAL
Qty: 90 TABLET | Refills: 0 | Status: SHIPPED | OUTPATIENT
Start: 2020-06-21 | End: 2020-09-13

## 2020-06-26 VITALS
WEIGHT: 281.4 LBS | DIASTOLIC BLOOD PRESSURE: 76 MMHG | SYSTOLIC BLOOD PRESSURE: 134 MMHG | BODY MASS INDEX: 45.42 KG/M2 | TEMPERATURE: 98.6 F

## 2020-06-26 DIAGNOSIS — I48.20 CHRONIC ATRIAL FIBRILLATION (H): ICD-10-CM

## 2020-06-26 DIAGNOSIS — Z79.01 LONG TERM CURRENT USE OF ANTICOAGULANT THERAPY: ICD-10-CM

## 2020-06-26 LAB — INR PPP: 3.3 (ref 2–3)

## 2020-06-26 PROCEDURE — 85610 PROTHROMBIN TIME: CPT | Performed by: FAMILY MEDICINE

## 2020-06-26 PROCEDURE — 36415 COLL VENOUS BLD VENIPUNCTURE: CPT | Performed by: FAMILY MEDICINE

## 2020-07-10 ENCOUNTER — OFFICE VISIT (OUTPATIENT)
Dept: FAMILY MEDICINE | Facility: CLINIC | Age: 85
End: 2020-07-10

## 2020-07-10 VITALS
WEIGHT: 279 LBS | RESPIRATION RATE: 16 BRPM | BODY MASS INDEX: 45.03 KG/M2 | SYSTOLIC BLOOD PRESSURE: 132 MMHG | TEMPERATURE: 97.8 F | DIASTOLIC BLOOD PRESSURE: 84 MMHG | HEART RATE: 94 BPM | OXYGEN SATURATION: 96 %

## 2020-07-10 DIAGNOSIS — Z79.01 LONG TERM CURRENT USE OF ANTICOAGULANT THERAPY: ICD-10-CM

## 2020-07-10 DIAGNOSIS — J98.4 RESTRICTIVE AIRWAY DISEASE: ICD-10-CM

## 2020-07-10 DIAGNOSIS — I48.20 CHRONIC ATRIAL FIBRILLATION (H): Primary | ICD-10-CM

## 2020-07-10 LAB — INR PPP: 3.1 (ref 2–3)

## 2020-07-10 PROCEDURE — 36415 COLL VENOUS BLD VENIPUNCTURE: CPT | Performed by: FAMILY MEDICINE

## 2020-07-10 PROCEDURE — 85610 PROTHROMBIN TIME: CPT | Performed by: FAMILY MEDICINE

## 2020-07-10 PROCEDURE — 99214 OFFICE O/P EST MOD 30 MIN: CPT | Performed by: FAMILY MEDICINE

## 2020-07-10 RX ORDER — BUDESONIDE AND FORMOTEROL FUMARATE DIHYDRATE 80; 4.5 UG/1; UG/1
2 AEROSOL RESPIRATORY (INHALATION) 2 TIMES DAILY
Qty: 1 INHALER | Refills: 11 | Status: SHIPPED | OUTPATIENT
Start: 2020-07-10 | End: 2021-07-20

## 2020-07-10 NOTE — PROGRESS NOTES
Problem(s) Oriented visit      Subjective:  CC: Gastrophageal Reflux (meds dont seem to be helping) and INR Followup (3.1)      1. Chronic atrial fibrillation (H)    - Prothrombin - INR ([facility]); Standing  - Prothrombin - INR ([facility])    2. Long term current use of anticoagulant therapy    - Prothrombin - INR ([facility]); Standing  - Prothrombin - INR ([facility])    3. Restrictive airway disease  Having regularcough throughout the night.  Doesn't really note wheezing. worse at no specific time,in cold or humid air, leaves, dander.  Also more pronounced with physical activity, especially in the extremes of temperatures or increased levels of exertion.  Feels occasional tightness/mild restriction in breathing.  See spiromety at 71% at pulmonary.   \     Also needs an INR Check, having no signs of bleeding  Todays and previous results are:    Lab Results   Component Value Date    INR 3.1 07/10/2020    INR 3.3 06/26/2020    INR 2.6 05/29/2020    INR 2.9 05/01/2020    INR 2.4 04/10/2020       The dose will not change.    Recheck next INR in 4 weeks      ROS:  General and Resp. completed and negative except as noted above     HISTORY:   reports no history of alcohol use.   reports that he quit smoking about 43 years ago. His smoking use included cigarettes. He has a 5.00 pack-year smoking history. He has never used smokeless tobacco.    Past Medical History:   Diagnosis Date     Atrial fibrillation (H)      CAD (coronary artery disease)      Cardiomyopathy (H)      Chronic cough 2017     Colon polyp 2010     Gout      Hiatal hernia      Hip pain      History of BPH      HTN (hypertension)      Hyperlipidemia      Neuropathy      Obesity (BMI 35.0-39.9 without comorbidity)      SUJEY on CPAP      Shoulder injury      Venous insufficiency of right leg      Wrist swelling      Past Surgical History:   Procedure Laterality Date     CLAVICLE SURGERY Left     as a kid     CT release       CYSTOSCOPY N/A 4/7/2016    Procedure: CYSTOSCOPY;   Surgeon: Lokesh Lima MD;  Location: [facility]     CYSTOSCOPY, FULGURATE BLEEDERS, EVACUATE CLOT(S), COMBINED N/A 1/16/2018    Procedure: COMBINED CYSTOSCOPY, FULGURATE BLEEDERS, EVACUATE CLOT(S);  CYSTOSCOPY, FULGERATION;  Surgeon: Lokesh Lima MD;  Location: [facility] OR     CYSTOSCOPY, TRANSURETHRAL RESECTION (TUR) PROSTATE, COMBINED N/A 2/25/2020    Procedure: CYSTOSCOPY; TRANSURETHRAL RESECTION OF THE PROSTATE;  Surgeon: Lokesh Lima MD;  Location: [facility] OR     HC LEFT HEART CATHETERIZATION  3/2010    Mild CAD     LASER KTP TRANSURETHRAL RESECTION (TUR) PROSTATE N/A 4/7/2016    Procedure: LASER KTP TRANSURETHRAL RESECTION (TUR) PROSTATE;  Surgeon: Lokesh Lima MD;  Location: [facility]     TRANSPOSITION ULNAR NERVE (ELBOW) Left 11/29/2019    Procedure: LEFT ELBOW ULNER NERVE SUBMUSCULAR TRANSPOSITION;  Surgeon: Nicole Bai MD;  Location: [facility] OR     ulnar reroute      right        The following health maintenance items are reviewed in Epic and correct as of today:  Health Maintenance   Topic Date Due     INFLUENZA VACCINE (1) 09/01/2020     FALL RISK ASSESSMENT  04/10/2021     MEDICARE ANNUAL WELLNESS VISIT  05/01/2021     DTAP/TDAP/TD IMMUNIZATION (3 - Td) 06/11/2022     ADVANCE CARE PLANNING  05/01/2025     PHQ-2  Completed     PNEUMOCOCCAL IMMUNIZATION 65+ LOW/MEDIUM RISK  Completed     ZOSTER IMMUNIZATION  Completed     IPV IMMUNIZATION  Aged Out     MENINGITIS IMMUNIZATION  Aged Out       EXAM:  BP: 132/84   Pulse: 94    Temp: 97.8    Wt Readings from Last 2 Encounters:   07/10/20 126.6 kg (279 lb)   06/26/20 127.6 kg (281 lb 6.4 oz)       BMI= Body mass index is 45.03 kg/m .    EXAM:  APPEARANCE: = Relaxed and in no distress  Conj/Eyelids = noninjected and lids and lashes are without inflammation  PERRLA/Irises = Pupils Round Reactive to Light and Irisis without inflammation  Ears/Nose = External structures and Nares have usual shape and form  Ear canals and TM's = Canals are not inflammed and  have none or little wax and the drums are not injected and have a light reflex   Lips/Teeth/Gums = No lesions seen, good dentition, and gums seem healthy  Oropharynx = No leukoplakia, No injection to the tissues, Normal Uvula  Neck = No anterior or posterior adenopathy appreciated.  Resp effort = Calm regular breathing  Breath Sounds =  Slight increase in exp phase  Mood/Affect = Cooperative and interested      Assessment/Plan:  Vasiliy was seen today for gastrophageal reflux and inr followup.    Diagnoses and all orders for this visit:    Chronic atrial fibrillation (H)  -     Prothrombin - INR ([facility]); Standing  -     Prothrombin - INR ([facility])    Long term current use of anticoagulant therapy  -     Prothrombin - INR ([facility]); Standing  -     Prothrombin - INR ([facility])    Restrictive airway disease  -     budesonide-formoterol (SYMBICORT) 80-4.5 MCG/ACT Inhaler; Inhale 2 puffs into the lungs 2 times daily    Discussed asthma in detail and discussed how their breathing symptoms and the pattern of the shortness of breath fits with asthma.   Discussed pathophysiology of asthma and treatments based on the degree of symptoms.   Discussed triggers for asthma in general, and those specific to the patient.  Also told them to anticipate potentially more intense coughing and shortness of breath with any URI (regardless of bacterial or viral etiology) and to be prepared to use the albuterol more often if needed and to return and see me for any such exacerbation.    I recommended having rescue inhaler available and using all throughout the year as needed, demonstrated proper MDI technique for them.  Emphasized the need for them to let me know if there are any changes for the worse in their breathing related to asthma, either acute or chronic and to seek emergency care if the breathing acutely worsens in a sever manner.        COUNSELING:   reports that he quit smoking about 43 years ago. His smoking use included cigarettes. He has a 5.00  "pack-year smoking history. He has never used smokeless tobacco.    Estimated body mass index is 45.03 kg/m  as calculated from the following:    Height as of 5/1/20: 1.676 m (5' 6\").    Weight as of this encounter: 126.6 kg (279 lb).       Appropriate preventive services were discussed with this patient, including applicable screening as appropriate for cardiovascular disease, diabetes, osteopenia/osteoporosis, and glaucoma.  As appropriate for age/gender, discussed screening for colorectal cancer, prostate cancer, breast cancer, and cervical cancer. Checklist reviewing preventive services available has been given to the patient.    Reviewed patients plan of care and provided an AVS. The Basic Care Plan (routine screening as documented in Health Maintenance) for Vasiliy meets the Care Plan requirement. This Care Plan has been established and reviewed with the  Patient.    Malvin Rosen MD  [facility]  [phone]       For any issues my office # is [phone]             "

## 2020-07-16 DIAGNOSIS — I50.9 CHF (CONGESTIVE HEART FAILURE) (H): ICD-10-CM

## 2020-07-16 DIAGNOSIS — I10 ESSENTIAL HYPERTENSION: Primary | ICD-10-CM

## 2020-07-16 DIAGNOSIS — Z76.0 ENCOUNTER FOR MEDICATION REFILL: ICD-10-CM

## 2020-07-16 RX ORDER — FUROSEMIDE 40 MG
60 TABLET ORAL DAILY
Qty: 135 TABLET | Refills: 3 | Status: SHIPPED | OUTPATIENT
Start: 2020-07-16 | End: 2021-08-06

## 2020-07-16 NOTE — TELEPHONE ENCOUNTER
Patient requesting refill on furosemide 40mg #135 si.5 tabs per day.     Last visit: 7/10/20 with Dr. Rosen.     Component      Latest Ref Rng & Units 2020   Sodium      133 - 144 mmol/L 137   Potassium      3.4 - 5.3 mmol/L 4.2   Chloride      94 - 109 mmol/L 103   Carbon Dioxide      20 - 32 mmol/L 30   Anion Gap      3 - 14 mmol/L 4   Glucose      70 - 99 mg/dL 117 (H)   Urea Nitrogen      7 - 30 mg/dL 30   Creatinine      0.66 - 1.25 mg/dL 1.08   GFR Estimate      >60 mL/min/1.73:m2 62   GFR Estimate If Black      >60 mL/min/1.73:m2 72   Calcium      8.5 - 10.1 mg/dL 9.3   Bilirubin Total      0.2 - 1.3 mg/dL 1.2   Albumin      3.4 - 5.0 g/dL 3.5   Protein Total      6.8 - 8.8 g/dL 7.5   Alkaline Phosphatase      40 - 150 U/L 133   ALT      0 - 70 U/L 22   AST      0 - 45 U/L 18

## 2020-07-24 VITALS
TEMPERATURE: 98.5 F | BODY MASS INDEX: 45.06 KG/M2 | SYSTOLIC BLOOD PRESSURE: 120 MMHG | WEIGHT: 279.2 LBS | DIASTOLIC BLOOD PRESSURE: 78 MMHG

## 2020-07-24 DIAGNOSIS — I48.20 CHRONIC ATRIAL FIBRILLATION (H): ICD-10-CM

## 2020-07-24 DIAGNOSIS — Z79.01 LONG TERM CURRENT USE OF ANTICOAGULANT THERAPY: ICD-10-CM

## 2020-07-24 LAB — INR PPP: 3.2 (ref 2–3)

## 2020-07-24 PROCEDURE — 36415 COLL VENOUS BLD VENIPUNCTURE: CPT | Performed by: FAMILY MEDICINE

## 2020-07-24 PROCEDURE — 85610 PROTHROMBIN TIME: CPT | Performed by: FAMILY MEDICINE

## 2020-07-24 PROCEDURE — 99212 OFFICE O/P EST SF 10 MIN: CPT | Performed by: FAMILY MEDICINE

## 2020-08-21 VITALS
TEMPERATURE: 98.1 F | DIASTOLIC BLOOD PRESSURE: 74 MMHG | BODY MASS INDEX: 44.81 KG/M2 | SYSTOLIC BLOOD PRESSURE: 132 MMHG | WEIGHT: 277.6 LBS

## 2020-08-21 DIAGNOSIS — E78.5 HYPERLIPIDEMIA WITH TARGET LDL LESS THAN 100: ICD-10-CM

## 2020-08-21 DIAGNOSIS — I48.20 CHRONIC ATRIAL FIBRILLATION (H): ICD-10-CM

## 2020-08-21 DIAGNOSIS — I50.9 CHF (CONGESTIVE HEART FAILURE) (H): ICD-10-CM

## 2020-08-21 DIAGNOSIS — Z79.01 LONG TERM CURRENT USE OF ANTICOAGULANT THERAPY: ICD-10-CM

## 2020-08-21 DIAGNOSIS — I10 ESSENTIAL HYPERTENSION: Primary | ICD-10-CM

## 2020-08-21 DIAGNOSIS — E55.9 VITAMIN D DEFICIENCY: ICD-10-CM

## 2020-08-21 LAB — INR PPP: 2.9 (ref 2–3)

## 2020-08-21 PROCEDURE — 36415 COLL VENOUS BLD VENIPUNCTURE: CPT | Performed by: FAMILY MEDICINE

## 2020-08-21 PROCEDURE — 85610 PROTHROMBIN TIME: CPT | Performed by: FAMILY MEDICINE

## 2020-08-21 RX ORDER — CHOLECALCIFEROL (VITAMIN D3) 50 MCG
1 TABLET ORAL DAILY
COMMUNITY

## 2020-08-21 NOTE — NURSING NOTE
Last dose of digoxin was 2.5 hrs prior to drawing. Of 125 mcg    Fasting labs per health maintenance  INR fingersitick  Sweteie Diaz MA August 21, 2020 12:13 PM

## 2020-08-21 NOTE — LETTER
[facility]  [address] [place]  [address]  Phone: [phone]    August 26, 2020      Vasiliy Corbin  [address]  [facility] [address]              Dear Vasiliy,    The results from your recent visit showed that your included test results are within expected ranges. I do not suggest that we make any changes at this time.     Sincerely,   Malvin Rosen M.D.     Results for orders placed or performed in visit on 08/21/20   Prothrombin - INR ([facility])     Status: None   Result Value Ref Range    INR 2.9 2 - 3   Basic Metabolic Panel (8) ([facility])     Status: Abnormal   Result Value Ref Range    Glucose 101 (H) 65 - 99 mg/dL    Urea Nitrogen 33 (H) 8 - 27 mg/dL    Creatinine 1.20 0.76 - 1.27 mg/dL    eGFR If NonAfricn Am 55 (L) >59 mL/min/1.73    eGFR If Africn Am 63 >59 mL/min/1.73    BUN/Creatinine Ratio 28 (H) 10 - 24    Sodium 138 134 - 144 mmol/L    Potassium 4.3 3.5 - 5.2 mmol/L    Chloride 103 96 - 106 mmol/L    Total CO2 22 20 - 29 mmol/L    Calcium 9.5 8.6 - 10.2 mg/dL    Narrative    Performed at:  01 - [facility] [address], [place], [place]  [id number]  : Amaury Meadows MD, Phone:  [phone]   Digoxin Serum ([facility])     Status: None   Result Value Ref Range    Digoxin Serum 0.9 0.5 - 0.9 ng/mL    Narrative    Performed at:  01 - [facility] [address], [place], [place]  [id number]  : Amaury Meadows MD, Phone:  [phone]   Lipid Panel ([facility])     Status: Abnormal   Result Value Ref Range    Cholesterol 99 (L) 100 - 199 mg/dL    Triglycerides 147 0 - 149 mg/dL    HDL Cholesterol 33 (L) >39 mg/dL    VLDL Cholesterol Antonio 29 5 - 40 mg/dL    LDL Cholesterol Calculated 37 0 - 99 mg/dL    LDL/HDL Ratio 1.1 0.0 - 3.6 ratio    Narrative    Performed at:  01 - [facility] [address], [place], [place]  [id number]  : Amaury Meadows MD, Phone:  [phone]   Vitamin D  25-Hydroxy ([facility])     Status: None   Result Value Ref Range     Vitamin D,25-Hydroxy 35.8 30.0 - 100.0 ng/mL    Narrative    Performed at:  01 - [facility]  [address] [place] [address], [place], [place]  [id number]  : Amaury Meadows MD, Phone:  [phone]

## 2020-08-22 LAB
BUN SERPL-MCNC: 33 MG/DL (ref 8–27)
BUN/CREATININE RATIO: 28 (ref 10–24)
CALCIUM SERPL-MCNC: 9.5 MG/DL (ref 8.6–10.2)
CHLORIDE SERPLBLD-SCNC: 103 MMOL/L (ref 96–106)
CHOLEST SERPL-MCNC: 99 MG/DL (ref 100–199)
CREAT SERPL-MCNC: 1.2 MG/DL (ref 0.76–1.27)
DIGOXIN SERPL IA-MCNC: 0.9 NG/ML (ref 0.5–0.9)
EGFR IF AFRICN AM: 63 ML/MIN/1.73
EGFR IF NONAFRICN AM: 55 ML/MIN/1.73
GLUCOSE SERPL-MCNC: 101 MG/DL (ref 65–99)
HDLC SERPL-MCNC: 33 MG/DL
LDL/HDL RATIO: 1.1 RATIO (ref 0–3.6)
LDLC SERPL CALC-MCNC: 37 MG/DL (ref 0–99)
POTASSIUM SERPL-SCNC: 4.3 MMOL/L (ref 3.5–5.2)
SODIUM SERPL-SCNC: 138 MMOL/L (ref 134–144)
TOTAL CO2: 22 MMOL/L (ref 20–29)
TRIGL SERPL-MCNC: 147 MG/DL (ref 0–149)
VITAMIN D, 25-HYDROXY: 35.8 NG/ML (ref 30–100)
VLDLC SERPL CALC-MCNC: 29 MG/DL (ref 5–40)

## 2020-08-25 ENCOUNTER — TELEPHONE (OUTPATIENT)
Dept: CARDIOLOGY | Facility: CLINIC | Age: 85
End: 2020-08-25

## 2020-08-25 NOTE — RESULT ENCOUNTER NOTE
Dear Vasiliy,   I am writing to report that your included test results are within expected ranges. I do not suggest that we make any changes at this time.  Malvin Rosen M.D.

## 2020-08-25 NOTE — TELEPHONE ENCOUNTER
PATIENT WELLNESS TELEPHONE SCREENING     Step 1 Screening Questions    In the past 3 weeks, have you been exposed to someone with a suspected or known illness?  COVID-19? No  Chickenpox? No   Measles? No  Pertussis? No    Do you have one of the following NEW symptoms?   Fever/Chills? No   Cough? No   Shortness of breath? No   New loss of taste or smell? No  Sore throat? No  Muscle or body aches? No  Headaches? No  Fatigue? No  Vomiting or diarrhea? No    Have you had a positive COVID-19 diagnostic test (nasal swab test) in the last 14 days or are you currently on self-quarantine restrictions (i.e. travel restrictions, exposures, etc.)?No    Step 2 Screening Results (Skip if the patient is negative for symptoms), If Yes:    Due to your current symptoms, for our safety we need to cancel your appointment. We are advising that you consult with your ordering provider or [facility] at [phone].    Step 3 Review Visitor Policy  Patient informed of the updated visitor policy   1 visitor allowed per patient   Visitor must screen negative for COVID symptoms   Visitor must wear a mask

## 2020-08-26 ENCOUNTER — HOSPITAL ENCOUNTER (OUTPATIENT)
Dept: CARDIOLOGY | Facility: CLINIC | Age: 85
End: 2020-08-26
Attending: INTERNAL MEDICINE
Payer: MEDICARE

## 2020-08-26 DIAGNOSIS — I25.10 CORONARY ARTERY DISEASE INVOLVING NATIVE CORONARY ARTERY OF NATIVE HEART WITHOUT ANGINA PECTORIS: ICD-10-CM

## 2020-08-26 DIAGNOSIS — I48.11 LONGSTANDING PERSISTENT ATRIAL FIBRILLATION (H): ICD-10-CM

## 2020-08-26 PROCEDURE — 93306 TTE W/DOPPLER COMPLETE: CPT

## 2020-08-26 PROCEDURE — 93227 XTRNL ECG REC<48 HR R&I: CPT | Performed by: INTERNAL MEDICINE

## 2020-08-26 PROCEDURE — 93306 TTE W/DOPPLER COMPLETE: CPT | Mod: 26 | Performed by: INTERNAL MEDICINE

## 2020-08-26 PROCEDURE — 93226 XTRNL ECG REC<48 HR SCAN A/R: CPT

## 2020-08-31 ENCOUNTER — TELEPHONE (OUTPATIENT)
Dept: CARDIOLOGY | Facility: CLINIC | Age: 85
End: 2020-08-31

## 2020-08-31 NOTE — TELEPHONE ENCOUNTER
48 hour holter results available. Afib with AVG HR 59 BPM. Patient on warfarin and metoprolol/dig. RN will send to Dr. Menendez to inquire if any further recommendations prior to apt on 10/6/20.       4/7/20 OV Dr. Menendez  IMPRESSION AND PLAN:   1.  Persistent, if not permanent atrial fibrillation.   Continue rate control approach.  He is doing well.  We will check a Holter in 3-4m to assess adequacy of rate control.  On Coumadin for stroke prophylaxis.                  2.   Congestive heart failure, diastolic dysfunction   On Lasix 60 mg per day. Part of the edema is related to venous insufficiency.  Has followed in the venous clinic before.  Repeat echocardiogram in 4m.        3.  Sleep apnea, treated.   Continue C PAP.                         4.  Non flow-limiting coronary artery disease on angiogram in 2010.      5.  Hypertension, treated to goal.      6. .  Dyslipidemia, treated to goal.      Return to clinic in 4m      Sincerely,     Diomedes Menendez

## 2020-09-03 NOTE — TELEPHONE ENCOUNTER
RN called and spoke with patient's wife and advised her of Dr. Menendez's recommendations. Patient's wife verbalized understanding and is in agreement with plan. RN updated patient's med list to reflect changes. Patient and/or patient's wife will call clinic with any further questions/conncerns prior to F/U apt.

## 2020-09-04 VITALS — TEMPERATURE: 98.5 F | DIASTOLIC BLOOD PRESSURE: 72 MMHG | SYSTOLIC BLOOD PRESSURE: 126 MMHG

## 2020-09-04 DIAGNOSIS — I48.20 CHRONIC ATRIAL FIBRILLATION (H): ICD-10-CM

## 2020-09-04 DIAGNOSIS — Z79.01 LONG TERM CURRENT USE OF ANTICOAGULANT THERAPY: ICD-10-CM

## 2020-09-04 LAB — INR PPP: 2.9 (ref 2–3)

## 2020-09-04 PROCEDURE — 85610 PROTHROMBIN TIME: CPT | Performed by: FAMILY MEDICINE

## 2020-09-04 PROCEDURE — 36415 COLL VENOUS BLD VENIPUNCTURE: CPT | Performed by: FAMILY MEDICINE

## 2020-09-11 DIAGNOSIS — Z76.0 ENCOUNTER FOR MEDICATION REFILL: ICD-10-CM

## 2020-09-11 NOTE — TELEPHONE ENCOUNTER
Allopurinol. Last uric acid 4/16/19. LOV 7/10/20.     Ref Range & Units  1yr ago       Uric Acid  3.7 - 8.6 mg/dL  5.7     Comment:            Therapeutic target for gout patients: <6.0

## 2020-09-13 RX ORDER — ALLOPURINOL 300 MG/1
TABLET ORAL
Qty: 90 TABLET | Refills: 0 | Status: SHIPPED | OUTPATIENT
Start: 2020-09-13 | End: 2020-12-20

## 2020-10-02 DIAGNOSIS — Z79.01 LONG TERM CURRENT USE OF ANTICOAGULANT THERAPY: ICD-10-CM

## 2020-10-02 DIAGNOSIS — I48.20 CHRONIC ATRIAL FIBRILLATION (H): ICD-10-CM

## 2020-10-02 LAB — INR PPP: 1.6 (ref 2–3)

## 2020-10-02 PROCEDURE — 36416 COLLJ CAPILLARY BLOOD SPEC: CPT | Performed by: FAMILY MEDICINE

## 2020-10-02 PROCEDURE — 85610 PROTHROMBIN TIME: CPT | Performed by: FAMILY MEDICINE

## 2020-10-06 ENCOUNTER — OFFICE VISIT (OUTPATIENT)
Dept: CARDIOLOGY | Facility: CLINIC | Age: 85
End: 2020-10-06
Payer: MEDICARE

## 2020-10-06 VITALS
WEIGHT: 278 LBS | DIASTOLIC BLOOD PRESSURE: 79 MMHG | SYSTOLIC BLOOD PRESSURE: 141 MMHG | HEIGHT: 66 IN | BODY MASS INDEX: 44.68 KG/M2 | HEART RATE: 74 BPM

## 2020-10-06 DIAGNOSIS — I42.9 CARDIOMYOPATHY, UNSPECIFIED TYPE (H): ICD-10-CM

## 2020-10-06 DIAGNOSIS — Z79.01 LONG TERM CURRENT USE OF ANTICOAGULANT THERAPY: ICD-10-CM

## 2020-10-06 DIAGNOSIS — I25.10 CORONARY ARTERY DISEASE INVOLVING NATIVE CORONARY ARTERY OF NATIVE HEART WITHOUT ANGINA PECTORIS: ICD-10-CM

## 2020-10-06 DIAGNOSIS — I10 ESSENTIAL HYPERTENSION: ICD-10-CM

## 2020-10-06 DIAGNOSIS — E66.01 MORBID OBESITY DUE TO EXCESS CALORIES (H): ICD-10-CM

## 2020-10-06 DIAGNOSIS — I48.20 CHRONIC ATRIAL FIBRILLATION (H): Primary | ICD-10-CM

## 2020-10-06 PROCEDURE — 99214 OFFICE O/P EST MOD 30 MIN: CPT | Performed by: INTERNAL MEDICINE

## 2020-10-06 PROCEDURE — 93000 ELECTROCARDIOGRAM COMPLETE: CPT | Performed by: INTERNAL MEDICINE

## 2020-10-06 ASSESSMENT — MIFFLIN-ST. JEOR: SCORE: 1888.75

## 2020-10-06 NOTE — PROGRESS NOTES
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  Then he reverted out of rhythm on amiodarone therapy, and we have been attempting rate control.      His rate control is now done by metoprolol and digoxin which is tolerating well.  He is doing reasonably well.  He has some leg edema which improves early in the morning and get worse throughout the day.  He wears leg stockings.  I suspect he has some venous insufficiency.  No palpitations.  No dizziness or syncope.    Today, I discussed results of his recent echocardiogram which reveals normal ejection fraction.  Severe biatrial enlargement was again noted.  No significant valvular disease.  Mild AI was noted.    EKG today was reviewed with him and revealed atrial fibrillation with occasional PVCs.    He continues to wear CPAP for his sleep apnea.        PHYSICAL EXAMINATION:    below     IMPRESSION AND PLAN:   1.  Persistent atrial fibrillation.   Continue rate control approach.  He is doing well.  His recent Holter was reviewed with him.  It revealed atrial fibrillation with average rate of 70 bpm however there were pauses up to 3.1 seconds.  Since Holter report was available, his digoxin has been stopped.  He continues to be on warfarin.                  2.   Congestive heart failure, diastolic dysfunction   On Lasix 60 mg per day. Part of the edema is related to venous insufficiency.    EF remains normal.    3.  Sleep apnea, treated.   Continue C PAP.                         4.  Non flow-limiting coronary artery disease on angiogram in 2010.      5.  Hypertension, treated to goal.      6. .  Dyslipidemia, treated to goal.      Return to clinic in  6m      Sincerely,     Diomedes Menendez                  Orders Placed This Encounter   Procedures     Follow-Up with Cardiologist     EKG 12-lead complete w/read - Clinics (performed today)       No orders of the defined types were placed in this encounter.      There are no discontinued medications.      Encounter Diagnoses   Name Primary?     Chronic atrial fibrillation (H) Yes     Essential hypertension      Cardiomyopathy, unspecified type (H)      Coronary artery disease involving native coronary artery of native heart without angina pectoris      Long term current use of anticoagulant therapy      Morbid obesity due to excess calories (H)        CURRENT MEDICATIONS:  Current Outpatient Medications   Medication Sig Dispense Refill     allopurinol (ZYLOPRIM) 300 MG tablet TAKE 1 TABLET(300 MG) BY MOUTH DAILY 90 tablet 0     budesonide-formoterol (SYMBICORT) 80-4.5 MCG/ACT Inhaler Inhale 2 puffs into the lungs 2 times daily 1 Inhaler 11     finasteride (PROSCAR) 5 MG tablet Take 1 tablet (5 mg) by mouth daily 90 tablet 3     furosemide (LASIX) 40 MG tablet Take 1.5 tablets (60 mg) by mouth daily 135 tablet 3     ipratropium (ATROVENT) 0.06 % nasal spray USE 2 SPRAYS IN EACH NOSTRIL FOUR TIMES DAILY AS NEEDED FOR RHINITIS 135 mL 3     losartan (COZAAR) 50 MG tablet Take 1 tablet (50 mg) by mouth daily 90 tablet 3     metoprolol tartrate (LOPRESSOR) 50 MG tablet TAKE 2 TABLETS(100 MG) BY MOUTH TWICE DAILY 360 tablet 3     Multiple Vitamin (MULTI-VITAMIN) per tablet Take 1 tablet by mouth daily. 100 tablet 12     omeprazole (PRILOSEC) 40 MG DR capsule Take 1 capsule by mouth twice daily 180 capsule 1     simvastatin (ZOCOR) 10 MG tablet TAKE 1 TABLET(10 MG) BY MOUTH AT BEDTIME 90 tablet 3     vitamin D3 (CHOLECALCIFEROL) 50 mcg (2000 units) tablet Take 1 tablet by mouth daily       warfarin ANTICOAGULANT (COUMADIN) 2 MG tablet Take 3mg daily 135 tablet 3       ALLERGIES     Allergies   Allergen Reactions     Cardizem  [Diltiazem]      Swelling and poor rate control     Lisinopril Cough       PAST MEDICAL HISTORY:  Past Medical History:   Diagnosis Date     Atrial fibrillation (H)     became chronic afib in 2016,paroxysmal,was on amiodarone but went into persistent afib in april 2106 and amiodrone was d/cd. now on BBLK and considering cardioversion.(5/2106).In Sept 2016 plan is rate control which has been a challenge and Holter in 6 months     CAD (coronary artery disease)     mild stenosis per cath     Cardiomyopathy (H)      Chronic cough 2017    Eval with Dr Sarai Oro - [facility] - rec speech therapy, ct for eval of crackles No obstruction or copd     Colon polyp 2010     Gout      Hiatal hernia      Hip pain      History of BPH     laser TURP in 2016 Dr Lima     HTN (hypertension)      Hyperlipidemia      Neuropathy     numbness & tingling R. 4th & 5th finger left hand     Obesity (BMI 35.0-39.9 without comorbidity)      SUJEY on CPAP      Shoulder injury      Venous insufficiency of right leg     no trteatment recommended by Judy - option to treat is there Dr Zapata     Wrist swelling        PAST SURGICAL HISTORY:  Past Surgical History:   Procedure Laterality Date     CLAVICLE SURGERY Left     as a kid     CT release       CYSTOSCOPY N/A 4/7/2016    Procedure: CYSTOSCOPY;  Surgeon: Lokesh Lima MD;  Location:  OR     CYSTOSCOPY, FULGURATE BLEEDERS, EVACUATE CLOT(S), COMBINED N/A 1/16/2018    Procedure: COMBINED CYSTOSCOPY, FULGURATE BLEEDERS, EVACUATE CLOT(S);  CYSTOSCOPY, FULGERATION;  Surgeon: Lokesh Lima MD;  Location:  OR     CYSTOSCOPY, TRANSURETHRAL RESECTION (TUR) PROSTATE, COMBINED N/A 2/25/2020    Procedure: CYSTOSCOPY; TRANSURETHRAL RESECTION OF THE PROSTATE;  Surgeon: Lokesh Lima MD;  Location:  OR     HC LEFT HEART CATHETERIZATION  3/2010    Mild CAD     LASER KTP TRANSURETHRAL RESECTION (TUR) PROSTATE N/A 4/7/2016    Procedure: LASER KTP TRANSURETHRAL RESECTION (TUR) PROSTATE;  Surgeon:  Lokesh Lima MD;  Location: [facility] OR     TRANSPOSITION ULNAR NERVE (ELBOW) Left 2019    Procedure: LEFT ELBOW ULNER NERVE SUBMUSCULAR TRANSPOSITION;  Surgeon: Nicole Bai MD;  Location: [facility] OR     ulnar reroute      right        FAMILY HISTORY:  Family History   Problem Relation Age of Onset     Heart Disease Mother 92        Heart failure     Heart Disease Father 92     C.A.D. Brother 70        MI     Myocardial Infarction Brother      Family History Negative Sister      Family History Negative Brother      Family History Negative Brother      Family History Negative Brother      Family History Negative Sister      Myocardial Infarction Son      Heart Disease Son      Heart Disease Son        SOCIAL HISTORY:  Social History     Socioeconomic History     Marital status:      Spouse name: None     Number of children: None     Years of education: None     Highest education level: None   Occupational History     None   Social Needs     Financial resource strain: None     Food insecurity     Worry: None     Inability: None     Transportation needs     Medical: None     Non-medical: None   Tobacco Use     Smoking status: Former Smoker     Packs/day: 1.00     Years: 5.00     Pack years: 5.00     Types: Cigarettes     Quit date: 4/3/1977     Years since quittin.5     Smokeless tobacco: Never Used   Substance and Sexual Activity     Alcohol use: No     Alcohol/week: 0.0 standard drinks     Drug use: No     Sexual activity: Yes   Lifestyle     Physical activity     Days per week: None     Minutes per session: None     Stress: None   Relationships     Social connections     Talks on phone: None     Gets together: None     Attends Confucianist service: None     Active member of club or organization: None     Attends meetings of clubs or organizations: None     Relationship status: None     Intimate partner violence     Fear of current or ex partner: None     Emotionally abused: None      "Physically abused: None     Forced sexual activity: None   Other Topics Concern     Parent/sibling w/ CABG, MI or angioplasty before 65F 55M? No      Service Not Asked     Blood Transfusions Not Asked     Caffeine Concern Yes     Comment: 8 cups of  decaf coffee per day     Occupational Exposure Not Asked     Hobby Hazards Not Asked     Sleep Concern Yes     Comment: sleep apnea, wears CPAP     Stress Concern No     Weight Concern Yes     Comment: Weight gain     Special Diet No     Back Care Not Asked     Exercise No     Bike Helmet Not Asked     Seat Belt Yes     Self-Exams Not Asked   Social History Narrative     None       Review of Systems:  Skin:  Negative       Eyes:  Positive for glasses    ENT:  Positive for hearing loss    Respiratory:  Positive for sleep apnea;CPAP     Cardiovascular:  Negative;palpitations;chest pain;dizziness;lightheadedness;syncope or near-syncope;cyanosis;fatigue Positive for edema in ankles during the day  Gastroenterology: Negative      Genitourinary:  Positive for prostate problem    Musculoskeletal:  Negative      Neurologic:  Negative      Psychiatric:  Negative      Heme/Lymph/Imm:  Negative      Endocrine:  Negative        Physical Exam:  Vitals: BP (!) 141/79   Pulse 74   Ht 1.676 m (5' 6\")   Wt 126.1 kg (278 lb)   BMI 44.87 kg/m      Constitutional:    obese      Skin:  warm and dry to the touch          Head:  normocephalic        Eyes:  sclera white        Lymph:      ENT:  no pallor or cyanosis        Neck:  carotid pulses are full and equal bilaterally JVP 8-10      Respiratory:  clear to auscultation         Cardiac: regular rhythm;normal S1 and S2 irregularly irregular rhythm;tachycardic distant heart sounds no presence of murmur       heart rate 78 with resting; 110- hallway walk  not assessed this visit                                        GI:  not assessed this visit        Extremities and Muscular Skeletal:      bilateral LE edema;trace RLE " edema;pitting;1+ LLE edema;pitting;1+ No venous stasis changes, telangiectasia or ulcerations    Neurological:  no gross motor deficits;affect appropriate        Psych:           CC  Diomedes Menendez MD  [address]  [address]  [place] [id number]

## 2020-10-06 NOTE — LETTER
10/6/2020    Malvin Rosen MD  [address]  [facility] [address]    RE: Vasiliy Corbin       Dear Colleague,    I had the pleasure of seeing Vasiliy Corbin in the [facility].    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  Then he reverted out of rhythm on amiodarone therapy, and we have been attempting rate control.      His rate control is now done by metoprolol and digoxin which is tolerating well.  He is doing reasonably well.  He has some leg edema which improves early in the morning and get worse throughout the day.  He wears leg stockings.  I suspect he has some venous insufficiency.  No palpitations.  No dizziness or syncope.    Today, I discussed results of his recent echocardiogram which reveals normal ejection fraction.  Severe biatrial enlargement was again noted.  No significant valvular disease.  Mild AI was noted.    EKG today was reviewed with him and revealed atrial fibrillation with occasional PVCs.    He continues to wear CPAP for his sleep apnea.        PHYSICAL EXAMINATION:    below     IMPRESSION AND PLAN:   1.  Persistent atrial fibrillation.   Continue rate control approach.  He is doing well.  His recent Holter was reviewed with him.  It revealed atrial fibrillation with average rate of 70 bpm however there were pauses up to 3.1 seconds.  Since Holter report was available, his digoxin has been stopped.  He continues to be on warfarin.                  2.   Congestive heart failure, diastolic dysfunction   On Lasix 60 mg per day. Part of the edema is related to venous insufficiency.    EF remains normal.    3.  Sleep apnea,  treated.   Continue C PAP.                         4.  Non flow-limiting coronary artery disease on angiogram in 2010.      5.  Hypertension, treated to goal.      6. .  Dyslipidemia, treated to goal.      Return to clinic in 6m      Sincerely,     Diomedes Menendez       Orders Placed This Encounter   Procedures     Follow-Up with Cardiologist     EKG 12-lead complete w/read - Clinics (performed today)       No orders of the defined types were placed in this encounter.      There are no discontinued medications.      Encounter Diagnoses   Name Primary?     Chronic atrial fibrillation (H) Yes     Essential hypertension      Cardiomyopathy, unspecified type (H)      Coronary artery disease involving native coronary artery of native heart without angina pectoris      Long term current use of anticoagulant therapy      Morbid obesity due to excess calories (H)        CURRENT MEDICATIONS:  Current Outpatient Medications   Medication Sig Dispense Refill     allopurinol (ZYLOPRIM) 300 MG tablet TAKE 1 TABLET(300 MG) BY MOUTH DAILY 90 tablet 0     budesonide-formoterol (SYMBICORT) 80-4.5 MCG/ACT Inhaler Inhale 2 puffs into the lungs 2 times daily 1 Inhaler 11     finasteride (PROSCAR) 5 MG tablet Take 1 tablet (5 mg) by mouth daily 90 tablet 3     furosemide (LASIX) 40 MG tablet Take 1.5 tablets (60 mg) by mouth daily 135 tablet 3     ipratropium (ATROVENT) 0.06 % nasal spray USE 2 SPRAYS IN EACH NOSTRIL FOUR TIMES DAILY AS NEEDED FOR RHINITIS 135 mL 3     losartan (COZAAR) 50 MG tablet Take 1 tablet (50 mg) by mouth daily 90 tablet 3     metoprolol tartrate (LOPRESSOR) 50 MG tablet TAKE 2 TABLETS(100 MG) BY MOUTH TWICE DAILY 360 tablet 3     Multiple Vitamin (MULTI-VITAMIN) per tablet Take 1 tablet by mouth daily. 100 tablet 12     omeprazole (PRILOSEC) 40 MG DR capsule Take 1 capsule by mouth twice daily 180 capsule 1     simvastatin (ZOCOR) 10 MG tablet TAKE 1 TABLET(10 MG) BY MOUTH AT BEDTIME 90 tablet 3     vitamin D3  (CHOLECALCIFEROL) 50 mcg (2000 units) tablet Take 1 tablet by mouth daily       warfarin ANTICOAGULANT (COUMADIN) 2 MG tablet Take 3mg daily 135 tablet 3       ALLERGIES     Allergies   Allergen Reactions     Cardizem [Diltiazem]      Swelling and poor rate control     Lisinopril Cough       PAST MEDICAL HISTORY:  Past Medical History:   Diagnosis Date     Atrial fibrillation (H)     became chronic afib in 2016,paroxysmal,was on amiodarone but went into persistent afib in april 2106 and amiodrone was d/cd. now on BBLK and considering cardioversion.(5/2106).In Sept 2016 plan is rate control which has been a challenge and Holter in 6 months     CAD (coronary artery disease)     mild stenosis per cath     Cardiomyopathy (H)      Chronic cough 2017    Eval with Dr Sarai Oro - [facility] - rec speech therapy, ct for eval of crackles No obstruction or copd     Colon polyp 2010     Gout      Hiatal hernia      Hip pain      History of BPH     laser TURP in 2016 Dr Lima     HTN (hypertension)      Hyperlipidemia      Neuropathy     numbness & tingling R. 4th & 5th finger left hand     Obesity (BMI 35.0-39.9 without comorbidity)      SUJEY on CPAP      Shoulder injury      Venous insufficiency of right leg     no trteatment recommended by [facility] - option to treat is there Dr Zapata     Wrist swelling        PAST SURGICAL HISTORY:  Past Surgical History:   Procedure Laterality Date     CLAVICLE SURGERY Left     as a kid     CT release       CYSTOSCOPY N/A 4/7/2016    Procedure: CYSTOSCOPY;  Surgeon: Lokesh Lima MD;  Location:  OR     CYSTOSCOPY, FULGURATE BLEEDERS, EVACUATE CLOT(S), COMBINED N/A 1/16/2018    Procedure: COMBINED CYSTOSCOPY, FULGURATE BLEEDERS, EVACUATE CLOT(S);  CYSTOSCOPY, FULGERATION;  Surgeon: Lokesh Lima MD;  Location:  OR     CYSTOSCOPY, TRANSURETHRAL RESECTION (TUR) PROSTATE, COMBINED N/A 2/25/2020    Procedure: CYSTOSCOPY; TRANSURETHRAL RESECTION OF THE PROSTATE;  Surgeon: Janie  MD Lokesh;  Location: [facility] OR     HC LEFT HEART CATHETERIZATION  3/2010    Mild CAD     LASER KTP TRANSURETHRAL RESECTION (TUR) PROSTATE N/A 2016    Procedure: LASER KTP TRANSURETHRAL RESECTION (TUR) PROSTATE;  Surgeon: Lokesh Lima MD;  Location: [facility] OR     TRANSPOSITION ULNAR NERVE (ELBOW) Left 2019    Procedure: LEFT ELBOW ULNER NERVE SUBMUSCULAR TRANSPOSITION;  Surgeon: Nicole Bai MD;  Location: [facility] OR     ulnar reroute      right        FAMILY HISTORY:  Family History   Problem Relation Age of Onset     Heart Disease Mother 92        Heart failure     Heart Disease Father 92     C.A.D. Brother 70        MI     Myocardial Infarction Brother      Family History Negative Sister      Family History Negative Brother      Family History Negative Brother      Family History Negative Brother      Family History Negative Sister      Myocardial Infarction Son      Heart Disease Son      Heart Disease Son        SOCIAL HISTORY:  Social History     Socioeconomic History     Marital status:      Spouse name: None     Number of children: None     Years of education: None     Highest education level: None   Occupational History     None   Social Needs     Financial resource strain: None     Food insecurity     Worry: None     Inability: None     Transportation needs     Medical: None     Non-medical: None   Tobacco Use     Smoking status: Former Smoker     Packs/day: 1.00     Years: 5.00     Pack years: 5.00     Types: Cigarettes     Quit date: 4/3/1977     Years since quittin.5     Smokeless tobacco: Never Used   Substance and Sexual Activity     Alcohol use: No     Alcohol/week: 0.0 standard drinks     Drug use: No     Sexual activity: Yes   Lifestyle     Physical activity     Days per week: None     Minutes per session: None     Stress: None   Relationships     Social connections     Talks on phone: None     Gets together: None     Attends Hoahaoism service: None     Active member of  "club or organization: None     Attends meetings of clubs or organizations: None     Relationship status: None     Intimate partner violence     Fear of current or ex partner: None     Emotionally abused: None     Physically abused: None     Forced sexual activity: None   Other Topics Concern     Parent/sibling w/ CABG, MI or angioplasty before 65F 55M? No      Service Not Asked     Blood Transfusions Not Asked     Caffeine Concern Yes     Comment: 8 cups of  decaf coffee per day     Occupational Exposure Not Asked     Hobby Hazards Not Asked     Sleep Concern Yes     Comment: sleep apnea, wears CPAP     Stress Concern No     Weight Concern Yes     Comment: Weight gain     Special Diet No     Back Care Not Asked     Exercise No     Bike Helmet Not Asked     Seat Belt Yes     Self-Exams Not Asked   Social History Narrative     None       Review of Systems:  Skin:  Negative       Eyes:  Positive for glasses    ENT:  Positive for hearing loss    Respiratory:  Positive for sleep apnea;CPAP     Cardiovascular:  Negative;palpitations;chest pain;dizziness;lightheadedness;syncope or near-syncope;cyanosis;fatigue Positive for edema in ankles during the day  Gastroenterology: Negative      Genitourinary:  Positive for prostate problem    Musculoskeletal:  Negative      Neurologic:  Negative      Psychiatric:  Negative      Heme/Lymph/Imm:  Negative      Endocrine:  Negative        Physical Exam:  Vitals: BP (!) 141/79   Pulse 74   Ht 1.676 m (5' 6\")   Wt 126.1 kg (278 lb)   BMI 44.87 kg/m      Constitutional:    obese      Skin:  warm and dry to the touch          Head:  normocephalic        Eyes:  sclera white        Lymph:      ENT:  no pallor or cyanosis        Neck:  carotid pulses are full and equal bilaterally JVP 8-10      Respiratory:  clear to auscultation         Cardiac: regular rhythm;normal S1 and S2 irregularly irregular rhythm;tachycardic distant heart sounds no presence of murmur       heart rate 78 " with resting; 110- hallway walk  not assessed this visit                                        GI:  not assessed this visit        Extremities and Muscular Skeletal:      bilateral LE edema;trace RLE edema;pitting;1+ LLE edema;pitting;1+ No venous stasis changes, telangiectasia or ulcerations    Neurological:  no gross motor deficits;affect appropriate        Psych:           Thank you for allowing me to participate in the care of your patient.    Sincerely,     Diomedes Menendez MD     [facility]

## 2020-10-06 NOTE — LETTER
10/6/2020    Malvin Rosen MD  [address]  [facility] [address]    RE: Vasiliy Corbin       Dear Colleague,    I had the pleasure of seeing Vasiliy Corbin in the [facility].    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  Then he reverted out of rhythm on amiodarone therapy, and we have been attempting rate control.      His rate control is now done by metoprolol and digoxin which is tolerating well.  He is doing reasonably well.  He has some leg edema which improves early in the morning and get worse throughout the day.  He wears leg stockings.  I suspect he has some venous insufficiency.  No palpitations.  No dizziness or syncope.    Today, I discussed results of his recent echocardiogram which reveals normal ejection fraction.  Severe biatrial enlargement was again noted.  No significant valvular disease.  Mild AI was noted.    EKG today was reviewed with him and revealed atrial fibrillation with occasional PVCs.    He continues to wear CPAP for his sleep apnea.        PHYSICAL EXAMINATION:    below     IMPRESSION AND PLAN:   1.  Persistent atrial fibrillation.   Continue rate control approach.  He is doing well.  His recent Holter was reviewed with him.  It revealed atrial fibrillation with average rate of 70 bpm however there were pauses up to 3.1 seconds.  Since Holter report was available, his digoxin has been stopped.  He continues to be on warfarin.                  2.   Congestive heart failure, diastolic dysfunction   On Lasix 60 mg per day. Part of the edema is related to venous insufficiency.    EF remains normal.    3.  Sleep apnea,  treated.   Continue C PAP.                         4.  Non flow-limiting coronary artery disease on angiogram in 2010.      5.  Hypertension, treated to goal.      6. .  Dyslipidemia, treated to goal.      Return to clinic in 6m      Sincerely,     Diomedes Menendez                  Orders Placed This Encounter   Procedures     Follow-Up with Cardiologist     EKG 12-lead complete w/read - Clinics (performed today)       No orders of the defined types were placed in this encounter.      There are no discontinued medications.      Encounter Diagnoses   Name Primary?     Chronic atrial fibrillation (H) Yes     Essential hypertension      Cardiomyopathy, unspecified type (H)      Coronary artery disease involving native coronary artery of native heart without angina pectoris      Long term current use of anticoagulant therapy      Morbid obesity due to excess calories (H)        CURRENT MEDICATIONS:  Current Outpatient Medications   Medication Sig Dispense Refill     allopurinol (ZYLOPRIM) 300 MG tablet TAKE 1 TABLET(300 MG) BY MOUTH DAILY 90 tablet 0     budesonide-formoterol (SYMBICORT) 80-4.5 MCG/ACT Inhaler Inhale 2 puffs into the lungs 2 times daily 1 Inhaler 11     finasteride (PROSCAR) 5 MG tablet Take 1 tablet (5 mg) by mouth daily 90 tablet 3     furosemide (LASIX) 40 MG tablet Take 1.5 tablets (60 mg) by mouth daily 135 tablet 3     ipratropium (ATROVENT) 0.06 % nasal spray USE 2 SPRAYS IN EACH NOSTRIL FOUR TIMES DAILY AS NEEDED FOR RHINITIS 135 mL 3     losartan (COZAAR) 50 MG tablet Take 1 tablet (50 mg) by mouth daily 90 tablet 3     metoprolol tartrate (LOPRESSOR) 50 MG tablet TAKE 2 TABLETS(100 MG) BY MOUTH TWICE DAILY 360 tablet 3     Multiple Vitamin (MULTI-VITAMIN) per tablet Take 1 tablet by mouth daily. 100 tablet 12     omeprazole (PRILOSEC) 40 MG DR capsule Take 1 capsule by mouth twice daily 180 capsule 1     simvastatin (ZOCOR) 10 MG tablet TAKE 1 TABLET(10 MG) BY MOUTH AT BEDTIME 90 tablet 3      vitamin D3 (CHOLECALCIFEROL) 50 mcg (2000 units) tablet Take 1 tablet by mouth daily       warfarin ANTICOAGULANT (COUMADIN) 2 MG tablet Take 3mg daily 135 tablet 3       ALLERGIES     Allergies   Allergen Reactions     Cardizem [Diltiazem]      Swelling and poor rate control     Lisinopril Cough       PAST MEDICAL HISTORY:  Past Medical History:   Diagnosis Date     Atrial fibrillation (H)     became chronic afib in 2016,paroxysmal,was on amiodarone but went into persistent afib in april 2106 and amiodrone was d/cd. now on BBLK and considering cardioversion.(5/2106).In Sept 2016 plan is rate control which has been a challenge and Holter in 6 months     CAD (coronary artery disease)     mild stenosis per cath     Cardiomyopathy (H)      Chronic cough 2017    Eval with Dr Sarai Oro - [facility] - rec speech therapy, ct for eval of crackles No obstruction or copd     Colon polyp 2010     Gout      Hiatal hernia      Hip pain      History of BPH     laser TURP in 2016 Dr Lima     HTN (hypertension)      Hyperlipidemia      Neuropathy     numbness & tingling R. 4th & 5th finger left hand     Obesity (BMI 35.0-39.9 without comorbidity)      SUJEY on CPAP      Shoulder injury      Venous insufficiency of right leg     no trteatment recommended by [facility] - option to treat is there Dr Zapata     Wrist swelling        PAST SURGICAL HISTORY:  Past Surgical History:   Procedure Laterality Date     CLAVICLE SURGERY Left     as a kid     CT release       CYSTOSCOPY N/A 4/7/2016    Procedure: CYSTOSCOPY;  Surgeon: Lokesh Lima MD;  Location:  OR     CYSTOSCOPY, FULGURATE BLEEDERS, EVACUATE CLOT(S), COMBINED N/A 1/16/2018    Procedure: COMBINED CYSTOSCOPY, FULGURATE BLEEDERS, EVACUATE CLOT(S);  CYSTOSCOPY, FULGERATION;  Surgeon: Lokesh Lima MD;  Location:  OR     CYSTOSCOPY, TRANSURETHRAL RESECTION (TUR) PROSTATE, COMBINED N/A 2/25/2020    Procedure: CYSTOSCOPY; TRANSURETHRAL RESECTION OF THE PROSTATE;  Surgeon:  Lokesh Lima MD;  Location: [facility] OR     HC LEFT HEART CATHETERIZATION  3/2010    Mild CAD     LASER KTP TRANSURETHRAL RESECTION (TUR) PROSTATE N/A 2016    Procedure: LASER KTP TRANSURETHRAL RESECTION (TUR) PROSTATE;  Surgeon: Lokesh Lima MD;  Location: [facility] OR     TRANSPOSITION ULNAR NERVE (ELBOW) Left 2019    Procedure: LEFT ELBOW ULNER NERVE SUBMUSCULAR TRANSPOSITION;  Surgeon: Nicole Bai MD;  Location: [facility] OR     ulnar reroute      right        FAMILY HISTORY:  Family History   Problem Relation Age of Onset     Heart Disease Mother 92        Heart failure     Heart Disease Father 92     C.A.D. Brother 70        MI     Myocardial Infarction Brother      Family History Negative Sister      Family History Negative Brother      Family History Negative Brother      Family History Negative Brother      Family History Negative Sister      Myocardial Infarction Son      Heart Disease Son      Heart Disease Son        SOCIAL HISTORY:  Social History     Socioeconomic History     Marital status:      Spouse name: None     Number of children: None     Years of education: None     Highest education level: None   Occupational History     None   Social Needs     Financial resource strain: None     Food insecurity     Worry: None     Inability: None     Transportation needs     Medical: None     Non-medical: None   Tobacco Use     Smoking status: Former Smoker     Packs/day: 1.00     Years: 5.00     Pack years: 5.00     Types: Cigarettes     Quit date: 4/3/1977     Years since quittin.5     Smokeless tobacco: Never Used   Substance and Sexual Activity     Alcohol use: No     Alcohol/week: 0.0 standard drinks     Drug use: No     Sexual activity: Yes   Lifestyle     Physical activity     Days per week: None     Minutes per session: None     Stress: None   Relationships     Social connections     Talks on phone: None     Gets together: None     Attends Rastafarian service: None     Active  "member of club or organization: None     Attends meetings of clubs or organizations: None     Relationship status: None     Intimate partner violence     Fear of current or ex partner: None     Emotionally abused: None     Physically abused: None     Forced sexual activity: None   Other Topics Concern     Parent/sibling w/ CABG, MI or angioplasty before 65F 55M? No      Service Not Asked     Blood Transfusions Not Asked     Caffeine Concern Yes     Comment: 8 cups of  decaf coffee per day     Occupational Exposure Not Asked     Hobby Hazards Not Asked     Sleep Concern Yes     Comment: sleep apnea, wears CPAP     Stress Concern No     Weight Concern Yes     Comment: Weight gain     Special Diet No     Back Care Not Asked     Exercise No     Bike Helmet Not Asked     Seat Belt Yes     Self-Exams Not Asked   Social History Narrative     None       Review of Systems:  Skin:  Negative       Eyes:  Positive for glasses    ENT:  Positive for hearing loss    Respiratory:  Positive for sleep apnea;CPAP     Cardiovascular:  Negative;palpitations;chest pain;dizziness;lightheadedness;syncope or near-syncope;cyanosis;fatigue Positive for edema in ankles during the day  Gastroenterology: Negative      Genitourinary:  Positive for prostate problem    Musculoskeletal:  Negative      Neurologic:  Negative      Psychiatric:  Negative      Heme/Lymph/Imm:  Negative      Endocrine:  Negative        Physical Exam:  Vitals: BP (!) 141/79   Pulse 74   Ht 1.676 m (5' 6\")   Wt 126.1 kg (278 lb)   BMI 44.87 kg/m      Constitutional:    obese      Skin:  warm and dry to the touch          Head:  normocephalic        Eyes:  sclera white        Lymph:      ENT:  no pallor or cyanosis        Neck:  carotid pulses are full and equal bilaterally JVP 8-10      Respiratory:  clear to auscultation         Cardiac: regular rhythm;normal S1 and S2 irregularly irregular rhythm;tachycardic distant heart sounds no presence of murmur       " heart rate 78 with resting; 110- hallway walk  not assessed this visit                                        GI:  not assessed this visit        Extremities and Muscular Skeletal:      bilateral LE edema;trace RLE edema;pitting;1+ LLE edema;pitting;1+ No venous stasis changes, telangiectasia or ulcerations    Neurological:  no gross motor deficits;affect appropriate        Psych:           CC  Diomedes Menendez MD  [address]  [id number]  [place] [id number]                  Thank you for allowing me to participate in the care of your patient.      Sincerely,     Diomedes Menendez MD     [facility]    cc:   Diomedes Menendez MD  [address]  [address]  [place] [id number]

## 2020-10-16 VITALS
TEMPERATURE: 97.4 F | SYSTOLIC BLOOD PRESSURE: 128 MMHG | WEIGHT: 280 LBS | BODY MASS INDEX: 45.19 KG/M2 | DIASTOLIC BLOOD PRESSURE: 78 MMHG

## 2020-10-16 DIAGNOSIS — Z79.01 LONG TERM CURRENT USE OF ANTICOAGULANT THERAPY: ICD-10-CM

## 2020-10-16 DIAGNOSIS — I48.20 CHRONIC ATRIAL FIBRILLATION (H): ICD-10-CM

## 2020-10-16 DIAGNOSIS — Z23 FLU VACCINE NEED: Primary | ICD-10-CM

## 2020-10-16 LAB — INR PPP: 2.7 (ref 2–3)

## 2020-10-16 PROCEDURE — 85610 PROTHROMBIN TIME: CPT | Performed by: FAMILY MEDICINE

## 2020-10-16 PROCEDURE — 36416 COLLJ CAPILLARY BLOOD SPEC: CPT | Performed by: FAMILY MEDICINE

## 2020-10-16 PROCEDURE — 90674 CCIIV4 VAC NO PRSV 0.5 ML IM: CPT | Performed by: FAMILY MEDICINE

## 2020-10-16 PROCEDURE — G0008 ADMIN INFLUENZA VIRUS VAC: HCPCS | Performed by: FAMILY MEDICINE

## 2020-11-13 DIAGNOSIS — I48.20 CHRONIC ATRIAL FIBRILLATION (H): ICD-10-CM

## 2020-11-13 DIAGNOSIS — Z79.01 LONG TERM CURRENT USE OF ANTICOAGULANT THERAPY: ICD-10-CM

## 2020-11-13 LAB — INR PPP: 2.2 (ref 2–3)

## 2020-11-13 PROCEDURE — 85610 PROTHROMBIN TIME: CPT | Performed by: FAMILY MEDICINE

## 2020-11-13 PROCEDURE — 36416 COLLJ CAPILLARY BLOOD SPEC: CPT | Performed by: FAMILY MEDICINE

## 2020-12-11 VITALS
TEMPERATURE: 97.9 F | DIASTOLIC BLOOD PRESSURE: 66 MMHG | BODY MASS INDEX: 45.74 KG/M2 | WEIGHT: 283.4 LBS | SYSTOLIC BLOOD PRESSURE: 128 MMHG

## 2020-12-11 DIAGNOSIS — Z79.01 LONG TERM CURRENT USE OF ANTICOAGULANT THERAPY: ICD-10-CM

## 2020-12-11 DIAGNOSIS — I48.20 CHRONIC ATRIAL FIBRILLATION (H): ICD-10-CM

## 2020-12-11 LAB — INR PPP: 2.5 (ref 2–3)

## 2020-12-11 PROCEDURE — 36416 COLLJ CAPILLARY BLOOD SPEC: CPT | Performed by: FAMILY MEDICINE

## 2020-12-11 PROCEDURE — 85610 PROTHROMBIN TIME: CPT | Performed by: FAMILY MEDICINE

## 2020-12-20 DIAGNOSIS — Z76.0 ENCOUNTER FOR MEDICATION REFILL: ICD-10-CM

## 2020-12-20 RX ORDER — ALLOPURINOL 300 MG/1
TABLET ORAL
Qty: 90 TABLET | Refills: 0 | Status: SHIPPED | OUTPATIENT
Start: 2020-12-20 | End: 2021-03-19

## 2021-01-08 VITALS — TEMPERATURE: 98.1 F

## 2021-01-08 DIAGNOSIS — I48.20 CHRONIC ATRIAL FIBRILLATION (H): ICD-10-CM

## 2021-01-08 DIAGNOSIS — Z79.01 LONG TERM CURRENT USE OF ANTICOAGULANT THERAPY: ICD-10-CM

## 2021-01-08 LAB — INR PPP: 2.1 (ref 2–3)

## 2021-01-08 PROCEDURE — 85610 PROTHROMBIN TIME: CPT | Performed by: FAMILY MEDICINE

## 2021-01-08 PROCEDURE — 36416 COLLJ CAPILLARY BLOOD SPEC: CPT | Performed by: FAMILY MEDICINE

## 2021-01-26 DIAGNOSIS — Z76.0 ENCOUNTER FOR MEDICATION REFILL: ICD-10-CM

## 2021-01-26 NOTE — TELEPHONE ENCOUNTER
Omeprazole. Last addressed 5/1/20.     GERD stable.  Taking meds as ordered, no reported side effects from medicines.  Eating properly to avoid sx.   No melena, no hematochezia, no diarrhea.  No unintended weigth loss.

## 2021-01-27 RX ORDER — OMEPRAZOLE 40 MG/1
CAPSULE, DELAYED RELEASE ORAL
Qty: 180 CAPSULE | Refills: 1 | Status: SHIPPED | OUTPATIENT
Start: 2021-01-27 | End: 2021-07-29

## 2021-01-29 ENCOUNTER — IMMUNIZATION (OUTPATIENT)
Dept: NURSING | Facility: CLINIC | Age: 86
End: 2021-01-29
Payer: MEDICARE

## 2021-01-29 PROCEDURE — 91300 PR COVID VAC PFIZER DIL RECON 30 MCG/0.3 ML IM: CPT

## 2021-01-29 PROCEDURE — 0001A PR COVID VAC PFIZER DIL RECON 30 MCG/0.3 ML IM: CPT

## 2021-02-05 VITALS
SYSTOLIC BLOOD PRESSURE: 136 MMHG | TEMPERATURE: 97.4 F | BODY MASS INDEX: 46.42 KG/M2 | WEIGHT: 287.6 LBS | DIASTOLIC BLOOD PRESSURE: 74 MMHG

## 2021-02-05 DIAGNOSIS — Z79.01 LONG TERM CURRENT USE OF ANTICOAGULANT THERAPY: ICD-10-CM

## 2021-02-05 DIAGNOSIS — I48.20 CHRONIC ATRIAL FIBRILLATION (H): ICD-10-CM

## 2021-02-05 LAB — INR PPP: 2.6 (ref 2–3)

## 2021-02-05 PROCEDURE — 36416 COLLJ CAPILLARY BLOOD SPEC: CPT | Performed by: FAMILY MEDICINE

## 2021-02-05 PROCEDURE — 85610 PROTHROMBIN TIME: CPT | Performed by: FAMILY MEDICINE

## 2021-02-19 ENCOUNTER — IMMUNIZATION (OUTPATIENT)
Dept: NURSING | Facility: CLINIC | Age: 86
End: 2021-02-19
Attending: INTERNAL MEDICINE
Payer: MEDICARE

## 2021-02-19 PROCEDURE — 91300 PR COVID VAC PFIZER DIL RECON 30 MCG/0.3 ML IM: CPT

## 2021-02-19 PROCEDURE — 0002A PR COVID VAC PFIZER DIL RECON 30 MCG/0.3 ML IM: CPT

## 2021-03-05 VITALS — TEMPERATURE: 98.1 F

## 2021-03-05 DIAGNOSIS — Z79.01 LONG TERM CURRENT USE OF ANTICOAGULANT THERAPY: ICD-10-CM

## 2021-03-05 DIAGNOSIS — I48.20 CHRONIC ATRIAL FIBRILLATION (H): ICD-10-CM

## 2021-03-05 LAB — INR PPP: 2.2 (ref 2–3)

## 2021-03-05 PROCEDURE — 36416 COLLJ CAPILLARY BLOOD SPEC: CPT | Performed by: FAMILY MEDICINE

## 2021-03-05 PROCEDURE — 85610 PROTHROMBIN TIME: CPT | Performed by: FAMILY MEDICINE

## 2021-03-19 DIAGNOSIS — Z76.0 ENCOUNTER FOR MEDICATION REFILL: ICD-10-CM

## 2021-03-19 RX ORDER — ALLOPURINOL 300 MG/1
TABLET ORAL
Qty: 90 TABLET | Refills: 0 | Status: SHIPPED | OUTPATIENT
Start: 2021-03-19 | End: 2021-06-21

## 2021-04-02 VITALS
SYSTOLIC BLOOD PRESSURE: 122 MMHG | WEIGHT: 286.2 LBS | TEMPERATURE: 97.9 F | BODY MASS INDEX: 46.19 KG/M2 | DIASTOLIC BLOOD PRESSURE: 84 MMHG

## 2021-04-02 DIAGNOSIS — Z79.01 LONG TERM CURRENT USE OF ANTICOAGULANT THERAPY: ICD-10-CM

## 2021-04-02 DIAGNOSIS — I10 ESSENTIAL HYPERTENSION: Primary | ICD-10-CM

## 2021-04-02 DIAGNOSIS — I48.20 CHRONIC ATRIAL FIBRILLATION (H): ICD-10-CM

## 2021-04-02 DIAGNOSIS — I50.9 CHF (CONGESTIVE HEART FAILURE) (H): ICD-10-CM

## 2021-04-02 LAB — INR PPP: 2.3 (ref 2–3)

## 2021-04-02 PROCEDURE — 85610 PROTHROMBIN TIME: CPT | Performed by: FAMILY MEDICINE

## 2021-04-02 PROCEDURE — 36416 COLLJ CAPILLARY BLOOD SPEC: CPT | Performed by: FAMILY MEDICINE

## 2021-04-21 DIAGNOSIS — I48.20 CHRONIC ATRIAL FIBRILLATION (H): ICD-10-CM

## 2021-04-21 RX ORDER — METOPROLOL TARTRATE 50 MG
TABLET ORAL
Qty: 360 TABLET | Refills: 3 | Status: SHIPPED | OUTPATIENT
Start: 2021-04-21 | End: 2022-05-02

## 2021-04-30 DIAGNOSIS — I48.20 CHRONIC ATRIAL FIBRILLATION (H): ICD-10-CM

## 2021-04-30 DIAGNOSIS — Z79.01 LONG TERM CURRENT USE OF ANTICOAGULANT THERAPY: ICD-10-CM

## 2021-04-30 LAB — INR PPP: 2.5 (ref 2–3)

## 2021-04-30 PROCEDURE — 85610 PROTHROMBIN TIME: CPT | Performed by: FAMILY MEDICINE

## 2021-04-30 PROCEDURE — 36416 COLLJ CAPILLARY BLOOD SPEC: CPT | Performed by: FAMILY MEDICINE

## 2021-05-02 DIAGNOSIS — I48.20 CHRONIC ATRIAL FIBRILLATION (H): ICD-10-CM

## 2021-05-02 DIAGNOSIS — E78.5 HYPERLIPIDEMIA WITH TARGET LDL LESS THAN 100: ICD-10-CM

## 2021-05-02 RX ORDER — WARFARIN SODIUM 2 MG/1
TABLET ORAL
Qty: 135 TABLET | Refills: 3 | Status: SHIPPED | OUTPATIENT
Start: 2021-05-02 | End: 2022-05-16

## 2021-05-02 RX ORDER — SIMVASTATIN 10 MG
TABLET ORAL
Qty: 90 TABLET | Refills: 3 | Status: SHIPPED | OUTPATIENT
Start: 2021-05-02 | End: 2022-05-23

## 2021-05-18 DIAGNOSIS — Z79.899 ENCOUNTER FOR LONG-TERM (CURRENT) USE OF MEDICATIONS: ICD-10-CM

## 2021-05-18 DIAGNOSIS — N40.0 BENIGN PROSTATIC HYPERPLASIA WITHOUT LOWER URINARY TRACT SYMPTOMS: ICD-10-CM

## 2021-05-18 RX ORDER — IPRATROPIUM BROMIDE 42 UG/1
SPRAY, METERED NASAL
Qty: 135 ML | Refills: 3 | Status: SHIPPED | OUTPATIENT
Start: 2021-05-18 | End: 2022-05-15

## 2021-05-18 RX ORDER — FINASTERIDE 5 MG/1
TABLET, FILM COATED ORAL
Qty: 90 TABLET | Refills: 3 | Status: SHIPPED | OUTPATIENT
Start: 2021-05-18 | End: 2022-02-21

## 2021-05-18 NOTE — TELEPHONE ENCOUNTER
ipratropium (ATROVENT) 0.06 % nasal spray    finasteride 5 mg     LOV - 7/10/20    Last labs 8/21/20

## 2021-05-21 DIAGNOSIS — I42.9 CARDIOMYOPATHY, UNSPECIFIED TYPE (H): Primary | ICD-10-CM

## 2021-05-24 ENCOUNTER — HOSPITAL ENCOUNTER (OUTPATIENT)
Dept: CARDIOLOGY | Facility: CLINIC | Age: 86
Discharge: HOME | End: 2021-05-24
Attending: FAMILY MEDICINE | Admitting: FAMILY MEDICINE
Payer: MEDICARE

## 2021-05-24 DIAGNOSIS — I42.9 CARDIOMYOPATHY, UNSPECIFIED TYPE (H): ICD-10-CM

## 2021-05-24 PROCEDURE — 93306 TTE W/DOPPLER COMPLETE: CPT

## 2021-05-24 PROCEDURE — 93306 TTE W/DOPPLER COMPLETE: CPT | Mod: 26 | Performed by: INTERNAL MEDICINE

## 2021-05-26 ENCOUNTER — OFFICE VISIT (OUTPATIENT)
Dept: CARDIOLOGY | Facility: CLINIC | Age: 86
End: 2021-05-26
Payer: MEDICARE

## 2021-05-26 VITALS
SYSTOLIC BLOOD PRESSURE: 122 MMHG | WEIGHT: 288.9 LBS | BODY MASS INDEX: 46.43 KG/M2 | HEART RATE: 84 BPM | DIASTOLIC BLOOD PRESSURE: 68 MMHG | HEIGHT: 66 IN

## 2021-05-26 DIAGNOSIS — Z79.01 LONG TERM CURRENT USE OF ANTICOAGULANT THERAPY: ICD-10-CM

## 2021-05-26 DIAGNOSIS — I50.32 CHRONIC DIASTOLIC CONGESTIVE HEART FAILURE (H): ICD-10-CM

## 2021-05-26 DIAGNOSIS — G25.81 RESTLESS LEG SYNDROME: ICD-10-CM

## 2021-05-26 DIAGNOSIS — E66.01 MORBID OBESITY DUE TO EXCESS CALORIES (H): ICD-10-CM

## 2021-05-26 DIAGNOSIS — I48.20 CHRONIC ATRIAL FIBRILLATION (H): Primary | ICD-10-CM

## 2021-05-26 DIAGNOSIS — E78.5 HYPERLIPIDEMIA WITH TARGET LDL LESS THAN 100: ICD-10-CM

## 2021-05-26 DIAGNOSIS — I10 ESSENTIAL HYPERTENSION: ICD-10-CM

## 2021-05-26 PROCEDURE — 99214 OFFICE O/P EST MOD 30 MIN: CPT | Performed by: INTERNAL MEDICINE

## 2021-05-26 ASSESSMENT — MIFFLIN-ST. JEOR: SCORE: 1933.19

## 2021-05-26 NOTE — LETTER
5/26/2021    Malvin Rosen MD  [address]  [facility] [address]    RE: Vasiliy Corbin       Dear Colleague,    I had the pleasure of seeing Vasiliy Corbin in the [facility].    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  Then he reverted out of rhythm on amiodarone therapy, and we have been attempting rate control.     He returns for a follow-up and is doing reasonably well. He is not aware of his arrhythmia. Denies palpitations, dizziness or syncope. I discussed his recent echocardiogram results which revealed normal ejection fraction. Mild mitral and aortic regurgitation was noted. He does have sleep apnea and wears CPAP mask. His rhythm was atrial fibrillation with controlled rate on the echocardiogram. I reviewed the echo images myself    His rate control is done by metoprolol.     He denies any chest pain or shortness of breath orthopnea or PND. Unfortunately, has not been able to lose weight despite trying to eat less calories.        PHYSICAL EXAMINATION:    below     IMPRESSION AND PLAN:   1.  Persistent atrial fibrillation.   Continue rate control approach.  He is doing well. Ejection fraction normal. Recommended a 24-hour Holter in a year to reassess his rate control. In the past, we had to stop digoxin because of pauses up to 3.1-second.     Continue warfarin for stroke prophylaxis.    2.   Chronic diastolic congestive heart failure, euvolemic on present diuretic therapy has occasional dependent leg swelling at the end of the day likely from venous insufficiency. Wears leg stockings.  On Lasix 60 mg per day.      3.   Sleep apnea, treated.   Continue C PAP.                         4.  Non flow-limiting coronary artery disease on angiogram in 2010.      5.  Hypertension, treated to goal.      6. .  Dyslipidemia, treated to goal.      Return to clinic in year.      Sincerely,     Diomedes Menendez     Today's visit entailed:  Review of the result(s) of each unique test - lipid  profile, bmp  Independent interpretation of a test performed by another physician/other qualified health care professional (not separately reported) - Echo  Ordering of each unique test  The level of medical decision making during this visit was of moderate complexity.    No orders of the defined types were placed in this encounter.    No orders of the defined types were placed in this encounter.    There are no discontinued medications.    Encounter Diagnoses   Name Primary?     Coronary artery disease involving native coronary artery of native heart without angina pectoris Yes     Chronic atrial fibrillation (H)      Essential hypertension      Hyperlipidemia with target LDL less than 100      Long term current use of anticoagulant therapy      Morbid obesity due to excess calories (H)      Restless leg syndrome        CURRENT MEDICATIONS:  Current Outpatient Medications   Medication Sig Dispense Refill     allopurinol (ZYLOPRIM) 300 MG tablet TAKE 1 TABLET(300 MG) BY MOUTH DAILY 90 tablet 0     budesonide-formoterol (SYMBICORT) 80-4.5 MCG/ACT Inhaler Inhale 2 puffs into the lungs 2 times daily 1 Inhaler 11     finasteride (PROSCAR) 5 MG tablet TAKE 1 TABLET(5 MG) BY MOUTH DAILY 90 tablet 3     furosemide (LASIX) 40 MG tablet Take 1.5 tablets (60 mg) by mouth daily 135 tablet 3     ipratropium (ATROVENT) 0.06 % nasal spray USE 2 SPRAYS IN EACH NOSTRILS FOUR TIMES DAILY AS NEEDED FOR RHINITIS 135 mL 3     losartan (COZAAR) 50 MG tablet Take 1 tablet (50 mg) by mouth daily 90 tablet 3     metoprolol tartrate (LOPRESSOR) 50 MG tablet TAKE 2 TABLETS(100 MG) BY  MOUTH TWICE DAILY 360 tablet 3     Multiple Vitamin (MULTI-VITAMIN) per tablet Take 1 tablet by mouth daily. 100 tablet 12     omeprazole (PRILOSEC) 40 MG DR capsule Take 1 capsule by mouth twice daily 180 capsule 1     simvastatin (ZOCOR) 10 MG tablet TAKE 1 TABLET(10 MG) BY MOUTH AT BEDTIME 90 tablet 3     vitamin D3 (CHOLECALCIFEROL) 50 mcg (2000 units) tablet Take 1 tablet by mouth daily       warfarin ANTICOAGULANT (COUMADIN) 2 MG tablet TAKE 1 AND 1/2 TABLETS BY MOUTH DAILY 135 tablet 3       ALLERGIES     Allergies   Allergen Reactions     Cardizem [Diltiazem]      Swelling and poor rate control     Lisinopril Cough       PAST MEDICAL HISTORY:  Past Medical History:   Diagnosis Date     Atrial fibrillation (H)     became chronic afib in 2016,paroxysmal,was on amiodarone but went into persistent afib in april 2106 and amiodrone was d/cd. now on BBLK and considering cardioversion.(5/2106).In Sept 2016 plan is rate control which has been a challenge and Holter in 6 months     CAD (coronary artery disease)     mild stenosis per cath     Cardiomyopathy (H)      Chronic cough 2017    Eval with Dr Sarai Oro - [place] [facility] - rec speech therapy, ct for eval of crackles No obstruction or copd     Colon polyp 2010     Gout      Hiatal hernia      Hip pain      History of BPH     laser TURP in 2016 Dr Lima     HTN (hypertension)      Hyperlipidemia      Neuropathy     numbness & tingling R. 4th & 5th finger left hand     Obesity (BMI 35.0-39.9 without comorbidity)      SUJEY on CPAP      Shoulder injury      Venous insufficiency of right leg     no trteatment recommended by Judy - option to treat is there Dr Zapata     Wrist swelling        PAST SURGICAL HISTORY:  Past Surgical History:   Procedure Laterality Date     CLAVICLE SURGERY Left     as a kid     CT release       CYSTOSCOPY N/A 4/7/2016    Procedure: CYSTOSCOPY;  Surgeon: Lokesh Lima MD;  Location: [facility]     CYSTOSCOPY, FULGURATE BLEEDERS, EVACUATE  CLOT(S), COMBINED N/A 2018    Procedure: COMBINED CYSTOSCOPY, FULGURATE BLEEDERS, EVACUATE CLOT(S);  CYSTOSCOPY, FULGERATION;  Surgeon: Lokesh Lima MD;  Location: [facility] OR     CYSTOSCOPY, TRANSURETHRAL RESECTION (TUR) PROSTATE, COMBINED N/A 2020    Procedure: CYSTOSCOPY; TRANSURETHRAL RESECTION OF THE PROSTATE;  Surgeon: Lokesh Lima MD;  Location: [facility] OR     HC LEFT HEART CATHETERIZATION  3/2010    Mild CAD     LASER KTP TRANSURETHRAL RESECTION (TUR) PROSTATE N/A 2016    Procedure: LASER KTP TRANSURETHRAL RESECTION (TUR) PROSTATE;  Surgeon: Lokesh Lima MD;  Location: [facility]     TRANSPOSITION ULNAR NERVE (ELBOW) Left 2019    Procedure: LEFT ELBOW ULNER NERVE SUBMUSCULAR TRANSPOSITION;  Surgeon: Nicole Bai MD;  Location: [facility] OR     ulnar reroute      right        FAMILY HISTORY:  Family History   Problem Relation Age of Onset     Heart Disease Mother 92        Heart failure     Heart Disease Father 92     C.A.D. Brother 70        MI     Myocardial Infarction Brother      Family History Negative Sister      Family History Negative Brother      Family History Negative Brother      Family History Negative Brother      Family History Negative Sister      Myocardial Infarction Son      Heart Disease Son      Heart Disease Son        SOCIAL HISTORY:  Social History     Socioeconomic History     Marital status:      Spouse name: None     Number of children: None     Years of education: None     Highest education level: None   Occupational History     None   Social Needs     Financial resource strain: None     Food insecurity     Worry: None     Inability: None     Transportation needs     Medical: None     Non-medical: None   Tobacco Use     Smoking status: Former Smoker     Packs/day: 1.00     Years: 5.00     Pack years: 5.00     Types: Cigarettes     Quit date: 4/3/1977     Years since quittin.1     Smokeless tobacco: Never Used   Substance and Sexual Activity      "Alcohol use: No     Alcohol/week: 0.0 standard drinks     Drug use: No     Sexual activity: Yes   Lifestyle     Physical activity     Days per week: None     Minutes per session: None     Stress: None   Relationships     Social connections     Talks on phone: None     Gets together: None     Attends Cheondoism service: None     Active member of club or organization: None     Attends meetings of clubs or organizations: None     Relationship status: None     Intimate partner violence     Fear of current or ex partner: None     Emotionally abused: None     Physically abused: None     Forced sexual activity: None   Other Topics Concern     Parent/sibling w/ CABG, MI or angioplasty before 65F 55M? No      Service Not Asked     Blood Transfusions Not Asked     Caffeine Concern Yes     Comment: 8 cups of  decaf coffee per day     Occupational Exposure Not Asked     Hobby Hazards Not Asked     Sleep Concern Yes     Comment: sleep apnea, wears CPAP     Stress Concern No     Weight Concern Yes     Comment: Weight gain     Special Diet No     Back Care Not Asked     Exercise No     Bike Helmet Not Asked     Seat Belt Yes     Self-Exams Not Asked   Social History Narrative     None       Review of Systems:  Skin:  Negative     Eyes:  Positive for glasses  ENT:  Negative    Respiratory:  Positive for sleep apnea;CPAP  Cardiovascular:  Negative;palpitations;chest pain;lightheadedness;dizziness;syncope or near-syncope;cyanosis;fatigue Positive for  Gastroenterology: Negative    Genitourinary:  Negative    Musculoskeletal:  Negative    Neurologic:  Negative    Psychiatric:  Positive for sleep disturbances  Heme/Lymph/Imm:  Positive for    Endocrine:  Negative      Physical Exam:  Vitals: /68   Pulse 84   Ht 1.676 m (5' 6\")   Wt 131 kg (288 lb 14.4 oz)   BMI 46.63 kg/m      Constitutional:    obese      Skin:  warm and dry to the touch          Head:           Eyes:  sclera white        Lymph:      ENT:  no pallor " or cyanosis        Neck:  carotid pulses are full and equal bilaterally        Respiratory:  clear to auscultation         Cardiac:   irregularly irregular rhythm distant heart sounds no presence of murmur       heart rate 78 with resting; 110- hallway walk  not assessed this visit                                        GI:  not assessed this visit        Extremities and Muscular Skeletal:      bilateral LE edema;trace     No venous stasis changes, telangiectasia or ulcerations    Neurological:  no gross motor deficits;affect appropriate        Psych:         Recent Lab Results:  LIPID RESULTS:  Lab Results   Component Value Date    CHOL 99 (L) 08/21/2020    HDL 33 (L) 08/21/2020    LDL 37 08/21/2020    TRIG 147 08/21/2020    CHOLHDLRATIO 3.4 02/01/2012       LIVER ENZYME RESULTS:  Lab Results   Component Value Date    AST 18 02/26/2020    ALT 22 02/26/2020       CBC RESULTS:  Lab Results   Component Value Date    WBC 12.7 (H) 02/26/2020    RBC 4.06 (L) 02/26/2020    HGB 13.0 (L) 02/26/2020    HCT 38.5 (L) 02/26/2020    MCV 95 02/26/2020    MCH 32.0 02/26/2020    MCHC 33.8 02/26/2020    RDW 14.2 02/26/2020     02/26/2020       BMP RESULTS:  Lab Results   Component Value Date     08/21/2020    POTASSIUM 4.3 08/21/2020    CHLORIDE 103 08/21/2020    CO2 30 02/26/2020    ANIONGAP 4 02/26/2020     (H) 08/21/2020    BUN 33 (H) 08/21/2020    BUN 28 (H) 08/21/2020    CR 1.20 08/21/2020    GFRESTIMATED 62 02/26/2020    GFRESTBLACK 72 02/26/2020    EDUARD 9.5 08/21/2020        A1C RESULTS:  Lab Results   Component Value Date    A1C 5.8 (H) 04/16/2019       INR RESULTS:  Lab Results   Component Value Date    INR 2.5 04/30/2021    INR 2.3 04/02/2021     Thank you for allowing me to participate in the care of your patient.      Sincerely,     Diomedes Menendez MD     [facility]    cc:   No referring provider defined for this encounter.

## 2021-05-26 NOTE — PROGRESS NOTES
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  Then he reverted out of rhythm on amiodarone therapy, and we have been attempting rate control.     He returns for a follow-up and is doing reasonably well. He is not aware of his arrhythmia. Denies palpitations, dizziness or syncope. I discussed his recent echocardiogram results which revealed normal ejection fraction. Mild mitral and aortic regurgitation was noted. He does have sleep apnea and wears CPAP mask. His rhythm was atrial fibrillation with controlled rate on the echocardiogram. I reviewed the echo images myself    His rate control is done by metoprolol.     He denies any chest pain or shortness of breath orthopnea or PND. Unfortunately, has not been able to lose weight despite trying to eat less calories.        PHYSICAL EXAMINATION:    below     IMPRESSION AND PLAN:   1.  Persistent atrial fibrillation.   Continue rate control approach.  He is doing well. Ejection fraction normal. Recommended a 24-hour Holter in a year to reassess his rate control. In the past, we had to stop digoxin because of pauses up to 3.1-second.     Continue warfarin for stroke prophylaxis.    2.   Chronic diastolic congestive heart failure, euvolemic on present diuretic therapy has occasional dependent leg swelling at the end of the day likely from venous insufficiency. Wears leg stockings.  On Lasix 60 mg per day.      3.  Sleep apnea, treated.   Continue C PAP.                         4.  Non flow-limiting coronary artery disease on angiogram in 2010.      5.  Hypertension, treated to goal.      6. .  Dyslipidemia, treated to goal.      Return to clinic  in year.      Sincerely,     Diomedes Menendez     Today's visit entailed:  Review of the result(s) of each unique test - lipid  profile, bmp  Independent interpretation of a test performed by another physician/other qualified health care professional (not separately reported) - Echo  Ordering of each unique test  The level of medical decision making during this visit was of moderate complexity.    No orders of the defined types were placed in this encounter.    No orders of the defined types were placed in this encounter.    There are no discontinued medications.    Encounter Diagnoses   Name Primary?     Coronary artery disease involving native coronary artery of native heart without angina pectoris Yes     Chronic atrial fibrillation (H)      Essential hypertension      Hyperlipidemia with target LDL less than 100      Long term current use of anticoagulant therapy      Morbid obesity due to excess calories (H)      Restless leg syndrome        CURRENT MEDICATIONS:  Current Outpatient Medications   Medication Sig Dispense Refill     allopurinol (ZYLOPRIM) 300 MG tablet TAKE 1 TABLET(300 MG) BY MOUTH DAILY 90 tablet 0     budesonide-formoterol (SYMBICORT) 80-4.5 MCG/ACT Inhaler Inhale 2 puffs into the lungs 2 times daily 1 Inhaler 11     finasteride (PROSCAR) 5 MG tablet TAKE 1 TABLET(5 MG) BY MOUTH DAILY 90 tablet 3     furosemide (LASIX) 40 MG tablet Take 1.5 tablets (60 mg) by mouth daily 135 tablet 3     ipratropium (ATROVENT) 0.06 % nasal spray USE 2 SPRAYS IN EACH NOSTRILS FOUR TIMES DAILY AS NEEDED FOR RHINITIS 135 mL 3     losartan (COZAAR) 50 MG tablet Take 1 tablet (50 mg) by mouth daily 90 tablet 3     metoprolol tartrate (LOPRESSOR) 50 MG tablet TAKE 2 TABLETS(100 MG) BY MOUTH TWICE DAILY 360 tablet 3     Multiple Vitamin (MULTI-VITAMIN) per tablet Take 1 tablet by mouth daily. 100 tablet 12     omeprazole (PRILOSEC) 40 MG DR capsule Take 1 capsule by mouth twice daily 180 capsule 1     simvastatin (ZOCOR)  10 MG tablet TAKE 1 TABLET(10 MG) BY MOUTH AT BEDTIME 90 tablet 3     vitamin D3 (CHOLECALCIFEROL) 50 mcg (2000 units) tablet Take 1 tablet by mouth daily       warfarin ANTICOAGULANT (COUMADIN) 2 MG tablet TAKE 1 AND 1/2 TABLETS BY MOUTH DAILY 135 tablet 3       ALLERGIES     Allergies   Allergen Reactions     Cardizem [Diltiazem]      Swelling and poor rate control     Lisinopril Cough       PAST MEDICAL HISTORY:  Past Medical History:   Diagnosis Date     Atrial fibrillation (H)     became chronic afib in 2016,paroxysmal,was on amiodarone but went into persistent afib in april 2106 and amiodrone was d/cd. now on BBLK and considering cardioversion.(5/2106).In Sept 2016 plan is rate control which has been a challenge and Holter in 6 months     CAD (coronary artery disease)     mild stenosis per cath     Cardiomyopathy (H)      Chronic cough 2017    Eval with Dr Sarai Oro - [place] [facility] - rec speech therapy, ct for eval of crackles No obstruction or copd     Colon polyp 2010     Gout      Hiatal hernia      Hip pain      History of BPH     laser TURP in 2016 Dr Lima     HTN (hypertension)      Hyperlipidemia      Neuropathy     numbness & tingling R. 4th & 5th finger left hand     Obesity (BMI 35.0-39.9 without comorbidity)      SUJEY on CPAP      Shoulder injury      Venous insufficiency of right leg     no trteatment recommended by [place] - option to treat is there Dr Zapata     Wrist swelling        PAST SURGICAL HISTORY:  Past Surgical History:   Procedure Laterality Date     CLAVICLE SURGERY Left     as a kid     CT release       CYSTOSCOPY N/A 4/7/2016    Procedure: CYSTOSCOPY;  Surgeon: Lokesh Lima MD;  Location:  OR     CYSTOSCOPY, FULGURATE BLEEDERS, EVACUATE CLOT(S), COMBINED N/A 1/16/2018    Procedure: COMBINED CYSTOSCOPY, FULGURATE BLEEDERS, EVACUATE CLOT(S);  CYSTOSCOPY, FULGERATION;  Surgeon: Lokesh Lima MD;  Location:  OR     CYSTOSCOPY, TRANSURETHRAL RESECTION (TUR) PROSTATE, COMBINED  N/A 2020    Procedure: CYSTOSCOPY; TRANSURETHRAL RESECTION OF THE PROSTATE;  Surgeon: Lokesh Lima MD;  Location: [facility]     HC LEFT HEART CATHETERIZATION  3/2010    Mild CAD     LASER KTP TRANSURETHRAL RESECTION (TUR) PROSTATE N/A 2016    Procedure: LASER KTP TRANSURETHRAL RESECTION (TUR) PROSTATE;  Surgeon: Lokesh Lima MD;  Location: [facility]     TRANSPOSITION ULNAR NERVE (ELBOW) Left 2019    Procedure: LEFT ELBOW ULNER NERVE SUBMUSCULAR TRANSPOSITION;  Surgeon: Nicole Bai MD;  Location: [facility] OR     ulnar reroute      right        FAMILY HISTORY:  Family History   Problem Relation Age of Onset     Heart Disease Mother 92        Heart failure     Heart Disease Father 92     C.A.D. Brother 70        MI     Myocardial Infarction Brother      Family History Negative Sister      Family History Negative Brother      Family History Negative Brother      Family History Negative Brother      Family History Negative Sister      Myocardial Infarction Son      Heart Disease Son      Heart Disease Son        SOCIAL HISTORY:  Social History     Socioeconomic History     Marital status:      Spouse name: None     Number of children: None     Years of education: None     Highest education level: None   Occupational History     None   Social Needs     Financial resource strain: None     Food insecurity     Worry: None     Inability: None     Transportation needs     Medical: None     Non-medical: None   Tobacco Use     Smoking status: Former Smoker     Packs/day: 1.00     Years: 5.00     Pack years: 5.00     Types: Cigarettes     Quit date: 4/3/1977     Years since quittin.1     Smokeless tobacco: Never Used   Substance and Sexual Activity     Alcohol use: No     Alcohol/week: 0.0 standard drinks     Drug use: No     Sexual activity: Yes   Lifestyle     Physical activity     Days per week: None     Minutes per session: None     Stress: None   Relationships     Social connections      "Talks on phone: None     Gets together: None     Attends Yarsani service: None     Active member of club or organization: None     Attends meetings of clubs or organizations: None     Relationship status: None     Intimate partner violence     Fear of current or ex partner: None     Emotionally abused: None     Physically abused: None     Forced sexual activity: None   Other Topics Concern     Parent/sibling w/ CABG, MI or angioplasty before 65F 55M? No      Service Not Asked     Blood Transfusions Not Asked     Caffeine Concern Yes     Comment: 8 cups of  decaf coffee per day     Occupational Exposure Not Asked     Hobby Hazards Not Asked     Sleep Concern Yes     Comment: sleep apnea, wears CPAP     Stress Concern No     Weight Concern Yes     Comment: Weight gain     Special Diet No     Back Care Not Asked     Exercise No     Bike Helmet Not Asked     Seat Belt Yes     Self-Exams Not Asked   Social History Narrative     None       Review of Systems:  Skin:  Negative     Eyes:  Positive for glasses  ENT:  Negative    Respiratory:  Positive for sleep apnea;CPAP  Cardiovascular:  Negative;palpitations;chest pain;lightheadedness;dizziness;syncope or near-syncope;cyanosis;fatigue Positive for  Gastroenterology: Negative    Genitourinary:  Negative    Musculoskeletal:  Negative    Neurologic:  Negative    Psychiatric:  Positive for sleep disturbances  Heme/Lymph/Imm:  Positive for    Endocrine:  Negative      Physical Exam:  Vitals: /68   Pulse 84   Ht 1.676 m (5' 6\")   Wt 131 kg (288 lb 14.4 oz)   BMI 46.63 kg/m      Constitutional:    obese      Skin:  warm and dry to the touch          Head:           Eyes:  sclera white        Lymph:      ENT:  no pallor or cyanosis        Neck:  carotid pulses are full and equal bilaterally        Respiratory:  clear to auscultation         Cardiac:   irregularly irregular rhythm distant heart sounds no presence of murmur       heart rate 78 with resting; 110- " hallway walk  not assessed this visit                                        GI:  not assessed this visit        Extremities and Muscular Skeletal:      bilateral LE edema;trace     No venous stasis changes, telangiectasia or ulcerations    Neurological:  no gross motor deficits;affect appropriate        Psych:         Recent Lab Results:  LIPID RESULTS:  Lab Results   Component Value Date    CHOL 99 (L) 08/21/2020    HDL 33 (L) 08/21/2020    LDL 37 08/21/2020    TRIG 147 08/21/2020    CHOLHDLRATIO 3.4 02/01/2012       LIVER ENZYME RESULTS:  Lab Results   Component Value Date    AST 18 02/26/2020    ALT 22 02/26/2020       CBC RESULTS:  Lab Results   Component Value Date    WBC 12.7 (H) 02/26/2020    RBC 4.06 (L) 02/26/2020    HGB 13.0 (L) 02/26/2020    HCT 38.5 (L) 02/26/2020    MCV 95 02/26/2020    MCH 32.0 02/26/2020    MCHC 33.8 02/26/2020    RDW 14.2 02/26/2020     02/26/2020       BMP RESULTS:  Lab Results   Component Value Date     08/21/2020    POTASSIUM 4.3 08/21/2020    CHLORIDE 103 08/21/2020    CO2 30 02/26/2020    ANIONGAP 4 02/26/2020     (H) 08/21/2020    BUN 33 (H) 08/21/2020    BUN 28 (H) 08/21/2020    CR 1.20 08/21/2020    GFRESTIMATED 62 02/26/2020    GFRESTBLACK 72 02/26/2020    EDUARD 9.5 08/21/2020        A1C RESULTS:  Lab Results   Component Value Date    A1C 5.8 (H) 04/16/2019       INR RESULTS:  Lab Results   Component Value Date    INR 2.5 04/30/2021    INR 2.3 04/02/2021         CC  No referring provider defined for this encounter.

## 2021-05-28 DIAGNOSIS — Z79.01 LONG TERM CURRENT USE OF ANTICOAGULANT THERAPY: ICD-10-CM

## 2021-05-28 DIAGNOSIS — I48.20 CHRONIC ATRIAL FIBRILLATION (H): ICD-10-CM

## 2021-05-28 LAB — INR PPP: 2.7 (ref 2–3)

## 2021-05-28 PROCEDURE — 85610 PROTHROMBIN TIME: CPT | Performed by: FAMILY MEDICINE

## 2021-05-28 PROCEDURE — 36416 COLLJ CAPILLARY BLOOD SPEC: CPT | Performed by: FAMILY MEDICINE

## 2021-06-17 DIAGNOSIS — I10 ESSENTIAL HYPERTENSION: ICD-10-CM

## 2021-06-17 RX ORDER — LOSARTAN POTASSIUM 50 MG/1
TABLET ORAL
Qty: 90 TABLET | Refills: 3 | Status: SHIPPED | OUTPATIENT
Start: 2021-06-17 | End: 2022-04-14

## 2021-06-19 DIAGNOSIS — Z76.0 ENCOUNTER FOR MEDICATION REFILL: ICD-10-CM

## 2021-06-21 RX ORDER — ALLOPURINOL 300 MG/1
TABLET ORAL
Qty: 90 TABLET | Refills: 0 | Status: SHIPPED | OUTPATIENT
Start: 2021-06-21 | End: 2021-09-17

## 2021-06-21 NOTE — PROGRESS NOTES
Admission medication history interview status for the 1/16/2018  admission is complete. See EPIC admission navigator for prior to admission medications     Medication history source reliability:Poor    Medication history interview source(s):Patient    Medication history resources (including written lists, pill bottles, clinic record):written list    [facility] pharmacy[facility]    Additional medication history information not noted on PTA med list :None    Time spent in this activity: 45 minutes    Prior to Admission medications    Medication Sig Last Dose Taking? Auth Provider   Finasteride (PROSCAR PO) Take 5 mg by mouth daily 1/15/2018 at AM Yes Reported, Patient   Furosemide (LASIX PO) Take 60 mg by mouth daily (1.5 x 40mg = 60mg) 1/16/2018 at 0500 Yes Reported, Patient   ipratropium (ATROVENT) 0.06 % spray Spray 2 sprays into both nostrils 4 times daily as needed for rhinitis 1/16/2018 at 0500 Yes Reported, Patient   Metoprolol Tartrate (LOPRESSOR PO) Take 100 mg by mouth 2 times daily (2 x 50mg = 100mg) 1/16/2018 at 0500 Yes Reported, Patient   OMEPRAZOLE PO Take 40 mg by mouth 2 times daily (2 x 20mg = 40mg) 1/16/2018 at 0500 Yes Reported, Patient   WARFARIN SODIUM PO Take 3 mg by mouth daily (1.5 x 2mg = 3mg) 1/11/2018 at PM Yes Reported, Patient   simvastatin (ZOCOR) 10 MG tablet Take 1 tablet (10 mg) by mouth At Bedtime 1/14/2018 at HS Yes Heriberto Haile MD   allopurinol (ZYLOPRIM) 300 MG tablet TAKE 1 TABLET BY MOUTH DAILY 1/15/2018 at AM Yes Heriberto Haile MD   losartan (COZAAR) 50 MG tablet Take 1 tablet (50 mg) by mouth daily 1/15/2018 at AM Yes Diomedes Menendez MD   digoxin (LANOXIN) 125 MCG tablet Take 1 tablet (125 mcg) by mouth daily 1/15/2018 at AM Yes Laura Wooten APRN CNP   Multiple Vitamin (MULTI-VITAMIN) per tablet Take 1 tablet by mouth daily. 1/15/2018 at AM Yes Heriberto Haile MD   ASPIRIN NOT PRESCRIBED (INTENTIONAL) Reported on 4/19/2017  On warfarin   Heriberto Haile MD  No change

## 2021-06-28 DIAGNOSIS — I48.20 CHRONIC ATRIAL FIBRILLATION (H): ICD-10-CM

## 2021-06-28 DIAGNOSIS — Z79.01 LONG TERM CURRENT USE OF ANTICOAGULANT THERAPY: ICD-10-CM

## 2021-06-28 LAB — INR PPP: 2.9 (ref 2–3)

## 2021-06-28 PROCEDURE — 85610 PROTHROMBIN TIME: CPT | Performed by: FAMILY MEDICINE

## 2021-06-28 PROCEDURE — 36416 COLLJ CAPILLARY BLOOD SPEC: CPT | Performed by: FAMILY MEDICINE

## 2021-07-11 ENCOUNTER — HEALTH MAINTENANCE LETTER (OUTPATIENT)
Age: 86
End: 2021-07-11

## 2021-07-19 DIAGNOSIS — J98.4 RESTRICTIVE AIRWAY DISEASE: ICD-10-CM

## 2021-07-20 RX ORDER — DILTIAZEM HYDROCHLORIDE 60 MG/1
TABLET, FILM COATED ORAL
Qty: 10.2 G | Refills: 3 | Status: SHIPPED | OUTPATIENT
Start: 2021-07-20 | End: 2021-11-16

## 2021-07-26 NOTE — PROGRESS NOTES
"  SUBJECTIVE:   Vasiliy Corbin is a 86 year old male who presents for Preventive Visit.    Gout:  No recent gout flares, tolerating the allopurinol well with no side effects.  Last flare was quite a while ago.    Essential Hypertension   Remains well controlled when checked out of clinic.   he has not experienced any significant side effects from medications for hypertension.    NO active cardiac complaints or symptoms with exercise.  Current medications for treatment:  ARB (Irbesartan, Losartan, Valsartan, and Beta Blockers (metoprolol, propranolol, atenolol    Reviewed last 6 BP readings in chart:  BP Readings from Last 6 Encounters:   07/29/21 132/82   05/26/21 122/68   04/02/21 122/84   02/05/21 136/74   12/11/20 128/66   10/16/20 128/78         Patient has been advised of split billing requirements and indicates understanding: Yes  Are you in the first 12 months of your Medicare Part B coverage?  No    Physical Health:    In general, how would you rate your overall physical health? good    Outside of work, how many days during the week do you exercise? 2-3 days/week    Outside of work, approximately how many minutes a day do you exercise?15-30 minutes    If you drink alcohol do you typically have >3 drinks per day or >7 drinks per week? No    Do you usually eat at least 4 servings of fruit and vegetables a day, include whole grains & fiber and avoid regularly eating high fat or \"junk\" foods? Yes    Do you have any problems taking medications regularly?  no    Do you have any side effects from medications? none    Needs assistance for the following daily activities: no assistance needed    Which of the following safety concerns are present in your home?  lack of grab bars in the bathroom     Hearing impairment: wears hearing aids    In the past 6 months, have you been bothered by leaking of urine? yes    Mental Health:    In general, how would you rate your overall mental or emotional health? good  PHQ-2 " Score:      Do you feel safe in your environment? Yes    Have you ever done Advance Care Planning? (For example, a Health Directive, POLST, or a discussion with a medical provider or your loved ones about your wishes): yes - patient will bring in  Additional concerns to address?  No  Fasting labs    Fall risk:  Fallen 2 or more times in the past year?: No  Any fall with injury in the past year?: No    Cognitive Screenin) Repeat 3 items (Leader, Season, Table)    2) Clock draw: NORMAL  3) 3 item recall: Recalls 3 objects  Results: 3 items recalled: COGNITIVE IMPAIRMENT LESS LIKELY    Mini-CogTM Copyright S Carol. Licensed by the author for use in [facility]; reprinted with permission (sorajendra@[facility]). All rights reserved.      Do you have sleep apnea, excessive snoring or daytime drowsiness?: yes- on CPAP        Fasting labs     Reviewed and updated as needed this visit by clinical staff  Tobacco  Allergies  Meds  Problems  Med Hx  Surg Hx  Fam Hx          Reviewed and updated as needed this visit by Provider  Tobacco  Allergies  Meds  Problems  Med Hx  Surg Hx  Fam Hx         Social History     Tobacco Use     Smoking status: Former Smoker     Packs/day: 1.00     Years: 5.00     Pack years: 5.00     Types: Cigarettes     Quit date: 4/3/1977     Years since quittin.3     Smokeless tobacco: Never Used   Substance Use Topics     Alcohol use: No     Alcohol/week: 0.0 standard drinks                           Current providers sharing in care for this patient include:   Patient Care Team:  Malvin Rosen MD as PCP - General (Family Practice)  Malvin Rosen MD as Assigned PCP  Diomedes Menendez MD as Assigned Heart and Vascular Provider    The following health maintenance items are reviewed in Epic and correct as of today:  Health Maintenance   Topic Date Due     HF ACTION PLAN  Never done     BMP  2021     ALT  2021     CBC  2021     FALL RISK  "ASSESSMENT  04/10/2021     LIPID  08/21/2021     INFLUENZA VACCINE (1) 09/01/2021     DTAP/TDAP/TD IMMUNIZATION (3 - Td or Tdap) 06/11/2022     MEDICARE ANNUAL WELLNESS VISIT  07/29/2022     ADVANCE CARE PLANNING  05/01/2025     TSH W/FREE T4 REFLEX  Completed     PHQ-2  Completed     Pneumococcal Vaccine: 65+ Years  Completed     ZOSTER IMMUNIZATION  Completed     COVID-19 Vaccine  Completed     IPV IMMUNIZATION  Aged Out     MENINGITIS IMMUNIZATION  Aged Out     HEPATITIS B IMMUNIZATION  Aged Out     Lab work is in process      ROS:  Constitutional, HEENT, cardiovascular, pulmonary, GI, , musculoskeletal, neuro, skin, endocrine and psych systems are negative, except as otherwise noted.    OBJECTIVE:   /82   Pulse 76   Ht 1.689 m (5' 6.5\")   Wt 130.3 kg (287 lb 3.2 oz)   BMI 45.66 kg/m   Estimated body mass index is 45.66 kg/m  as calculated from the following:    Height as of this encounter: 1.689 m (5' 6.5\").    Weight as of this encounter: 130.3 kg (287 lb 3.2 oz).  EXAM:   GENERAL: healthy, alert and no distress  EYES: Eyes grossly normal to inspection, PERRL and conjunctivae and sclerae normal  HENT: ear canals and TM's normal, nose and mouth without ulcers or lesions  NECK: no adenopathy, no asymmetry, masses, or scars and thyroid normal to palpation  RESP: lungs clear to auscultation - no rales, rhonchi or wheezes  CV: regular rate and rhythm, normal S1 S2, no S3 or S4, no murmur, click or rub, no peripheral edema and peripheral pulses strong  ABDOMEN: soft, nontender, no hepatosplenomegaly, no masses and bowel sounds normal  MS: no gross musculoskeletal defects noted, no edema  SKIN: no suspicious lesions or rashes  NEURO: Normal strength and tone, mentation intact and speech normal  PSYCH: mentation appears normal, affect normal/bright    Diagnostic Test Results:  Labs ordered in [facility]    ASSESSMENT / PLAN:   Vasiliy was seen today for physical.    Diagnoses and all orders for this " visit:    Encounter for Medicare annual wellness exam    Essential hypertension  Reviewed his current HTN management. Discussed our goal for him is a systolic pressure at or below 128 and diastolic pressure at or below 83.  We today managed his prescriptions with refills ensured to ensure availabilty of current medications.  Discussed the importance for aggressive management of HTN to prevent vascular complications later.  Recommended lower fat, lower carbohydrate, and lower sodium (<2000 mg)diet. Required intervals for follow up on HTN, lab studies reviewed.    Strongly recommened he follow his blood pressures outside the clinic to ensure that BPs are remaining within guidelines,.  Instructed to contact me if the readings are not within guidelines on a regular basis so we can adjust treatment as needed.    -     [place] ART PRESS WAVEFORM ANALYS CENTRAL ART PRESSURE  -     Comp. Metabolic Panel (14) ([facility])  -     CBC with Diff/Plt ([facility])    Morbid obesity due to excess calories (H)  Current BMI is: Body mass index is 45.66 kg/m ..  Reviewed weight patterns.   Obesity as a biological preventable and treatable disease, which is associated with significantly increased risk of many acute and chronic health conditions.   Obesity has now been recognized as a chronic disease by the American Medical Association.  Obesity has serious co-morbidities associated with obesity including increased risk for hypertension, stroke, coronary artery disease, dyslipidemia, Type II diabetes, depression, sleep apnea, cancers of the colon, breast and endometrium, obstructive sleep apnea, osteoarthritis and female infertility.  Recommended regular aerobic exercise, recommended improved diet aiming at lowering amount of processed foods, lower sugars, moderation/reduction of simple carbs and fat in the diet, establishing more regular meal times, always eating breakfast, front loading some of the calories and adding more protein to the diet.    "      Hyperlipidemia with target LDL less than 100  Hyperlipidemia  Discussed current lipid results, previous results (if available) current guidelines (NCEP) for treatment and goals for lipids.    Discussed ongoing lifestyle modification, dietary changes (low fat, low simple carb) and regular aerobic exercise.    Discussed the link between dysmetabolic syndrome and impaired glucose tolerance seen in certain patterns of lipids.     Reviewed medication use for lipid lowering, including the statins are their possible side effects of myalgias, rhabdomyolysis, and liver toxicity.  We today managed his prescriptions with refills ensured to ensure availabilty of current medications.  Discussed the importance for aggressive management of dyslipidemia to prevent vascular complications later.     Instructed to contact me if he develop any intolerance to the treatment.    -     Lipid Panel ([facility])    Chronic atrial fibrillation (H)  Continue coumadin, no signs of bleeding.  Good stability no falls.  -     Digoxin Serum ([facility])    Cardiomyopathy, unspecified type (H)  Known issue that I take into account for his medical decisions, no current exacerbations or new concerns.           Patient has been advised of split billing requirements and indicates understanding: Yes    COUNSELING:  Reviewed preventive health counseling, as reflected in patient instructions       Regular exercise       Healthy diet/nutrition    Estimated body mass index is 45.66 kg/m  as calculated from the following:    Height as of this encounter: 1.689 m (5' 6.5\").    Weight as of this encounter: 130.3 kg (287 lb 3.2 oz).    Weight management plan: Discussed healthy diet and exercise guidelines    He reports that he quit smoking about 44 years ago. His smoking use included cigarettes. He has a 5.00 pack-year smoking history. He has never used smokeless tobacco.    Appropriate preventive services were discussed with this patient, including applicable " screening as appropriate for cardiovascular disease, diabetes, osteopenia/osteoporosis, and glaucoma.  As appropriate for age/gender, discussed screening for colorectal cancer, prostate cancer, breast cancer, and cervical cancer. Checklist reviewing preventive services available has been given to the patient.    Reviewed patients plan of care and provided an AVS. The Basic Care Plan (routine screening as documented in Health Maintenance) for Vasiliy meets the Care Plan requirement. This Care Plan has been established and reviewed with the Patient.    Counseling Resources:  ATP IV Guidelines  Pooled Cohorts Equation Calculator  Breast Cancer Risk Calculator  BRCA-Related Cancer Risk Assessment: FHS-7 Tool  FRAX Risk Assessment  ICSI Preventive Guidelines  Dietary Guidelines for Americans, 2010  USDA's MyPlate  ASA Prophylaxis  Lung CA Screening    Malvin Rosen MD  [facility]

## 2021-07-26 NOTE — PATIENT INSTRUCTIONS
Personalized Prevention Plan  You are due for the preventive services outlined below.  Your care team is available to assist you in scheduling these services.  If you have already completed any of these items, please share that information with your care team to update in your medical record.  Health Maintenance Due   Topic Date Due     Heart Failure Action Plan  Never done     PHQ-2  01/01/2021     Basic Metabolic Panel  02/21/2021     Liver Monitoring Lab  02/26/2021     Complete Blood Count  02/26/2021     FALL RISK ASSESSMENT  04/10/2021     Cholesterol Lab  08/21/2021

## 2021-07-27 DIAGNOSIS — I48.20 CHRONIC ATRIAL FIBRILLATION (H): ICD-10-CM

## 2021-07-27 DIAGNOSIS — Z79.01 LONG TERM CURRENT USE OF ANTICOAGULANT THERAPY: ICD-10-CM

## 2021-07-27 LAB — INR PPP: 3 (ref 2–3)

## 2021-07-27 PROCEDURE — 85610 PROTHROMBIN TIME: CPT | Performed by: FAMILY MEDICINE

## 2021-07-27 PROCEDURE — 36416 COLLJ CAPILLARY BLOOD SPEC: CPT | Performed by: FAMILY MEDICINE

## 2021-07-29 ENCOUNTER — OFFICE VISIT (OUTPATIENT)
Dept: FAMILY MEDICINE | Facility: CLINIC | Age: 86
End: 2021-07-29

## 2021-07-29 VITALS
BODY MASS INDEX: 45.08 KG/M2 | HEIGHT: 67 IN | HEART RATE: 76 BPM | DIASTOLIC BLOOD PRESSURE: 82 MMHG | SYSTOLIC BLOOD PRESSURE: 132 MMHG | WEIGHT: 287.2 LBS

## 2021-07-29 DIAGNOSIS — I42.9 CARDIOMYOPATHY, UNSPECIFIED TYPE (H): ICD-10-CM

## 2021-07-29 DIAGNOSIS — I10 ESSENTIAL HYPERTENSION: ICD-10-CM

## 2021-07-29 DIAGNOSIS — I48.20 CHRONIC ATRIAL FIBRILLATION (H): ICD-10-CM

## 2021-07-29 DIAGNOSIS — E78.5 HYPERLIPIDEMIA WITH TARGET LDL LESS THAN 100: ICD-10-CM

## 2021-07-29 DIAGNOSIS — Z00.00 ENCOUNTER FOR MEDICARE ANNUAL WELLNESS EXAM: Primary | ICD-10-CM

## 2021-07-29 DIAGNOSIS — E66.01 MORBID OBESITY DUE TO EXCESS CALORIES (H): ICD-10-CM

## 2021-07-29 LAB
% GRANULOCYTES: 71.7 % (ref 42.2–75.2)
HCT VFR BLD AUTO: 40.1 % (ref 39–51)
HEMOGLOBIN: 14.1 G/DL (ref 13.4–17.5)
LYMPHOCYTES NFR BLD AUTO: 21.3 % (ref 20.5–51.1)
MCH RBC QN AUTO: 32.5 PG (ref 27–31)
MCHC RBC AUTO-ENTMCNC: 35.1 G/DL (ref 33–37)
MCV RBC AUTO: 92.6 FL (ref 80–100)
MONOCYTES NFR BLD AUTO: 7 % (ref 1.7–9.3)
PLATELET # BLD AUTO: 178 K/UL (ref 140–450)
RBC # BLD AUTO: 4.33 X10/CMM (ref 4.2–5.9)
WBC # BLD AUTO: 5.9 X10/CMM (ref 3.8–11)

## 2021-07-29 PROCEDURE — 93050 ART PRESSURE WAVEFORM ANALYS: CPT | Performed by: FAMILY MEDICINE

## 2021-07-29 PROCEDURE — 85025 COMPLETE CBC W/AUTO DIFF WBC: CPT | Performed by: FAMILY MEDICINE

## 2021-07-29 PROCEDURE — 99214 OFFICE O/P EST MOD 30 MIN: CPT | Mod: 25 | Performed by: FAMILY MEDICINE

## 2021-07-29 PROCEDURE — G0439 PPPS, SUBSEQ VISIT: HCPCS | Performed by: FAMILY MEDICINE

## 2021-07-29 PROCEDURE — 36415 COLL VENOUS BLD VENIPUNCTURE: CPT | Performed by: FAMILY MEDICINE

## 2021-07-29 ASSESSMENT — MIFFLIN-ST. JEOR: SCORE: 1933.42

## 2021-07-30 LAB
ALBUMIN SERPL-MCNC: 4.3 G/DL (ref 3.6–4.6)
ALBUMIN/GLOB SERPL: 1.4 {RATIO} (ref 1.2–2.2)
ALP SERPL-CCNC: 136 IU/L (ref 48–121)
ALT SERPL-CCNC: 21 IU/L (ref 0–44)
AST SERPL-CCNC: 25 IU/L (ref 0–40)
BILIRUB SERPL-MCNC: 0.9 MG/DL (ref 0–1.2)
BUN SERPL-MCNC: 36 MG/DL (ref 8–27)
BUN/CREATININE RATIO: 29 (ref 10–24)
CALCIUM SERPL-MCNC: 9.9 MG/DL (ref 8.6–10.2)
CHLORIDE SERPLBLD-SCNC: 103 MMOL/L (ref 96–106)
CHOLEST SERPL-MCNC: 116 MG/DL (ref 100–199)
CREAT SERPL-MCNC: 1.23 MG/DL (ref 0.76–1.27)
DIGOXIN SERPL IA-MCNC: <0.4 NG/ML (ref 0.5–0.9)
EGFR IF AFRICN AM: 61 ML/MIN/1.73
EGFR IF NONAFRICN AM: 53 ML/MIN/1.73
GLOBULIN, TOTAL: 3 G/DL (ref 1.5–4.5)
GLUCOSE SERPL-MCNC: 96 MG/DL (ref 65–99)
HDLC SERPL-MCNC: 42 MG/DL
LDL/HDL RATIO: 1.2 RATIO (ref 0–3.6)
LDLC SERPL CALC-MCNC: 52 MG/DL (ref 0–99)
POTASSIUM SERPL-SCNC: 4.7 MMOL/L (ref 3.5–5.2)
PROT SERPL-MCNC: 7.3 G/DL (ref 6–8.5)
SODIUM SERPL-SCNC: 138 MMOL/L (ref 134–144)
TOTAL CO2: 22 MMOL/L (ref 20–29)
TRIGL SERPL-MCNC: 126 MG/DL (ref 0–149)
VLDLC SERPL CALC-MCNC: 22 MG/DL (ref 5–40)

## 2021-08-06 DIAGNOSIS — Z76.0 ENCOUNTER FOR MEDICATION REFILL: ICD-10-CM

## 2021-08-06 RX ORDER — FUROSEMIDE 40 MG
TABLET ORAL
Qty: 135 TABLET | Refills: 3 | Status: SHIPPED | OUTPATIENT
Start: 2021-08-06 | End: 2022-08-29

## 2021-08-06 NOTE — TELEPHONE ENCOUNTER
Refill for furosemide   LOV 7/29/21    Last Comprehensive Metabolic Panel:  Sodium   Date Value Ref Range Status   07/29/2021 138 134 - 144 mmol/L Final     Potassium   Date Value Ref Range Status   07/29/2021 4.7 3.5 - 5.2 mmol/L Final     Chloride   Date Value Ref Range Status   07/29/2021 103 96 - 106 mmol/L Final     Carbon Dioxide   Date Value Ref Range Status   02/26/2020 30 20 - 32 mmol/L Final     Anion Gap   Date Value Ref Range Status   02/26/2020 4 3 - 14 mmol/L Final     Glucose   Date Value Ref Range Status   07/29/2021 96 65 - 99 mg/dL Final     Urea Nitrogen   Date Value Ref Range Status   07/29/2021 36 (H) 8 - 27 mg/dL Final     BUN/Creatinine Ratio   Date Value Ref Range Status   07/29/2021 29 (H) 10 - 24 Final     Creatinine   Date Value Ref Range Status   07/29/2021 1.23 0.76 - 1.27 mg/dL Final     GFR Estimate   Date Value Ref Range Status   02/26/2020 62 >60 mL/min/[1.73_m2] Final     Comment:     Non  GFR Calc  Starting 12/18/2018, serum creatinine based estimated GFR (eGFR) will be   calculated using the Chronic Kidney Disease Epidemiology Collaboration   (CKD-EPI) equation.       Calcium   Date Value Ref Range Status   07/29/2021 9.9 8.6 - 10.2 mg/dL Final

## 2021-08-27 VITALS — HEART RATE: 61 BPM | DIASTOLIC BLOOD PRESSURE: 69 MMHG | SYSTOLIC BLOOD PRESSURE: 149 MMHG

## 2021-08-27 DIAGNOSIS — I48.20 CHRONIC ATRIAL FIBRILLATION (H): ICD-10-CM

## 2021-08-27 DIAGNOSIS — Z79.01 LONG TERM CURRENT USE OF ANTICOAGULANT THERAPY: ICD-10-CM

## 2021-08-27 LAB — INR PPP: 2.7 (ref 2–3)

## 2021-08-27 PROCEDURE — 36416 COLLJ CAPILLARY BLOOD SPEC: CPT | Performed by: FAMILY MEDICINE

## 2021-08-27 PROCEDURE — 85610 PROTHROMBIN TIME: CPT | Performed by: FAMILY MEDICINE

## 2021-09-05 ENCOUNTER — HEALTH MAINTENANCE LETTER (OUTPATIENT)
Age: 86
End: 2021-09-05

## 2021-09-17 DIAGNOSIS — Z76.0 ENCOUNTER FOR MEDICATION REFILL: ICD-10-CM

## 2021-09-17 RX ORDER — ALLOPURINOL 300 MG/1
TABLET ORAL
Qty: 90 TABLET | Refills: 0 | Status: SHIPPED | OUTPATIENT
Start: 2021-09-17 | End: 2021-12-16

## 2021-09-24 DIAGNOSIS — Z79.01 LONG TERM CURRENT USE OF ANTICOAGULANT THERAPY: ICD-10-CM

## 2021-09-24 DIAGNOSIS — I48.20 CHRONIC ATRIAL FIBRILLATION (H): ICD-10-CM

## 2021-09-24 LAB — INR PPP: 2.8 (ref 2–3)

## 2021-09-24 PROCEDURE — 85610 PROTHROMBIN TIME: CPT | Performed by: FAMILY MEDICINE

## 2021-09-24 PROCEDURE — 36416 COLLJ CAPILLARY BLOOD SPEC: CPT | Performed by: FAMILY MEDICINE

## 2021-10-22 DIAGNOSIS — I48.20 CHRONIC ATRIAL FIBRILLATION (H): ICD-10-CM

## 2021-10-22 DIAGNOSIS — Z79.01 LONG TERM CURRENT USE OF ANTICOAGULANT THERAPY: ICD-10-CM

## 2021-10-22 LAB — INR PPP: 3 (ref 2–3)

## 2021-10-22 PROCEDURE — 36416 COLLJ CAPILLARY BLOOD SPEC: CPT | Performed by: FAMILY MEDICINE

## 2021-10-22 PROCEDURE — 85610 PROTHROMBIN TIME: CPT | Performed by: FAMILY MEDICINE

## 2021-11-16 DIAGNOSIS — J98.4 RESTRICTIVE AIRWAY DISEASE: ICD-10-CM

## 2021-11-16 RX ORDER — DILTIAZEM HYDROCHLORIDE 60 MG/1
TABLET, FILM COATED ORAL
Qty: 10.2 G | Refills: 3 | Status: SHIPPED | OUTPATIENT
Start: 2021-11-16 | End: 2022-09-13

## 2021-11-19 DIAGNOSIS — Z79.01 LONG TERM CURRENT USE OF ANTICOAGULANT THERAPY: ICD-10-CM

## 2021-11-19 DIAGNOSIS — I48.20 CHRONIC ATRIAL FIBRILLATION (H): ICD-10-CM

## 2021-11-19 LAB — INR PPP: 2.5 (ref 2–3)

## 2021-11-19 PROCEDURE — 36416 COLLJ CAPILLARY BLOOD SPEC: CPT | Performed by: FAMILY MEDICINE

## 2021-11-19 PROCEDURE — 85610 PROTHROMBIN TIME: CPT | Performed by: FAMILY MEDICINE

## 2021-12-10 ENCOUNTER — TRANSFERRED RECORDS (OUTPATIENT)
Dept: FAMILY MEDICINE | Facility: CLINIC | Age: 86
End: 2021-12-10

## 2021-12-14 NOTE — TELEPHONE ENCOUNTER
12/1/21 Received fax for generic substitution for symbicort.  Original prescription was LAURA.  Dr. Rosen approved substitution.  Form was faxed back to [facility] pharmacy.    Luis Antonio Blanco,   [facility]  [phone]

## 2021-12-16 DIAGNOSIS — Z76.0 ENCOUNTER FOR MEDICATION REFILL: ICD-10-CM

## 2021-12-16 RX ORDER — ALLOPURINOL 300 MG/1
TABLET ORAL
Qty: 90 TABLET | Refills: 0 | Status: SHIPPED | OUTPATIENT
Start: 2021-12-16 | End: 2022-03-16

## 2021-12-20 DIAGNOSIS — Z79.01 LONG TERM CURRENT USE OF ANTICOAGULANT THERAPY: ICD-10-CM

## 2021-12-20 DIAGNOSIS — I48.20 CHRONIC ATRIAL FIBRILLATION (H): ICD-10-CM

## 2021-12-20 LAB — INR PPP: 2.2 (ref 2–3)

## 2021-12-20 PROCEDURE — 85610 PROTHROMBIN TIME: CPT | Performed by: FAMILY MEDICINE

## 2021-12-20 PROCEDURE — 36416 COLLJ CAPILLARY BLOOD SPEC: CPT | Performed by: FAMILY MEDICINE

## 2022-01-21 VITALS — WEIGHT: 286.6 LBS | DIASTOLIC BLOOD PRESSURE: 70 MMHG | SYSTOLIC BLOOD PRESSURE: 134 MMHG | BODY MASS INDEX: 45.57 KG/M2

## 2022-01-21 DIAGNOSIS — Z79.01 LONG TERM CURRENT USE OF ANTICOAGULANT THERAPY: ICD-10-CM

## 2022-01-21 DIAGNOSIS — I48.20 CHRONIC ATRIAL FIBRILLATION (H): ICD-10-CM

## 2022-01-21 LAB — INR PPP: 2.2 (ref 2–3)

## 2022-01-21 PROCEDURE — 85610 PROTHROMBIN TIME: CPT | Performed by: FAMILY MEDICINE

## 2022-01-21 PROCEDURE — 36416 COLLJ CAPILLARY BLOOD SPEC: CPT | Performed by: FAMILY MEDICINE

## 2022-02-18 ENCOUNTER — OFFICE VISIT (OUTPATIENT)
Dept: FAMILY MEDICINE | Facility: CLINIC | Age: 87
End: 2022-02-18

## 2022-02-18 VITALS
BODY MASS INDEX: 45.74 KG/M2 | RESPIRATION RATE: 16 BRPM | DIASTOLIC BLOOD PRESSURE: 76 MMHG | OXYGEN SATURATION: 96 % | HEIGHT: 67 IN | HEART RATE: 69 BPM | SYSTOLIC BLOOD PRESSURE: 132 MMHG | WEIGHT: 291.4 LBS

## 2022-02-18 DIAGNOSIS — E66.01 MORBID OBESITY DUE TO EXCESS CALORIES (H): ICD-10-CM

## 2022-02-18 DIAGNOSIS — I50.9 CHRONIC CONGESTIVE HEART FAILURE, UNSPECIFIED HEART FAILURE TYPE (H): ICD-10-CM

## 2022-02-18 DIAGNOSIS — I10 ESSENTIAL HYPERTENSION: Primary | ICD-10-CM

## 2022-02-18 DIAGNOSIS — Z79.01 LONG TERM CURRENT USE OF ANTICOAGULANT THERAPY: ICD-10-CM

## 2022-02-18 DIAGNOSIS — I48.20 CHRONIC ATRIAL FIBRILLATION (H): ICD-10-CM

## 2022-02-18 DIAGNOSIS — B49 FUNGAL INFECTION: ICD-10-CM

## 2022-02-18 LAB — INR PPP: 2.5 (ref 2–3)

## 2022-02-18 PROCEDURE — 36416 COLLJ CAPILLARY BLOOD SPEC: CPT | Performed by: FAMILY MEDICINE

## 2022-02-18 PROCEDURE — 99214 OFFICE O/P EST MOD 30 MIN: CPT | Performed by: FAMILY MEDICINE

## 2022-02-18 PROCEDURE — 85610 PROTHROMBIN TIME: CPT | Performed by: FAMILY MEDICINE

## 2022-02-18 RX ORDER — FLUCONAZOLE 100 MG/1
100 TABLET ORAL DAILY
Qty: 15 TABLET | Refills: 1 | Status: SHIPPED | OUTPATIENT
Start: 2022-02-18 | End: 2022-02-18

## 2022-02-18 RX ORDER — NYSTATIN 100000/ML
SUSPENSION, ORAL (FINAL DOSE FORM) ORAL
Qty: 400 ML | Refills: 0 | Status: SHIPPED | OUTPATIENT
Start: 2022-02-18 | End: 2023-05-27

## 2022-02-18 NOTE — PROGRESS NOTES
Problem(s) Oriented visit      Subjective:  CC: Hypertension, INR Followup, and Medication Reconciliation (discuss symbicort - hoarness )    If takes symbicort twice a day gets very hoarse.  Breathing is quite good.  Stopped the symbicort and improved.  Came back when restarted.      1. Essential hypertension  Essential Hypertension   Remains well controlled when checked out of clinic.   he has not experienced any significant side effects from medications for hypertension.    NO active cardiac complaints or symptoms with exercise.  Current medications for treatment:  See list    Reviewed last 6 BP readings in chart:  BP Readings from Last 6 Encounters:   02/18/22 132/76   01/21/22 134/70   08/27/21 (!) 149/69   07/29/21 132/82   05/26/21 122/68   04/02/21 122/84           2. Chronic congestive heart failure, unspecified heart failure type (H)  Has known history of CHF.  No new symptoms to report.  Denies new or worsened shortness of breath, dyspnea on exertion, orthopnea, and excessive lower extremity edema.   Taking medications as ordered.     Reviewed weights in chart:  Wt Readings from Last 10 Encounters:   02/18/22 132.2 kg (291 lb 6.4 oz)   01/21/22 130 kg (286 lb 9.6 oz)   07/29/21 130.3 kg (287 lb 3.2 oz)   05/26/21 131 kg (288 lb 14.4 oz)   04/02/21 129.8 kg (286 lb 3.2 oz)   02/05/21 130.5 kg (287 lb 9.6 oz)   12/11/20 128.5 kg (283 lb 6.4 oz)   10/16/20 127 kg (280 lb)   10/06/20 126.1 kg (278 lb)   08/21/20 125.9 kg (277 lb 9.6 oz)         3. Chronic atrial fibrillation (H)  Has history of atrial fibrillation.    No palpitations, no dizziness, no dyspnea on exertion, no shortness of breath.  No racing heart, no fast heart rates.    Taking medications as ordered, no side effects reported.   Patient is taking anticoagulation Warfarin per flow sheet according to direction, with no reported side effects.    - Prothrombin - INR ([facility])    4. Long term current use of anticoagulant therapy    - Prothrombin - INR  ([facility])    5. Morbid obesity due to excess calories (H)  The patient has had a history of ongoing obesity.  Reviewed the weigth curves.   Their current BMI is:  Body mass index is 46.33 kg/m .  Reviewed previous attempts at weight loss which have not been successful in producing prolonged weigth loss.   Discussed current eating and exercise habits.     Reviewed weights in chart:  Wt Readings from Last 10 Encounters:   02/18/22 132.2 kg (291 lb 6.4 oz)   01/21/22 130 kg (286 lb 9.6 oz)   07/29/21 130.3 kg (287 lb 3.2 oz)   05/26/21 131 kg (288 lb 14.4 oz)   04/02/21 129.8 kg (286 lb 3.2 oz)   02/05/21 130.5 kg (287 lb 9.6 oz)   12/11/20 128.5 kg (283 lb 6.4 oz)   10/16/20 127 kg (280 lb)   10/06/20 126.1 kg (278 lb)   08/21/20 125.9 kg (277 lb 9.6 oz)            Also needs an INR Check, having no signs of bleeding  Todays and previous results are:    Lab Results   Component Value Date    INR 2.5 02/18/2022    INR 2.2 01/21/2022    INR 2.2 12/20/2021    INR 2.5 11/19/2021    INR 3.0 10/22/2021       The dose will not change.    Recheck next INR in 4 weeks      ROS:  General and Resp. completed and negative except as noted above     HISTORY:   reports no history of alcohol use.   reports that he quit smoking about 44 years ago. His smoking use included cigarettes. He has a 5.00 pack-year smoking history. He has never used smokeless tobacco.    Past Medical History:   Diagnosis Date     Atrial fibrillation (H)      CAD (coronary artery disease)      Cardiomyopathy (H)      Chronic cough 2017     Colon polyp 2010     Gout      Hiatal hernia      Hip pain      History of BPH      HTN (hypertension)      Hyperlipidemia      Neuropathy      Obesity (BMI 35.0-39.9 without comorbidity)      SUJEY on CPAP      Shoulder injury      Venous insufficiency of right leg      Wrist swelling      Past Surgical History:   Procedure Laterality Date     CLAVICLE SURGERY Left     as a kid     CT release       CYSTOSCOPY N/A 4/7/2016     Procedure: CYSTOSCOPY;  Surgeon: Lokesh Lima MD;  Location: [facility] OR     CYSTOSCOPY, FULGURATE BLEEDERS, EVACUATE CLOT(S), COMBINED N/A 1/16/2018    Procedure: COMBINED CYSTOSCOPY, FULGURATE BLEEDERS, EVACUATE CLOT(S);  CYSTOSCOPY, FULGERATION;  Surgeon: Lokesh Lima MD;  Location: [facility] OR     CYSTOSCOPY, TRANSURETHRAL RESECTION (TUR) PROSTATE, COMBINED N/A 2/25/2020    Procedure: CYSTOSCOPY; TRANSURETHRAL RESECTION OF THE PROSTATE;  Surgeon: Lokesh Lima MD;  Location: [facility] OR     HC LEFT HEART CATHETERIZATION  3/2010    Mild CAD     LASER KTP TRANSURETHRAL RESECTION (TUR) PROSTATE N/A 4/7/2016    Procedure: LASER KTP TRANSURETHRAL RESECTION (TUR) PROSTATE;  Surgeon: Lokesh Lima MD;  Location: [facility] OR     TRANSPOSITION ULNAR NERVE (ELBOW) Left 11/29/2019    Procedure: LEFT ELBOW ULNER NERVE SUBMUSCULAR TRANSPOSITION;  Surgeon: Nicole Bai MD;  Location: [facility] OR     ulnar reroute      right        The following health maintenance items are reviewed in Epic and correct as of today:  Health Maintenance   Topic Date Due     HF ACTION PLAN  Never done     PHQ-2  01/01/2022     BMP  01/29/2022     DTAP/TDAP/TD IMMUNIZATION (3 - Td or Tdap) 06/11/2022     MEDICARE ANNUAL WELLNESS VISIT  07/29/2022     ALT  07/29/2022     LIPID  07/29/2022     FALL RISK ASSESSMENT  07/29/2022     CBC  07/29/2022     ADVANCE CARE PLANNING  05/01/2025     TSH W/FREE T4 REFLEX  Completed     INFLUENZA VACCINE  Completed     Pneumococcal Vaccine: 65+ Years  Completed     ZOSTER IMMUNIZATION  Completed     COVID-19 Vaccine  Completed     IPV IMMUNIZATION  Aged Out     MENINGITIS IMMUNIZATION  Aged Out     HEPATITIS B IMMUNIZATION  Aged Out       EXAM:  BP: 132/76   Pulse: 69    Temp: Data Unavailable    Wt Readings from Last 2 Encounters:   02/18/22 132.2 kg (291 lb 6.4 oz)   01/21/22 130 kg (286 lb 9.6 oz)       BMI= Body mass index is 46.33 kg/m .    EXAM:  APPEARANCE: = Relaxed and in no distress  Conj/Eyelids =  "noninjected and lids and lashes are without inflammation  PERRLA/Irises = Pupils Round Reactive to Light and Irisis without inflammation  Neck = No anterior or posterior adenopathy appreciated.  Thyroid = Not enlarged and no masses felt  Resp effort = Calm regular breathing  Breath Sounds = Good air movement with no rales or rhonchi in any lung fields  Heart Rate, Rythym, & sounds (no Murm)  = Regular rate and rythym with no S3, S4, or murmer appreciated.  Carotid Art's = Pulses full and equal and no bruits appreciated  Abdomen = Soft, nontender, no masses, & bowel sounds in all quadrants  Liver/Spleen = Normal span and no splenomegaly noted  Digits and Nails = FROM in all finger joints, no nail dystrophy  Ext (edema) = No pretibial edema noted or elsewhere  Musculsktl =  Strength and ROM of major joints are within normal limits  SKIN = absent significant rashes or lesions   Recent/Remote Memory = Alert and Oriented x 3  Mood/Affect = Cooperative and interested      Assessment/Plan:  Vasiliy was seen today for hypertension, inr followup and medication reconciliation.    Diagnoses and all orders for this visit:    Essential hypertension    Chronic congestive heart failure, unspecified heart failure type (H)    Chronic atrial fibrillation (H)  -     Prothrombin - INR ([facility])    Long term current use of anticoagulant therapy  -     Prothrombin - INR ([facility])    Morbid obesity due to excess calories (H)        COUNSELING:   reports that he quit smoking about 44 years ago. His smoking use included cigarettes. He has a 5.00 pack-year smoking history. He has never used smokeless tobacco.    Estimated body mass index is 46.33 kg/m  as calculated from the following:    Height as of this encounter: 1.689 m (5' 6.5\").    Weight as of this encounter: 132.2 kg (291 lb 6.4 oz).   Weight management plan: Discussed healthy diet and exercise guidelines    Appropriate preventive services were discussed with this patient, including applicable " screening as appropriate for cardiovascular disease, diabetes, osteopenia/osteoporosis, and glaucoma.  As appropriate for age/gender, discussed screening for colorectal cancer, prostate cancer, breast cancer, and cervical cancer. Checklist reviewing preventive services available has been given to the patient.    Reviewed patients plan of care and provided an AVS. The Intermediate Care Plan ( asthma action plan, low back pain action plan, and migraine action plan) for Vasiliy meets the Care Plan requirement. This Care Plan has been established and reviewed with the  Patient.    Malvin Rosen MD  [facility]  [phone]       For any issues my office # is [phone]

## 2022-02-20 DIAGNOSIS — N40.0 BENIGN PROSTATIC HYPERPLASIA WITHOUT LOWER URINARY TRACT SYMPTOMS: ICD-10-CM

## 2022-02-20 LAB
BUN SERPL-MCNC: 28 MG/DL (ref 8–27)
BUN/CREATININE RATIO: 28 (ref 10–24)
CALCIUM SERPL-MCNC: 9.5 MG/DL (ref 8.6–10.2)
CHLORIDE SERPLBLD-SCNC: 105 MMOL/L (ref 96–106)
CREAT SERPL-MCNC: 1.01 MG/DL (ref 0.76–1.27)
EGFR IF AFRICN AM: 77 ML/MIN/1.73
EGFR IF NONAFRICN AM: 67 ML/MIN/1.73
GLUCOSE SERPL-MCNC: 94 MG/DL (ref 65–99)
POTASSIUM SERPL-SCNC: 4.3 MMOL/L (ref 3.5–5.2)
SODIUM SERPL-SCNC: 141 MMOL/L (ref 134–144)
TOTAL CO2: 22 MMOL/L (ref 20–29)

## 2022-02-21 RX ORDER — FINASTERIDE 5 MG/1
TABLET, FILM COATED ORAL
Qty: 90 TABLET | Refills: 3 | Status: SHIPPED | OUTPATIENT
Start: 2022-02-21 | End: 2023-02-15

## 2022-02-21 NOTE — RESULT ENCOUNTER NOTE
Dear Vasiliy,   I am writing to report that your included test results are as expected.  Many lab panels contain some results that are outside of the normal range, I have reviewed each of these results and they require no changes at this time.    Malvin Rosen MD

## 2022-03-16 DIAGNOSIS — Z76.0 ENCOUNTER FOR MEDICATION REFILL: ICD-10-CM

## 2022-03-16 RX ORDER — ALLOPURINOL 300 MG/1
TABLET ORAL
Qty: 90 TABLET | Refills: 0 | Status: SHIPPED | OUTPATIENT
Start: 2022-03-16 | End: 2022-06-14

## 2022-03-18 DIAGNOSIS — Z79.01 LONG TERM CURRENT USE OF ANTICOAGULANT THERAPY: ICD-10-CM

## 2022-03-18 DIAGNOSIS — I48.20 CHRONIC ATRIAL FIBRILLATION (H): ICD-10-CM

## 2022-03-18 LAB — INR PPP: 2.1 (ref 2–3)

## 2022-03-18 PROCEDURE — 36416 COLLJ CAPILLARY BLOOD SPEC: CPT | Performed by: FAMILY MEDICINE

## 2022-03-18 PROCEDURE — 85610 PROTHROMBIN TIME: CPT | Performed by: FAMILY MEDICINE

## 2022-04-14 DIAGNOSIS — I10 ESSENTIAL HYPERTENSION: ICD-10-CM

## 2022-04-14 RX ORDER — LOSARTAN POTASSIUM 50 MG/1
50 TABLET ORAL DAILY
Qty: 90 TABLET | Refills: 3 | Status: SHIPPED | OUTPATIENT
Start: 2022-04-14 | End: 2023-04-02

## 2022-04-14 NOTE — TELEPHONE ENCOUNTER
Losartan. Last addressed 2/18/22.      Essential hypertension  Essential Hypertension   Remains well controlled when checked out of clinic.   he has not experienced any significant side effects from medications for hypertension.    NO active cardiac complaints or symptoms with exercise.  Current medications for treatment:  See list     Reviewed last 6 BP readings in chart:      BP Readings from Last 6 Encounters:   02/18/22 132/76   01/21/22 134/70   08/27/21 (!) 149/69   07/29/21 132/82   05/26/21 122/68   04/02/21 122/84

## 2022-04-15 DIAGNOSIS — I48.20 CHRONIC ATRIAL FIBRILLATION (H): Primary | ICD-10-CM

## 2022-04-15 DIAGNOSIS — Z79.01 LONG TERM CURRENT USE OF ANTICOAGULANT THERAPY: ICD-10-CM

## 2022-04-15 LAB — INR PPP: 3.1 (ref 2–3)

## 2022-04-15 PROCEDURE — 36416 COLLJ CAPILLARY BLOOD SPEC: CPT | Performed by: FAMILY MEDICINE

## 2022-04-15 PROCEDURE — 85610 PROTHROMBIN TIME: CPT | Performed by: FAMILY MEDICINE

## 2022-04-30 DIAGNOSIS — I48.20 CHRONIC ATRIAL FIBRILLATION (H): ICD-10-CM

## 2022-05-02 RX ORDER — METOPROLOL TARTRATE 50 MG
TABLET ORAL
Qty: 360 TABLET | Refills: 3 | Status: SHIPPED | OUTPATIENT
Start: 2022-05-02 | End: 2022-11-30

## 2022-05-02 RX ORDER — METOPROLOL TARTRATE 50 MG
TABLET ORAL
Qty: 360 TABLET | Refills: 3 | Status: SHIPPED | OUTPATIENT
Start: 2022-05-02 | End: 2023-05-05

## 2022-05-04 DIAGNOSIS — Z79.01 LONG TERM CURRENT USE OF ANTICOAGULANT THERAPY: ICD-10-CM

## 2022-05-04 DIAGNOSIS — I48.20 CHRONIC ATRIAL FIBRILLATION (H): ICD-10-CM

## 2022-05-04 LAB — INR PPP: 2.8 (ref 2–3)

## 2022-05-04 PROCEDURE — 36416 COLLJ CAPILLARY BLOOD SPEC: CPT | Performed by: FAMILY MEDICINE

## 2022-05-04 PROCEDURE — 85610 PROTHROMBIN TIME: CPT | Performed by: FAMILY MEDICINE

## 2022-05-15 DIAGNOSIS — Z79.899 ENCOUNTER FOR LONG-TERM (CURRENT) USE OF MEDICATIONS: ICD-10-CM

## 2022-05-15 RX ORDER — IPRATROPIUM BROMIDE 42 UG/1
SPRAY, METERED NASAL
Qty: 135 ML | Refills: 3 | Status: SHIPPED | OUTPATIENT
Start: 2022-05-15 | End: 2023-08-23

## 2022-05-16 DIAGNOSIS — I48.20 CHRONIC ATRIAL FIBRILLATION (H): ICD-10-CM

## 2022-05-16 RX ORDER — WARFARIN SODIUM 2 MG/1
TABLET ORAL
Qty: 135 TABLET | Refills: 3 | Status: SHIPPED | OUTPATIENT
Start: 2022-05-16 | End: 2023-05-02

## 2022-05-16 NOTE — TELEPHONE ENCOUNTER
warfarin ANTICOAGULANT (COUMADIN) 2 MG     LOV  2/18/22    Last labs 5/4/22    INR   Date Value Ref Range Status   05/04/2022 2.8 2.0 - 3.0 Final

## 2022-05-23 DIAGNOSIS — E78.5 HYPERLIPIDEMIA WITH TARGET LDL LESS THAN 100: ICD-10-CM

## 2022-05-23 RX ORDER — SIMVASTATIN 10 MG
TABLET ORAL
Qty: 90 TABLET | Refills: 3 | Status: SHIPPED | OUTPATIENT
Start: 2022-05-23 | End: 2023-05-02

## 2022-06-01 DIAGNOSIS — I48.20 CHRONIC ATRIAL FIBRILLATION (H): ICD-10-CM

## 2022-06-01 DIAGNOSIS — Z79.01 LONG TERM CURRENT USE OF ANTICOAGULANT THERAPY: ICD-10-CM

## 2022-06-01 LAB — INR PPP: 2.4 (ref 2–3)

## 2022-06-01 PROCEDURE — 85610 PROTHROMBIN TIME: CPT | Performed by: FAMILY MEDICINE

## 2022-06-14 DIAGNOSIS — Z76.0 ENCOUNTER FOR MEDICATION REFILL: ICD-10-CM

## 2022-06-14 RX ORDER — ALLOPURINOL 300 MG/1
TABLET ORAL
Qty: 90 TABLET | Refills: 0 | Status: SHIPPED | OUTPATIENT
Start: 2022-06-14 | End: 2022-09-12

## 2022-06-29 DIAGNOSIS — Z79.01 LONG TERM CURRENT USE OF ANTICOAGULANT THERAPY: ICD-10-CM

## 2022-06-29 DIAGNOSIS — I48.20 CHRONIC ATRIAL FIBRILLATION (H): ICD-10-CM

## 2022-06-29 LAB — INR PPP: 2.5 (ref 2–3)

## 2022-06-29 PROCEDURE — 85610 PROTHROMBIN TIME: CPT | Performed by: FAMILY MEDICINE

## 2022-07-27 DIAGNOSIS — I48.20 CHRONIC ATRIAL FIBRILLATION (H): ICD-10-CM

## 2022-07-27 DIAGNOSIS — Z79.01 LONG TERM CURRENT USE OF ANTICOAGULANT THERAPY: ICD-10-CM

## 2022-07-27 LAB — INR PPP: 2.5 (ref 2–3)

## 2022-07-27 PROCEDURE — 85610 PROTHROMBIN TIME: CPT | Performed by: FAMILY MEDICINE

## 2022-08-15 ENCOUNTER — HOSPITAL ENCOUNTER (OUTPATIENT)
Dept: CARDIOLOGY | Facility: CLINIC | Age: 87
Discharge: HOME | End: 2022-08-15
Attending: INTERNAL MEDICINE | Admitting: INTERNAL MEDICINE
Payer: MEDICARE

## 2022-08-15 DIAGNOSIS — I48.20 CHRONIC ATRIAL FIBRILLATION (H): ICD-10-CM

## 2022-08-15 PROCEDURE — 93226 XTRNL ECG REC<48 HR SCAN A/R: CPT

## 2022-08-15 PROCEDURE — 93227 XTRNL ECG REC<48 HR R&I: CPT | Performed by: INTERNAL MEDICINE

## 2022-08-24 DIAGNOSIS — I48.20 CHRONIC ATRIAL FIBRILLATION (H): ICD-10-CM

## 2022-08-24 DIAGNOSIS — Z79.01 LONG TERM CURRENT USE OF ANTICOAGULANT THERAPY: ICD-10-CM

## 2022-08-24 LAB — INR PPP: 3 (ref 2–3)

## 2022-08-24 PROCEDURE — 85610 PROTHROMBIN TIME: CPT | Performed by: FAMILY MEDICINE

## 2022-08-29 DIAGNOSIS — Z76.0 ENCOUNTER FOR MEDICATION REFILL: ICD-10-CM

## 2022-08-29 RX ORDER — FUROSEMIDE 40 MG
TABLET ORAL
Qty: 135 TABLET | Refills: 3 | Status: SHIPPED | OUTPATIENT
Start: 2022-08-29 | End: 2023-07-31

## 2022-09-12 DIAGNOSIS — Z76.0 ENCOUNTER FOR MEDICATION REFILL: ICD-10-CM

## 2022-09-12 DIAGNOSIS — J98.4 RESTRICTIVE AIRWAY DISEASE: ICD-10-CM

## 2022-09-12 RX ORDER — ALLOPURINOL 300 MG/1
TABLET ORAL
Qty: 90 TABLET | Refills: 0 | Status: SHIPPED | OUTPATIENT
Start: 2022-09-12 | End: 2022-12-12

## 2022-09-13 RX ORDER — DILTIAZEM HYDROCHLORIDE 60 MG/1
TABLET, FILM COATED ORAL
Qty: 10.2 G | Refills: 3 | Status: SHIPPED | OUTPATIENT
Start: 2022-09-13 | End: 2023-01-10

## 2022-09-14 ENCOUNTER — TELEPHONE (OUTPATIENT)
Dept: CARDIOLOGY | Facility: CLINIC | Age: 87
End: 2022-09-14

## 2022-09-14 NOTE — TELEPHONE ENCOUNTER
1. Vasiliy Corbin was monitored for 24 hours. Quality of tracing was fair. Principle rhythm was atrial fibrillation. Average HR was 70 bpm, maximum HR was 80 bpm, minimum HR was 52 bpm. QRS duration .08 seconds, QT interval .39 - .47 seconds. There were 14 pauses longer than 2 seconds, the longest was 2.36 seconds. 2. There were 27 ventricular ectopics. 24 of these were isolated PVCs. There was 1 triplet. 3. Patient event button was not pressed. VERNELL Reed RN

## 2022-09-21 DIAGNOSIS — Z79.01 LONG TERM CURRENT USE OF ANTICOAGULANT THERAPY: ICD-10-CM

## 2022-09-21 DIAGNOSIS — I48.20 CHRONIC ATRIAL FIBRILLATION (H): ICD-10-CM

## 2022-09-21 LAB — INR PPP: 3 (ref 2–3)

## 2022-09-21 PROCEDURE — 85610 PROTHROMBIN TIME: CPT | Performed by: FAMILY MEDICINE

## 2022-10-19 DIAGNOSIS — Z23 NEEDS FLU SHOT: Primary | ICD-10-CM

## 2022-10-19 DIAGNOSIS — Z79.01 LONG TERM CURRENT USE OF ANTICOAGULANT THERAPY: ICD-10-CM

## 2022-10-19 DIAGNOSIS — I48.20 CHRONIC ATRIAL FIBRILLATION (H): ICD-10-CM

## 2022-10-19 LAB — INR PPP: 3

## 2022-10-19 PROCEDURE — 85610 PROTHROMBIN TIME: CPT | Performed by: FAMILY MEDICINE

## 2022-10-19 PROCEDURE — 90694 VACC AIIV4 NO PRSRV 0.5ML IM: CPT | Performed by: FAMILY MEDICINE

## 2022-10-19 PROCEDURE — G0008 ADMIN INFLUENZA VIRUS VAC: HCPCS | Performed by: FAMILY MEDICINE

## 2022-11-16 DIAGNOSIS — Z79.01 LONG TERM CURRENT USE OF ANTICOAGULANT THERAPY: ICD-10-CM

## 2022-11-16 DIAGNOSIS — I48.20 CHRONIC ATRIAL FIBRILLATION (H): ICD-10-CM

## 2022-11-16 LAB — INR PPP: 2.7 (ref 2–3)

## 2022-11-16 PROCEDURE — 85610 PROTHROMBIN TIME: CPT | Performed by: FAMILY MEDICINE

## 2022-11-30 ENCOUNTER — OFFICE VISIT (OUTPATIENT)
Dept: CARDIOLOGY | Facility: CLINIC | Age: 87
End: 2022-11-30
Payer: MEDICARE

## 2022-11-30 VITALS
BODY MASS INDEX: 46.96 KG/M2 | HEIGHT: 66 IN | SYSTOLIC BLOOD PRESSURE: 133 MMHG | HEART RATE: 80 BPM | WEIGHT: 292.2 LBS | DIASTOLIC BLOOD PRESSURE: 72 MMHG

## 2022-11-30 DIAGNOSIS — I10 ESSENTIAL HYPERTENSION: ICD-10-CM

## 2022-11-30 DIAGNOSIS — I48.21 PERMANENT ATRIAL FIBRILLATION (H): Primary | ICD-10-CM

## 2022-11-30 DIAGNOSIS — I50.32 CHRONIC DIASTOLIC CONGESTIVE HEART FAILURE (H): ICD-10-CM

## 2022-11-30 DIAGNOSIS — E66.01 MORBID OBESITY DUE TO EXCESS CALORIES (H): ICD-10-CM

## 2022-11-30 DIAGNOSIS — G47.33 OSA ON CPAP: ICD-10-CM

## 2022-11-30 PROCEDURE — 99214 OFFICE O/P EST MOD 30 MIN: CPT | Performed by: INTERNAL MEDICINE

## 2022-11-30 NOTE — LETTER
11/30/2022    Malvin Rosen MD  [address]  [facility] [address]    RE: Vasiliy Corbin       Dear Colleague,     I had the pleasure of seeing Vasiliy Corbin in the [facility].  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  Then he reverted out of rhythm on amiodarone therapy, and we have been attempting rate control.  Amiodarone has been discontinued now.    He is doing well.  In fact, he has been able to shovel some snow yesterday without any chest pain or shortness of breath.  He took some breaks.  Overall, his main issue has been weight gain over the last 2 years of COVID.  He is trying to decrease his calories but has not been successful in losing weight.    He denies any palpitations dizziness or syncope.  We reviewed his Holter results from August which showed adequate rate control with his atrial fibrillation without any significant pauses.    Remains on warfarin for stroke prophylaxis.       PHYSICAL EXAMINATION:    below     IMPRESSION AND PLAN:   1.  Persistent atrial fibrillation.   Continue rate control approach.  He is doing well. Ejection fraction normal.  On metoprolol and rate control is adequate on the Holter.    2.   Chronic diastolic congestive heart failure, euvolemic on present diuretic therapy has occasional dependent leg swelling at the end of the day likely from venous insufficiency. Wears leg stockings.  On Lasix 60 mg per day.      3.  Sleep apnea, treated.   Continue C PAP.                         4.  Non flow-limiting coronary artery disease on angiogram in 2010.      5.  Hypertension, treated to goal.         Return to clinic in year with my  midlevel provider.  He will also follow with his primary care physician who has been refilling his meds.      Sincerely,     Diomedes Menendez    Today's clinic visit entailed:    32 minutes spent on the date of the encounter doing chart review, review of test results, patient visit and documentation   Provider  Link to [facility] Help Grid     The level of medical decision making during this visit was of moderate complexity.      No orders of the defined types were placed in this encounter.      No orders of the defined types were placed in this encounter.      Medications Discontinued During This Encounter   Medication Reason     metoprolol tartrate (LOPRESSOR) 50 MG tablet Medication Reconciliation Clean Up         No diagnosis found.    CURRENT MEDICATIONS:  Current Outpatient Medications   Medication Sig Dispense Refill     allopurinol (ZYLOPRIM) 300 MG tablet TAKE 1 TABLET(300 MG) BY MOUTH DAILY 90 tablet 0     finasteride (PROSCAR) 5 MG tablet TAKE 1 TABLET(5 MG) BY MOUTH DAILY 90 tablet 3     furosemide (LASIX) 40 MG tablet TAKE 1 AND 1/2 TABLETS(60 MG) BY MOUTH DAILY 135 tablet 3     ipratropium (ATROVENT) 0.06 % nasal spray USE 2 SPRAYS IN EACH NOSTRILS FOUR TIMES DAILY AS NEEDED FOR RHINITIS 135 mL 3     losartan (COZAAR) 50 MG tablet Take 1 tablet (50 mg) by mouth daily 90 tablet 3     metoprolol tartrate (LOPRESSOR) 50 MG tablet TAKE 2 TABLETS(100 MG) BY MOUTH TWICE DAILY 360 tablet 3     Multiple Vitamin (MULTI-VITAMIN) per tablet Take 1 tablet by mouth daily. 100 tablet 12     nystatin (MYCOSTATIN) 778566 UNIT/ML suspension Swish and spit a 1/2 tsp tid 400 mL 0     simvastatin (ZOCOR) 10 MG tablet TAKE 1 TABLET(10 MG) BY MOUTH AT BEDTIME 90 tablet 3     SYMBICORT 80-4.5 MCG/ACT Inhaler INHALE 2 PUFFS INTO THE LUNGS TWICE DAILY 10.2 g 3     warfarin ANTICOAGULANT (COUMADIN) 2 MG tablet TAKE 1 AND 1/2 TABLETS BY MOUTH DAILY 135 tablet 3     vitamin D3 (CHOLECALCIFEROL) 50 mcg (2000 units) tablet Take 1 tablet by  mouth daily         ALLERGIES     Allergies   Allergen Reactions     Cardizem [Diltiazem]      Swelling and poor rate control     Lisinopril Cough       PAST MEDICAL HISTORY:  Past Medical History:   Diagnosis Date     Atrial fibrillation (H)     became chronic afib in 2016,paroxysmal,was on amiodarone but went into persistent afib in april 2106 and amiodrone was d/cd. now on BBLK and considering cardioversion.(5/2106).In Sept 2016 plan is rate control which has been a challenge and Holter in 6 months     CAD (coronary artery disease)     mild stenosis per cath     Cardiomyopathy (H)      Chronic cough 2017    Eval with Dr Moon Pulmykel - [place] [facility] - rec speech therapy, ct for eval of crackles No obstruction or copd     Colon polyp 2010     Gout      Hiatal hernia      Hip pain      History of BPH     laser TURP in 2016 Dr Lima     HTN (hypertension)      Hyperlipidemia      Neuropathy     numbness & tingling R. 4th & 5th finger left hand     Obesity (BMI 35.0-39.9 without comorbidity)      SUJEY on CPAP      Shoulder injury      Venous insufficiency of right leg     no trteatment recommended by Judy - jamel to treat is there Dr Zapata     Wrist swelling        PAST SURGICAL HISTORY:  Past Surgical History:   Procedure Laterality Date     CLAVICLE SURGERY Left     as a kid     CT release       CYSTOSCOPY N/A 4/7/2016    Procedure: CYSTOSCOPY;  Surgeon: Lokesh Lima MD;  Location:  OR     CYSTOSCOPY, FULGURATE BLEEDERS, EVACUATE CLOT(S), COMBINED N/A 1/16/2018    Procedure: COMBINED CYSTOSCOPY, FULGURATE BLEEDERS, EVACUATE CLOT(S);  CYSTOSCOPY, FULGERATION;  Surgeon: Lokesh Lima MD;  Location:  OR     CYSTOSCOPY, TRANSURETHRAL RESECTION (TUR) PROSTATE, COMBINED N/A 2/25/2020    Procedure: CYSTOSCOPY; TRANSURETHRAL RESECTION OF THE PROSTATE;  Surgeon: Lokesh Lima MD;  Location:  OR      LEFT HEART CATHETERIZATION  3/2010    Mild CAD     LASER KTP TRANSURETHRAL RESECTION (TUR) PROSTATE N/A  2016    Procedure: LASER KTP TRANSURETHRAL RESECTION (TUR) PROSTATE;  Surgeon: Lokesh Lima MD;  Location: [facility] OR     TRANSPOSITION ULNAR NERVE (ELBOW) Left 2019    Procedure: LEFT ELBOW ULNER NERVE SUBMUSCULAR TRANSPOSITION;  Surgeon: Nicole Bai MD;  Location: [facility] OR     ulnar reroute      right        FAMILY HISTORY:  Family History   Problem Relation Age of Onset     Heart Disease Mother 92        Heart failure     Heart Disease Father 92     C.A.D. Brother 70        MI     Myocardial Infarction Brother      Family History Negative Sister      Family History Negative Brother      Family History Negative Brother      Family History Negative Brother      Family History Negative Sister      Myocardial Infarction Son      Heart Disease Son      Heart Disease Son        SOCIAL HISTORY:  Social History     Socioeconomic History     Marital status:      Spouse name: None     Number of children: None     Years of education: None     Highest education level: None   Tobacco Use     Smoking status: Former     Packs/day: 1.00     Years: 5.00     Pack years: 5.00     Types: Cigarettes     Quit date: 4/3/1977     Years since quittin.6     Smokeless tobacco: Never   Substance and Sexual Activity     Alcohol use: No     Alcohol/week: 0.0 standard drinks     Drug use: No     Sexual activity: Yes   Other Topics Concern     Parent/sibling w/ CABG, MI or angioplasty before 65F 55M? No     Caffeine Concern Yes     Comment: 8 cups of  decaf coffee per day     Sleep Concern Yes     Comment: sleep apnea, wears CPAP     Stress Concern No     Weight Concern Yes     Comment: Weight gain     Special Diet No     Exercise No     Seat Belt Yes       Review of Systems:  Skin:          Eyes:         ENT:         Respiratory:    cough     Cardiovascular:  Negative;palpitations;chest pain;syncope or near-syncope;cyanosis;lightheadedness;dizziness;fatigue Positive for;edema    Gastroenterology:       "  Genitourinary:         Musculoskeletal:         Neurologic:         Psychiatric:         Heme/Lymph/Imm:         Endocrine:  Negative        Physical Exam:  Vitals: /72   Pulse 80   Ht 1.676 m (5' 6\")   Wt 132.5 kg (292 lb 3.2 oz)   BMI 47.16 kg/m      Constitutional:    obese      Skin:  warm and dry to the touch          Head:           Eyes:  sclera white        Lymph:      ENT:  no pallor or cyanosis        Neck:  carotid pulses are full and equal bilaterally;JVP normal        Respiratory:  clear to auscultation         Cardiac: irreg S1S2                                  GI:  not assessed this visit        Extremities and Muscular Skeletal:      bilateral LE edema;trace RLE edema;pitting;1+ LLE edema;pitting;1+   Neurological:  no gross motor deficits;affect appropriate        Psych:           CC  No referring provider defined for this encounter.    Thank you for allowing me to participate in the care of your patient.      Sincerely,     Diomedes Menendez MD     [facility]  "

## 2022-11-30 NOTE — PROGRESS NOTES
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  Then he reverted out of rhythm on amiodarone therapy, and we have been attempting rate control.  Amiodarone has been discontinued now.    He is doing well.  In fact, he has been able to shovel some snow yesterday without any chest pain or shortness of breath.  He took some breaks.  Overall, his main issue has been weight gain over the last 2 years of COVID.  He is trying to decrease his calories but has not been successful in losing weight.    He denies any palpitations dizziness or syncope.  We reviewed his Holter results from August which showed adequate rate control with his atrial fibrillation without any significant pauses.    Remains on warfarin for stroke prophylaxis.       PHYSICAL EXAMINATION:    below     IMPRESSION AND PLAN:   1.  Persistent atrial fibrillation.   Continue rate control approach.  He is doing well. Ejection fraction normal.  On metoprolol and rate control is adequate on the Holter.    2.   Chronic diastolic congestive heart failure, euvolemic on present diuretic therapy has occasional dependent leg swelling at the end of the day likely from venous insufficiency. Wears leg stockings.  On Lasix 60 mg per day.      3.  Sleep apnea, treated.   Continue C PAP.                         4.  Non flow-limiting coronary artery disease on angiogram in 2010.      5.  Hypertension, treated to goal.         Return to clinic in year with my midlevel provider.  He will also follow with his primary care physician who has been refilling his meds.      Sincerely,     Diomedes Menendez    Today's clinic visit entailed:    32 minutes spent on the date of the encounter  doing chart review, review of test results, patient visit and documentation   Provider  Link to [place] Help Grid     The level of medical decision making during this visit was of moderate complexity.      No orders of the defined types were placed in this encounter.      No orders of the defined types were placed in this encounter.      Medications Discontinued During This Encounter   Medication Reason     metoprolol tartrate (LOPRESSOR) 50 MG tablet Medication Reconciliation Clean Up         No diagnosis found.    CURRENT MEDICATIONS:  Current Outpatient Medications   Medication Sig Dispense Refill     allopurinol (ZYLOPRIM) 300 MG tablet TAKE 1 TABLET(300 MG) BY MOUTH DAILY 90 tablet 0     finasteride (PROSCAR) 5 MG tablet TAKE 1 TABLET(5 MG) BY MOUTH DAILY 90 tablet 3     furosemide (LASIX) 40 MG tablet TAKE 1 AND 1/2 TABLETS(60 MG) BY MOUTH DAILY 135 tablet 3     ipratropium (ATROVENT) 0.06 % nasal spray USE 2 SPRAYS IN EACH NOSTRILS FOUR TIMES DAILY AS NEEDED FOR RHINITIS 135 mL 3     losartan (COZAAR) 50 MG tablet Take 1 tablet (50 mg) by mouth daily 90 tablet 3     metoprolol tartrate (LOPRESSOR) 50 MG tablet TAKE 2 TABLETS(100 MG) BY MOUTH TWICE DAILY 360 tablet 3     Multiple Vitamin (MULTI-VITAMIN) per tablet Take 1 tablet by mouth daily. 100 tablet 12     nystatin (MYCOSTATIN) 023144 UNIT/ML suspension Swish and spit a 1/2 tsp tid 400 mL 0     simvastatin (ZOCOR) 10 MG tablet TAKE 1 TABLET(10 MG) BY MOUTH AT BEDTIME 90 tablet 3     SYMBICORT 80-4.5 MCG/ACT Inhaler INHALE 2 PUFFS INTO THE LUNGS TWICE DAILY 10.2 g 3     warfarin ANTICOAGULANT (COUMADIN) 2 MG tablet TAKE 1 AND 1/2 TABLETS BY MOUTH DAILY 135 tablet 3     vitamin D3 (CHOLECALCIFEROL) 50 mcg (2000 units) tablet Take 1 tablet by mouth daily         ALLERGIES     Allergies   Allergen Reactions     Cardizem [Diltiazem]      Swelling and poor rate control     Lisinopril Cough       PAST MEDICAL HISTORY:  Past Medical History:   Diagnosis Date      Atrial fibrillation (H)     became chronic afib in 2016,paroxysmal,was on amiodarone but went into persistent afib in april 2106 and amiodrone was d/cd. now on BBLK and considering cardioversion.(5/2106).In Sept 2016 plan is rate control which has been a challenge and Holter in 6 months     CAD (coronary artery disease)     mild stenosis per cath     Cardiomyopathy (H)      Chronic cough 2017    Eval with Dr Moon Pulmonlouise - [place] [facility] - rec speech therapy, ct for eval of crackles No obstruction or copd     Colon polyp 2010     Gout      Hiatal hernia      Hip pain      History of BPH     laser TURP in 2016 Dr Lima     HTN (hypertension)      Hyperlipidemia      Neuropathy     numbness & tingling R. 4th & 5th finger left hand     Obesity (BMI 35.0-39.9 without comorbidity)      SUJEY on CPAP      Shoulder injury      Venous insufficiency of right leg     no trteatment recommended by Judy - option to treat is there Dr Zapata     Wrist swelling        PAST SURGICAL HISTORY:  Past Surgical History:   Procedure Laterality Date     CLAVICLE SURGERY Left     as a kid     CT release       CYSTOSCOPY N/A 4/7/2016    Procedure: CYSTOSCOPY;  Surgeon: Lokesh Lima MD;  Location:  OR     CYSTOSCOPY, FULGURATE BLEEDERS, EVACUATE CLOT(S), COMBINED N/A 1/16/2018    Procedure: COMBINED CYSTOSCOPY, FULGURATE BLEEDERS, EVACUATE CLOT(S);  CYSTOSCOPY, FULGERATION;  Surgeon: Lokesh Lima MD;  Location:  OR     CYSTOSCOPY, TRANSURETHRAL RESECTION (TUR) PROSTATE, COMBINED N/A 2/25/2020    Procedure: CYSTOSCOPY; TRANSURETHRAL RESECTION OF THE PROSTATE;  Surgeon: Lokesh Lima MD;  Location:  OR     HC LEFT HEART CATHETERIZATION  3/2010    Mild CAD     LASER KTP TRANSURETHRAL RESECTION (TUR) PROSTATE N/A 4/7/2016    Procedure: LASER KTP TRANSURETHRAL RESECTION (TUR) PROSTATE;  Surgeon: Lokesh Lima MD;  Location:  OR     TRANSPOSITION ULNAR NERVE (ELBOW) Left 11/29/2019    Procedure: LEFT ELBOW ULNER NERVE SUBMUSCULAR  "TRANSPOSITION;  Surgeon: Nicole Bai MD;  Location: [facility] OR     ulnar reroute      right        FAMILY HISTORY:  Family History   Problem Relation Age of Onset     Heart Disease Mother 92        Heart failure     Heart Disease Father 92     C.A.D. Brother 70        MI     Myocardial Infarction Brother      Family History Negative Sister      Family History Negative Brother      Family History Negative Brother      Family History Negative Brother      Family History Negative Sister      Myocardial Infarction Son      Heart Disease Son      Heart Disease Son        SOCIAL HISTORY:  Social History     Socioeconomic History     Marital status:      Spouse name: None     Number of children: None     Years of education: None     Highest education level: None   Tobacco Use     Smoking status: Former     Packs/day: 1.00     Years: 5.00     Pack years: 5.00     Types: Cigarettes     Quit date: 4/3/1977     Years since quittin.6     Smokeless tobacco: Never   Substance and Sexual Activity     Alcohol use: No     Alcohol/week: 0.0 standard drinks     Drug use: No     Sexual activity: Yes   Other Topics Concern     Parent/sibling w/ CABG, MI or angioplasty before 65F 55M? No     Caffeine Concern Yes     Comment: 8 cups of  decaf coffee per day     Sleep Concern Yes     Comment: sleep apnea, wears CPAP     Stress Concern No     Weight Concern Yes     Comment: Weight gain     Special Diet No     Exercise No     Seat Belt Yes       Review of Systems:  Skin:          Eyes:         ENT:         Respiratory:    cough     Cardiovascular:  Negative;palpitations;chest pain;syncope or near-syncope;cyanosis;lightheadedness;dizziness;fatigue Positive for;edema    Gastroenterology:        Genitourinary:         Musculoskeletal:         Neurologic:         Psychiatric:         Heme/Lymph/Imm:         Endocrine:  Negative        Physical Exam:  Vitals: /72   Pulse 80   Ht 1.676 m (5' 6\")   Wt 132.5 kg (292 " lb 3.2 oz)   BMI 47.16 kg/m      Constitutional:    obese      Skin:  warm and dry to the touch          Head:           Eyes:  sclera white        Lymph:      ENT:  no pallor or cyanosis        Neck:  carotid pulses are full and equal bilaterally;JVP normal        Respiratory:  clear to auscultation         Cardiac: irreg S1S2                                  GI:  not assessed this visit        Extremities and Muscular Skeletal:      bilateral LE edema;trace RLE edema;pitting;1+ LLE edema;pitting;1+   Neurological:  no gross motor deficits;affect appropriate        Psych:           CC  No referring provider defined for this encounter.

## 2022-12-09 NOTE — PATIENT INSTRUCTIONS
For informational purposes only. Not to replace the advice of your health care provider. Copyright   2003,  [facility]. All rights reserved. Clinically reviewed by Tiffany Shoemaker MD. "[employer] [id number] - REV .  Preparing for Your Surgery  Getting started  A nurse will call you to review your health history and instructions. They will give you an arrival time based on your scheduled surgery time. Please be ready to share:    Your doctor's clinic name and phone number    Your medical, surgical, and anesthesia history    A list of allergies and sensitivities    A list of medicines, including herbal treatments and over-the-counter drugs    Whether the patient has a legal guardian (ask how to send us the papers in advance)  Please tell us if you're pregnant--or if there's any chance you might be pregnant. Some surgeries may injure a fetus (unborn baby), so they require a pregnancy test. Surgeries that are safe for a fetus don't always need a test, and you can choose whether to have one.   If you have a child who's having surgery, please ask for a copy of Preparing for Your Child's Surgery.    Preparing for surgery    Within 10 to 30 days of surgery: Have a pre-op exam (sometimes called an H&P, or History and Physical). This can be done at a clinic or pre-operative center.  ? If you're having a , you may not need this exam. Talk to your care team.    At your pre-op exam, talk to your care team about all medicines you take. If you need to stop any medicines before surgery, ask when to start taking them again.  ? We do this for your safety. Many medicines can make you bleed too much during surgery. Some change how well surgery (anesthesia) drugs work.    Call your insurance company to let them know you're having surgery. (If you don't have insurance, call [phone].)    Call your clinic if there's any change in your health. This includes signs of a cold or flu (sore throat, runny nose,  cough, rash, fever). It also includes a scrape or scratch near the surgery site.    If you have questions on the day of surgery, call your hospital or surgery center.  Eating and drinking guidelines  For your safety: Unless your surgeon tells you otherwise, follow the guidelines below.    Eat and drink as usual until 8 hours before you arrive for surgery. After that, no food or milk.    Drink clear liquids until 2 hours before you arrive. These are liquids you can see through, like water, Gatorade, and Propel Water. They also include plain black coffee and tea (no cream or milk), candy, and breath mints. You can spit out gum when you arrive.    If you drink alcohol: Stop drinking it the night before surgery.    If your care team tells you to take medicine on the morning of surgery, it's okay to take it with a sip of water.  Preventing infection    Shower or bathe the night before and morning of your surgery. Follow the instructions your clinic gave you. (If no instructions, use regular soap.)    Don't shave or clip hair near your surgery site. We'll remove the hair if needed.    Don't smoke or vape the morning of surgery. You may chew nicotine gum up to 2 hours before surgery. A nicotine patch is okay.  ? Note: Some surgeries require you to completely quit smoking and nicotine. Check with your surgeon.    Your care team will make every effort to keep you safe from infection. We will:  ? Clean our hands often with soap and water (or an alcohol-based hand rub).  ? Clean the skin at your surgery site with a special soap that kills germs.  ? Give you a special gown to keep you warm. (Cold raises the risk of infection.)  ? Wear special hair covers, masks, gowns and gloves during surgery.  ? Give antibiotic medicine, if prescribed. Not all surgeries need antibiotics.  What to bring on the day of surgery    Photo ID and insurance card    Copy of your health care directive, if you have one    Glasses and hearing aids (bring  cases)  ? You can't wear contacts during surgery    Inhaler and eye drops, if you use them (tell us about these when you arrive)    CPAP machine or breathing device, if you use them    A few personal items, if spending the night    If you have . . .  ? A pacemaker, ICD (cardiac defibrillator) or other implant: Bring the ID card.  ? An implanted stimulator: Bring the remote control.  ? A legal guardian: Bring a copy of the certified (court-stamped) guardianship papers.  Please remove any jewelry, including body piercings. Leave jewelry and other valuables at home.  If you're going home the day of surgery    You must have a responsible adult drive you home. They should stay with you overnight as well.    If you don't have someone to stay with you, and you aren't safe to go home alone, we may keep you overnight. Insurance often won't pay for this.  After surgery  If it's hard to control your pain or you need more pain medicine, please call your surgeon's office.  Questions?   If you have any questions for your care team, list them here: _________________________________________________________________________________________________________________________________________________________________________ ____________________________________ ____________________________________ ____________________________________

## 2022-12-09 NOTE — PROGRESS NOTES
[facility]  [address] [place] [address]  Phone: [phone]  Fax: [phone]  Primary Provider: Leigh Rosen  Pre-op Performing Provider: LEIGH ROSEN      PREOPERATIVE EVALUATION:  Today's date: 12/12/2022  Preoperative Questionnaire:   No - Have you ever had a heart attack or stroke?  No - Have you ever had surgery on your heart or blood vessels, such as a stent, coronary (heart) bypass, or surgery on an artery in the head, neck, heart, or legs?  No - Do you have chest pain when you are physically active?  No - Do you have a history of heart failure?  No - Do you currently have a cold, bronchitis, or symptoms of other respiratory (head and chest) infections?  No - Do you have a cough, shortness of breath, or wheezing?  No - Do you or anyone in your family have a history of blood clots?  No - Do you or anyone in your family have a serious bleeding problem, such as long-lasting bleeding after surgeries or cuts?  No - Have you ever had anemia or been told to take iron pills?  No - Have you had any abnormal blood loss such as black, tarry or bloody stools, or abnormal vaginal bleeding?  No - Have you ever had a blood transfusion?  Yes - Are you willing to have a blood transfusion if it is medically needed before, during, or after your surgery?  No - Have you or anyone in your family ever had problems with anesthesia (sedation for surgery)?  YES - Do you have sleep apnea, excessive snoring, or daytime drowsiness?   No - Do you have any artifical heart valves or other implanted medical devices, such as a pacemaker, defibrillator, or continuous glucose monitor?  No - Do you have any artifical joints?  No - Are you allergic to latex?  No - Is there any chance that you may be pregnant?      Vasiliy Corbin is a 87 year old male who presents for a preoperative evaluation.    Surgical Information:  Surgery/Procedure: cataract  Surgery Location: [facility]  Center   Surgeon: Dr Cary  Surgery Date: right 12/19/22, left 1/5/23  Time of Surgery: 11:30  Where patient plans to recover: At home with family  Fax number for surgical facility: [phone]    Type of Anesthesia Anticipated: to be determined    Assessment & Plan     The proposed surgical procedure is considered LOW risk.    Preop general physical exam for Age-related nuclear cataract of both eyes      Morbid obesity due to excess calories (H)  Long term    SUJEY on CPAP  Tolerates well    Chronic atrial fibrillation (H)  Actively managed by Dr. Menendez, his last note reviewed.  - Prothrombin - INR ([facility])    Secondary hypercoagulable state (H)  he has Afib with a risk stratification: score of 4 and on coumadin        for estimation of stroke risk for nonvalvular Afib in adults.    Long term current use of anticoagulant therapy    - Prothrombin - INR ([facility]) 2.4 today    Coronary artery disease involving native coronary artery of native heart without angina pectoris  No recent angina    Hyperlipidemia with target LDL less than 100  On medication and will check level today.  - ALT (SGPT) ([facility])  - Lipid Profile ([facility])    Benign prostatic hyperplasia without lower urinary tract symptoms  BPH:  The patient denies dysuria, urinary frequency, nocturia more than once per night, or urinary hesitancy.        Essential hypertension  Reviewed his current HTN management. Discussed our goal for him is a systolic pressure at or below 128 and diastolic pressure at or below 83.  We today managed his prescriptions with refills ensured to ensure availabilty of current medications.  Discussed the importance for aggressive management of HTN to prevent vascular complications later.  Recommended lower fat, lower carbohydrate, and lower sodium (<2000 mg)diet. Required intervals for follow up on HTN, lab studies reviewed.    Strongly recommened he follow his blood pressures outside the clinic to ensure that BPs are remaining within  guidelines,.  Instructed to contact me if the readings are not within guidelines on a regular basis so we can adjust treatment as needed.    - Basic Metabolic Panel (8) ([facility])    Mild peripheral edema  Compression stockings work well.    mild chronic reactvie cough  Rare inhaler works great.            Risks and Recommendations:  The patient has the following additional risks and recommendations for perioperative complications:   - No identified additional risk factors other than previously addressed    Medication Instructions:  Patient is to take all scheduled medications on the day of surgery   - Bleeding risk is low for this procedure (e.g. dental, skin, cataract).    RECOMMENDATION:  APPROVAL GIVEN to proceed with proposed procedure, without further diagnostic evaluation.    Review of prior external note(s) from - Dr. Menendez    Subjective     HPI related to upcoming procedure: BILATERAL MATURE CATARACTS READY FOR SURGICAL INTERVENTION      Health Care Directive:  Patient does not have a Health Care Directive or Living Will: As is reasonable care for most folks, In the short term, he wants usual aggressive medical care.   No desire for long term prolongation of life through artificial means if no hope to bring back to a reasonable status.      Preoperative Review of :   reviewed - no record of controlled substances prescribed.      Status of Chronic Conditions:  See problem list for active medical problems.  Problems all longstanding and stable, except as noted/documented.  See ROS for pertinent symptoms related to these conditions.      Review of Systems  Constitutional, neuro, ENT, endocrine, pulmonary, cardiac, gastrointestinal, genitourinary, musculoskeletal, integument and psychiatric systems are negative, except as otherwise noted.    Patient Active Problem List    Diagnosis Date Noted     Mild peripheral edema 12/12/2022     Priority: Medium     Secondary hypercoagulable state (H) 12/12/2022      Priority: Medium     mild chronic reactvie cough 12/12/2022     Priority: Medium     Chronic diastolic congestive heart failure (H) 05/26/2021     Priority: Medium     BPH (benign prostatic hyperplasia) 02/25/2020     Priority: Medium     Morbid obesity due to excess calories (H) 08/07/2017     Priority: Medium     Cardiomyopathy, unspecified type (H) 06/26/2017     Priority: Medium     Diastolic dysfunction 04/20/2017     Priority: Medium     Coronary artery disease involving native coronary artery of native heart without angina pectoris 11/23/2015     Priority: Medium     Chronic atrial fibrillation (H) 10/26/2015     Priority: Medium     Essential hypertension 06/15/2015     Priority: Medium     Health Care Home 08/19/2013     Priority: Medium     State Tier Level:  Tier 1           SUJEY on CPAP      Priority: Medium     ACP (advance care planning)      Priority: Medium     VMR (vasomotor rhinitis) 04/04/2013     Priority: Medium     Hyperlipidemia with target LDL less than 100      Priority: Medium     Diagnosis updated by automated process. Provider to review and confirm.       Cervical spine arthritis 03/21/2012     Priority: Medium     Long term current use of anticoagulant therapy 02/29/2012     Priority: Medium     Problem list name updated by automated process. Provider to review       Restless leg syndrome 05/23/2011     Priority: Medium     Permanent atrial fibrillation 05/23/2011     Priority: Medium      Past Medical History:   Diagnosis Date     Atrial fibrillation (H)     became chronic afib in 2016,paroxysmal,was on amiodarone but went into persistent afib in april 2106 and amiodrone was d/cd. now on BBLK and considering cardioversion.(5/2106).In Sept 2016 plan is rate control which has been a challenge and Holter in 6 months     CAD (coronary artery disease)     mild stenosis per cath     Cardiomyopathy (H)      Chronic cough 2017    Eval with Dr Sarai Oro - Kaila Ricardo - rec speech therapy,  ct for eval of crackles No obstruction or copd     Colon polyp 2010     Gout      Hiatal hernia      Hip pain      History of BPH     laser TURP in 2016 Dr Lima     HTN (hypertension)      Hyperlipidemia      Neuropathy     numbness & tingling R. 4th & 5th finger left hand     Obesity (BMI 35.0-39.9 without comorbidity)      SUJEY on CPAP      Shoulder injury      Venous insufficiency of right leg     no trteatment recommended by [facility] - option to treat is there Dr Zapata     Wrist swelling      Past Surgical History:   Procedure Laterality Date     CLAVICLE SURGERY Left     as a kid     CT release       CYSTOSCOPY N/A 4/7/2016    Procedure: CYSTOSCOPY;  Surgeon: Lokesh Lima MD;  Location: [facility] OR     CYSTOSCOPY, FULGURATE BLEEDERS, EVACUATE CLOT(S), COMBINED N/A 1/16/2018    Procedure: COMBINED CYSTOSCOPY, FULGURATE BLEEDERS, EVACUATE CLOT(S);  CYSTOSCOPY, FULGERATION;  Surgeon: Lokesh Lima MD;  Location: [facility] OR     CYSTOSCOPY, TRANSURETHRAL RESECTION (TUR) PROSTATE, COMBINED N/A 2/25/2020    Procedure: CYSTOSCOPY; TRANSURETHRAL RESECTION OF THE PROSTATE;  Surgeon: Lokesh Lima MD;  Location: [facility] OR     HC LEFT HEART CATHETERIZATION  3/2010    Mild CAD     LASER KTP TRANSURETHRAL RESECTION (TUR) PROSTATE N/A 4/7/2016    Procedure: LASER KTP TRANSURETHRAL RESECTION (TUR) PROSTATE;  Surgeon: Lokesh Lima MD;  Location: [facility] OR     TRANSPOSITION ULNAR NERVE (ELBOW) Left 11/29/2019    Procedure: LEFT ELBOW ULNER NERVE SUBMUSCULAR TRANSPOSITION;  Surgeon: Nicole Bai MD;  Location: [facility] OR     ulnar reroute      right      Current Outpatient Medications   Medication Sig Dispense Refill     allopurinol (ZYLOPRIM) 300 MG tablet TAKE 1 TABLET(300 MG) BY MOUTH DAILY 90 tablet 0     finasteride (PROSCAR) 5 MG tablet TAKE 1 TABLET(5 MG) BY MOUTH DAILY 90 tablet 3     furosemide (LASIX) 40 MG tablet TAKE 1 AND 1/2 TABLETS(60 MG) BY MOUTH DAILY 135 tablet 3     ipratropium (ATROVENT) 0.06 % nasal spray USE 2  "SPRAYS IN EACH NOSTRILS FOUR TIMES DAILY AS NEEDED FOR RHINITIS 135 mL 3     losartan (COZAAR) 50 MG tablet Take 1 tablet (50 mg) by mouth daily 90 tablet 3     metoprolol tartrate (LOPRESSOR) 50 MG tablet TAKE 2 TABLETS(100 MG) BY MOUTH TWICE DAILY 360 tablet 3     Multiple Vitamin (MULTI-VITAMIN) per tablet Take 1 tablet by mouth daily. 100 tablet 12     simvastatin (ZOCOR) 10 MG tablet TAKE 1 TABLET(10 MG) BY MOUTH AT BEDTIME 90 tablet 3     SYMBICORT 80-4.5 MCG/ACT Inhaler INHALE 2 PUFFS INTO THE LUNGS TWICE DAILY 10.2 g 3     vitamin D3 (CHOLECALCIFEROL) 50 mcg (2000 units) tablet Take 1 tablet by mouth daily       warfarin ANTICOAGULANT (COUMADIN) 2 MG tablet TAKE 1 AND 1/2 TABLETS BY MOUTH DAILY 135 tablet 3     nystatin (MYCOSTATIN) 544117 UNIT/ML suspension Swish and spit a 1/2 tsp tid (Patient not taking: Reported on 2022) 400 mL 0       Allergies   Allergen Reactions     Cardizem [Diltiazem]      Swelling and poor rate control     Lisinopril Cough        Social History     Tobacco Use     Smoking status: Former     Packs/day: 1.00     Years: 5.00     Pack years: 5.00     Types: Cigarettes     Quit date: 4/3/1977     Years since quittin.7     Smokeless tobacco: Never   Substance Use Topics     Alcohol use: No     Alcohol/week: 0.0 standard drinks     Family History   Problem Relation Age of Onset     Heart Disease Mother 92        Heart failure     Heart Disease Father 92     C.A.D. Brother 70        MI     Myocardial Infarction Brother      Family History Negative Sister      Family History Negative Brother      Family History Negative Brother      Family History Negative Brother      Family History Negative Sister      Myocardial Infarction Son      Heart Disease Son      Heart Disease Son      History   Drug Use No         Objective     BP (!) 150/77   Pulse 60   Temp 97.5  F (36.4  C) (Temporal)   Resp 16   Ht 1.676 m (5' 6\")   Wt 132.5 kg (292 lb)   SpO2 97%   BMI 47.13 kg/m  "     Physical Exam    GENERAL APPEARANCE: healthy, alert and no distress    GENERAL APPEARANCE: over weight     EYES: EOMI,  PERRL     HENT: ear canals and TM's normal and nose and mouth without ulcers or lesions     NECK: no adenopathy, no asymmetry, masses, or scars and thyroid normal to palpation     RESP: lungs clear to auscultation - no rales, rhonchi or wheezes     CV: irregularly irregular rhythm     ABDOMEN:  soft, nontender, no HSM or masses and bowel sounds normal     MS: extremities normal- no gross deformities noted, no evidence of inflammation in joints, FROM in all extremities.     SKIN: no suspicious lesions or rashes     NEURO: Normal strength and tone, sensory exam grossly normal, mentation intact and speech normal     PSYCH: mentation appears normal. and affect normal/bright     LYMPHATICS: No cervical adenopathy    Recent Labs   Lab Test 11/16/22  0000 10/19/22  0000 03/18/22  0939 02/18/22  1007 08/27/21  0920 07/29/21  1130 07/29/21  0000   HGB  --   --   --   --   --   --  14.1   PLT  --   --   --   --   --   --  178   INR 2.7 3.0   < >  --    < >  --   --    NA  --   --   --  141  --  138  --    POTASSIUM  --   --   --  4.3  --  4.7  --    CR  --   --   --  1.01  --  1.23  --     < > = values in this interval not displayed.        Diagnostics:  Labs pending at this time.  Results will be reviewed when available.   No EKG required for low risk surgery (cataract, skin procedure, breast biopsy, etc).    Revised Cardiac Risk Index (RCRI):  The patient has the following serious cardiovascular risks for perioperative complications:   - No serious cardiac risks = 0 points     RCRI Interpretation: 0 points: Class I (very low risk - 0.4% complication rate)           Signed Electronically by: Malvin Rosen MD  Copy of this evaluation report is provided to requesting physician.

## 2022-12-11 DIAGNOSIS — Z76.0 ENCOUNTER FOR MEDICATION REFILL: Primary | ICD-10-CM

## 2022-12-12 ENCOUNTER — OFFICE VISIT (OUTPATIENT)
Dept: FAMILY MEDICINE | Facility: CLINIC | Age: 87
End: 2022-12-12

## 2022-12-12 VITALS
TEMPERATURE: 97.5 F | BODY MASS INDEX: 46.93 KG/M2 | SYSTOLIC BLOOD PRESSURE: 150 MMHG | RESPIRATION RATE: 16 BRPM | OXYGEN SATURATION: 97 % | WEIGHT: 292 LBS | HEIGHT: 66 IN | HEART RATE: 60 BPM | DIASTOLIC BLOOD PRESSURE: 77 MMHG

## 2022-12-12 DIAGNOSIS — H25.13 AGE-RELATED NUCLEAR CATARACT OF BOTH EYES: ICD-10-CM

## 2022-12-12 DIAGNOSIS — D68.69 SECONDARY HYPERCOAGULABLE STATE (H): ICD-10-CM

## 2022-12-12 DIAGNOSIS — R60.0 MILD PERIPHERAL EDEMA: ICD-10-CM

## 2022-12-12 DIAGNOSIS — I48.20 CHRONIC ATRIAL FIBRILLATION (H): ICD-10-CM

## 2022-12-12 DIAGNOSIS — G25.81 RESTLESS LEG SYNDROME: ICD-10-CM

## 2022-12-12 DIAGNOSIS — Z01.818 PREOP GENERAL PHYSICAL EXAM: Primary | ICD-10-CM

## 2022-12-12 DIAGNOSIS — I10 ESSENTIAL HYPERTENSION: ICD-10-CM

## 2022-12-12 DIAGNOSIS — I25.10 CORONARY ARTERY DISEASE INVOLVING NATIVE CORONARY ARTERY OF NATIVE HEART WITHOUT ANGINA PECTORIS: ICD-10-CM

## 2022-12-12 DIAGNOSIS — E66.01 MORBID OBESITY DUE TO EXCESS CALORIES (H): ICD-10-CM

## 2022-12-12 DIAGNOSIS — Z79.01 LONG TERM CURRENT USE OF ANTICOAGULANT THERAPY: ICD-10-CM

## 2022-12-12 DIAGNOSIS — G47.33 OSA ON CPAP: ICD-10-CM

## 2022-12-12 DIAGNOSIS — E78.5 HYPERLIPIDEMIA WITH TARGET LDL LESS THAN 100: ICD-10-CM

## 2022-12-12 DIAGNOSIS — J45.20 MILD INTERMITTENT REACTIVE AIRWAY DISEASE WITHOUT COMPLICATION: ICD-10-CM

## 2022-12-12 DIAGNOSIS — N40.0 BENIGN PROSTATIC HYPERPLASIA WITHOUT LOWER URINARY TRACT SYMPTOMS: ICD-10-CM

## 2022-12-12 PROBLEM — R05.3 CHRONIC COUGH: Status: RESOLVED | Noted: 2020-01-22 | Resolved: 2022-12-12

## 2022-12-12 LAB — INR PPP: 2.6 (ref 2–3)

## 2022-12-12 PROCEDURE — 85610 PROTHROMBIN TIME: CPT | Performed by: FAMILY MEDICINE

## 2022-12-12 PROCEDURE — 99215 OFFICE O/P EST HI 40 MIN: CPT | Performed by: FAMILY MEDICINE

## 2022-12-12 PROCEDURE — 36415 COLL VENOUS BLD VENIPUNCTURE: CPT | Performed by: FAMILY MEDICINE

## 2022-12-12 RX ORDER — ALLOPURINOL 300 MG/1
TABLET ORAL
Qty: 90 TABLET | Refills: 0 | Status: SHIPPED | OUTPATIENT
Start: 2022-12-12 | End: 2023-03-12

## 2022-12-13 NOTE — PROGRESS NOTES
12/12/22 faxed this preop to [facility] @[phone]    Luis Antonio Blanco,   [facility]  [phone]

## 2022-12-14 LAB
ALT SERPL-CCNC: 21 IU/L (ref 0–44)
BUN SERPL-MCNC: 29 MG/DL (ref 8–27)
BUN/CREATININE RATIO: 29 (ref 10–24)
CALCIUM SERPL-MCNC: 9.6 MG/DL (ref 8.6–10.2)
CHLORIDE SERPLBLD-SCNC: 104 MMOL/L (ref 96–106)
CHOLEST SERPL-MCNC: 140 MG/DL (ref 100–199)
CREAT SERPL-MCNC: 1.01 MG/DL (ref 0.76–1.27)
EGFR: 72 ML/MIN/1.73
GLUCOSE SERPL-MCNC: 91 MG/DL (ref 70–99)
HDLC SERPL-MCNC: 43 MG/DL
LDL/HDL RATIO: 1.6 RATIO (ref 0–3.6)
LDLC SERPL CALC-MCNC: 69 MG/DL (ref 0–99)
POTASSIUM SERPL-SCNC: 4.3 MMOL/L (ref 3.5–5.2)
SODIUM SERPL-SCNC: 141 MMOL/L (ref 134–144)
TOTAL CO2: 21 MMOL/L (ref 20–29)
TRIGL SERPL-MCNC: 162 MG/DL (ref 0–149)
VLDLC SERPL CALC-MCNC: 28 MG/DL (ref 5–40)

## 2022-12-15 NOTE — PROGRESS NOTES
12/14/22 faxed this preop and labs to [facility] @ [phone]    Luis Antonio Blanco,   [facility]  [phone]

## 2022-12-16 NOTE — RESULT ENCOUNTER NOTE
Dear Vasiliy,     I am writing to report that your included test results are as expected.    Many labs contain some results that are slightly outside of the normal range, I have reviewed any of these results and they require no changes at this time.    Malvin Rosen MD

## 2023-01-10 DIAGNOSIS — J98.4 RESTRICTIVE AIRWAY DISEASE: ICD-10-CM

## 2023-01-10 RX ORDER — DILTIAZEM HYDROCHLORIDE 60 MG/1
TABLET, FILM COATED ORAL
Qty: 10.2 G | Refills: 3 | Status: SHIPPED | OUTPATIENT
Start: 2023-01-10 | End: 2023-11-20

## 2023-01-12 DIAGNOSIS — I48.20 CHRONIC ATRIAL FIBRILLATION (H): ICD-10-CM

## 2023-01-12 DIAGNOSIS — Z79.01 LONG TERM CURRENT USE OF ANTICOAGULANT THERAPY: ICD-10-CM

## 2023-01-12 LAB — INR PPP: 2.7 (ref 2–3)

## 2023-01-12 PROCEDURE — 85610 PROTHROMBIN TIME: CPT | Performed by: FAMILY MEDICINE

## 2023-02-02 ENCOUNTER — TRANSFERRED RECORDS (OUTPATIENT)
Dept: FAMILY MEDICINE | Facility: CLINIC | Age: 88
End: 2023-02-02

## 2023-02-13 DIAGNOSIS — Z79.01 LONG TERM CURRENT USE OF ANTICOAGULANT THERAPY: ICD-10-CM

## 2023-02-13 DIAGNOSIS — I48.20 CHRONIC ATRIAL FIBRILLATION (H): ICD-10-CM

## 2023-02-13 LAB — INR PPP: 3 (ref 2–3)

## 2023-02-13 PROCEDURE — 85610 PROTHROMBIN TIME: CPT | Performed by: FAMILY MEDICINE

## 2023-02-15 DIAGNOSIS — N40.0 BENIGN PROSTATIC HYPERPLASIA WITHOUT LOWER URINARY TRACT SYMPTOMS: ICD-10-CM

## 2023-02-15 RX ORDER — FINASTERIDE 5 MG/1
TABLET, FILM COATED ORAL
Qty: 90 TABLET | Refills: 3 | Status: SHIPPED | OUTPATIENT
Start: 2023-02-15 | End: 2024-01-29

## 2023-03-11 DIAGNOSIS — Z76.0 ENCOUNTER FOR MEDICATION REFILL: ICD-10-CM

## 2023-03-12 RX ORDER — ALLOPURINOL 300 MG/1
TABLET ORAL
Qty: 90 TABLET | Refills: 0 | Status: SHIPPED | OUTPATIENT
Start: 2023-03-12 | End: 2023-06-01

## 2023-03-13 DIAGNOSIS — Z79.01 LONG TERM CURRENT USE OF ANTICOAGULANT THERAPY: ICD-10-CM

## 2023-03-13 DIAGNOSIS — I48.20 CHRONIC ATRIAL FIBRILLATION (H): ICD-10-CM

## 2023-03-13 LAB — INR PPP: 2.8 (ref 2–3)

## 2023-03-13 PROCEDURE — 85610 PROTHROMBIN TIME: CPT | Performed by: FAMILY MEDICINE

## 2023-04-01 DIAGNOSIS — I10 ESSENTIAL HYPERTENSION: ICD-10-CM

## 2023-04-02 RX ORDER — LOSARTAN POTASSIUM 50 MG/1
TABLET ORAL
Qty: 90 TABLET | Refills: 3 | Status: ON HOLD | OUTPATIENT
Start: 2023-04-02 | End: 2023-05-30

## 2023-04-02 RX ORDER — LOSARTAN POTASSIUM 50 MG/1
TABLET ORAL
Qty: 90 TABLET | Refills: 3 | Status: SHIPPED | OUTPATIENT
Start: 2023-04-02 | End: 2024-04-15

## 2023-04-10 DIAGNOSIS — I48.20 CHRONIC ATRIAL FIBRILLATION (H): ICD-10-CM

## 2023-04-10 DIAGNOSIS — Z79.01 LONG TERM CURRENT USE OF ANTICOAGULANT THERAPY: ICD-10-CM

## 2023-04-10 LAB — INR PPP: 2.8 (ref 2–3)

## 2023-04-10 PROCEDURE — 85610 PROTHROMBIN TIME: CPT | Performed by: FAMILY MEDICINE

## 2023-04-24 DIAGNOSIS — Z79.01 LONG TERM CURRENT USE OF ANTICOAGULANT THERAPY: Primary | ICD-10-CM

## 2023-04-24 DIAGNOSIS — I48.20 CHRONIC ATRIAL FIBRILLATION (H): ICD-10-CM

## 2023-05-02 DIAGNOSIS — I48.20 CHRONIC ATRIAL FIBRILLATION (H): ICD-10-CM

## 2023-05-02 DIAGNOSIS — E78.5 HYPERLIPIDEMIA WITH TARGET LDL LESS THAN 100: ICD-10-CM

## 2023-05-02 RX ORDER — WARFARIN SODIUM 2 MG/1
TABLET ORAL
Qty: 135 TABLET | Refills: 3 | Status: SHIPPED | OUTPATIENT
Start: 2023-05-02 | End: 2024-04-28

## 2023-05-02 RX ORDER — SIMVASTATIN 10 MG
TABLET ORAL
Qty: 90 TABLET | Refills: 3 | Status: SHIPPED | OUTPATIENT
Start: 2023-05-02 | End: 2024-04-15

## 2023-05-02 NOTE — TELEPHONE ENCOUNTER
Simvastatin  Warfarin  LOV 12/12/22     Component      Latest Ref Rng 12/12/2022  12:17 PM   Cholesterol      100 - 199 mg/dL 140    Triglycerides      0 - 149 mg/dL 162 (H)    HDL Cholesterol      >39 mg/dL 43    VLDL Cholesterol Antonio      5 - 40 mg/dL 28    LDL Cholesterol Calculated      0 - 99 mg/dL 69    LDL/HDL Ratio      0.0 - 3.6 ratio 1.6       Legend:  (H) High    Chronic atrial fibrillation (H)  Actively managed by Dr. Menendez, his last note reviewed.   Latest Ref Rng 2/13/2023   [facility]     INR 2.0 - 3.0  3.0    ASSESS  THER    INR GOAL  2.0-3.0    WEEKLY DOSE  3 mg daily    PT INSTRUCT  continue same dose per protocol/dmo    RECHECK  4 WEEKS      ATRIAL FIB    LOCATION  Clinic    Comments  no diet or med changes. no bleeds/bruises per pt    Comments  pt informed/dmo       Latest Ref Rng 3/13/2023   [facility]     INR 2.0 - 3.0  2.8    ASSESS  THER    INR GOAL  2.0-3.0    WEEKLY DOSE  3 mg daily    PT INSTRUCT  continue same dose per protocol/dmo    RECHECK  4 WEEKS      ATRIAL FIB    LOCATION  Clinic    Comments  no diet or med changes. no bleeds/bruises per pt    Comments  pt informed/dmo       Latest Ref Rng 4/10/2023   [facility]     INR 2.0 - 3.0  2.8    ASSESS  THER    INR GOAL  2.0-3.0    WEEKLY DOSE  3mg qd    PT INSTRUCT  Continue same dose per protocol/TC    RECHECK  4 WEEKS      ATRIAL FIB    LOCATION  Clinic    Comments  No bleeding, med or diet changes/TC    Comments  Patient informed/TC

## 2023-05-05 DIAGNOSIS — I48.20 CHRONIC ATRIAL FIBRILLATION (H): ICD-10-CM

## 2023-05-05 RX ORDER — METOPROLOL TARTRATE 50 MG
TABLET ORAL
Qty: 360 TABLET | Refills: 3 | Status: SHIPPED | OUTPATIENT
Start: 2023-05-05 | End: 2024-04-28

## 2023-05-05 NOTE — TELEPHONE ENCOUNTER
Metoprolol   LOV 12/12/22  BP Readings from Last 3 Encounters:   12/12/22 (!) 150/77   11/30/22 133/72   02/18/22 132/76     Component      Latest Ref Rng 12/12/2022  12:17 PM   Glucose      70 - 99 mg/dL 91    Urea Nitrogen      8 - 27 mg/dL 29 (H)    Creatinine      0.76 - 1.27 mg/dL 1.01    eGFR       >59 mL/min/1.73 72    BUN/Creatinine Ratio      10 - 24  29 (H)    Sodium      134 - 144 mmol/L 141    Potassium      3.5 - 5.2 mmol/L 4.3    Chloride      96 - 106 mmol/L 104    Total CO2      20 - 29 mmol/L 21    Calcium      8.6 - 10.2 mg/dL 9.6    Cholesterol      100 - 199 mg/dL 140    Triglycerides      0 - 149 mg/dL 162 (H)    HDL Cholesterol      >39 mg/dL 43    VLDL Cholesterol Antonio      5 - 40 mg/dL 28    LDL Cholesterol Calculated      0 - 99 mg/dL 69    LDL/HDL Ratio      0.0 - 3.6 ratio 1.6       Legend:  (H) High

## 2023-05-09 DIAGNOSIS — Z79.01 LONG TERM CURRENT USE OF ANTICOAGULANT THERAPY: ICD-10-CM

## 2023-05-09 DIAGNOSIS — I48.20 CHRONIC ATRIAL FIBRILLATION (H): ICD-10-CM

## 2023-05-09 LAB — INR PPP: 3.2 (ref 2–3)

## 2023-05-09 PROCEDURE — 85610 PROTHROMBIN TIME: CPT | Performed by: FAMILY MEDICINE

## 2023-05-24 DIAGNOSIS — Z79.01 LONG TERM CURRENT USE OF ANTICOAGULANT THERAPY: ICD-10-CM

## 2023-05-24 DIAGNOSIS — I48.20 CHRONIC ATRIAL FIBRILLATION (H): ICD-10-CM

## 2023-05-24 LAB — INR PPP: 2.6 (ref 2–3)

## 2023-05-24 PROCEDURE — 36416 COLLJ CAPILLARY BLOOD SPEC: CPT | Performed by: FAMILY MEDICINE

## 2023-05-24 PROCEDURE — 85610 PROTHROMBIN TIME: CPT | Performed by: FAMILY MEDICINE

## 2023-05-27 ENCOUNTER — HOSPITAL ENCOUNTER (INPATIENT)
Facility: CLINIC | Age: 88
LOS: 1 days | Discharge: HOME | DRG: 699 | End: 2023-05-30
Attending: EMERGENCY MEDICINE | Admitting: INTERNAL MEDICINE
Payer: MEDICARE

## 2023-05-27 DIAGNOSIS — I48.20 CHRONIC ATRIAL FIBRILLATION (H): ICD-10-CM

## 2023-05-27 DIAGNOSIS — N32.0 BLADDER OUTLET OBSTRUCTION: ICD-10-CM

## 2023-05-27 DIAGNOSIS — R31.9 HEMATURIA, UNSPECIFIED TYPE: Primary | ICD-10-CM

## 2023-05-27 LAB
ALBUMIN SERPL BCG-MCNC: 4.2 G/DL (ref 3.5–5.2)
ALBUMIN UR-MCNC: NEGATIVE MG/DL
ALP SERPL-CCNC: 129 U/L (ref 40–129)
ALT SERPL W P-5'-P-CCNC: 21 U/L (ref 10–50)
ANION GAP SERPL CALCULATED.3IONS-SCNC: 13 MMOL/L (ref 7–15)
ANION GAP SERPL CALCULATED.3IONS-SCNC: 13 MMOL/L (ref 7–15)
APPEARANCE UR: CLEAR
AST SERPL W P-5'-P-CCNC: 26 U/L (ref 10–50)
BACTERIA #/AREA URNS HPF: ABNORMAL /HPF
BASOPHILS # BLD AUTO: 0 10E3/UL (ref 0–0.2)
BASOPHILS # BLD AUTO: 0 10E3/UL (ref 0–0.2)
BASOPHILS NFR BLD AUTO: 0 %
BASOPHILS NFR BLD AUTO: 0 %
BILIRUB SERPL-MCNC: 0.9 MG/DL
BILIRUB UR QL STRIP: NEGATIVE
BUN SERPL-MCNC: 34.8 MG/DL (ref 8–23)
BUN SERPL-MCNC: 34.8 MG/DL (ref 8–23)
CALCIUM SERPL-MCNC: 9.6 MG/DL (ref 8.8–10.2)
CALCIUM SERPL-MCNC: 9.6 MG/DL (ref 8.8–10.2)
CHLORIDE SERPL-SCNC: 103 MMOL/L (ref 98–107)
CHLORIDE SERPL-SCNC: 103 MMOL/L (ref 98–107)
COLOR UR AUTO: ABNORMAL
CREAT SERPL-MCNC: 1.22 MG/DL (ref 0.67–1.17)
CREAT SERPL-MCNC: 1.22 MG/DL (ref 0.67–1.17)
DEPRECATED HCO3 PLAS-SCNC: 24 MMOL/L (ref 22–29)
DEPRECATED HCO3 PLAS-SCNC: 24 MMOL/L (ref 22–29)
EOSINOPHIL # BLD AUTO: 0 10E3/UL (ref 0–0.7)
EOSINOPHIL # BLD AUTO: 0.1 10E3/UL (ref 0–0.7)
EOSINOPHIL NFR BLD AUTO: 0 %
EOSINOPHIL NFR BLD AUTO: 1 %
ERYTHROCYTE [DISTWIDTH] IN BLOOD BY AUTOMATED COUNT: 14.2 % (ref 10–15)
ERYTHROCYTE [DISTWIDTH] IN BLOOD BY AUTOMATED COUNT: 14.3 % (ref 10–15)
GFR SERPL CREATININE-BSD FRML MDRD: 57 ML/MIN/1.73M2
GFR SERPL CREATININE-BSD FRML MDRD: 57 ML/MIN/1.73M2
GLUCOSE SERPL-MCNC: 125 MG/DL (ref 70–99)
GLUCOSE SERPL-MCNC: 125 MG/DL (ref 70–99)
GLUCOSE UR STRIP-MCNC: NEGATIVE MG/DL
HCT VFR BLD AUTO: 41.2 % (ref 40–53)
HCT VFR BLD AUTO: 41.9 % (ref 40–53)
HGB BLD-MCNC: 13.7 G/DL (ref 13.3–17.7)
HGB BLD-MCNC: 14 G/DL (ref 13.3–17.7)
HGB UR QL STRIP: ABNORMAL
IMM GRANULOCYTES # BLD: 0 10E3/UL
IMM GRANULOCYTES # BLD: 0.1 10E3/UL
IMM GRANULOCYTES NFR BLD: 0 %
IMM GRANULOCYTES NFR BLD: 1 %
INR PPP: 2.95 (ref 0.85–1.15)
INR PPP: 3.13 (ref 0.85–1.15)
KETONES UR STRIP-MCNC: NEGATIVE MG/DL
LEUKOCYTE ESTERASE UR QL STRIP: NEGATIVE
LYMPHOCYTES # BLD AUTO: 1.2 10E3/UL (ref 0.8–5.3)
LYMPHOCYTES # BLD AUTO: 1.3 10E3/UL (ref 0.8–5.3)
LYMPHOCYTES NFR BLD AUTO: 13 %
LYMPHOCYTES NFR BLD AUTO: 14 %
MCH RBC QN AUTO: 32.5 PG (ref 26.5–33)
MCH RBC QN AUTO: 32.6 PG (ref 26.5–33)
MCHC RBC AUTO-ENTMCNC: 33.3 G/DL (ref 31.5–36.5)
MCHC RBC AUTO-ENTMCNC: 33.4 G/DL (ref 31.5–36.5)
MCV RBC AUTO: 97 FL (ref 78–100)
MCV RBC AUTO: 98 FL (ref 78–100)
MONOCYTES # BLD AUTO: 0.7 10E3/UL (ref 0–1.3)
MONOCYTES # BLD AUTO: 0.8 10E3/UL (ref 0–1.3)
MONOCYTES NFR BLD AUTO: 8 %
MONOCYTES NFR BLD AUTO: 8 %
NEUTROPHILS # BLD AUTO: 7.1 10E3/UL (ref 1.6–8.3)
NEUTROPHILS # BLD AUTO: 8.1 10E3/UL (ref 1.6–8.3)
NEUTROPHILS NFR BLD AUTO: 77 %
NEUTROPHILS NFR BLD AUTO: 78 %
NITRATE UR QL: NEGATIVE
NRBC # BLD AUTO: 0 10E3/UL
NRBC # BLD AUTO: 0 10E3/UL
NRBC BLD AUTO-RTO: 0 /100
NRBC BLD AUTO-RTO: 0 /100
PH UR STRIP: 5.5 [PH] (ref 5–7)
PLATELET # BLD AUTO: 183 10E3/UL (ref 150–450)
PLATELET # BLD AUTO: 194 10E3/UL (ref 150–450)
POTASSIUM SERPL-SCNC: 4.4 MMOL/L (ref 3.4–5.3)
POTASSIUM SERPL-SCNC: 4.4 MMOL/L (ref 3.4–5.3)
PROT SERPL-MCNC: 7.6 G/DL (ref 6.4–8.3)
RBC # BLD AUTO: 4.21 10E6/UL (ref 4.4–5.9)
RBC # BLD AUTO: 4.3 10E6/UL (ref 4.4–5.9)
RBC URINE: 2 /HPF
SODIUM SERPL-SCNC: 140 MMOL/L (ref 136–145)
SODIUM SERPL-SCNC: 140 MMOL/L (ref 136–145)
SP GR UR STRIP: 1 (ref 1–1.03)
UROBILINOGEN UR STRIP-MCNC: NORMAL MG/DL
WBC # BLD AUTO: 10.3 10E3/UL (ref 4–11)
WBC # BLD AUTO: 9.2 10E3/UL (ref 4–11)
WBC URINE: 0 /HPF

## 2023-05-27 PROCEDURE — 81001 URINALYSIS AUTO W/SCOPE: CPT | Performed by: EMERGENCY MEDICINE

## 2023-05-27 PROCEDURE — 80053 COMPREHEN METABOLIC PANEL: CPT | Performed by: EMERGENCY MEDICINE

## 2023-05-27 PROCEDURE — 250N000011 HC RX IP 250 OP 636: Performed by: EMERGENCY MEDICINE

## 2023-05-27 PROCEDURE — 51798 US URINE CAPACITY MEASURE: CPT

## 2023-05-27 PROCEDURE — 99222 1ST HOSP IP/OBS MODERATE 55: CPT | Mod: AI | Performed by: INTERNAL MEDICINE

## 2023-05-27 PROCEDURE — 85610 PROTHROMBIN TIME: CPT | Performed by: EMERGENCY MEDICINE

## 2023-05-27 PROCEDURE — 85025 COMPLETE CBC W/AUTO DIFF WBC: CPT | Performed by: EMERGENCY MEDICINE

## 2023-05-27 PROCEDURE — 250N000009 HC RX 250: Performed by: EMERGENCY MEDICINE

## 2023-05-27 PROCEDURE — G0378 HOSPITAL OBSERVATION PER HR: HCPCS

## 2023-05-27 PROCEDURE — 99285 EMERGENCY DEPT VISIT HI MDM: CPT | Mod: 25

## 2023-05-27 PROCEDURE — 51702 INSERT TEMP BLADDER CATH: CPT

## 2023-05-27 PROCEDURE — 96374 THER/PROPH/DIAG INJ IV PUSH: CPT

## 2023-05-27 PROCEDURE — 96376 TX/PRO/DX INJ SAME DRUG ADON: CPT

## 2023-05-27 PROCEDURE — 36415 COLL VENOUS BLD VENIPUNCTURE: CPT | Performed by: EMERGENCY MEDICINE

## 2023-05-27 RX ORDER — LIDOCAINE HYDROCHLORIDE 20 MG/ML
10 JELLY TOPICAL ONCE
Status: COMPLETED | OUTPATIENT
Start: 2023-05-27 | End: 2023-05-27

## 2023-05-27 RX ORDER — HYDROMORPHONE HYDROCHLORIDE 1 MG/ML
0.5 INJECTION, SOLUTION INTRAMUSCULAR; INTRAVENOUS; SUBCUTANEOUS ONCE
Status: COMPLETED | OUTPATIENT
Start: 2023-05-27 | End: 2023-05-27

## 2023-05-27 RX ORDER — POLYETHYLENE GLYCOL 3350 17 G/17G
17 POWDER, FOR SOLUTION ORAL DAILY PRN
Status: DISCONTINUED | OUTPATIENT
Start: 2023-05-27 | End: 2023-05-30 | Stop reason: HOSPADM

## 2023-05-27 RX ORDER — LOSARTAN POTASSIUM 50 MG/1
50 TABLET ORAL DAILY
Status: DISCONTINUED | OUTPATIENT
Start: 2023-05-28 | End: 2023-05-30 | Stop reason: HOSPADM

## 2023-05-27 RX ORDER — FINASTERIDE 5 MG/1
5 TABLET, FILM COATED ORAL DAILY
Status: DISCONTINUED | OUTPATIENT
Start: 2023-05-28 | End: 2023-05-30 | Stop reason: HOSPADM

## 2023-05-27 RX ORDER — AMOXICILLIN 250 MG
1 CAPSULE ORAL 2 TIMES DAILY PRN
Status: DISCONTINUED | OUTPATIENT
Start: 2023-05-27 | End: 2023-05-30 | Stop reason: HOSPADM

## 2023-05-27 RX ORDER — AMOXICILLIN 250 MG
2 CAPSULE ORAL 2 TIMES DAILY PRN
Status: DISCONTINUED | OUTPATIENT
Start: 2023-05-27 | End: 2023-05-30 | Stop reason: HOSPADM

## 2023-05-27 RX ORDER — ONDANSETRON 2 MG/ML
4 INJECTION INTRAMUSCULAR; INTRAVENOUS EVERY 6 HOURS PRN
Status: DISCONTINUED | OUTPATIENT
Start: 2023-05-27 | End: 2023-05-30 | Stop reason: HOSPADM

## 2023-05-27 RX ORDER — ACETAMINOPHEN 325 MG/1
650 TABLET ORAL EVERY 6 HOURS PRN
Status: DISCONTINUED | OUTPATIENT
Start: 2023-05-27 | End: 2023-05-30 | Stop reason: HOSPADM

## 2023-05-27 RX ORDER — METOPROLOL TARTRATE 50 MG
100 TABLET ORAL 2 TIMES DAILY
Status: DISCONTINUED | OUTPATIENT
Start: 2023-05-27 | End: 2023-05-30 | Stop reason: HOSPADM

## 2023-05-27 RX ORDER — ONDANSETRON 4 MG/1
4 TABLET, ORALLY DISINTEGRATING ORAL EVERY 6 HOURS PRN
Status: DISCONTINUED | OUTPATIENT
Start: 2023-05-27 | End: 2023-05-30 | Stop reason: HOSPADM

## 2023-05-27 RX ORDER — ACETAMINOPHEN 650 MG/1
650 SUPPOSITORY RECTAL EVERY 6 HOURS PRN
Status: DISCONTINUED | OUTPATIENT
Start: 2023-05-27 | End: 2023-05-30 | Stop reason: HOSPADM

## 2023-05-27 RX ADMIN — HYDROMORPHONE HYDROCHLORIDE 0.5 MG: 1 INJECTION, SOLUTION INTRAMUSCULAR; INTRAVENOUS; SUBCUTANEOUS at 21:10

## 2023-05-27 RX ADMIN — LIDOCAINE HYDROCHLORIDE 10 ML: 20 JELLY TOPICAL at 19:02

## 2023-05-27 RX ADMIN — HYDROMORPHONE HYDROCHLORIDE 0.5 MG: 1 INJECTION, SOLUTION INTRAMUSCULAR; INTRAVENOUS; SUBCUTANEOUS at 20:00

## 2023-05-27 ASSESSMENT — ACTIVITIES OF DAILY LIVING (ADL)
ADLS_ACUITY_SCORE: 35
ADLS_ACUITY_SCORE: 35
ADLS_ACUITY_SCORE: 33

## 2023-05-27 NOTE — ED PROVIDER NOTES
"  History     Chief Complaint:  Urinary Retention       HPI   Vasiliy Corbin is a 88 year old anticoagulated male with a history of BPH who presents with urinary retention. His last urinary movement was at 1030 this morning, and since then he has only noticed small amounts of blood and small clots in his urine. He recently had his INR checked this week which was 2.6. Here, he is feeling bladder fullness and states, \"it feels likes its going to pop.\" He last attempted to urinate right before leaving to come to the ED, but was unable to get any urine out. His leg swelling is at baseline. He denies dysuria.      Independent Historian:   None - Patient Only    Review of External Notes:   None       Medications:    Zyloprim  Proscar  Lasix  Atrovent  Cozaar  Lopressor  Zocor  Coumadin    Past Medical History:    A-fib  CAD  Cardiomyopathy  Colon polyp  Gout  Hiatal hernia  BPH  Hypertension  Hyperlipidemia  Neuropathy  SUJEY    Past Surgical History:    Clavicle surgery  CT release  Cystoscopy x3  Left heart catheterization  Transurethral resection x2  Ulnar nerve reroute     Physical Exam     Patient Vitals for the past 24 hrs:   BP Temp Temp src Pulse Resp SpO2 Height Weight   05/27/23 1820 (!) 167/79 97.6  F (36.4  C) Temporal 89 18 98 % 1.676 m (5' 6\") 127.9 kg (282 lb)        Physical Exam  Constitutional: Elderly white male. Supine. No respiratory distress  HENT: No signs of trauma.   Eyes: EOM are normal. Pupils are equal, round, and reactive to light.   Neck: Normal range of motion. No JVD present. No cervical adenopathy.  Cardiovascular: Regular irregular rhythm.  Exam reveals no gallop and no friction rub.    No murmur heard.  Pulmonary/Chest: Bilateral breath sounds normal. No wheezes, rhonchi or rales.  Abdominal: Soft. No tenderness. No rebound or guarding. 2+ femoral pulses.  : Circumcised male. Small amount of blood in underwear, testes bilaterally descended nontender  Musculoskeletal: No edema. No " tenderness. Trace edema in both legs.  Lymphadenopathy: No lymphadenopathy.   Neurological: Alert and oriented to person, place, and time. Normal strength. Coordination normal.   Skin: Skin is warm and dry. No rash noted. No erythema.     Emergency Department Course     Laboratory:  Labs Ordered and Resulted from Time of ED Arrival to Time of ED Departure   COMPREHENSIVE METABOLIC PANEL - Abnormal       Result Value    Sodium 140      Potassium 4.4      Chloride 103      Carbon Dioxide (CO2) 24      Anion Gap 13      Urea Nitrogen 34.8 (*)     Creatinine 1.22 (*)     Calcium 9.6      Glucose 125 (*)     Alkaline Phosphatase 129      AST 26      ALT 21      Protein Total 7.6      Albumin 4.2      Bilirubin Total 0.9      GFR Estimate 57 (*)    INR - Abnormal    INR 3.13 (*)    CBC WITH PLATELETS AND DIFFERENTIAL - Abnormal    WBC Count 9.2      RBC Count 4.21 (*)     Hemoglobin 13.7      Hematocrit 41.2      MCV 98      MCH 32.5      MCHC 33.3      RDW 14.2      Platelet Count 183      % Neutrophils 77      % Lymphocytes 14      % Monocytes 8      % Eosinophils 1      % Basophils 0      % Immature Granulocytes 0      NRBCs per 100 WBC 0      Absolute Neutrophils 7.1      Absolute Lymphocytes 1.2      Absolute Monocytes 0.7      Absolute Eosinophils 0.1      Absolute Basophils 0.0      Absolute Immature Granulocytes 0.0      Absolute NRBCs 0.0     BASIC METABOLIC PANEL - Abnormal    Sodium 140      Potassium 4.4      Chloride 103      Carbon Dioxide (CO2) 24      Anion Gap 13      Urea Nitrogen 34.8 (*)     Creatinine 1.22 (*)     Calcium 9.6      Glucose 125 (*)     GFR Estimate 57 (*)    INR - Abnormal    INR 2.95 (*)    CBC WITH PLATELETS AND DIFFERENTIAL - Abnormal    WBC Count 10.3      RBC Count 4.30 (*)     Hemoglobin 14.0      Hematocrit 41.9      MCV 97      MCH 32.6      MCHC 33.4      RDW 14.3      Platelet Count 194      % Neutrophils 78      % Lymphocytes 13      % Monocytes 8      % Eosinophils 0      %  Basophils 0      % Immature Granulocytes 1      NRBCs per 100 WBC 0      Absolute Neutrophils 8.1      Absolute Lymphocytes 1.3      Absolute Monocytes 0.8      Absolute Eosinophils 0.0      Absolute Basophils 0.0      Absolute Immature Granulocytes 0.1      Absolute NRBCs 0.0     ROUTINE UA WITH MICROSCOPIC REFLEX TO CULTURE        Emergency Department Course & Assessments:    Interventions:  Medications   lidocaine (XYLOCAINE) 2 % external gel 10 mL (10 mLs Urethral $Given 5/27/23 1902)   HYDROmorphone (PF) (DILAUDID) injection 0.5 mg (0.5 mg Intravenous $Given 5/27/23 2000)        Assessments:  1847 I obtained history and examined the patient, as noted above.    Independent Interpretation (X-rays, CTs, rhythm strip):  None    Consultations/Discussion of Management or Tests:      Social Determinants of Health affecting care:   None    Disposition:  observation    Impression & Plan      Medical Decision Making:  This is a 88-year-old male who presents to the ED complaining of inability to void. He has a history of prostatic hypertrophy and underwent a cysto and TURP on February of 2020. He also is on blood thinners, coumadin for history of a-fib. He states he noted a few drops of blood this morning, but was last able to void approximately 10 hours ago. He feels very uncomfortable. His abdominal exam reveals some abdominal distention and there are some drops of blood in his underwear. Lab work was obtained and nursing attempted pressley placement and were unsuccessful. I tried placing a coude catheter and was able to put the catheter up to the hub, but could not get any urine out, suggesting this may have been a false passage. The urethra felt very tight as I was trying to pass this catheter. Patient has received pain medication.  I have spoken with Dr Navarrete urologist on call, who will  come in and  get a catheter placed. Likely he will needs obs for irrigation.. hospitalist contacted and signed out to   Ana    Diagnosis:    ICD-10-CM    1. Bladder outlet obstruction  N32.0                   Scribe Disclosure:  I, Shaji Noonan, am serving as a scribe at 8:35 PM on 5/27/2023 to document services personally performed by Benjamin Nugent MD based on my observations and the provider's statements to me.      Benjamin Nugent MD  05/27/23 2100

## 2023-05-27 NOTE — ED TRIAGE NOTES
"Patient presents with complaints of urinary retention since this morning. Patient has a history of BPH and a \"surgery\" about 4 years ago. Patient is on Coumadin and has passed a few small clots earlier this this week, then it was clear until yesterday when he noted a small amount of blood in his urine and small clots again today.     Triage Assessment     Row Name 05/27/23 9190       Triage Assessment (Adult)    Airway WDL WDL       Respiratory WDL    Respiratory WDL WDL       Skin Circulation/Temperature WDL    Skin Circulation/Temperature WDL WDL       Cardiac WDL    Cardiac WDL WDL       Peripheral/Neurovascular WDL    Peripheral Neurovascular WDL WDL       Cognitive/Neuro/Behavioral WDL    Cognitive/Neuro/Behavioral WDL WDL              "

## 2023-05-28 LAB
ANION GAP SERPL CALCULATED.3IONS-SCNC: 9 MMOL/L (ref 7–15)
BUN SERPL-MCNC: 30.8 MG/DL (ref 8–23)
CALCIUM SERPL-MCNC: 9.5 MG/DL (ref 8.8–10.2)
CHLORIDE SERPL-SCNC: 104 MMOL/L (ref 98–107)
CREAT SERPL-MCNC: 1.08 MG/DL (ref 0.67–1.17)
DEPRECATED HCO3 PLAS-SCNC: 25 MMOL/L (ref 22–29)
ERYTHROCYTE [DISTWIDTH] IN BLOOD BY AUTOMATED COUNT: 14.1 % (ref 10–15)
GFR SERPL CREATININE-BSD FRML MDRD: 66 ML/MIN/1.73M2
GLUCOSE SERPL-MCNC: 97 MG/DL (ref 70–99)
HCT VFR BLD AUTO: 39.2 % (ref 40–53)
HGB BLD-MCNC: 13.1 G/DL (ref 13.3–17.7)
INR PPP: 2.69 (ref 0.85–1.15)
MCH RBC QN AUTO: 32.8 PG (ref 26.5–33)
MCHC RBC AUTO-ENTMCNC: 33.4 G/DL (ref 31.5–36.5)
MCV RBC AUTO: 98 FL (ref 78–100)
PLATELET # BLD AUTO: 155 10E3/UL (ref 150–450)
POTASSIUM SERPL-SCNC: 4.2 MMOL/L (ref 3.4–5.3)
RBC # BLD AUTO: 4 10E6/UL (ref 4.4–5.9)
SODIUM SERPL-SCNC: 138 MMOL/L (ref 136–145)
WBC # BLD AUTO: 8.7 10E3/UL (ref 4–11)

## 2023-05-28 PROCEDURE — 250N000013 HC RX MED GY IP 250 OP 250 PS 637

## 2023-05-28 PROCEDURE — 99232 SBSQ HOSP IP/OBS MODERATE 35: CPT | Performed by: STUDENT IN AN ORGANIZED HEALTH CARE EDUCATION/TRAINING PROGRAM

## 2023-05-28 PROCEDURE — 250N000013 HC RX MED GY IP 250 OP 250 PS 637: Performed by: INTERNAL MEDICINE

## 2023-05-28 PROCEDURE — 85610 PROTHROMBIN TIME: CPT | Performed by: STUDENT IN AN ORGANIZED HEALTH CARE EDUCATION/TRAINING PROGRAM

## 2023-05-28 PROCEDURE — G0378 HOSPITAL OBSERVATION PER HR: HCPCS

## 2023-05-28 PROCEDURE — 96375 TX/PRO/DX INJ NEW DRUG ADDON: CPT

## 2023-05-28 PROCEDURE — 80048 BASIC METABOLIC PNL TOTAL CA: CPT | Performed by: STUDENT IN AN ORGANIZED HEALTH CARE EDUCATION/TRAINING PROGRAM

## 2023-05-28 PROCEDURE — 250N000011 HC RX IP 250 OP 636: Performed by: STUDENT IN AN ORGANIZED HEALTH CARE EDUCATION/TRAINING PROGRAM

## 2023-05-28 PROCEDURE — 85027 COMPLETE CBC AUTOMATED: CPT | Performed by: STUDENT IN AN ORGANIZED HEALTH CARE EDUCATION/TRAINING PROGRAM

## 2023-05-28 PROCEDURE — 36415 COLL VENOUS BLD VENIPUNCTURE: CPT | Performed by: STUDENT IN AN ORGANIZED HEALTH CARE EDUCATION/TRAINING PROGRAM

## 2023-05-28 RX ORDER — CEFTRIAXONE 1 G/1
1 INJECTION, POWDER, FOR SOLUTION INTRAMUSCULAR; INTRAVENOUS ONCE
Status: COMPLETED | OUTPATIENT
Start: 2023-05-28 | End: 2023-05-28

## 2023-05-28 RX ORDER — WARFARIN SODIUM 2 MG/1
4 TABLET ORAL
Status: COMPLETED | OUTPATIENT
Start: 2023-05-28 | End: 2023-05-28

## 2023-05-28 RX ADMIN — METOPROLOL TARTRATE 100 MG: 50 TABLET, FILM COATED ORAL at 00:23

## 2023-05-28 RX ADMIN — WARFARIN SODIUM 4 MG: 2 TABLET ORAL at 17:46

## 2023-05-28 RX ADMIN — FINASTERIDE 5 MG: 5 TABLET, FILM COATED ORAL at 10:04

## 2023-05-28 RX ADMIN — LOSARTAN POTASSIUM 50 MG: 50 TABLET, FILM COATED ORAL at 10:03

## 2023-05-28 RX ADMIN — METOPROLOL TARTRATE 100 MG: 50 TABLET, FILM COATED ORAL at 10:04

## 2023-05-28 RX ADMIN — CEFTRIAXONE SODIUM 1 G: 1 INJECTION, POWDER, FOR SOLUTION INTRAMUSCULAR; INTRAVENOUS at 15:54

## 2023-05-28 RX ADMIN — METOPROLOL TARTRATE 100 MG: 50 TABLET, FILM COATED ORAL at 22:35

## 2023-05-28 RX ADMIN — FUROSEMIDE 60 MG: 40 TABLET ORAL at 10:04

## 2023-05-28 ASSESSMENT — ACTIVITIES OF DAILY LIVING (ADL)
ADLS_ACUITY_SCORE: 35

## 2023-05-28 NOTE — PROGRESS NOTES
RECEIVING UNIT ED HANDOFF REVIEW    ED Nurse Handoff Report was reviewed by: Bud Chowdary RN on May 27, 2023 at 10:05 PM

## 2023-05-28 NOTE — ED NOTES
"[facility]  ED Nurse Handoff Report    ED Chief complaint: Urinary Retention      ED Diagnosis:   Final diagnoses:   Bladder outlet obstruction       Code Status: Hospitalist to discuss    Allergies:   Allergies   Allergen Reactions     Cardizem [Diltiazem]      Swelling and poor rate control     Lisinopril Cough       Patient Story: Pt comes in with urinary retention since 1030 this morning. Complaining of intense bladder pain and pressure. He has a hx of BPH. On blood thinners for Afib.  Focused Assessment:  Distended abdomen, bloody discharge from penis after multiple catheterization attempts.     Treatments and/or interventions provided: Catheterization attempted multiple times. Difficulty passing due to extreme urethral tightness. Bladder scan was >999 and urologist on call will be coming in to place a catheter.   Patient's response to treatments and/or interventions: Dilaudid given for pain    To be done/followed up on inpatient unit:  urine output, hemodynamics    Does this patient have any cognitive concerns?: None    Activity level - Baseline/Home:  Independent  Activity Level - Current:   Independent    Patient's Preferred language: English   Needed?: No    Isolation: None  Infection: Not Applicable  Patient tested for COVID 19 prior to admission: NO  Bariatric?: No    Vital Signs:   Vitals:    05/27/23 1820 05/27/23 2025   BP: (!) 167/79 131/86   Pulse: 89    Resp: 18    Temp: 97.6  F (36.4  C)    TempSrc: Temporal    SpO2: 98% 92%   Weight: 127.9 kg (282 lb)    Height: 1.676 m (5' 6\")        Cardiac Rhythm:     Was the PSS-3 completed:   Yes  What interventions are required if any?               Family Comments: Wife at bedside  OBS brochure/video discussed/provided to patient/family: No              Name of person given brochure if not patient:               Relationship to patient:     For the majority of the shift this patient's behavior was Green.   Behavioral " interventions performed were Reassurance.    ED NURSE PHONE NUMBER: [phone]

## 2023-05-28 NOTE — PLAN OF CARE
Shift: 0730 - 1530  Vitals: VSS except hypertensive on RA  Pain: Denies having pain  IV Access: PIV SL  Cognitive/Behavioral: WDL, A&Ox4, calm/cooperative  Respiratory: WDL, denies shortness of breath  Cardiac: WDL, denies chest pain  Neurovascular: WDL ex mild edema in BLE  GI/: Howell catheter in place due to obstruction. Pink tinged urine this AM, has since improved and is yellow/straw-colored. Pt denies bladder discomfort. Abdomin slightly distended, last BM on 5/27 per pt  Skin: WDL   Mobility: SBA with gaitbelt   Diet: Regular diet, poor appetite  D/C Plan: Likely to home tomorrow with Howell catheter

## 2023-05-28 NOTE — PHARMACY-ADMISSION MEDICATION HISTORY
Pharmacist Admission Medication History    Admission medication history is complete. The information provided in this note is only as accurate as the sources available at the time of the update.    Medication reconciliation/reorder completed by provider prior to medication history? No    Information Source(s): Patient and CareEverywhere/SureScripts via in-person    Pertinent Information:     Changes made to PTA medication list:    Added: None    Deleted: None    Changed: None    Medication Affordability:       Allergies reviewed with patient and updates made in EHR: no    Medication History Completed By: Lynne De Santiago RPH 5/27/2023 9:48 PM    Prior to Admission medications    Medication Sig Last Dose Taking? Auth Provider Long Term End Date   allopurinol (ZYLOPRIM) 300 MG tablet TAKE 1 TABLET(300 MG) BY MOUTH DAILY 5/27/2023 Yes Malvin Rosen MD     finasteride (PROSCAR) 5 MG tablet TAKE 1 TABLET(5 MG) BY MOUTH DAILY 5/27/2023 Yes Alexa Reyes APRN CNP     furosemide (LASIX) 40 MG tablet TAKE 1 AND 1/2 TABLETS(60 MG) BY MOUTH DAILY 5/27/2023 Yes Malvin Rosen MD Yes    ipratropium (ATROVENT) 0.06 % nasal spray USE 2 SPRAYS IN EACH NOSTRILS FOUR TIMES DAILY AS NEEDED FOR RHINITIS  Patient taking differently: Spray 1 spray into both nostrils 2 times daily 5/27/2023 Yes Malvin Rosen MD     losartan (COZAAR) 50 MG tablet TAKE 1 TABLET(50 MG) BY MOUTH DAILY 5/27/2023 Yes Malvin Rosen MD Yes    metoprolol tartrate (LOPRESSOR) 50 MG tablet TAKE 2 TABLETS(100 MG) BY MOUTH TWICE DAILY 5/27/2023 Yes Efrain Tavarez MD Yes    Multiple Vitamin (MULTI-VITAMIN) per tablet Take 1 tablet by mouth daily. 5/27/2023 Yes Heriberto Haile MD     simvastatin (ZOCOR) 10 MG tablet TAKE 1 TABLET(10 MG) BY MOUTH AT BEDTIME 5/26/2023 Yes Efrain Tavarez MD Yes    SYMBICORT 80-4.5 MCG/ACT Inhaler INHALE 2 PUFFS INTO THE LUNGS TWICE DAILY 5/27/2023 Yes Malvin Rosen MD Yes    vitamin D3  (CHOLECALCIFEROL) 50 mcg (2000 units) tablet Take 1 tablet by mouth daily 5/27/2023 Yes Reported, Patient     warfarin ANTICOAGULANT (COUMADIN) 2 MG tablet TAKE 1 AND 1/2 TABLETS BY MOUTH DAILY 5/26/2023 Yes Efrain Tavarez MD     losartan (COZAAR) 50 MG tablet TAKE 1 TABLET(50 MG) BY MOUTH DAILY   Malvin Rosen MD Yes      Lynne StinsonD

## 2023-05-28 NOTE — H&P
"[facility]    History and Physical - Hospitalist Service       Date of Admission:  5/27/2023    Assessment & Plan      Vasiliy Corbin is a 88 year old male admitted on 5/27/2023. He presents with urinary retention    Bladder outlet obstruction   Hematuria  BPH s/p TURP  [needs rec- finasteride 5 mg daily]  Since this am has only noted small blood/ clots in urine, minimal urine. Increased bladder fullness and pain. In ED mildly tachy, other VSS. Creatinine 1.22. Pressley passed in ED with >1.5 L urine out. Initially very bloody, now improved.   - continue pressley until seen outpatient by urology   - bladder irrigation prn   - likely discharge in am if stable     Atrial fibrillation   Chronic HFpEF  CAD, mild 2010  Hx tachy-mediated CM, resolved  HTN  HLD  [needs rec- furosemide 60 mg daily, losartan 50 mg daily, metoprolol 100 mg BID, warfarin]  Follows with Dr. Menendez. INR 2.95.   - hold warfarin tonight  - hold statin in obs  - resume other meds    CKD III  Baseline creatinine 1-1.2. At 1.22 on presentation.   - monitor    Gout- hold allopurinol in obs  SUJEY- resume home CPAP  Morbid obesity-BMI 45.5. encourage weight loss and healthy lifestyle.      Diet:   regular after urology plans completed  DVT Prophylaxis: Warfarin  Pressley Catheter: Not present  Lines: None     Cardiac Monitoring: None  Code Status:   DNR/DNI    Clinically Significant Risk Factors Present on Admission               # Drug Induced Coagulation Defect: home medication list includes an anticoagulant medication    # Hypertension: Noted on problem list      # Severe Obesity: Estimated body mass index is 45.52 kg/m  as calculated from the following:    Height as of this encounter: 1.676 m (5' 6\").    Weight as of this encounter: 127.9 kg (282 lb).            Disposition Plan      Expected Discharge Date: 05/28/2023                  Adalberto Gallegos MD  Hospitalist Service  [facility]  Securely " message with Eve (more info)  Text page via [facility] Paging/Directory     ______________________________________________________________________    Chief Complaint   Urinary retention    History is obtained from the patient, electronic health record and emergency department physician    History of Present Illness   Vasiliy Corbin is a 88 year old male who presents with urinary retention.  He has a history of BPH, atrial fibrillation, hypertension, sleep apnea among others.  Patient had a history of a TURP several years ago and states been doing fine.  He states about a week ago he did notice some blood clots in his urine but they resolved.  Then the morning of presentation he was urinating normally when he just suddenly stopped.  He could not produce any urine.  He did note blood and blood clots coming out in small amounts.  This persisted prompting him to come to the emergency department.  He was in quite a bit of discomfort and attempts to pass a Howell were done in the emergency department without success.  Ultimately urology had to place a Howell catheter at bedside.  Initially there was quite a bit of blood but the urine is now pink.  He feels much better.  He otherwise denies chest pain or shortness of breath.  Denies other abdominal pain.  He states his edema is stable.      Past Medical History    Past Medical History:   Diagnosis Date     Atrial fibrillation (H)     became chronic afib in 2016,paroxysmal,was on amiodarone but went into persistent afib in april 2106 and amiodrone was d/cd. now on BBLK and considering cardioversion.(5/2106).In Sept 2016 plan is rate control which has been a challenge and Holter in 6 months     CAD (coronary artery disease)     mild stenosis per cath     Cardiomyopathy (H)      Chronic cough 2017    Eval with Dr Sarai Oro - Kaila Ricardo - rec speech therapy, ct for eval of crackles No obstruction or copd     Colon polyp 2010     Gout      Hiatal hernia      Hip pain       History of BPH     laser TURP in 2016 Dr Lima     HTN (hypertension)      Hyperlipidemia      Neuropathy     numbness & tingling R. 4th & 5th finger left hand     Obesity (BMI 35.0-39.9 without comorbidity)      SUJEY on CPAP      Shoulder injury      Venous insufficiency of right leg     no trteatment recommended by [facility]car[facility] - option to treat is there  Jodi     Wrist swelling        Past Surgical History   Past Surgical History:   Procedure Laterality Date     CLAVICLE SURGERY Left     as a kid     CT release       CYSTOSCOPY N/A 4/7/2016    Procedure: CYSTOSCOPY;  Surgeon: Lokesh Lima MD;  Location: [facility] OR     CYSTOSCOPY, FULGURATE BLEEDERS, EVACUATE CLOT(S), COMBINED N/A 1/16/2018    Procedure: COMBINED CYSTOSCOPY, FULGURATE BLEEDERS, EVACUATE CLOT(S);  CYSTOSCOPY, FULGERATION;  Surgeon: Lokesh Lima MD;  Location: [facility] OR     CYSTOSCOPY, TRANSURETHRAL RESECTION (TUR) PROSTATE, COMBINED N/A 2/25/2020    Procedure: CYSTOSCOPY; TRANSURETHRAL RESECTION OF THE PROSTATE;  Surgeon: Lokesh Lima MD;  Location: [facility] OR     HC LEFT HEART CATHETERIZATION  3/2010    Mild CAD     LASER KTP TRANSURETHRAL RESECTION (TUR) PROSTATE N/A 4/7/2016    Procedure: LASER KTP TRANSURETHRAL RESECTION (TUR) PROSTATE;  Surgeon: Lokesh Lima MD;  Location: [facility] OR     TRANSPOSITION ULNAR NERVE (ELBOW) Left 11/29/2019    Procedure: LEFT ELBOW ULNER NERVE SUBMUSCULAR TRANSPOSITION;  Surgeon: Nicole Bai MD;  Location: [facility] OR     ulnar reroute      right        Prior to Admission Medications   Prior to Admission Medications   Prescriptions Last Dose Informant Patient Reported? Taking?   Multiple Vitamin (MULTI-VITAMIN) per tablet  Self Yes No   Sig: Take 1 tablet by mouth daily.   SYMBICORT 80-4.5 MCG/ACT Inhaler   No No   Sig: INHALE 2 PUFFS INTO THE LUNGS TWICE DAILY   allopurinol (ZYLOPRIM) 300 MG tablet   No No   Sig: TAKE 1 TABLET(300 MG) BY MOUTH DAILY   finasteride (PROSCAR) 5 MG tablet   No No   Sig: TAKE 1  TABLET(5 MG) BY MOUTH DAILY   furosemide (LASIX) 40 MG tablet   No No   Sig: TAKE 1 AND 1/2 TABLETS(60 MG) BY MOUTH DAILY   ipratropium (ATROVENT) 0.06 % nasal spray   No No   Sig: USE 2 SPRAYS IN EACH NOSTRILS FOUR TIMES DAILY AS NEEDED FOR RHINITIS   losartan (COZAAR) 50 MG tablet   No No   Sig: TAKE 1 TABLET(50 MG) BY MOUTH DAILY   losartan (COZAAR) 50 MG tablet   No No   Sig: TAKE 1 TABLET(50 MG) BY MOUTH DAILY   metoprolol tartrate (LOPRESSOR) 50 MG tablet   No No   Sig: TAKE 2 TABLETS(100 MG) BY MOUTH TWICE DAILY   nystatin (MYCOSTATIN) 356692 UNIT/ML suspension   No No   Sig: Swish and spit a 1/2 tsp tid   Patient not taking: Reported on 12/12/2022   simvastatin (ZOCOR) 10 MG tablet   No No   Sig: TAKE 1 TABLET(10 MG) BY MOUTH AT BEDTIME   vitamin D3 (CHOLECALCIFEROL) 50 mcg (2000 units) tablet   Yes No   Sig: Take 1 tablet by mouth daily   warfarin ANTICOAGULANT (COUMADIN) 2 MG tablet   No No   Sig: TAKE 1 AND 1/2 TABLETS BY MOUTH DAILY      Facility-Administered Medications: None        Review of Systems    The 10 point Review of Systems is negative other than noted in the HPI or here.      Physical Exam   Vital Signs: Temp: 97.6  F (36.4  C) Temp src: Temporal BP: 131/86 Pulse: 89   Resp: 18 SpO2: 92 % O2 Device: None (Room air)    Weight: 282 lbs 0 oz    General Appearance: Alert, very pleasant, no distress  Respiratory: CTA B  Cardiovascular: RRR, no murmur. 1+ pitting edema bilaterally  GI: obese, soft, nt/nd  Skin: no rashes or lesions grossly   Other: CN grossly intact, WARNER      Medical Decision Making       60 MINUTES SPENT BY ME on the date of service doing chart review, history, exam, documentation & further activities per the note.      Data     I have personally reviewed the following data over the past 24 hrs:    10.3  \   14.0   / 194     140; 140 103; 103 34.8 (H); 34.8 (H) /  125 (H); 125 (H)   4.4; 4.4 24; 24 1.22 (H); 1.22 (H) \       ALT: 21 AST: 26 AP: 129 TBILI: 0.9   ALB: 4.2 TOT  PROTEIN: 7.6 LIPASE: N/A       INR:  2.95 (H) PTT:  N/A   D-dimer:  N/A Fibrinogen:  N/A       Imaging results reviewed over the past 24 hrs:   No results found for this or any previous visit (from the past 24 hour(s)).

## 2023-05-28 NOTE — PHARMACY-ANTICOAGULATION SERVICE
Clinical Pharmacy - Warfarin Dosing Consult     Pharmacy has been consulted to manage this patient s warfarin therapy.  Indication: Atrial Fibrillation  Therapy Goal: INR 2-3  Warfarin Prior to Admission: Yes  Warfarin PTA Regimen: 3 mg daily    INR   Date Value Ref Range Status   05/28/2023 2.69 (H) 0.85 - 1.15 Final   05/27/2023 2.95 (H) 0.85 - 1.15 Final       Recommend warfarin 4 mg today.  Pharmacy will monitor Vasiliy Corbin daily and order warfarin doses to achieve specified goal.      Please contact pharmacy as soon as possible if the warfarin needs to be held for a procedure or if the warfarin goals change.

## 2023-05-28 NOTE — CONSULTS
[facility]  UROLOGY CONSULT NOTE    Vasiliy Corbin  : 1935  MRN # [id number]    ADMIT DATE: 2023  DATE OF CONSULT: 2023    PCP: Malvin Rosen    REQUESTING SERVICE: ER  REASON FOR CONSULT: Urinary retention    CHIEF COMPLAINT: Urinary retention    HPI: Vasiliy Corbin is a 88 year old male with past urologic history that includes BPH, s/p TURP in , with recent onset of gross hematuria with clot passage that worsened this morning, and then sudden cessation of stream - he could not void again after this. Proceeded to the ER at [facility] where an initial bladder scan noted 1L. Multiple attempts at Howell placement were unsuccessful, and urology was thusly consulted.      ROS:   CONSTITUTIONAL: Denies fever, chills, fatigue, or weight fluctuations  HEAD: Denies headache.  EENT: Denies vision changes, hearing changes, dysphagia, or sore throat.   NECK: Denies lymphadenopathy.   CV:Denies chest pain, palpitations, or shortness of breath.   PULMONARY:Denies shortness of breath, cough, or previous TB exposure.   GI:Denies nausea, vomiting, diarrhea, and abdominal pain. Bowel movements are regular.   :Denies urinary alterations, dysuria, urinary frequency, hematuria, and abnormal drainage.   EXT:Denies lower extremity edema.   SKIN:Denies abnormal rashes or lesions.   MUSCULOSKELETAL:Denies upper or lower extremity weakness and pain.   NEUROLOGICAL:Denies lightheadedness, dizziness, seizures, or upper or lower extremity paresthesia.   HEMATOLOGICAL:No abnormal bruising or bleeding.   PSYCHIATRIC:Denies any mood alterations.     PMH:  Past Medical History:   Diagnosis Date     Atrial fibrillation (H)     became chronic afib in ,paroxysmal,was on amiodarone but went into persistent afib in 2106 and amiodrone was d/cd. now on BBLK and considering cardioversion.(2106).In 2016 plan is rate control which has been a challenge and Holter in 6 months     CAD (coronary  artery disease)     mild stenosis per cath     Cardiomyopathy (H)      Chronic cough 2017    Eval with Dr Sarai Oro - Kaila Ricardo - rec speech therapy, ct for eval of crackles No obstruction or copd     Colon polyp 2010     Gout      Hiatal hernia      Hip pain      History of BPH     laser TURP in 2016 Dr Lima     HTN (hypertension)      Hyperlipidemia      Neuropathy     numbness & tingling R. 4th & 5th finger left hand     Obesity (BMI 35.0-39.9 without comorbidity)      SUJEY on CPAP      Shoulder injury      Venous insufficiency of right leg     no trteatment recommended by Judy - option to treat is there Dr Zapata     Wrist swelling        PSH:  Past Surgical History:   Procedure Laterality Date     CLAVICLE SURGERY Left     as a kid     CT release       CYSTOSCOPY N/A 4/7/2016    Procedure: CYSTOSCOPY;  Surgeon: Lokesh Lima MD;  Location:  OR     CYSTOSCOPY, FULGURATE BLEEDERS, EVACUATE CLOT(S), COMBINED N/A 1/16/2018    Procedure: COMBINED CYSTOSCOPY, FULGURATE BLEEDERS, EVACUATE CLOT(S);  CYSTOSCOPY, FULGERATION;  Surgeon: Lokesh Lima MD;  Location:  OR     CYSTOSCOPY, TRANSURETHRAL RESECTION (TUR) PROSTATE, COMBINED N/A 2/25/2020    Procedure: CYSTOSCOPY; TRANSURETHRAL RESECTION OF THE PROSTATE;  Surgeon: Lokesh Lima MD;  Location:  OR     HC LEFT HEART CATHETERIZATION  3/2010    Mild CAD     LASER KTP TRANSURETHRAL RESECTION (TUR) PROSTATE N/A 4/7/2016    Procedure: LASER KTP TRANSURETHRAL RESECTION (TUR) PROSTATE;  Surgeon: Lokesh Lima MD;  Location:  OR     TRANSPOSITION ULNAR NERVE (ELBOW) Left 11/29/2019    Procedure: LEFT ELBOW ULNER NERVE SUBMUSCULAR TRANSPOSITION;  Surgeon: Nicole Bai MD;  Location:  OR     ulnar reroute      right        MEDICATIONS:  No current facility-administered medications for this encounter.     Current Outpatient Medications   Medication     allopurinol (ZYLOPRIM) 300 MG tablet     finasteride (PROSCAR) 5 MG tablet      "furosemide (LASIX) 40 MG tablet     ipratropium (ATROVENT) 0.06 % nasal spray     losartan (COZAAR) 50 MG tablet     metoprolol tartrate (LOPRESSOR) 50 MG tablet     Multiple Vitamin (MULTI-VITAMIN) per tablet     simvastatin (ZOCOR) 10 MG tablet     SYMBICORT 80-4.5 MCG/ACT Inhaler     vitamin D3 (CHOLECALCIFEROL) 50 mcg (2000 units) tablet     warfarin ANTICOAGULANT (COUMADIN) 2 MG tablet     losartan (COZAAR) 50 MG tablet       ALLERGIES:     Allergies   Allergen Reactions     Cardizem [Diltiazem]      Swelling and poor rate control     Lisinopril Cough       FAMILY HISTORY:  Family History   Problem Relation Age of Onset     Heart Disease Mother 92        Heart failure     Heart Disease Father 92     C.A.D. Brother 70        MI     Myocardial Infarction Brother      Family History Negative Sister      Family History Negative Brother      Family History Negative Brother      Family History Negative Brother      Family History Negative Sister      Myocardial Infarction Son      Heart Disease Son      Heart Disease Son        SOCIAL HISTORY:  Tobacco Use:   Alcohol Use:   Drug Use:     PHYSICAL EXAM:  Blood pressure (!) 158/104, pulse 94, temperature 97.6  F (36.4  C), temperature source Temporal, resp. rate 18, height 1.676 m (5' 6\"), weight 127.9 kg (282 lb), SpO2 92 %.    GENERAL: Alert and orientated x 3. Appears in no acute distress.   HEENT: Anicteric sclera. PERRLA. Mucous membranes moist and without lesions.   NECK: Supple and without lymphadenopathy.   CV: RRR, S1S2, no murmurs appreciated.  RESPIRATORY: Respirations regular, even, and unlabored. Lungs CTAB with no wheezing, rales, rhonchi.   GI: Soft and non distended with normoactive bowel sounds present in all quadrants. No tenderness, rebound, guarding.   EXTREMITIES: No peripheral edema. 2+ bilateral pedal pulses.   NEUROLOGIC: CN II-XII grossly intact. No focal deficits.   MUSCULOSKELETAL: No joint swelling or tenderness. Strength 5/5 bilaterally in " upper and lower extremities.   SKIN: No jaundice. No rashes or lesions.     LABS:  CMP  Recent Labs   Lab 05/27/23 1827     140   POTASSIUM 4.4  4.4   CHLORIDE 103  103   CO2 24  24   ANIONGAP 13  13   *  125*   BUN 34.8*  34.8*   CR 1.22*  1.22*   GFRESTIMATED 57*  57*   EDUARD 9.6  9.6   PROTTOTAL 7.6   ALBUMIN 4.2   BILITOTAL 0.9   ALKPHOS 129   AST 26   ALT 21     CBC  Recent Labs   Lab 05/27/23 1902 05/27/23 1827   WBC 10.3 9.2   RBC 4.30* 4.21*   HGB 14.0 13.7   HCT 41.9 41.2   MCV 97 98   MCH 32.6 32.5   MCHC 33.4 33.3   RDW 14.3 14.2    183     INR  Recent Labs   Lab 05/27/23 1902 05/27/23 1827 05/24/23  0000   INR 2.95* 3.13* 2.6       IMAGING:   None reviewed    ASSESSMENT & RECOMMENDATIONS:  1. Gross hematuria  2. Urinary retention from clots  3. BPH/LUTS    At bedside a 20F Coude was installed by me and the bladder was drained, initial amount: 1.6L.     I then irrigated him clear with sterile water, noting old black clot pieces insodoing. When I was finished he was clear orange.     --Home with Howell, can follow up with us in the clinic for voiding trial.  --Will keep Howell 10-14 days, Vasliiy understands he is at increased risk for failing a voiding trial due to the large volume urinary retention event he sustained.   --Consider Rocephin x 1 to cover him for the lower  tract manipulation he just received.    I appreciate the opportunity to participate in the care of this patient.     Diogo Hill MD  [facility]

## 2023-05-29 PROBLEM — R31.9 HEMATURIA: Status: ACTIVE | Noted: 2023-05-29

## 2023-05-29 LAB — INR PPP: 2.53 (ref 0.85–1.15)

## 2023-05-29 PROCEDURE — 99232 SBSQ HOSP IP/OBS MODERATE 35: CPT | Performed by: STUDENT IN AN ORGANIZED HEALTH CARE EDUCATION/TRAINING PROGRAM

## 2023-05-29 PROCEDURE — 250N000013 HC RX MED GY IP 250 OP 250 PS 637: Performed by: INTERNAL MEDICINE

## 2023-05-29 PROCEDURE — G0378 HOSPITAL OBSERVATION PER HR: HCPCS

## 2023-05-29 PROCEDURE — 120N000001 HC R&B MED SURG/OB

## 2023-05-29 PROCEDURE — 85610 PROTHROMBIN TIME: CPT | Performed by: STUDENT IN AN ORGANIZED HEALTH CARE EDUCATION/TRAINING PROGRAM

## 2023-05-29 PROCEDURE — 36415 COLL VENOUS BLD VENIPUNCTURE: CPT | Performed by: STUDENT IN AN ORGANIZED HEALTH CARE EDUCATION/TRAINING PROGRAM

## 2023-05-29 RX ORDER — WARFARIN SODIUM 3 MG/1
3 TABLET ORAL
Status: DISCONTINUED | OUTPATIENT
Start: 2023-05-29 | End: 2023-05-30 | Stop reason: HOSPADM

## 2023-05-29 RX ADMIN — FINASTERIDE 5 MG: 5 TABLET, FILM COATED ORAL at 08:46

## 2023-05-29 RX ADMIN — FUROSEMIDE 60 MG: 40 TABLET ORAL at 08:45

## 2023-05-29 RX ADMIN — METOPROLOL TARTRATE 100 MG: 50 TABLET, FILM COATED ORAL at 08:46

## 2023-05-29 RX ADMIN — SENNOSIDES AND DOCUSATE SODIUM 1 TABLET: 50; 8.6 TABLET ORAL at 23:56

## 2023-05-29 RX ADMIN — LOSARTAN POTASSIUM 50 MG: 50 TABLET, FILM COATED ORAL at 08:46

## 2023-05-29 RX ADMIN — METOPROLOL TARTRATE 100 MG: 50 TABLET, FILM COATED ORAL at 20:52

## 2023-05-29 ASSESSMENT — ACTIVITIES OF DAILY LIVING (ADL)
ADLS_ACUITY_SCORE: 35
ADLS_ACUITY_SCORE: 37
ADLS_ACUITY_SCORE: 35

## 2023-05-29 NOTE — PLAN OF CARE
Goal Outcome Evaluation:      Plan of Care Reviewed With: patient    Overall Patient Progress: improvingOverall Patient Progress: improving    A&OX4. VSS on RA with slightly high BP. Active BS, abd soft/nontender, and slightly distended. Tolerating regular diet. Pressley in place with yellow output. Bleeding around pressley insertion site, cleaned. BLE edema, compression socks on. Up independently. Urology consulted. Plans for possible discharge home today with pressley.

## 2023-05-29 NOTE — PROGRESS NOTES
[facility]    Medicine Progress Note - Hospitalist Service    Date of Admission:  5/27/2023     Assessment & Plan   Vasiliy Corbin is a 88 year old male admitted on 5/27/2023. He presents with urinary retention     Bladder outlet obstruction   Hematuria  BPH s/p TURP  [needs rec- finasteride 5 mg daily]  Since this am has only noted small blood/ clots in urine, minimal urine. Increased bladder fullness and pain. In ED mildly tachy, other VSS. Creatinine 1.22. Pressley passed in ED with >1.5 L urine out. Initially very bloody, now improved.   - continue pressley until seen outpatient by urology    -Urology consult appreciate  - Discussed with urology on 5/28/22 and will restart coumadin if no hematuria discharge with pressley in morning  -Patient having reoccurence hematuria on 5/29/23.Discussed with urology and to hold coumadin and irrigation q 12 hours   -Hold coumadin     Atrial fibrillation   Chronic HFpEF  CAD, mild 2010  Hx tachy-mediated CM, resolved  HTN  HLD  [needs rec- furosemide 60 mg daily, losartan 50 mg daily, metoprolol 100 mg BID, warfarin]  Follows with Dr. Menendez.-- hold statin in obs  - resume other meds  -Hold coumadin due to reoccurence of hematuria  -INR 2.53 today      CKD III  Baseline creatinine 1-1.2. At 1.22 on presentation.   - monitor     Gout- hold allopurinol in obs  SUJEY- resume home CPAP  Morbid obesity-BMI 45.5. encourage weight loss and healthy lifestyle.         Diet: Regular Diet Adult    DVT Prophylaxis: warfarin  Pressley Catheter: PRESENT, indication: Retention  Lines: None     Cardiac Monitoring: None  Code Status: No CPR- Do NOT Intubate      Clinically Significant Risk Factors Present on Admission               # Drug Induced Coagulation Defect: home medication list includes an anticoagulant medication    # Hypertension: Noted on problem list      # Severe Obesity: Estimated body mass index is 46.83 kg/m  as calculated from the following:    Height as of  "this encounter: 1.676 m (5' 6\").    Weight as of this encounter: 131.6 kg (290 lb 2 oz).            Disposition Plan  Discharge tomorrow     Expected Discharge Date: 05/31/2023                  Nathan Kamara MD  Hospitalist Service  [facility]  Securely message with [employer] (more info)  Text page via [email or web address] Paging/Directory   ______________________________________________________________________    Interval History   Reoccurence of  hematuria  No abdominal pain  No bladder cramps     Physical Exam   Vital Signs: Temp: 98.5  F (36.9  C) Temp src: Oral BP: (!) 150/78 Pulse: 74   Resp: 16 SpO2: 94 % O2 Device: None (Room air)    Weight: 290 lbs 2.01 oz   .Physical Exam  Cardiovascular:      Rate and Rhythm: Normal rate and regular rhythm.      Heart sounds: Normal heart sounds.   Pulmonary:      Effort: Pulmonary effort is normal. No respiratory distress.      Breath sounds: Normal breath sounds.   Abdominal:      General: There is no distension.      Palpations: Abdomen is soft.      Tenderness: There is no abdominal tenderness.   Genitourinary:     Comments: Howell +        Medical Decision Making       "

## 2023-05-29 NOTE — PROGRESS NOTES
"[facility]    Medicine Progress Note - Hospitalist Service    Date of Admission:  5/27/2023     Assessment & Plan   Vasiliy Corbin is a 88 year old male admitted on 5/27/2023. He presents with urinary retention     Bladder outlet obstruction   Hematuria  BPH s/p TURP  [needs rec- finasteride 5 mg daily]  Since this am has only noted small blood/ clots in urine, minimal urine. Increased bladder fullness and pain. In ED mildly tachy, other VSS. Creatinine 1.22. Pressley passed in ED with >1.5 L urine out. Initially very bloody, now improved.   - continue pressley until seen outpatient by urology    -Urology consult appreciate  - Discussed with urology and will restart coumadin if no hematuria discharge with pressley in morning     Atrial fibrillation   Chronic HFpEF  CAD, mild 2010  Hx tachy-mediated CM, resolved  HTN  HLD  [needs rec- furosemide 60 mg daily, losartan 50 mg daily, metoprolol 100 mg BID, warfarin]  Follows with Dr. Menendez. INR 2.69 today  - Continue warfarin   - hold statin in obs  - resume other meds     CKD III  Baseline creatinine 1-1.2. At 1.22 on presentation.   - monitor     Gout- hold allopurinol in obs  SUJEY- resume home CPAP  Morbid obesity-BMI 45.5. encourage weight loss and healthy lifestyle.         Diet: Regular Diet Adult    DVT Prophylaxis: warfarin  Pressley Catheter: PRESENT, indication: Retention  Lines: None     Cardiac Monitoring: None  Code Status: No CPR- Do NOT Intubate      Clinically Significant Risk Factors Present on Admission               # Drug Induced Coagulation Defect: home medication list includes an anticoagulant medication    # Hypertension: Noted on problem list      # Severe Obesity: Estimated body mass index is 46.83 kg/m  as calculated from the following:    Height as of this encounter: 1.676 m (5' 6\").    Weight as of this encounter: 131.6 kg (290 lb 2 oz).            Disposition Plan  Discharge tomorrow     Expected Discharge Date: 05/29/2023    "               Nathan Kamara MD  Hospitalist Service  [facility]  Securely message with [employer] (more info)  Text page via [employer] Paging/Directory   ______________________________________________________________________    Interval History   No hematuria  No abdominal pain    Physical Exam   Vital Signs: Temp: 97.6  F (36.4  C) Temp src: Oral BP: (!) 161/69 Pulse: 67   Resp: 16 SpO2: 96 % O2 Device: None (Room air)    Weight: 290 lbs 2.01 oz   .Physical Exam  Cardiovascular:      Rate and Rhythm: Normal rate and regular rhythm.      Heart sounds: Normal heart sounds.   Pulmonary:      Effort: Pulmonary effort is normal. No respiratory distress.      Breath sounds: Normal breath sounds.   Abdominal:      General: There is no distension.      Palpations: Abdomen is soft.      Tenderness: There is no abdominal tenderness.         Medical Decision Making

## 2023-05-30 VITALS
HEIGHT: 66 IN | BODY MASS INDEX: 46.63 KG/M2 | OXYGEN SATURATION: 99 % | DIASTOLIC BLOOD PRESSURE: 73 MMHG | TEMPERATURE: 97.7 F | SYSTOLIC BLOOD PRESSURE: 148 MMHG | HEART RATE: 56 BPM | RESPIRATION RATE: 18 BRPM | WEIGHT: 290.13 LBS

## 2023-05-30 LAB
ERYTHROCYTE [DISTWIDTH] IN BLOOD BY AUTOMATED COUNT: 14.2 % (ref 10–15)
HCT VFR BLD AUTO: 39.4 % (ref 40–53)
HGB BLD-MCNC: 13.3 G/DL (ref 13.3–17.7)
INR PPP: 2.37 (ref 0.85–1.15)
MCH RBC QN AUTO: 33.3 PG (ref 26.5–33)
MCHC RBC AUTO-ENTMCNC: 33.8 G/DL (ref 31.5–36.5)
MCV RBC AUTO: 99 FL (ref 78–100)
PLATELET # BLD AUTO: 176 10E3/UL (ref 150–450)
RBC # BLD AUTO: 3.99 10E6/UL (ref 4.4–5.9)
WBC # BLD AUTO: 9.4 10E3/UL (ref 4–11)

## 2023-05-30 PROCEDURE — 85027 COMPLETE CBC AUTOMATED: CPT | Performed by: STUDENT IN AN ORGANIZED HEALTH CARE EDUCATION/TRAINING PROGRAM

## 2023-05-30 PROCEDURE — 36415 COLL VENOUS BLD VENIPUNCTURE: CPT | Performed by: STUDENT IN AN ORGANIZED HEALTH CARE EDUCATION/TRAINING PROGRAM

## 2023-05-30 PROCEDURE — 250N000013 HC RX MED GY IP 250 OP 250 PS 637: Performed by: INTERNAL MEDICINE

## 2023-05-30 PROCEDURE — 85610 PROTHROMBIN TIME: CPT | Performed by: STUDENT IN AN ORGANIZED HEALTH CARE EDUCATION/TRAINING PROGRAM

## 2023-05-30 PROCEDURE — 99239 HOSP IP/OBS DSCHRG MGMT >30: CPT | Performed by: STUDENT IN AN ORGANIZED HEALTH CARE EDUCATION/TRAINING PROGRAM

## 2023-05-30 RX ADMIN — METOPROLOL TARTRATE 100 MG: 50 TABLET, FILM COATED ORAL at 08:33

## 2023-05-30 RX ADMIN — FUROSEMIDE 60 MG: 40 TABLET ORAL at 08:33

## 2023-05-30 RX ADMIN — LOSARTAN POTASSIUM 50 MG: 50 TABLET, FILM COATED ORAL at 08:33

## 2023-05-30 RX ADMIN — FINASTERIDE 5 MG: 5 TABLET, FILM COATED ORAL at 08:33

## 2023-05-30 ASSESSMENT — ACTIVITIES OF DAILY LIVING (ADL)
ADLS_ACUITY_SCORE: 37

## 2023-05-30 NOTE — PROGRESS NOTES
"[facility]    Urology Progress Note     Assessment & Plan   Vasiliy Corbin is a 88 year old male who was admitted on 5/27/2023 with hematuria and clot retention. Pressley placed by urology for 1.6 L.     Interval History   Doing well, urine clear yellow today, denies trouble with pressley. Held off on restarting Coumadin yesterday due to recurrence of hematuria. Hgb, Cr stable.     Plan:   - OK to discharge to home with pressley  - Scheduled for urology follow up June 7 for likely trial of void. Did discuss possibility that he will need pressley for longer given his large volume retention.   - Ideally would like 2-3 days of clear urine prior to restarting Coumadin. Would be comfortable with restarting as soon as tomorrow. I did discuss with Vasiliy that there is likely to be some recurrence of blood in the urine: OK to continue anticoagulation if urine is light pink / amrita colored, but would recommend holding again if urine becomes dark / red wine / or large clots seen.     Shira Ty PA-C  [facility]  Pager: [phone]  Office: [phone]      OBJECTIVE:          BP (!) 148/73 (BP Location: Left arm)   Pulse 56   Temp 97.7  F (36.5  C) (Oral)   Resp 18   Ht 1.676 m (5' 6\")   Wt 131.6 kg (290 lb 2 oz)   SpO2 99%   BMI 46.83 kg/m      Intake/Output Summary (Last 24 hours) at 5/30/2023 1438  Last data filed at 5/30/2023 1346  Gross per 24 hour   Intake 600 ml   Output 3675 ml   Net -3075 ml            General appearance shows no deformaties and good grooming in no acute distress.  HEAD, EARS, NOSE, MOUTH, AND THROAT: atraumatic, normocephalic  CARDIAC: skin well perfused  RESPIRATORY: breathing unlabored  ABDOMEN: soft, non tender, non distended  : Pressley draining clear urine  SKIN/HAIR/NAILS: no visible rashes  NEUROLOGIC: no focal deficits  PSYCHIATRIC: Speech, mood, and affect normal      Shira Ty PA-C  [facility]   Pager: [phone]  Office: " [phone]

## 2023-05-30 NOTE — PLAN OF CARE
Goal Outcome Evaluation:      Plan of Care Reviewed With: patient    Overall Patient Progress: improvingOverall Patient Progress: improving      A&OX4. VSS on RA with slightly high BP. Active BS, abd soft/nontender, and slightly distended, PRN senna given. Tolerating regular diet. Pressley in place with yellow urine color. BLE edema, compression socks on. Up independently. Urology to see him in the morning, and Plans for possible discharge home today with pressley.

## 2023-05-30 NOTE — CONSULTS
Writer met with patient and family. Discussed home RN, Homebound status and urology follow up. Bedside RN has done pressley teaching. Patient politely declines [facility] at this time, instructed to call PCP if changes mind, reinforced urology follow up.     Alexa Fletcher RN   [facility]   Phone [phone]

## 2023-05-30 NOTE — PLAN OF CARE
Goal Outcome Evaluation:    ([id number])  Patient is A&O x4. Up independent in room. Denies chest pain or SOB. Howell catheter patent with pink urine color. Discharge possibly tomorrow.

## 2023-06-01 ENCOUNTER — PATIENT OUTREACH (OUTPATIENT)
Dept: FAMILY MEDICINE | Facility: CLINIC | Age: 88
End: 2023-06-01

## 2023-06-01 DIAGNOSIS — Z76.0 ENCOUNTER FOR MEDICATION REFILL: ICD-10-CM

## 2023-06-01 RX ORDER — ALLOPURINOL 300 MG/1
TABLET ORAL
Qty: 90 TABLET | Refills: 0 | Status: SHIPPED | OUTPATIENT
Start: 2023-06-01 | End: 2023-08-30

## 2023-06-01 NOTE — TELEPHONE ENCOUNTER
PC to pt  After his recent discharge. Wife states he was asleep. She did report that he had blood in his urine again. Upon discharge it was clear but there was blood in it again. [facility] asked wife to have pt. Call this writer when he wakes up.    Social Work Care Coordination initial contact:      Reason for referral: Hospital follow up    Medical concerns: Bladder outlet obstruction        Behavioral concerns: NA      Psychosocial Concerns:  PC to pt post hospital discharge. Pt reports he has a catheter in until next week when he will get it removed with Urology. Pt reports that he has NOT had blood in his urine like wife had thought nor had there been any signs of clots.      Pt stated that while in the hospital, his Warfarin was changed. Pt has cardiology who manages that medication. [facility] suggested he check with provider on how to proceed with taking it or changing it.    [facility] encouraged pt to schedule a follow up with pcp as well but pt stated he'd prefer to follow up with specialists at this time.      Immediate needs: follow up with Urology and CArdiology      Questions pertaining to care plan: NA      Resources shared:   NA    Plan: Pt to schedule with specialists.    ALEXA Donnelly  , Care Coordination  [facility]  [phone]  [email or web address]     ALEXA Donnelly  , Care Coordination  [facility]  Cell: [phone]  Clinic: [phone]  [email or web address]

## 2023-06-14 NOTE — PROGRESS NOTES
Hospital Follow-up Visit:    Hospital/Nursing Home/IP Rehab Facility: [facility]  Date of Admission: 5/27/23  Date of Discharge: 5/30/23  Reason(s) for Admission: Bladder Outlet     Was your hospitalization related to COVID-19? No   Problems taking medications regularly:  None  Medication changes since discharge: Tamsulosin   Problems adhering to non-medication therapy:  None    Summary of hospitalization:  [facility] discharge summary reviewed  Diagnostic Tests/Treatments reviewed.  Follow up needed: none  Other Healthcare Providers Involved in Patient s Care:         None  Update since discharge: improved.   Plan of care communicated with patient     Problem(s) Oriented visit      ROS:  General and Resp. completed and negative except as noted above     HISTORY:   reports no history of alcohol use.   reports that he quit smoking about 46 years ago. His smoking use included cigarettes. He has a 5.00 pack-year smoking history. He has never used smokeless tobacco.    Past Medical History:   Diagnosis Date     Atrial fibrillation (H)      CAD (coronary artery disease)      Cardiomyopathy (H)      Chronic cough 2017     Colon polyp 2010     Gout      Hiatal hernia      Hip pain      History of BPH      HTN (hypertension)      Hyperlipidemia      Neuropathy      Obesity (BMI 35.0-39.9 without comorbidity)      SUJEY on CPAP      Shoulder injury      Venous insufficiency of right leg      Wrist swelling      Past Surgical History:   Procedure Laterality Date     CLAVICLE SURGERY Left     as a kid     CT release       CYSTOSCOPY N/A 4/7/2016    Procedure: CYSTOSCOPY;  Surgeon: Lokesh Lima MD;  Location:  OR     CYSTOSCOPY, FULGURATE BLEEDERS, EVACUATE CLOT(S), COMBINED N/A 1/16/2018    Procedure: COMBINED CYSTOSCOPY, FULGURATE BLEEDERS, EVACUATE CLOT(S);  CYSTOSCOPY, FULGERATION;  Surgeon: Lokesh Lima MD;  Location:  OR     CYSTOSCOPY, TRANSURETHRAL RESECTION (TUR)  PROSTATE, COMBINED N/A 2/25/2020    Procedure: CYSTOSCOPY; TRANSURETHRAL RESECTION OF THE PROSTATE;  Surgeon: Lokesh Lima MD;  Location: [facility] OR     HC LEFT HEART CATHETERIZATION  3/2010    Mild CAD     LASER KTP TRANSURETHRAL RESECTION (TUR) PROSTATE N/A 4/7/2016    Procedure: LASER KTP TRANSURETHRAL RESECTION (TUR) PROSTATE;  Surgeon: Lokesh Lima MD;  Location: [facility] OR     TRANSPOSITION ULNAR NERVE (ELBOW) Left 11/29/2019    Procedure: LEFT ELBOW ULNER NERVE SUBMUSCULAR TRANSPOSITION;  Surgeon: Nicole Bai MD;  Location: [facility] OR     ulnar reroute      right        EXAM:  BP: 120/59[seca average bp reading[   Pulse: 60    Temp: Data Unavailable    Wt Readings from Last 2 Encounters:   06/15/23 131.7 kg (290 lb 6.4 oz)   05/27/23 131.6 kg (290 lb 2 oz)       BMI= Body mass index is 46.87 kg/m .    EXAM:  APPEARANCE: = Relaxed and in no distress  Oropharynx = No leukoplakia, No injection to the tissues, Normal Uvula  Thyroid = Not enlarged and no masses felt  Resp effort = Calm regular breathing  Heart Rate, Rythym, & sounds (no Murm)  = irregularly irregular rhythm      Assessment/Plan:  Vasiliy was seen today for hospital f/u.    Diagnoses and all orders for this visit:    Hematuria  Cath out  No urine blood  -     CBC with Diff/Plt ([facility])  -     VENOUS COLLECTION    Long term current use of anticoagulant therapy  -     Prothrombin - INR ([facility])    Chronic atrial fibrillation (H)  INR at goal, continue at 3 mg daily recheck one week  -     Prothrombin - INR ([facility])    Chronic diastolic congestive heart failure (H)  No dyspnea on arb    Morbid obesity due to excess calories (H)  Current BMI is: Body mass index is 46.87 kg/m ..  Reviewed weight patterns.   Obesity as a biological preventable and treatable disease, which is associated with significantly increased risk of many acute and chronic health conditions.   Obesity has now been recognized as a chronic disease by the American Medical  "Association.  Obesity has serious co-morbidities associated with obesity including increased risk for hypertension, stroke, coronary artery disease, dyslipidemia, Type II diabetes, depression, sleep apnea, cancers of the colon, breast and endometrium, obstructive sleep apnea, osteoarthritis and female infertility.  Recommended regular aerobic exercise, recommended improved diet aiming at lowering amount of processed foods, lower sugars, moderation/reduction of simple carbs and fat in the diet, establishing more regular meal times, always eating breakfast, front loading some of the calories and adding more protein to the diet.         Secondary hypercoagulable state (H)  [id number] Score: 4 points, which represents a 4.8% annual risk of major embolic event, without anti-coagulation or an LAAO device.     No data recorded        COUNSELING:   reports that he quit smoking about 46 years ago. His smoking use included cigarettes. He has a 5.00 pack-year smoking history. He has never used smokeless tobacco.    Estimated body mass index is 46.87 kg/m  as calculated from the following:    Height as of 5/27/23: 1.676 m (5' 6\").    Weight as of this encounter: 131.7 kg (290 lb 6.4 oz).       Appropriate preventive services were discussed with this patient, including applicable screening as appropriate for cardiovascular disease, diabetes, osteopenia/osteoporosis, and glaucoma.  As appropriate for age/gender, discussed screening for colorectal cancer, prostate cancer, breast cancer, and cervical cancer. Checklist reviewing preventive services available has been given to the patient.    Reviewed patients plan of care and provided an AVS. The Basic Care Plan (routine screening as documented in Health Maintenance) for Vasiliy meets the Care Plan requirement. This Care Plan has been established and reviewed with the  Patient.      The following health maintenance items are reviewed in Epic and correct as of today:  Health " Maintenance   Topic Date Due     ASTHMA ACTION PLAN  Never done     ASTHMA CONTROL TEST  Never done     TSH W/FREE T4 REFLEX  04/16/2020     COVID-19 Vaccine (4 - Pfizer series) 11/22/2021     DTAP/TDAP/TD IMMUNIZATION (2 - Td or Tdap) 06/11/2022     MEDICARE ANNUAL WELLNESS VISIT  07/29/2022     FALL RISK ASSESSMENT  07/29/2022     PHQ-2 (once per calendar year)  01/01/2023     BMP  11/28/2023     LIPID  12/12/2023     ALT  05/27/2024     CBC  06/15/2024     HF ACTION PLAN  02/18/2025     ADVANCE CARE PLANNING  05/01/2025     INFLUENZA VACCINE  Completed     Pneumococcal Vaccine: 65+ Years  Completed     ZOSTER IMMUNIZATION  Completed     IPV IMMUNIZATION  Aged Out     MENINGITIS IMMUNIZATION  Aged Out     COLORECTAL CANCER SCREENING  Discontinued       Malvin Rosen  [facility]  For any issues my office # is [phone]

## 2023-06-15 ENCOUNTER — OFFICE VISIT (OUTPATIENT)
Dept: FAMILY MEDICINE | Facility: CLINIC | Age: 88
End: 2023-06-15

## 2023-06-15 VITALS
WEIGHT: 290.4 LBS | BODY MASS INDEX: 46.87 KG/M2 | HEART RATE: 60 BPM | SYSTOLIC BLOOD PRESSURE: 120 MMHG | OXYGEN SATURATION: 94 % | DIASTOLIC BLOOD PRESSURE: 59 MMHG

## 2023-06-15 DIAGNOSIS — I50.32 CHRONIC DIASTOLIC CONGESTIVE HEART FAILURE (H): ICD-10-CM

## 2023-06-15 DIAGNOSIS — Z79.01 LONG TERM CURRENT USE OF ANTICOAGULANT THERAPY: ICD-10-CM

## 2023-06-15 DIAGNOSIS — E66.01 MORBID OBESITY DUE TO EXCESS CALORIES (H): ICD-10-CM

## 2023-06-15 DIAGNOSIS — R31.0 GROSS HEMATURIA: Primary | ICD-10-CM

## 2023-06-15 DIAGNOSIS — I48.20 CHRONIC ATRIAL FIBRILLATION (H): ICD-10-CM

## 2023-06-15 DIAGNOSIS — D68.69 SECONDARY HYPERCOAGULABLE STATE (H): ICD-10-CM

## 2023-06-15 LAB
% GRANULOCYTES: 72.5 % (ref 42.2–75.2)
HCT VFR BLD AUTO: 36.8 % (ref 39–51)
HEMOGLOBIN: 12.4 G/DL (ref 13.4–17.5)
INR PPP: 2.1 (ref 2–3)
LYMPHOCYTES NFR BLD AUTO: 20.4 % (ref 20.5–51.1)
MCH RBC QN AUTO: 33 PG (ref 27–31)
MCHC RBC AUTO-ENTMCNC: 33.8 G/DL (ref 33–37)
MCV RBC AUTO: 97.5 FL (ref 80–100)
MONOCYTES NFR BLD AUTO: 7.1 % (ref 1.7–9.3)
PLATELET # BLD AUTO: 197 K/UL (ref 140–450)
RBC # BLD AUTO: 3.77 X10/CMM (ref 4.2–5.9)
WBC # BLD AUTO: 6.5 X10/CMM (ref 3.8–11)

## 2023-06-15 PROCEDURE — 99496 TRANSJ CARE MGMT HIGH F2F 7D: CPT | Mod: 25 | Performed by: FAMILY MEDICINE

## 2023-06-15 PROCEDURE — 85610 PROTHROMBIN TIME: CPT | Performed by: FAMILY MEDICINE

## 2023-06-15 PROCEDURE — 36415 COLL VENOUS BLD VENIPUNCTURE: CPT | Performed by: FAMILY MEDICINE

## 2023-06-15 PROCEDURE — 85025 COMPLETE CBC W/AUTO DIFF WBC: CPT | Performed by: FAMILY MEDICINE

## 2023-06-15 RX ORDER — TAMSULOSIN HYDROCHLORIDE 0.4 MG/1
0.4 CAPSULE ORAL DAILY
COMMUNITY
Start: 2023-06-08

## 2023-06-21 ENCOUNTER — OFFICE VISIT (OUTPATIENT)
Dept: FAMILY MEDICINE | Facility: CLINIC | Age: 88
End: 2023-06-21

## 2023-06-21 VITALS
WEIGHT: 291.3 LBS | DIASTOLIC BLOOD PRESSURE: 62 MMHG | BODY MASS INDEX: 47.02 KG/M2 | SYSTOLIC BLOOD PRESSURE: 134 MMHG | HEART RATE: 54 BPM | OXYGEN SATURATION: 95 %

## 2023-06-21 DIAGNOSIS — J45.20 MILD INTERMITTENT REACTIVE AIRWAY DISEASE WITHOUT COMPLICATION: Primary | ICD-10-CM

## 2023-06-21 DIAGNOSIS — Z79.01 LONG TERM CURRENT USE OF ANTICOAGULANT THERAPY: ICD-10-CM

## 2023-06-21 DIAGNOSIS — I48.20 CHRONIC ATRIAL FIBRILLATION (H): ICD-10-CM

## 2023-06-21 LAB — INR PPP: 2.4 (ref 2–3)

## 2023-06-21 PROCEDURE — 36416 COLLJ CAPILLARY BLOOD SPEC: CPT | Performed by: FAMILY MEDICINE

## 2023-06-21 PROCEDURE — 85610 PROTHROMBIN TIME: CPT | Performed by: FAMILY MEDICINE

## 2023-06-21 PROCEDURE — 99213 OFFICE O/P EST LOW 20 MIN: CPT | Performed by: FAMILY MEDICINE

## 2023-06-21 ASSESSMENT — ASTHMA QUESTIONNAIRES: ACT_TOTALSCORE: 24

## 2023-06-21 NOTE — PROGRESS NOTES
Vasiliy Corbin is a 88 year old male here for an INR check.  Feeling pretty well, no signs of bleeding.    /62   Pulse 54   Wt 132.1 kg (291 lb 4.8 oz)   SpO2 95%   BMI 47.02 kg/m       Todays and previous results are:    Lab Results   Component Value Date    INR 2.4 06/21/2023    INR 2.1 06/15/2023    INR 2.37 05/30/2023    INR 2.53 05/29/2023    INR 2.69 05/28/2023    INR 2.95 05/27/2023    INR 3.13 05/27/2023    INR 2.6 05/24/2023    INR 3.2 05/09/2023    INR 2.8 04/10/2023     ASSESSMENT:                                                      Vasiliy was seen today for inr followup.    Diagnoses and all orders for this visit:    Long term current use of anticoagulant therapy  -     Prothrombin - INR ([facility])    Chronic atrial fibrillation (H)  -     Prothrombin - INR ([facility])      No further bleeding, will let us know if any recurrence.]    PLAN:                                                    The Warfarin (Coumadin) dose will not change.     Recheck next INR in Follow up in 4 weeks.        Malvin Rosen MD  [facility]

## 2023-07-13 DIAGNOSIS — Z79.01 LONG TERM CURRENT USE OF ANTICOAGULANT THERAPY: ICD-10-CM

## 2023-07-13 DIAGNOSIS — I48.20 CHRONIC ATRIAL FIBRILLATION (H): ICD-10-CM

## 2023-07-13 LAB — INR PPP: 2.7 (ref 2–3)

## 2023-07-13 PROCEDURE — 85610 PROTHROMBIN TIME: CPT | Performed by: FAMILY MEDICINE

## 2023-07-13 PROCEDURE — 36416 COLLJ CAPILLARY BLOOD SPEC: CPT | Performed by: FAMILY MEDICINE

## 2023-07-26 ENCOUNTER — OFFICE VISIT (OUTPATIENT)
Dept: FAMILY MEDICINE | Facility: CLINIC | Age: 88
End: 2023-07-26

## 2023-07-26 VITALS
SYSTOLIC BLOOD PRESSURE: 144 MMHG | OXYGEN SATURATION: 96 % | WEIGHT: 293.6 LBS | HEART RATE: 65 BPM | BODY MASS INDEX: 47.39 KG/M2 | DIASTOLIC BLOOD PRESSURE: 75 MMHG

## 2023-07-26 DIAGNOSIS — T16.2XXA FOREIGN BODY OF LEFT EAR, INITIAL ENCOUNTER: Primary | ICD-10-CM

## 2023-07-26 DIAGNOSIS — H61.22 IMPACTED CERUMEN OF LEFT EAR: ICD-10-CM

## 2023-07-26 PROCEDURE — 69200 CLEAR OUTER EAR CANAL: CPT

## 2023-07-26 PROCEDURE — 69209 REMOVE IMPACTED EAR WAX UNI: CPT | Mod: LT

## 2023-07-26 PROCEDURE — 99213 OFFICE O/P EST LOW 20 MIN: CPT | Mod: 25

## 2023-07-26 NOTE — PROGRESS NOTES
SUBJECTIVE:    Vasiliy Corbin, is a 88 year old male presenting for the below:     1. Hear aid plug stuck in left ear     Patient was getting hearing aids yesterday and the hearing aid plug of left hearing aid become lodged in ear canal - the hearing aid specialist attempted to get it out without success and causing an abrasion to ear canal - scant blood present in ear canal. Patient in no pain    OBJECTIVE:  Vitals:    07/26/23 0757   BP: (!) 144/75   Pulse: 65   SpO2: 96%   Weight: 133.2 kg (293 lb 9.6 oz)    Body mass index is 47.39 kg/m .  General: no acute distress, cooperative with exam.  Eyes: no injection or drainage.  Ears: blood in ear canal with abrasion in lower part of canal - very small with scant amount of blood, cerumen impaction to left ear  Neck: supple, no thyroid nodules or enlargement.  Lungs: equal chest rise, unlabored breathing   Heart: regular rate, normal S1 and S2, no murmurs.   Extremities: warm, perfused, no swelling or edema.  Neuro: grossly intact   Psych: mental status normal, mood and affect appropriate.      ASSESSMENT / PLAN:      Vasiliy was seen today for ear problem.    Diagnoses and all orders for this visit:    Foreign body of left ear, initial encounter  -     Cancel: REMOVE FOREIGN BODY SIMPLE  -     REMOVE FB, EXT AUDITORY CANAL    Impacted cerumen of left ear  -     CA REMOVAL IMPACTED CERUMEN IRRIGATION/LVG CHAPARRO (RN/MA)      Alligator forceps used to remove hearing aid plug successfully from left ear canal. Patient had no pain prior to the removal, no pain during removal, and no pain after removal. Patient had blood in ear canal prior to removal from the hearing aid specialist trying to remove hearing aid plug from ear canal yesterday - patient has small abrasion and scant blood in ear. Behind hearing aid plug patient has impacted cerumen.

## 2023-07-31 DIAGNOSIS — Z76.0 ENCOUNTER FOR MEDICATION REFILL: ICD-10-CM

## 2023-07-31 RX ORDER — FUROSEMIDE 40 MG
TABLET ORAL
Qty: 135 TABLET | Refills: 3 | Status: SHIPPED | OUTPATIENT
Start: 2023-07-31 | End: 2024-05-01

## 2023-08-14 DIAGNOSIS — Z79.01 LONG TERM CURRENT USE OF ANTICOAGULANT THERAPY: ICD-10-CM

## 2023-08-14 DIAGNOSIS — I48.20 CHRONIC ATRIAL FIBRILLATION (H): ICD-10-CM

## 2023-08-14 LAB — INR PPP: 3.3 (ref 2–3)

## 2023-08-14 PROCEDURE — 85610 PROTHROMBIN TIME: CPT | Performed by: FAMILY MEDICINE

## 2023-08-14 PROCEDURE — 36416 COLLJ CAPILLARY BLOOD SPEC: CPT | Performed by: FAMILY MEDICINE

## 2023-08-23 DIAGNOSIS — Z79.899 ENCOUNTER FOR LONG-TERM (CURRENT) USE OF MEDICATIONS: ICD-10-CM

## 2023-08-23 RX ORDER — IPRATROPIUM BROMIDE 42 UG/1
1 SPRAY, METERED NASAL 2 TIMES DAILY
Qty: 135 ML | Refills: 1 | Status: SHIPPED | OUTPATIENT
Start: 2023-08-23 | End: 2023-12-26

## 2023-08-23 NOTE — TELEPHONE ENCOUNTER
Ipratropium 0.06% Nasal Spray    LOV: 7/26/23  Last CPX: 7/29/21    PRN for rhinitis     Next appt: 8/28/23 (lab)

## 2023-08-28 DIAGNOSIS — Z79.01 LONG TERM CURRENT USE OF ANTICOAGULANT THERAPY: ICD-10-CM

## 2023-08-28 DIAGNOSIS — I48.20 CHRONIC ATRIAL FIBRILLATION (H): ICD-10-CM

## 2023-08-28 LAB — INR PPP: 3.3 (ref 2–3)

## 2023-08-28 PROCEDURE — 36416 COLLJ CAPILLARY BLOOD SPEC: CPT | Performed by: FAMILY MEDICINE

## 2023-08-28 PROCEDURE — 85610 PROTHROMBIN TIME: CPT | Performed by: FAMILY MEDICINE

## 2023-08-30 DIAGNOSIS — Z76.0 ENCOUNTER FOR MEDICATION REFILL: ICD-10-CM

## 2023-08-30 RX ORDER — ALLOPURINOL 300 MG/1
TABLET ORAL
Qty: 90 TABLET | Refills: 0 | Status: SHIPPED | OUTPATIENT
Start: 2023-08-30 | End: 2023-11-28

## 2023-08-30 NOTE — TELEPHONE ENCOUNTER
Med: Allopurinol      LOV (related): 7/26/23      Due for F/U around: 12/12/22    Next Appt: 9/11/23-LAB  INR

## 2023-09-11 DIAGNOSIS — Z79.01 LONG TERM CURRENT USE OF ANTICOAGULANT THERAPY: ICD-10-CM

## 2023-09-11 DIAGNOSIS — I48.20 CHRONIC ATRIAL FIBRILLATION (H): ICD-10-CM

## 2023-09-11 LAB — INR PPP: 2.5

## 2023-09-11 PROCEDURE — 36416 COLLJ CAPILLARY BLOOD SPEC: CPT | Performed by: FAMILY MEDICINE

## 2023-09-11 PROCEDURE — 85610 PROTHROMBIN TIME: CPT | Performed by: FAMILY MEDICINE

## 2023-10-09 DIAGNOSIS — Z79.01 LONG TERM CURRENT USE OF ANTICOAGULANT THERAPY: ICD-10-CM

## 2023-10-09 DIAGNOSIS — I48.20 CHRONIC ATRIAL FIBRILLATION (H): ICD-10-CM

## 2023-10-09 LAB — INR PPP: 4.81 (ref 0.85–1.15)

## 2023-10-09 PROCEDURE — 85610 PROTHROMBIN TIME: CPT | Mod: ORL | Performed by: FAMILY MEDICINE

## 2023-10-09 PROCEDURE — 36415 COLL VENOUS BLD VENIPUNCTURE: CPT

## 2023-10-11 DIAGNOSIS — Z79.01 LONG TERM CURRENT USE OF ANTICOAGULANT THERAPY: ICD-10-CM

## 2023-10-11 DIAGNOSIS — I48.20 CHRONIC ATRIAL FIBRILLATION (H): ICD-10-CM

## 2023-10-11 LAB — INR PPP: 4 (ref 2–3)

## 2023-10-11 PROCEDURE — 85610 PROTHROMBIN TIME: CPT | Performed by: FAMILY MEDICINE

## 2023-10-16 DIAGNOSIS — I48.20 CHRONIC ATRIAL FIBRILLATION (H): ICD-10-CM

## 2023-10-16 DIAGNOSIS — Z79.01 LONG TERM CURRENT USE OF ANTICOAGULANT THERAPY: ICD-10-CM

## 2023-10-16 LAB — INR PPP: 2.6 (ref 2–3)

## 2023-10-16 PROCEDURE — 85610 PROTHROMBIN TIME: CPT | Performed by: FAMILY MEDICINE

## 2023-10-30 DIAGNOSIS — I48.20 CHRONIC ATRIAL FIBRILLATION (H): ICD-10-CM

## 2023-10-30 DIAGNOSIS — Z79.01 LONG TERM CURRENT USE OF ANTICOAGULANT THERAPY: ICD-10-CM

## 2023-10-30 LAB — INR PPP: 2.1 (ref 2–3)

## 2023-10-30 PROCEDURE — 85610 PROTHROMBIN TIME: CPT | Performed by: FAMILY MEDICINE

## 2023-11-20 DIAGNOSIS — J98.4 RESTRICTIVE AIRWAY DISEASE: ICD-10-CM

## 2023-11-20 RX ORDER — DILTIAZEM HYDROCHLORIDE 60 MG/1
TABLET, FILM COATED ORAL
Qty: 30.6 G | Refills: 11 | Status: SHIPPED | OUTPATIENT
Start: 2023-11-20

## 2023-11-27 DIAGNOSIS — I48.20 CHRONIC ATRIAL FIBRILLATION (H): ICD-10-CM

## 2023-11-27 DIAGNOSIS — Z79.01 LONG TERM CURRENT USE OF ANTICOAGULANT THERAPY: ICD-10-CM

## 2023-11-27 LAB — INR PPP: 2.1 (ref 2–3)

## 2023-11-27 PROCEDURE — 85610 PROTHROMBIN TIME: CPT | Performed by: FAMILY MEDICINE

## 2023-11-28 DIAGNOSIS — Z76.0 ENCOUNTER FOR MEDICATION REFILL: ICD-10-CM

## 2023-11-28 RX ORDER — ALLOPURINOL 300 MG/1
TABLET ORAL
Qty: 90 TABLET | Refills: 0 | Status: SHIPPED | OUTPATIENT
Start: 2023-11-28 | End: 2024-02-26

## 2023-12-26 DIAGNOSIS — Z79.01 LONG TERM CURRENT USE OF ANTICOAGULANT THERAPY: ICD-10-CM

## 2023-12-26 DIAGNOSIS — Z79.899 ENCOUNTER FOR LONG-TERM (CURRENT) USE OF MEDICATIONS: ICD-10-CM

## 2023-12-26 DIAGNOSIS — I48.20 CHRONIC ATRIAL FIBRILLATION (H): ICD-10-CM

## 2023-12-26 LAB — INR PPP: 2 (ref 2–3)

## 2023-12-26 PROCEDURE — 85610 PROTHROMBIN TIME: CPT | Performed by: FAMILY MEDICINE

## 2023-12-26 RX ORDER — IPRATROPIUM BROMIDE 42 UG/1
2 SPRAY, METERED NASAL 4 TIMES DAILY
Qty: 45 ML | Refills: 6 | Status: SHIPPED | OUTPATIENT
Start: 2023-12-26

## 2023-12-26 NOTE — TELEPHONE ENCOUNTER
Ipratropium 0.06% nasal spray-2 sprays each nostril qid  [facility]    Med: ipratropium (ATROVENT) 0.06 % nasal spray     LOV (related): 6/21/23      Due for F/U around: Jan 2024 for AWV    Next Appt: Lab only 1/22/24 for INR

## 2024-01-22 DIAGNOSIS — Z79.01 LONG TERM CURRENT USE OF ANTICOAGULANT THERAPY: ICD-10-CM

## 2024-01-22 DIAGNOSIS — I48.20 CHRONIC ATRIAL FIBRILLATION (H): ICD-10-CM

## 2024-01-22 LAB — INR PPP: 2 (ref 2–3)

## 2024-01-22 PROCEDURE — 85610 PROTHROMBIN TIME: CPT | Performed by: FAMILY MEDICINE

## 2024-01-29 DIAGNOSIS — N40.0 BENIGN PROSTATIC HYPERPLASIA WITHOUT LOWER URINARY TRACT SYMPTOMS: ICD-10-CM

## 2024-01-29 RX ORDER — FINASTERIDE 5 MG/1
5 TABLET, FILM COATED ORAL DAILY
Qty: 90 TABLET | Refills: 3 | Status: SHIPPED | OUTPATIENT
Start: 2024-01-29

## 2024-01-29 NOTE — TELEPHONE ENCOUNTER
Med: Finasteride      LOV (related): 12/12/22      Due for F/U around: due for cpx     Next Appt: 2/19/24 lab

## 2024-02-19 DIAGNOSIS — I48.20 CHRONIC ATRIAL FIBRILLATION (H): ICD-10-CM

## 2024-02-19 DIAGNOSIS — Z79.01 LONG TERM CURRENT USE OF ANTICOAGULANT THERAPY: ICD-10-CM

## 2024-02-19 LAB — INR PPP: 1.7 (ref 2–3)

## 2024-02-19 PROCEDURE — 85610 PROTHROMBIN TIME: CPT | Performed by: FAMILY MEDICINE

## 2024-02-26 DIAGNOSIS — Z76.0 ENCOUNTER FOR MEDICATION REFILL: ICD-10-CM

## 2024-02-26 RX ORDER — ALLOPURINOL 300 MG/1
TABLET ORAL
Qty: 90 TABLET | Refills: 0 | Status: SHIPPED | OUTPATIENT
Start: 2024-02-26 | End: 2024-04-15

## 2024-03-04 DIAGNOSIS — I48.20 CHRONIC ATRIAL FIBRILLATION (H): ICD-10-CM

## 2024-03-04 DIAGNOSIS — Z79.01 LONG TERM CURRENT USE OF ANTICOAGULANT THERAPY: ICD-10-CM

## 2024-03-04 LAB — INR PPP: 2.4 (ref 2–3)

## 2024-03-04 PROCEDURE — 36416 COLLJ CAPILLARY BLOOD SPEC: CPT

## 2024-03-04 PROCEDURE — 85610 PROTHROMBIN TIME: CPT

## 2024-04-04 DIAGNOSIS — I48.20 CHRONIC ATRIAL FIBRILLATION (H): ICD-10-CM

## 2024-04-04 DIAGNOSIS — Z79.01 LONG TERM CURRENT USE OF ANTICOAGULANT THERAPY: ICD-10-CM

## 2024-04-04 LAB — INR PPP: 1.8 (ref 2–3)

## 2024-04-04 PROCEDURE — 85610 PROTHROMBIN TIME: CPT

## 2024-04-04 PROCEDURE — 36416 COLLJ CAPILLARY BLOOD SPEC: CPT

## 2024-04-12 NOTE — PATIENT INSTRUCTIONS
Preventive Care Advice   This is general advice given by our system to help you stay healthy. However, your care team may have specific advice just for you. Please talk to your care team about your preventive care needs.  Nutrition  Eat 5 or more servings of fruits and vegetables each day.  Try wheat bread, brown rice and whole grain pasta (instead of white bread, rice, and pasta).  Get enough calcium and vitamin D. Check the label on foods and aim for 100% of the RDA (recommended daily allowance).  Lifestyle  Exercise at least 150 minutes each week   (30 minutes a day, 5 days a week).  Do muscle strengthening activities 2 days a week. These help control your weight and prevent disease.  No smoking.  Wear sunscreen to prevent skin cancer.  Have a dental exam and cleaning every 6 months.  Yearly exams  See your health care team every year to talk about:  Any changes in your health.  Any medicines your care team has prescribed.  Preventive care, family planning, and ways to prevent chronic diseases.  Shots (vaccines)   HPV shots (up to age 26), if you've never had them before.  Hepatitis B shots (up to age 59), if you've never had them before.  COVID-19 shot: Get this shot when it's due.  Flu shot: Get a flu shot every year.  Tetanus shot: Get a tetanus shot every 10 years.  Pneumococcal, hepatitis A, and RSV shots: Ask your care team if you need these based on your risk.  Shingles shot (for age 50 and up).  General health tests  Diabetes screening:  Starting at age 35, Get screened for diabetes at least every 3 years.  If you are younger than age 35, ask your care team if you should be screened for diabetes.  Cholesterol test: At age 39, start having a cholesterol test every 5 years, or more often if advised.  Bone density scan (DEXA): At age 50, ask your care team if you should have this scan for osteoporosis (brittle bones).  Hepatitis C: Get tested at least once in your life.  STIs (sexually transmitted  infections)  Before age 24: Ask your care team if you should be screened for STIs.  After age 24: Get screened for STIs if you're at risk. You are at risk for STIs (including HIV) if:  You are sexually active with more than one person.  You don't use condoms every time.  You or a partner was diagnosed with a sexually transmitted infection.  If you are at risk for HIV, ask about PrEP medicine to prevent HIV.  Get tested for HIV at least once in your life, whether you are at risk for HIV or not.  Cancer screening tests  Cervical cancer screening: If you have a cervix, begin getting regular cervical cancer screening tests at age 21. Most people who have regular screenings with normal results can stop after age 65. Talk about this with your provider.  Breast cancer scan (mammogram): If you've ever had breasts, begin having regular mammograms starting at age 40. This is a scan to check for breast cancer.  Colon cancer screening: It is important to start screening for colon cancer at age 45.  Have a colonoscopy test every 10 years (or more often if you're at risk) Or, ask your provider about stool tests like a FIT test every year or Cologuard test every 3 years.  To learn more about your testing options, visit: [email or web address]://[email or web address].pdf.  For help making a decision, visit: https://bit.ly/cm42272.  Prostate cancer screening test: If you have a prostate and are age 55 to 69, ask your provider if you would benefit from a yearly prostate cancer screening test.  Lung cancer screening: If you are a current or former smoker age 50 to 80, ask your care team if ongoing lung cancer screenings are right for you.  For informational purposes only. Not to replace the advice of your health care provider. Copyright   2023 [employer]. All rights reserved. Clinically reviewed by the [facility]. [employer] [id number] - REV 01/24.

## 2024-04-15 ENCOUNTER — OFFICE VISIT (OUTPATIENT)
Dept: FAMILY MEDICINE | Facility: CLINIC | Age: 89
End: 2024-04-15

## 2024-04-15 VITALS
BODY MASS INDEX: 46.9 KG/M2 | OXYGEN SATURATION: 96 % | HEIGHT: 66 IN | SYSTOLIC BLOOD PRESSURE: 154 MMHG | WEIGHT: 291.8 LBS | HEART RATE: 66 BPM | DIASTOLIC BLOOD PRESSURE: 87 MMHG

## 2024-04-15 DIAGNOSIS — I48.20 CHRONIC ATRIAL FIBRILLATION (H): ICD-10-CM

## 2024-04-15 DIAGNOSIS — I10 ESSENTIAL HYPERTENSION: ICD-10-CM

## 2024-04-15 DIAGNOSIS — Z00.00 ENCOUNTER FOR MEDICARE ANNUAL WELLNESS EXAM: Primary | ICD-10-CM

## 2024-04-15 DIAGNOSIS — E66.01 MORBID OBESITY DUE TO EXCESS CALORIES (H): ICD-10-CM

## 2024-04-15 DIAGNOSIS — I50.32 CHRONIC DIASTOLIC CONGESTIVE HEART FAILURE (H): ICD-10-CM

## 2024-04-15 DIAGNOSIS — D68.69 SECONDARY HYPERCOAGULABLE STATE (H): ICD-10-CM

## 2024-04-15 DIAGNOSIS — I77.89 AORTIC ROOT ENLARGEMENT (H): ICD-10-CM

## 2024-04-15 DIAGNOSIS — M1A.09X0 IDIOPATHIC CHRONIC GOUT OF MULTIPLE SITES WITHOUT TOPHUS: ICD-10-CM

## 2024-04-15 DIAGNOSIS — E78.5 HYPERLIPIDEMIA WITH TARGET LDL LESS THAN 100: ICD-10-CM

## 2024-04-15 DIAGNOSIS — R06.09 DYSPNEA ON EXERTION: ICD-10-CM

## 2024-04-15 LAB
% GRANULOCYTES: 67.7 % (ref 42.2–75.2)
ALBUMIN SERPL BCG-MCNC: 4.2 G/DL (ref 3.5–5.2)
ALP SERPL-CCNC: 144 U/L (ref 40–150)
ALT SERPL W P-5'-P-CCNC: 23 U/L (ref 0–70)
ANION GAP SERPL CALCULATED.3IONS-SCNC: 15 MMOL/L (ref 7–15)
AST SERPL W P-5'-P-CCNC: 30 U/L (ref 0–45)
BILIRUB SERPL-MCNC: 1.1 MG/DL
BUN SERPL-MCNC: 34 MG/DL (ref 8–23)
CALCIUM SERPL-MCNC: 9.8 MG/DL (ref 8.8–10.2)
CHLORIDE SERPL-SCNC: 106 MMOL/L (ref 98–107)
CHOLESTEROL: 106 MG/DL (ref 100–199)
CREAT SERPL-MCNC: 1.32 MG/DL (ref 0.67–1.17)
DEPRECATED HCO3 PLAS-SCNC: 20 MMOL/L (ref 22–29)
EGFRCR SERPLBLD CKD-EPI 2021: 52 ML/MIN/1.73M2
FASTING?: NO
FEF 25/75: NORMAL
FEV-1: NORMAL
FEV1/FVC: NORMAL
FVC: NORMAL
GLUCOSE SERPL-MCNC: 93 MG/DL (ref 70–99)
HCT VFR BLD AUTO: 36.8 % (ref 39–51)
HDL (RMG): 40 MG/DL (ref 40–?)
HEMOGLOBIN: 12.3 G/DL (ref 13.4–17.5)
LDL CALCULATED (RMG): 49 MG/DL (ref 0–130)
LYMPHOCYTES NFR BLD AUTO: 23.5 % (ref 20.5–51.1)
MCH RBC QN AUTO: ABNORMAL PG (ref 27–31)
MCHC RBC AUTO-ENTMCNC: 33.5 G/DL (ref 33–37)
MCV RBC AUTO: 95.9 FL (ref 80–100)
MONOCYTES NFR BLD AUTO: 8.8 % (ref 1.7–9.3)
NT-PROBNP SERPL-MCNC: 3131 PG/ML (ref 0–1800)
PLATELET # BLD AUTO: 165 K/UL (ref 140–450)
POTASSIUM SERPL-SCNC: 4.3 MMOL/L (ref 3.4–5.3)
PROT SERPL-MCNC: 7.5 G/DL (ref 6.4–8.3)
RBC # BLD AUTO: 3.84 X10/CMM (ref 4.2–5.9)
SODIUM SERPL-SCNC: 141 MMOL/L (ref 135–145)
TRIGLYCERIDES (RMG): 86 MG/DL (ref 0–149)
WBC # BLD AUTO: 6.9 X10/CMM (ref 3.8–11)

## 2024-04-15 PROCEDURE — 99214 OFFICE O/P EST MOD 30 MIN: CPT | Mod: 25 | Performed by: FAMILY MEDICINE

## 2024-04-15 PROCEDURE — G0438 PPPS, INITIAL VISIT: HCPCS | Performed by: FAMILY MEDICINE

## 2024-04-15 PROCEDURE — 36415 COLL VENOUS BLD VENIPUNCTURE: CPT | Performed by: FAMILY MEDICINE

## 2024-04-15 PROCEDURE — 83880 ASSAY OF NATRIURETIC PEPTIDE: CPT | Mod: ORL | Performed by: FAMILY MEDICINE

## 2024-04-15 PROCEDURE — 80061 LIPID PANEL: CPT | Mod: QW | Performed by: FAMILY MEDICINE

## 2024-04-15 PROCEDURE — 71046 X-RAY EXAM CHEST 2 VIEWS: CPT | Performed by: FAMILY MEDICINE

## 2024-04-15 PROCEDURE — 80053 COMPREHEN METABOLIC PANEL: CPT | Mod: ORL | Performed by: FAMILY MEDICINE

## 2024-04-15 PROCEDURE — 94010 BREATHING CAPACITY TEST: CPT | Mod: 59 | Performed by: FAMILY MEDICINE

## 2024-04-15 PROCEDURE — 85025 COMPLETE CBC W/AUTO DIFF WBC: CPT | Performed by: FAMILY MEDICINE

## 2024-04-15 RX ORDER — SIMVASTATIN 10 MG
TABLET ORAL
Qty: 90 TABLET | Refills: 3 | Status: SHIPPED | OUTPATIENT
Start: 2024-04-15

## 2024-04-15 RX ORDER — RESPIRATORY SYNCYTIAL VIRUS VACCINE 120MCG/0.5
KIT INTRAMUSCULAR
COMMUNITY
Start: 2023-11-09

## 2024-04-15 RX ORDER — LOSARTAN POTASSIUM 50 MG/1
50 TABLET ORAL DAILY
Qty: 90 TABLET | Refills: 3 | Status: SHIPPED | OUTPATIENT
Start: 2024-04-15 | End: 2024-06-25

## 2024-04-15 RX ORDER — ALLOPURINOL 300 MG/1
TABLET ORAL
Qty: 90 TABLET | Refills: 0 | Status: SHIPPED | OUTPATIENT
Start: 2024-04-15 | End: 2024-09-12

## 2024-04-15 SDOH — HEALTH STABILITY: PHYSICAL HEALTH: ON AVERAGE, HOW MANY DAYS PER WEEK DO YOU ENGAGE IN MODERATE TO STRENUOUS EXERCISE (LIKE A BRISK WALK)?: 0 DAYS

## 2024-04-15 SDOH — HEALTH STABILITY: PHYSICAL HEALTH: ON AVERAGE, HOW MANY MINUTES DO YOU ENGAGE IN EXERCISE AT THIS LEVEL?: 0 MIN

## 2024-04-15 ASSESSMENT — SOCIAL DETERMINANTS OF HEALTH (SDOH): HOW OFTEN DO YOU GET TOGETHER WITH FRIENDS OR RELATIVES?: TWICE A WEEK

## 2024-04-15 NOTE — PROGRESS NOTES
"Preventive Care Visit  [facility]  Malvin Rosen MD, Family Medicine  Apr 15, 2024      Assessment & Plan     Encounter for Medicare annual wellness exam    Chronic diastolic congestive heart failure (H)  Actively managedby Dr. Menendez next due in 2 months  - CBC with Diff/Plt ([facility])  - Comprehensive metabolic panel  - Comprehensive metabolic panel    Chronic atrial fibrillation (H)  Resulting in [id number] Score: 5 points - A score of 5 or greater represents a 7.2 - 12.2% annual risk of major embolic event, without anti-coagulation or an LAAO device. {Secondary hypercoagulable state (H24)  On coumadin    Morbid obesity due to excess calories (H)  Long disucssion    Aortic root enlargement 4.0 cm on Echo 2021  Will no longer follow    Gout  No recent attacks  - allopurinol (ZYLOPRIM) 300 MG tablet  Dispense: 90 tablet; Refill: 0    Essential hypertension  Well controlled.  - losartan (COZAAR) 50 MG tablet  Dispense: 90 tablet; Refill: 3    Hyperlipidemia with target LDL less than 100  On simvastatin  - Lipid Profile ([facility])  - simvastatin (ZOCOR) 10 MG tablet  Dispense: 90 tablet; Refill: 3    Dyspnea on exertion  Our xryay machine is down  - Spirometry, Breathing Capacity  - N terminal pro BNP outpatient  - X-ray Chest 2 vws*        Patient has been advised of split billing requirements and indicates understanding: Yes      BMI  Estimated body mass index is 47.82 kg/m  as calculated from the following:    Height as of this encounter: 1.664 m (5' 5.5\").    Weight as of this encounter: 132.4 kg (291 lb 12.8 oz).   Weight management plan: Discussed healthy diet and exercise guidelines    Counseling  Appropriate preventive services were discussed with this patient, including applicable screening as appropriate for fall prevention, nutrition, physical activity, Tobacco-use cessation, weight loss and cognition.  Checklist reviewing preventive services available has been given to the patient.  Reviewed " patient's diet, addressing concerns and/or questions.   He is at risk for psychosocial distress and has been provided with information to reduce risk.   Discussed possible causes of fatigue. The patient was provided with written information regarding signs of hearing loss.       Work on weight loss    No follow-ups on file.    Nikkie Amanda is a 89 year old, presenting for the following:  Physical (Had Covid in October and is still dealing with shortness of breath, mild cough, and mental haze )    Since Covid in October, only able to walk 75 feet without needing to stop and rest.  Deep breathing helps.  SUJEY at night  Morbid obesity  Chronic Afib    Health Care Directive  Patient does not have a Health Care Directive or Living Will:     HPI        4/15/2024   General Health   How would you rate your overall physical health? Good   Feel stress (tense, anxious, or unable to sleep) Only a little   (!) STRESS CONCERN      4/15/2024   Nutrition   Diet: Regular (no restrictions)         4/15/2024   Exercise   Days per week of moderate/strenous exercise 0 days   Average minutes spent exercising at this level 0 min   (!) EXERCISE CONCERN      4/15/2024   Social Factors   Frequency of gathering with friends or relatives Twice a week   Worry food won't last until get money to buy more No   Food not last or not have enough money for food? No   Do you have housing?  Yes   Are you worried about losing your housing? No   Lack of transportation? No   Unable to get utilities (heat,electricity)? No         4/15/2024   Fall Risk   Fallen 2 or more times in the past year? No    No   Trouble with walking or balance? No    No          4/15/2024   Activities of Daily Living- Home Safety   Needs help with the following daily activites None of the above   Safety concerns in the home None of the above         4/15/2024   Dental   Dentist two times every year? Yes         4/15/2024   Hearing Screening   Hearing concerns? (!)   IT'S HARD  TO FOLLOW A CONVERSATION IN A NOISY RESTAURANT OR CROWDED ROOM.   Wears Hearing Aids      4/15/2024   Driving Risk Screening   Patient/family members have concerns about driving No         4/15/2024   General Alertness/Fatigue Screening   Have you been more tired than usual lately? (!) YES         4/15/2024   Urinary Incontinence Screening   Bothered by leaking urine in past 6 months No         4/15/2024   TB Screening   Were you born outside of the [place]? No         Today's PHQ-2 Score:       4/15/2024     2:46 PM   PHQ-2 (  [employer])   Q1: Little interest or pleasure in doing things 0   Q2: Feeling down, depressed or hopeless 0   PHQ-2 Score 0           4/15/2024   Substance Use   Alcohol more than 3/day or more than 7/wk No   Do you have a current opioid prescription? No   How severe/bad is pain from 1 to 10? 0/10 (No Pain)   Do you use any other substances recreationally? No     Social History     Tobacco Use    Smoking status: Former     Current packs/day: 0.00     Average packs/day: 1 pack/day for 5.0 years (5.0 ttl pk-yrs)     Types: Cigarettes     Start date: 4/3/1972     Quit date: 4/3/1977     Years since quittin.0    Smokeless tobacco: Never   Substance Use Topics    Alcohol use: No     Alcohol/week: 0.0 standard drinks of alcohol    Drug use: No             Reviewed and updated as needed this visit by Provider                    Past Medical History:   Diagnosis Date    Atrial fibrillation (H)     became chronic afib in ,paroxysmal,was on amiodarone but went into persistent afib in 2106 and amiodrone was d/cd. now on BBLK and considering cardioversion.(2106).In 2016 plan is rate control which has been a challenge and Holter in 6 months    CAD (coronary artery disease)     mild stenosis per cath    Cardiomyopathy (H)     Chronic cough 2017    Eval with Dr Sarai Oro - Kaila Ricardo - rec speech therapy, ct for eval of crackles No obstruction or copd    Colon polyp 2010    Gout      Hiatal hernia     Hip pain     History of BPH     laser TURP in 2016 Dr Lima    HTN (hypertension)     Hyperlipidemia     Neuropathy     numbness & tingling R. 4th & 5th finger left hand    Obesity (BMI 35.0-39.9 without comorbidity)     SUJEY on CPAP     Shoulder injury     Venous insufficiency of right leg     no trteatment recommended by [facility] - option to treat is there Dr Zapata    Wrist swelling      Past Surgical History:   Procedure Laterality Date    CLAVICLE SURGERY Left     as a kid    CT release      CYSTOSCOPY N/A 4/7/2016    Procedure: CYSTOSCOPY;  Surgeon: Lokesh Lima MD;  Location: [facility] OR    CYSTOSCOPY, FULGURATE BLEEDERS, EVACUATE CLOT(S), COMBINED N/A 1/16/2018    Procedure: COMBINED CYSTOSCOPY, FULGURATE BLEEDERS, EVACUATE CLOT(S);  CYSTOSCOPY, FULGERATION;  Surgeon: Lokesh Lima MD;  Location: [facility] OR    CYSTOSCOPY, TRANSURETHRAL RESECTION (TUR) PROSTATE, COMBINED N/A 2/25/2020    Procedure: CYSTOSCOPY; TRANSURETHRAL RESECTION OF THE PROSTATE;  Surgeon: Lokesh Lima MD;  Location: [facility] OR    HC LEFT HEART CATHETERIZATION  3/2010    Mild CAD    LASER KTP TRANSURETHRAL RESECTION (TUR) PROSTATE N/A 4/7/2016    Procedure: LASER KTP TRANSURETHRAL RESECTION (TUR) PROSTATE;  Surgeon: Lokesh Lima MD;  Location: [facility] OR    TRANSPOSITION ULNAR NERVE (ELBOW) Left 11/29/2019    Procedure: LEFT ELBOW ULNER NERVE SUBMUSCULAR TRANSPOSITION;  Surgeon: Nicole Bai MD;  Location: [facility] OR    ulnar reroute      right      Current providers sharing in care for this patient include:  Patient Care Team:  Malvin Rosen MD as PCP - General (Family Practice)  Malvin Rosen MD as Assigned PCP  Diomedes Menendez MD as Assigned Heart and Vascular Provider    The following health maintenance items are reviewed in Epic and correct as of today:  Health Maintenance   Topic Date Due    DTAP/TDAP/TD IMMUNIZATION (2 - Td or Tdap) 06/11/2022    BMP  11/28/2023    LIPID  12/12/2023    ASTHMA  "CONTROL TEST  12/21/2023    ALT  05/27/2024    ASTHMA ACTION PLAN  06/21/2024    HF ACTION PLAN  02/18/2025    MEDICARE ANNUAL WELLNESS VISIT  04/15/2025    FALL RISK ASSESSMENT  04/15/2025    CBC  04/15/2025    ADVANCE CARE PLANNING  05/01/2025    TSH W/FREE T4 REFLEX  Completed    PHQ-2 (once per calendar year)  Completed    INFLUENZA VACCINE  Completed    Pneumococcal Vaccine: 65+ Years  Completed    ZOSTER IMMUNIZATION  Completed    RSV VACCINE (Pregnancy & 60+)  Completed    COVID-19 Vaccine  Completed    IPV IMMUNIZATION  Aged Out    HPV IMMUNIZATION  Aged Out    MENINGITIS IMMUNIZATION  Aged Out    RSV MONOCLONAL ANTIBODY  Aged Out    COLORECTAL CANCER SCREENING  Discontinued         Review of Systems  Constitutional, HEENT, cardiovascular, pulmonary, GI, , musculoskeletal, neuro, skin, endocrine and psych systems are negative, except as otherwise noted.     Objective    Exam  BP (!) 154/87   Pulse 66   Ht 1.664 m (5' 5.5\")   Wt 132.4 kg (291 lb 12.8 oz)   SpO2 96%   BMI 47.82 kg/m     Estimated body mass index is 47.82 kg/m  as calculated from the following:    Height as of this encounter: 1.664 m (5' 5.5\").    Weight as of this encounter: 132.4 kg (291 lb 12.8 oz).    Physical Exam  GENERAL: alert, no distress, and obese  EYES: Eyes grossly normal to inspection, PERRL and conjunctivae and sclerae normal  HENT: ear canals and TM's normal, nose and mouth without ulcers or lesions  NECK: no adenopathy, no asymmetry, masses, or scars  RESP: lungs clear to auscultation - no rales, rhonchi or wheezes  CV: regular rate and rhythm, normal S1 S2, no S3 or S4, no murmur, click or rub, no peripheral edema  ABDOMEN: soft, nontender, no hepatosplenomegaly, no masses and bowel sounds normal  MS: no gross musculoskeletal defects noted, no edema  SKIN: no suspicious lesions or rashes  NEURO: Normal strength and tone, mentation intact and speech normal  PSYCH: mentation appears normal, affect normal/bright        " 4/15/2024   Mini Cog   Clock Draw Score 2 Normal    2 Normal   3 Item Recall 1 object recalled    1 object recalled   Mini Cog Total Score 3    3              Signed Electronically by: Malvin Rosen MD

## 2024-04-16 ENCOUNTER — HOSPITAL ENCOUNTER (OUTPATIENT)
Dept: GENERAL RADIOLOGY | Facility: CLINIC | Age: 89
Discharge: HOME | End: 2024-04-16
Attending: FAMILY MEDICINE | Admitting: FAMILY MEDICINE
Payer: MEDICARE

## 2024-04-16 PROCEDURE — 71046 X-RAY EXAM CHEST 2 VIEWS: CPT

## 2024-04-18 ENCOUNTER — TELEPHONE (OUTPATIENT)
Dept: FAMILY MEDICINE | Facility: CLINIC | Age: 89
End: 2024-04-18

## 2024-04-18 NOTE — TELEPHONE ENCOUNTER
Called patient with results and Dr. Rosen's recommendations. Patient agrees and will increase his furosemide to 80mg daily until sees cardiology on 5/1/24. Call clinic if questions or concerns in meantime.   Demi Wilson RN

## 2024-04-18 NOTE — TELEPHONE ENCOUNTER
----- Message from Malvin Rosen MD sent at 4/17/2024 12:49 PM CDT -----  His BNP is a bit elevated,   He is due to see cards next month.  Let's have him increase his furosemide from 60- 80 mg a day and see if that helps his dyspnea.  KN

## 2024-04-26 DIAGNOSIS — I48.20 CHRONIC ATRIAL FIBRILLATION (H): ICD-10-CM

## 2024-04-26 NOTE — TELEPHONE ENCOUNTER
I discussed the case with the resident and I agree with their assessment and plan as outlined by Dr. Quarles .  Ernesto Triana MD.     Med: WARFARIN ANTICOAGULANT AND METOPROLOL TARTRATE    LOV (related): 4/15/24    Last INR Date: 4/4/24  LAST INR #: 1.8  CURRENT DOSE: Warfarin - 2mg   Metoprolol Tartrate - 100mg    Due for F/U around: N/A    Next Appt: 5/1/2024          INR   Date Value Ref Range Status   04/04/2024 1.8 (A) 2.0 - 3.0 Final

## 2024-04-28 RX ORDER — METOPROLOL TARTRATE 50 MG
TABLET ORAL
Qty: 360 TABLET | Refills: 3 | Status: SHIPPED | OUTPATIENT
Start: 2024-04-28 | End: 2024-05-01

## 2024-04-28 RX ORDER — WARFARIN SODIUM 2 MG/1
TABLET ORAL
Qty: 135 TABLET | Refills: 3 | Status: SHIPPED | OUTPATIENT
Start: 2024-04-28 | End: 2024-08-07

## 2024-05-01 ENCOUNTER — OFFICE VISIT (OUTPATIENT)
Dept: CARDIOLOGY | Facility: CLINIC | Age: 89
End: 2024-05-01
Payer: MEDICARE

## 2024-05-01 VITALS
RESPIRATION RATE: 16 BRPM | HEART RATE: 54 BPM | SYSTOLIC BLOOD PRESSURE: 145 MMHG | DIASTOLIC BLOOD PRESSURE: 81 MMHG | OXYGEN SATURATION: 96 %

## 2024-05-01 VITALS
WEIGHT: 286.9 LBS | SYSTOLIC BLOOD PRESSURE: 149 MMHG | OXYGEN SATURATION: 95 % | HEIGHT: 66 IN | DIASTOLIC BLOOD PRESSURE: 78 MMHG | BODY MASS INDEX: 46.11 KG/M2 | HEART RATE: 63 BPM

## 2024-05-01 DIAGNOSIS — I48.20 CHRONIC ATRIAL FIBRILLATION (H): ICD-10-CM

## 2024-05-01 DIAGNOSIS — G47.33 OSA ON CPAP: ICD-10-CM

## 2024-05-01 DIAGNOSIS — I10 ESSENTIAL HYPERTENSION: ICD-10-CM

## 2024-05-01 DIAGNOSIS — Z79.01 LONG TERM CURRENT USE OF ANTICOAGULANT THERAPY: ICD-10-CM

## 2024-05-01 DIAGNOSIS — Z76.0 ENCOUNTER FOR MEDICATION REFILL: ICD-10-CM

## 2024-05-01 DIAGNOSIS — I50.33 ACUTE ON CHRONIC DIASTOLIC CONGESTIVE HEART FAILURE (H): Primary | ICD-10-CM

## 2024-05-01 DIAGNOSIS — I77.89 AORTIC ROOT ENLARGEMENT (H): ICD-10-CM

## 2024-05-01 DIAGNOSIS — R06.02 SOB (SHORTNESS OF BREATH): ICD-10-CM

## 2024-05-01 DIAGNOSIS — E78.5 HYPERLIPIDEMIA WITH TARGET LDL LESS THAN 100: ICD-10-CM

## 2024-05-01 DIAGNOSIS — I25.10 CORONARY ARTERY DISEASE INVOLVING NATIVE CORONARY ARTERY OF NATIVE HEART WITHOUT ANGINA PECTORIS: ICD-10-CM

## 2024-05-01 LAB — INR PPP: 1.8 (ref 2–3)

## 2024-05-01 PROCEDURE — 36416 COLLJ CAPILLARY BLOOD SPEC: CPT | Performed by: FAMILY MEDICINE

## 2024-05-01 PROCEDURE — 99215 OFFICE O/P EST HI 40 MIN: CPT | Performed by: INTERNAL MEDICINE

## 2024-05-01 PROCEDURE — 85610 PROTHROMBIN TIME: CPT | Performed by: FAMILY MEDICINE

## 2024-05-01 PROCEDURE — 99213 OFFICE O/P EST LOW 20 MIN: CPT | Performed by: FAMILY MEDICINE

## 2024-05-01 RX ORDER — FUROSEMIDE 40 MG
40 TABLET ORAL 2 TIMES DAILY
COMMUNITY
Start: 2024-05-01 | End: 2024-07-25

## 2024-05-01 RX ORDER — CARVEDILOL 12.5 MG/1
12.5 TABLET ORAL 2 TIMES DAILY WITH MEALS
Qty: 50 TABLET | Refills: 4 | Status: SHIPPED | OUTPATIENT
Start: 2024-05-01 | End: 2024-08-29

## 2024-05-01 NOTE — PATIENT INSTRUCTIONS
INR = 1.8     PLAN:                                                    The Warfarin (Coumadin) dose will change to 1.5 mg on tues and Sat and 3mg other days.     Recheck next INR in Follow up in 2 weeks.

## 2024-05-01 NOTE — LETTER
May 1, 2024      Vasiliy Corbin  [address]  [facility] [id number]        Dear ,      Component      Latest Ref Rng 4/15/2024   Sodium      135 - 145 mmol/L 141    Potassium      3.4 - 5.3 mmol/L 4.3    Carbon Dioxide (CO2)      22 - 29 mmol/L 20 (L)    Anion Gap      7 - 15 mmol/L 15    Urea Nitrogen      8.0 - 23.0 mg/dL 34.0 (H)    Creatinine      0.67 - 1.17 mg/dL 1.32 (H)    GFR Estimate      >60 mL/min/1.73m2 52 (L)    Calcium      8.8 - 10.2 mg/dL 9.8    Chloride      98 - 107 mmol/L 106    Glucose      70 - 99 mg/dL 93    Alkaline Phosphatase      40 - 150 U/L 144    AST      0 - 45 U/L 30    ALT      0 - 70 U/L 23    Protein Total      6.4 - 8.3 g/dL 7.5    Albumin      3.5 - 5.2 g/dL 4.2    Bilirubin Total      <=1.2 mg/dL 1.1    WBC      3.8 - 11.0 x10/cmm 6.9    % Lymphocytes      20.5 - 51.1 % 23.5    % Monocytes      1.7 - 9.3 % 8.8    % Granulocytes      42.2 - 75.2 % 67.7    RBC x10/cmm      4.2 - 5.9 x10/cmm 3.84 !    Hemoglobin      13.4 - 17.5 g/dl 12.3 !    Hematocrit      39 - 51 % 36.8 !    MCV      80 - 100 fL 95.9    MCH      27.0 - 31.0 pg 3.2.2    MCHC      33.0 - 37.0 g/dL 33.5    Platelet Count      140 - 450 K/uL 165    Cholesterol      100 - 199 mg/dL 106    HDL Cholesterol      40 mg/dL 40    Triglycerides      0 - 149 mg/dL 86    LDL Cholesterol Calculated      0 - 130 mg/dL 49    Patient Fasting? No    N-Terminal Pro Bnp      0 - 1,800 pg/mL 3,131 (H)       Legend:  (L) Low  (H) High  ! Abnormal      Called patient with results and Dr. Rosen's recommendations. Patient agrees and will increase his furosemide to 80mg daily until sees cardiology on 5/1/24. Call clinic if questions or concerns in meantime.  Demi Rosen MD  4/17/2024 12:49 PM CDT       His BNP is a bit elevated,  He is due to see cards next month.  Let's have him increase his furosemide from 60- 80 mg a day and see if that helps his dyspnea.  KN       Sincerely,  Malvin  MD Silas

## 2024-05-01 NOTE — PATIENT INSTRUCTIONS
Use 3 tabs of lasix for next 3 days  Schedule stress mri  Stop metoprolol  Start coreg 12.5mg twice daily  See mu NP in 1m,

## 2024-05-01 NOTE — PROGRESS NOTES
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  Then he reverted out of rhythm on amiodarone therapy, and we have been attempting rate control.  Amiodarone has been discontinued now because of bradycardia.  His last Holter in 2022 revealed atrial fibrillation with controlled rate.  No significant bradycardia arrhythmias were noted.     More recently had a COVID infection in October and since then he had prolonged cough which finally improved but he continues to have exertional shortness of breath that is more than usual.  Walking through the [place] he had to stop 2-3 times about a week ago when he came for a chest x-ray.    His primary care physician, Dr. Rosen sent him for chest x-ray and also mimi an N-terminal proBNP as he was having this shortness of breath.  N-terminal proBNP was slightly elevated at 3131.  It was about 2700 few years ago.  He was taking Lasix 60 mg/day which was increased to 80 mg/day.  With that his shortness of breath has improved slightly.  Chest x-ray did not show any infiltrate or congestion.    He denies any chest pain.  No orthopnea or PND.  He has chronic leg edema which improves in the morning and gets worse during the day.    Blood pressure also is elevated today.  He is on losartan 50 mg/day as well as metoprolol 50 mg twice daily.    On exam, irregular S1 and S2.  No significant murmurs.  Distant heart sounds.  JVP is difficult to see due to thick neck.  Chest was reauscultation.    Impression    1.  Exertional shortness of breath, worsening since his COVID infection, could be acute on chronic diastolic heart failure as noted by elevated N-terminal proBNP and some leg edema  2.   Persistent atrial fibrillation, rate controlled, on warfarin  3.  Mild aortic root dilatation in the past  4.  Obesity  5.  Chronic renal insufficiency with a creatinine of 1.3 recently after Lasix was increased to 80 mg/day.  However he has had an increase creatinine in the past also including in 2023.    Discussion  At this time, I would suggest increasing Lasix to 3 tablets of 40 mg each for next 3 days to facilitate more diuresis.  In addition, I will schedule him for a stress MRI to assess his LV function and RV function as well as rule out ischemia as shortness of breath could be angina:.  He cannot exercise due to his weight and arthritis.    In addition, for better blood pressure control I will switch his metoprolol to Coreg 12.5 mg twice daily.  Continue losartan at the same dose.  He will return to see my midlevel provider after the stress MRI and to see how he is doing with his fluid status.    I also spent a long time discussing the importance of lowering intake of calories and trying to lose weight as it will help his breathing.    If no other obvious causes are found or he does not improve with diuresis, we should also do a 24-hour Holter to ensure that there is no significant bradycardia arrhythmias and chronotropic incompetence causing his shortness of breath.    Once stabilized, he can see me in clinic on an annual basis.      Sincerely,     Diomedes Menendez    Today's clinic visit entailed:  Review of external notes as documented elsewhere in note  Review of the result(s) of each unique test - CXR, bnp  The following tests were independently interpreted by me as noted in my documentation: CXR  Ordering of each unique test  Prescription drug management    Provider  Link to [facility] Help Grid     The level of medical decision making during this visit was of high complexity.      No orders of the defined types were placed in this encounter.      Orders Placed This Encounter   Medications    furosemide (LASIX)  40 MG tablet     Sig: Take 1 tablet (40 mg) by mouth 2 times daily    carvedilol (COREG) 12.5 MG tablet     Sig: Take 1 tablet (12.5 mg) by mouth 2 times daily (with meals)     Dispense:  50 tablet     Refill:  4       Medications Discontinued During This Encounter   Medication Reason    furosemide (LASIX) 40 MG tablet Reorder (No AVS)    metoprolol tartrate (LOPRESSOR) 50 MG tablet          Encounter Diagnoses   Name Primary?    Essential hypertension     Chronic atrial fibrillation (H)     SUJEY on CPAP     Aortic root enlargement 4.0 cm on Echo 2021     Coronary artery disease involving native coronary artery of native heart without angina pectoris     Hyperlipidemia with target LDL less than 100     SOB (shortness of breath)     Acute on chronic diastolic congestive heart failure (H) Yes    Encounter for medication refill        CURRENT MEDICATIONS:  Current Outpatient Medications   Medication Sig Dispense Refill    allopurinol (ZYLOPRIM) 300 MG tablet TAKE 1 TABLET(300 MG) BY MOUTH DAILY 90 tablet 0    budesonide-formoterol (SYMBICORT) 80-4.5 MCG/ACT Inhaler INHALE 2 PUFFS INTO THE LUNGS TWICE DAILY 30.6 g 11    carvedilol (COREG) 12.5 MG tablet Take 1 tablet (12.5 mg) by mouth 2 times daily (with meals) 50 tablet 4    finasteride (PROSCAR) 5 MG tablet Take 1 tablet (5 mg) by mouth daily 90 tablet 3    furosemide (LASIX) 40 MG tablet Take 1 tablet (40 mg) by mouth 2 times daily      ipratropium (ATROVENT) 0.06 % nasal spray Spray 2 sprays into both nostrils 4 times daily 45 mL 6    losartan (COZAAR) 50 MG tablet Take 1 tablet (50 mg) by mouth daily 90 tablet 3    Multiple Vitamin (MULTI-VITAMIN) per tablet Take 1 tablet by mouth daily. 100 tablet 12    simvastatin (ZOCOR) 10 MG tablet TAKE 1 TABLET(10 MG) BY MOUTH AT BEDTIME 90 tablet 3    tamsulosin (FLOMAX) 0.4 MG capsule Take 0.4 mg by mouth daily      vitamin D3 (CHOLECALCIFEROL) 50 mcg (2000 units) tablet Take 1 tablet by mouth daily      warfarin  ANTICOAGULANT (COUMADIN) 2 MG tablet TAKE 1 AND 1/2 TABLETS BY MOUTH DAILY (Patient taking differently: Takes 1.5 tablets Wed and Sat and 2 tablet all the other days.) 135 tablet 3    ABRYSVO injection  (Patient not taking: Reported on 5/1/2024)         ALLERGIES     Allergies   Allergen Reactions    Cardizem [Diltiazem]      Swelling and poor rate control    Lisinopril Cough       PAST MEDICAL HISTORY:  Past Medical History:   Diagnosis Date    Atrial fibrillation (H)     became chronic afib in 2016,paroxysmal,was on amiodarone but went into persistent afib in april 2106 and amiodrone was d/cd. now on BBLK and considering cardioversion.(5/2106).In Sept 2016 plan is rate control which has been a challenge and Holter in 6 months    CAD (coronary artery disease)     mild stenosis per cath    Cardiomyopathy (H)     Chronic cough 2017    Eval with Dr Sarai Oro - Kaila Ricardo - rec speech therapy, ct for eval of crackles No obstruction or copd    Colon polyp 2010    Gout     Hiatal hernia     Hip pain     History of BPH     laser TURP in 2016 Dr Lima    HTN (hypertension)     Hyperlipidemia     Neuropathy     numbness & tingling R. 4th & 5th finger left hand    Obesity (BMI 35.0-39.9 without comorbidity)     SUJEY on CPAP     Shoulder injury     Venous insufficiency of right leg     no trteatment recommended by [facility] - option to treat is there Dr Zapata    Wrist swelling        PAST SURGICAL HISTORY:  Past Surgical History:   Procedure Laterality Date    CLAVICLE SURGERY Left     as a kid    CT release      CYSTOSCOPY N/A 4/7/2016    Procedure: CYSTOSCOPY;  Surgeon: Lokesh Lima MD;  Location:  OR    CYSTOSCOPY, FULGURATE BLEEDERS, EVACUATE CLOT(S), COMBINED N/A 1/16/2018    Procedure: COMBINED CYSTOSCOPY, FULGURATE BLEEDERS, EVACUATE CLOT(S);  CYSTOSCOPY, FULGERATION;  Surgeon: Lokesh Lima MD;  Location:  OR    CYSTOSCOPY, TRANSURETHRAL RESECTION (TUR) PROSTATE, COMBINED N/A 2/25/2020    Procedure:  CYSTOSCOPY; TRANSURETHRAL RESECTION OF THE PROSTATE;  Surgeon: Lokesh Lima MD;  Location: [facility] OR    HC LEFT HEART CATHETERIZATION  3/2010    Mild CAD    LASER KTP TRANSURETHRAL RESECTION (TUR) PROSTATE N/A 2016    Procedure: LASER KTP TRANSURETHRAL RESECTION (TUR) PROSTATE;  Surgeon: Lokesh Lima MD;  Location: [facility] OR    TRANSPOSITION ULNAR NERVE (ELBOW) Left 2019    Procedure: LEFT ELBOW ULNER NERVE SUBMUSCULAR TRANSPOSITION;  Surgeon: Nicole Bai MD;  Location: [facility] OR    ulnar reroute      right        FAMILY HISTORY:  Family History   Problem Relation Age of Onset    Heart Disease Mother 92        Heart failure    Heart Disease Father 92    C.A.D. Brother 70        MI    Myocardial Infarction Brother     Family History Negative Sister     Family History Negative Brother     Family History Negative Brother     Family History Negative Brother     Family History Negative Sister     Myocardial Infarction Son     Heart Disease Son     Heart Disease Son        SOCIAL HISTORY:  Social History     Socioeconomic History    Marital status:      Spouse name: None    Number of children: None    Years of education: None    Highest education level: None   Tobacco Use    Smoking status: Former     Current packs/day: 0.00     Average packs/day: 1 pack/day for 5.0 years (5.0 ttl pk-yrs)     Types: Cigarettes     Start date: 4/3/1972     Quit date: 4/3/1977     Years since quittin.1    Smokeless tobacco: Never   Substance and Sexual Activity    Alcohol use: No     Alcohol/week: 0.0 standard drinks of alcohol    Drug use: No    Sexual activity: Yes   Other Topics Concern    Parent/sibling w/ CABG, MI or angioplasty before 65F 55M? No    Caffeine Concern Yes     Comment: 8 cups of  decaf coffee per day    Sleep Concern Yes     Comment: sleep apnea, wears CPAP    Stress Concern No    Weight Concern Yes     Comment: Weight gain    Special Diet No    Exercise No    Seat Belt Yes     Social  Determinants of Health     Financial Resource Strain: Low Risk  (4/15/2024)    Financial Resource Strain     Within the past 12 months, have you or your family members you live with been unable to get utilities (heat, electricity) when it was really needed?: No   Food Insecurity: Low Risk  (4/15/2024)    Food Insecurity     Within the past 12 months, did you worry that your food would run out before you got money to buy more?: No     Within the past 12 months, did the food you bought just not last and you didn t have money to get more?: No   Transportation Needs: Low Risk  (4/15/2024)    Transportation Needs     Within the past 12 months, has lack of transportation kept you from medical appointments, getting your medicines, non-medical meetings or appointments, work, or from getting things that you need?: No   Physical Activity: Inactive (4/15/2024)    Exercise Vital Sign     Days of Exercise per Week: 0 days     Minutes of Exercise per Session: 0 min   Stress: No Stress Concern Present (4/15/2024)    [employer] - Occupational Stress Questionnaire     Feeling of Stress : Only a little   Social Connections: Unknown (4/15/2024)    Social Connection and Isolation Panel [NHANES]     Frequency of Social Gatherings with Friends and Family: Twice a week   Housing Stability: Low Risk  (4/15/2024)    Housing Stability     Do you have housing? : Yes     Are you worried about losing your housing?: No       Review of Systems:  Skin:  Negative       Eyes:  Positive for glasses    ENT:  Negative hearing loss    Respiratory:  Positive for dyspnea on exertion since COVID in Oct.   Cardiovascular:  Negative;palpitations;chest pain;syncope or near-syncope;cyanosis;lightheadedness;dizziness;Negative for Positive for;edema;fatigue edema in ankles by the end of the day -- about the same as last visit.  Gastroenterology:        Genitourinary:         Musculoskeletal:         Neurologic:         Psychiatric:   "Positive for sleep disturbances sleeps through the night sometimes.  Heme/Lymph/Imm:  Positive for easy bruising;allergies    Endocrine:  Negative        Physical Exam:  Vitals: BP (!) 149/78 (BP Location: Left arm, Patient Position: Sitting)   Pulse 63   Ht 1.664 m (5' 5.5\")   Wt 130.1 kg (286 lb 14.4 oz)   SpO2 95%   BMI 47.02 kg/m            CC  No referring provider defined for this encounter.                "

## 2024-05-01 NOTE — PROGRESS NOTES
Vasiliy Corbin is a 89 year old male here for an INR check.  Feeling pretty well, no signs of bleeding.    BP (!) 145/81   Pulse 54   Resp 16   SpO2 96%      Todays and previous results are:    Lab Results   Component Value Date    INR 1.8 05/01/2024    INR 1.8 04/04/2024    INR 2.4 03/04/2024    INR 1.7 02/19/2024    INR 2.0 01/22/2024     ASSESSMENT:                                                      Vasiliy was seen today for inr followup.    Diagnoses and all orders for this visit:    Long term current use of anticoagulant therapy  -     Prothrombin - INR ([facility])    Chronic atrial fibrillation (H)  -     Prothrombin - INR ([facility])          PLAN:                                                    The Warfarin (Coumadin) dose will change to 1.5 mg on tues and Sat and 3mg other days.     Recheck next INR in Follow up in 2 weeks.        Malvin Rosen MD  [facility]

## 2024-05-01 NOTE — LETTER
5/1/2024    Malvin Rosen MD  [address]  [facility] [place] [address]    RE: Vasiliy Corbin       Dear Colleague,     I had the pleasure of seeing Vasiliy Corbin in the [facility].  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  Then he reverted out of rhythm on amiodarone therapy, and we have been attempting rate control.  Amiodarone has been discontinued now because of bradycardia.  His last Holter in 2022 revealed atrial fibrillation with controlled rate.  No significant bradycardia arrhythmias were noted.     More recently had a COVID infection in October and since then he had prolonged cough which finally improved but he continues to have exertional shortness of breath that is more than usual.  Walking through the [place] he had to stop 2-3 times about a week ago when he came for a chest x-ray.    His primary care physician, Dr. Rosen sent him for chest x-ray and also mimi an N-terminal proBNP as he was having this shortness of breath.  N-terminal proBNP was slightly elevated at 3131.  It was about 2700 few years ago.  He was taking Lasix 60 mg/day which was increased to 80 mg/day.  With that his shortness of breath has improved slightly.  Chest x-ray did not show any infiltrate or congestion.    He denies any chest pain.  No orthopnea or PND.  He has chronic leg edema which improves in the morning and gets worse during the day.    Blood pressure also is elevated today.  He is on losartan 50 mg/day as well as metoprolol 50 mg twice daily.    On exam, irregular S1 and S2.  No significant murmurs.  Distant heart sounds.  JVP is difficult to see due to thick neck.  Chest was  reauscultation.    Impression    1.  Exertional shortness of breath, worsening since his COVID infection, could be acute on chronic diastolic heart failure as noted by elevated N-terminal proBNP and some leg edema  2.  Persistent atrial fibrillation, rate controlled, on warfarin  3.  Mild aortic root dilatation in the past  4.  Obesity  5.  Chronic renal insufficiency with a creatinine of 1.3 recently after Lasix was increased to 80 mg/day.  However he has had an increase creatinine in the past also including in 2023.    Discussion  At this time, I would suggest increasing Lasix to 3 tablets of 40 mg each for next 3 days to facilitate more diuresis.  In addition, I will schedule him for a stress MRI to assess his LV function and RV function as well as rule out ischemia as shortness of breath could be angina:.  He cannot exercise due to his weight and arthritis.    In addition, for better blood pressure control I will switch his metoprolol to Coreg 12.5 mg twice daily.  Continue losartan at the same dose.  He will return to see my midlevel provider after the stress MRI and to see how he is doing with his fluid status.    I also spent a long time discussing the importance of lowering intake of calories and trying to lose weight as it will help his breathing.    If no other obvious causes are found or he does not improve with diuresis, we should also do a 24-hour Holter to ensure that there is no significant bradycardia arrhythmias and chronotropic incompetence causing his shortness of breath.    Once stabilized, he can see me in clinic on an annual basis.      Sincerely,     Diomedes Menendez    Today's clinic visit entailed:  Review of external notes as documented elsewhere in note  Review of the result(s) of each unique test - CXR, bnp  The following tests were independently interpreted by me as noted in my documentation: CXR  Ordering of each unique test  Prescription drug management    Provider  Link to [facility]      The level of medical decision making during this visit was of high complexity.      No orders of the defined types were placed in this encounter.      Orders Placed This Encounter   Medications    furosemide (LASIX) 40 MG tablet     Sig: Take 1 tablet (40 mg) by mouth 2 times daily    carvedilol (COREG) 12.5 MG tablet     Sig: Take 1 tablet (12.5 mg) by mouth 2 times daily (with meals)     Dispense:  50 tablet     Refill:  4       Medications Discontinued During This Encounter   Medication Reason    furosemide (LASIX) 40 MG tablet Reorder (No AVS)    metoprolol tartrate (LOPRESSOR) 50 MG tablet          Encounter Diagnoses   Name Primary?    Essential hypertension     Chronic atrial fibrillation (H)     SUJEY on CPAP     Aortic root enlargement 4.0 cm on Echo 2021     Coronary artery disease involving native coronary artery of native heart without angina pectoris     Hyperlipidemia with target LDL less than 100     SOB (shortness of breath)     Acute on chronic diastolic congestive heart failure (H) Yes    Encounter for medication refill        CURRENT MEDICATIONS:  Current Outpatient Medications   Medication Sig Dispense Refill    allopurinol (ZYLOPRIM) 300 MG tablet TAKE 1 TABLET(300 MG) BY MOUTH DAILY 90 tablet 0    budesonide-formoterol (SYMBICORT) 80-4.5 MCG/ACT Inhaler INHALE 2 PUFFS INTO THE LUNGS TWICE DAILY 30.6 g 11    carvedilol (COREG) 12.5 MG tablet Take 1 tablet (12.5 mg) by mouth 2 times daily (with meals) 50 tablet 4    finasteride (PROSCAR) 5 MG tablet Take 1 tablet (5 mg) by mouth daily 90 tablet 3    furosemide (LASIX) 40 MG tablet Take 1 tablet (40 mg) by mouth 2 times daily      ipratropium (ATROVENT) 0.06 % nasal spray Spray 2 sprays into both nostrils 4 times daily 45 mL 6    losartan (COZAAR) 50 MG tablet Take 1 tablet (50 mg) by mouth daily 90 tablet 3    Multiple Vitamin (MULTI-VITAMIN) per tablet Take 1 tablet by mouth daily. 100 tablet 12    simvastatin (ZOCOR) 10 MG tablet TAKE 1  TABLET(10 MG) BY MOUTH AT BEDTIME 90 tablet 3    tamsulosin (FLOMAX) 0.4 MG capsule Take 0.4 mg by mouth daily      vitamin D3 (CHOLECALCIFEROL) 50 mcg (2000 units) tablet Take 1 tablet by mouth daily      warfarin ANTICOAGULANT (COUMADIN) 2 MG tablet TAKE 1 AND 1/2 TABLETS BY MOUTH DAILY (Patient taking differently: Takes 1.5 tablets Wed and Sat and 2 tablet all the other days.) 135 tablet 3    ABRYSVO injection  (Patient not taking: Reported on 5/1/2024)         ALLERGIES     Allergies   Allergen Reactions    Cardizem [Diltiazem]      Swelling and poor rate control    Lisinopril Cough       PAST MEDICAL HISTORY:  Past Medical History:   Diagnosis Date    Atrial fibrillation (H)     became chronic afib in 2016,paroxysmal,was on amiodarone but went into persistent afib in april 2106 and amiodrone was d/cd. now on BBLK and considering cardioversion.(5/2106).In Sept 2016 plan is rate control which has been a challenge and Holter in 6 months    CAD (coronary artery disease)     mild stenosis per cath    Cardiomyopathy (H)     Chronic cough 2017    Eval with Dr Sarai Oro - Kaila Ricardo - rec speech therapy, ct for eval of crackles No obstruction or copd    Colon polyp 2010    Gout     Hiatal hernia     Hip pain     History of BPH     laser TURP in 2016 Dr Lima    HTN (hypertension)     Hyperlipidemia     Neuropathy     numbness & tingling R. 4th & 5th finger left hand    Obesity (BMI 35.0-39.9 without comorbidity)     SUJEY on CPAP     Shoulder injury     Venous insufficiency of right leg     no trteatment recommended by Judy - option to treat is there Dr Zapata    Wrist swelling        PAST SURGICAL HISTORY:  Past Surgical History:   Procedure Laterality Date    CLAVICLE SURGERY Left     as a kid    CT release      CYSTOSCOPY N/A 4/7/2016    Procedure: CYSTOSCOPY;  Surgeon: Lokesh Lima MD;  Location: [facility] OR    CYSTOSCOPY, FULGURATE BLEEDERS, EVACUATE CLOT(S), COMBINED N/A 1/16/2018    Procedure: COMBINED  CYSTOSCOPY, FULGURATE BLEEDERS, EVACUATE CLOT(S);  CYSTOSCOPY, FULGERATION;  Surgeon: Lokesh Lima MD;  Location: [facility] OR    CYSTOSCOPY, TRANSURETHRAL RESECTION (TUR) PROSTATE, COMBINED N/A 2020    Procedure: CYSTOSCOPY; TRANSURETHRAL RESECTION OF THE PROSTATE;  Surgeon: Lokesh Lima MD;  Location: [facility] OR    HC LEFT HEART CATHETERIZATION  3/2010    Mild CAD    LASER KTP TRANSURETHRAL RESECTION (TUR) PROSTATE N/A 2016    Procedure: LASER KTP TRANSURETHRAL RESECTION (TUR) PROSTATE;  Surgeon: Lokesh Lima MD;  Location: [facility]    TRANSPOSITION ULNAR NERVE (ELBOW) Left 2019    Procedure: LEFT ELBOW ULNER NERVE SUBMUSCULAR TRANSPOSITION;  Surgeon: Nicole Bai MD;  Location: [facility] OR    ulnar reroute      right        FAMILY HISTORY:  Family History   Problem Relation Age of Onset    Heart Disease Mother 92        Heart failure    Heart Disease Father 92    C.A.D. Brother 70        MI    Myocardial Infarction Brother     Family History Negative Sister     Family History Negative Brother     Family History Negative Brother     Family History Negative Brother     Family History Negative Sister     Myocardial Infarction Son     Heart Disease Son     Heart Disease Son        SOCIAL HISTORY:  Social History     Socioeconomic History    Marital status:      Spouse name: None    Number of children: None    Years of education: None    Highest education level: None   Tobacco Use    Smoking status: Former     Current packs/day: 0.00     Average packs/day: 1 pack/day for 5.0 years (5.0 ttl pk-yrs)     Types: Cigarettes     Start date: 4/3/1972     Quit date: 4/3/1977     Years since quittin.1    Smokeless tobacco: Never   Substance and Sexual Activity    Alcohol use: No     Alcohol/week: 0.0 standard drinks of alcohol    Drug use: No    Sexual activity: Yes   Other Topics Concern    Parent/sibling w/ CABG, MI or angioplasty before 65F 55M? No    Caffeine Concern Yes     Comment: 8 cups  of  decaf coffee per day    Sleep Concern Yes     Comment: sleep apnea, wears CPAP    Stress Concern No    Weight Concern Yes     Comment: Weight gain    Special Diet No    Exercise No    Seat Belt Yes     Social Determinants of Health     Financial Resource Strain: Low Risk  (4/15/2024)    Financial Resource Strain     Within the past 12 months, have you or your family members you live with been unable to get utilities (heat, electricity) when it was really needed?: No   Food Insecurity: Low Risk  (4/15/2024)    Food Insecurity     Within the past 12 months, did you worry that your food would run out before you got money to buy more?: No     Within the past 12 months, did the food you bought just not last and you didn t have money to get more?: No   Transportation Needs: Low Risk  (4/15/2024)    Transportation Needs     Within the past 12 months, has lack of transportation kept you from medical appointments, getting your medicines, non-medical meetings or appointments, work, or from getting things that you need?: No   Physical Activity: Inactive (4/15/2024)    Exercise Vital Sign     Days of Exercise per Week: 0 days     Minutes of Exercise per Session: 0 min   Stress: No Stress Concern Present (4/15/2024)    [employer] [place] [employer] - Occupational Stress Questionnaire     Feeling of Stress : Only a little   Social Connections: Unknown (4/15/2024)    Social Connection and Isolation Panel [NHANES]     Frequency of Social Gatherings with Friends and Family: Twice a week   Housing Stability: Low Risk  (4/15/2024)    Housing Stability     Do you have housing? : Yes     Are you worried about losing your housing?: No       Review of Systems:  Skin:  Negative       Eyes:  Positive for glasses    ENT:  Negative hearing loss    Respiratory:  Positive for dyspnea on exertion since COVID in Oct.   Cardiovascular:  Negative;palpitations;chest pain;syncope or  "near-syncope;cyanosis;lightheadedness;dizziness;Negative for Positive for;edema;fatigue edema in ankles by the end of the day -- about the same as last visit.  Gastroenterology:        Genitourinary:         Musculoskeletal:         Neurologic:         Psychiatric:  Positive for sleep disturbances sleeps through the night sometimes.  Heme/Lymph/Imm:  Positive for easy bruising;allergies    Endocrine:  Negative        Physical Exam:  Vitals: BP (!) 149/78 (BP Location: Left arm, Patient Position: Sitting)   Pulse 63   Ht 1.664 m (5' 5.5\")   Wt 130.1 kg (286 lb 14.4 oz)   SpO2 95%   BMI 47.02 kg/m            CC  No referring provider defined for this encounter.            Thank you for allowing me to participate in the care of your patient.      Sincerely,     Diomedes Menendez MD     [facility]    "

## 2024-05-15 DIAGNOSIS — Z79.01 LONG TERM CURRENT USE OF ANTICOAGULANT THERAPY: ICD-10-CM

## 2024-05-15 DIAGNOSIS — I48.20 CHRONIC ATRIAL FIBRILLATION (H): ICD-10-CM

## 2024-05-15 LAB — INR PPP: 2.2 (ref 2–3)

## 2024-05-15 PROCEDURE — 36416 COLLJ CAPILLARY BLOOD SPEC: CPT

## 2024-05-15 PROCEDURE — 85610 PROTHROMBIN TIME: CPT

## 2024-06-04 ENCOUNTER — TELEPHONE (OUTPATIENT)
Dept: CARDIOLOGY | Facility: CLINIC | Age: 89
End: 2024-06-04

## 2024-06-04 ENCOUNTER — HOSPITAL ENCOUNTER (OUTPATIENT)
Dept: CARDIOLOGY | Facility: CLINIC | Age: 89
Discharge: HOME | End: 2024-06-04
Attending: INTERNAL MEDICINE | Admitting: INTERNAL MEDICINE
Payer: MEDICARE

## 2024-06-04 VITALS — HEART RATE: 83 BPM | SYSTOLIC BLOOD PRESSURE: 140 MMHG | DIASTOLIC BLOOD PRESSURE: 58 MMHG

## 2024-06-04 DIAGNOSIS — I10 ESSENTIAL HYPERTENSION: Primary | ICD-10-CM

## 2024-06-04 DIAGNOSIS — I50.33 ACUTE ON CHRONIC DIASTOLIC CONGESTIVE HEART FAILURE (H): ICD-10-CM

## 2024-06-04 DIAGNOSIS — R06.02 SOB (SHORTNESS OF BREATH): ICD-10-CM

## 2024-06-04 PROCEDURE — 250N000011 HC RX IP 250 OP 636: Mod: JZ | Performed by: INTERNAL MEDICINE

## 2024-06-04 PROCEDURE — 93018 CV STRESS TEST I&R ONLY: CPT | Performed by: INTERNAL MEDICINE

## 2024-06-04 PROCEDURE — A9585 GADOBUTROL INJECTION: HCPCS | Performed by: INTERNAL MEDICINE

## 2024-06-04 PROCEDURE — 93016 CV STRESS TEST SUPVJ ONLY: CPT | Performed by: INTERNAL MEDICINE

## 2024-06-04 PROCEDURE — 75563 CARD MRI W/STRESS IMG & DYE: CPT | Mod: ME

## 2024-06-04 PROCEDURE — 255N000002 HC RX 255 OP 636: Performed by: INTERNAL MEDICINE

## 2024-06-04 PROCEDURE — G1010 CDSM STANSON: HCPCS | Performed by: INTERNAL MEDICINE

## 2024-06-04 PROCEDURE — 75563 CARD MRI W/STRESS IMG & DYE: CPT | Mod: 26 | Performed by: INTERNAL MEDICINE

## 2024-06-04 RX ORDER — DIPHENHYDRAMINE HYDROCHLORIDE 50 MG/ML
25-50 INJECTION INTRAMUSCULAR; INTRAVENOUS
Status: DISCONTINUED | OUTPATIENT
Start: 2024-06-04 | End: 2024-06-05 | Stop reason: HOSPADM

## 2024-06-04 RX ORDER — AMINOPHYLLINE 25 MG/ML
100 INJECTION, SOLUTION INTRAVENOUS ONCE
Status: DISCONTINUED | OUTPATIENT
Start: 2024-06-04 | End: 2024-06-05 | Stop reason: HOSPADM

## 2024-06-04 RX ORDER — DIAZEPAM 5 MG
5 TABLET ORAL EVERY 30 MIN PRN
Status: DISCONTINUED | OUTPATIENT
Start: 2024-06-04 | End: 2024-06-05 | Stop reason: HOSPADM

## 2024-06-04 RX ORDER — DIPHENHYDRAMINE HCL 25 MG
25 CAPSULE ORAL
Status: DISCONTINUED | OUTPATIENT
Start: 2024-06-04 | End: 2024-06-05 | Stop reason: HOSPADM

## 2024-06-04 RX ORDER — GADOBUTROL 604.72 MG/ML
32 INJECTION INTRAVENOUS ONCE
Status: COMPLETED | OUTPATIENT
Start: 2024-06-04 | End: 2024-06-04

## 2024-06-04 RX ORDER — ONDANSETRON 2 MG/ML
4 INJECTION INTRAMUSCULAR; INTRAVENOUS
Status: DISCONTINUED | OUTPATIENT
Start: 2024-06-04 | End: 2024-06-05 | Stop reason: HOSPADM

## 2024-06-04 RX ORDER — ALBUTEROL SULFATE 90 UG/1
2 AEROSOL, METERED RESPIRATORY (INHALATION) EVERY 5 MIN PRN
Status: DISCONTINUED | OUTPATIENT
Start: 2024-06-04 | End: 2024-06-05 | Stop reason: HOSPADM

## 2024-06-04 RX ORDER — CAFFEINE CITRATE 20 MG/ML
60 SOLUTION INTRAVENOUS
Status: DISCONTINUED | OUTPATIENT
Start: 2024-06-04 | End: 2024-06-05 | Stop reason: HOSPADM

## 2024-06-04 RX ORDER — METHYLPREDNISOLONE SODIUM SUCCINATE 125 MG/2ML
125 INJECTION, POWDER, LYOPHILIZED, FOR SOLUTION INTRAMUSCULAR; INTRAVENOUS
Status: DISCONTINUED | OUTPATIENT
Start: 2024-06-04 | End: 2024-06-05 | Stop reason: HOSPADM

## 2024-06-04 RX ORDER — REGADENOSON 0.08 MG/ML
0.4 INJECTION, SOLUTION INTRAVENOUS ONCE
Status: COMPLETED | OUTPATIENT
Start: 2024-06-04 | End: 2024-06-04

## 2024-06-04 RX ORDER — ACYCLOVIR 200 MG/1
0-1 CAPSULE ORAL
Status: DISCONTINUED | OUTPATIENT
Start: 2024-06-04 | End: 2024-06-05 | Stop reason: HOSPADM

## 2024-06-04 RX ADMIN — REGADENOSON 0.4 MG: 0.08 INJECTION, SOLUTION INTRAVENOUS at 14:20

## 2024-06-04 RX ADMIN — GADOBUTROL 32 ML: 604.72 INJECTION INTRAVENOUS at 14:57

## 2024-06-04 NOTE — TELEPHONE ENCOUNTER
No ischemia is reassuring. Is he feeling better with lasix. If not, lets do holter for 24 hrs to assess rate control,

## 2024-06-04 NOTE — TELEPHONE ENCOUNTER
MRI:  Normal biventricular size with normal systolic function.  Quantitative LVEF is 67%.  Quantitative RVEF is  65%.  Regadenoson induced stress perfusion is negative for ischemia.  Delayed hyperenhancement reveals no clear scar, fibrosis or evidence of infiltrative disease.   Technically challenging study due to artifact arising from clavicular hardware.    VERNELL Reed RN

## 2024-06-05 NOTE — TELEPHONE ENCOUNTER
Called Pt informed him of MRI , he states he is feeling well presently, he has been on lasix and weight down a couple pounds. There has been no BMP done since 4/15/24, will add BMP to see where kidney function at. Pt will set up lab. Pt says he feels good, and was out in yard working. VERNELL Reed RN

## 2024-06-05 NOTE — MR AVS SNAPSHOT
After Visit Summary   4/19/2017    Vasiliy Corbin    MRN: [id number]           Patient Information     Date Of Birth          1935        Visit Information        Provider Department      4/19/2017 1:10 PM Laura Wooten APRN CNP [facility] [place]        Today's Diagnoses     Chronic atrial fibrillation (H)        Localized edema          Care Instructions    Increase Furosemide to 40mg in am--full pill and 1/2 pill=20mg at noon    Schedule an echocardiogram and follow up with labs with me    Set up appointment with Dr. Herbert if we need it        Follow-ups after your visit        Additional Services     Follow-Up with Cardiac Advanced Practice Provider           Follow-Up with Electrophysiologist                 Your next 10 appointments already scheduled     May 01, 2017 10:15 AM CDT   LAB with [facility]   [facility] ([facility])    [address] [place] [address] [phone]           Patient must bring picture ID.  Patient should be prepared to give a urine specimen  Please do not eat 10-12 hours before your appointment if you are coming in fasting for labs on lipids, cholesterol, or glucose (sugar).  Pregnant women should follow their Care Team instructions. Water with medications is okay. Do not drink coffee or other fluids.   If you have concerns about taking  your medications, please ask at office or if scheduling via [employer], send a message by clicking on Secure Messaging, Message Your Care Team.              Future tests that were ordered for you today     Open Future Orders        Priority Expected Expires Ordered    Follow-Up with Electrophysiologist Routine 5/10/2017 4/19/2018 4/19/2017    Basic metabolic panel Routine 4/26/2017 4/19/2018 4/19/2017    Follow-Up with Cardiac Advanced Practice Provider Routine 4/26/2017 4/19/2018 4/19/2017    Echocardiogram Routine 4/28/2017 4/19/2018 4/19/2017     For part of this note, DIEGO Colin acted as a scribe for Dr. Gray under his direct supervision and review.    Date of Injury: January  Initial Treatment date: 4/29/2022  Date last seen: 5/29/2024  Mechanism: other  Occupation:   Referred by: Tova Manriquez NP    SUBJECTIVE:     The patient is a pleasant 42 year old female that presents to our office today for an evaluation and treatment of low back, neck and upper back pain. Patient rates her pain today at 1/10 with 10 being the worst.  Larisa reports having discomfort at the base of her neck.  States she has been waking up with pain so she believes it is caused by the way she is sleeping.      Larisa denies having any spinal complaints.  She feels \"good\".    OBJECTIVE FINDINGS:     Problem focus examination revealed:    (Cervical spine) Cervical spine facet joint function is within normal limits except for her C3 and C5 facet joints that exhibited limited passive range of motion and segmental restriction with tenderness upon palpation. The following muscles were examined for normal flexibility and tone; left trapezius muscle, right trapezius muscle, left levator scapulae muscle, right levator scapulae muscle, left sternocleidomastoid muscle, right sternocleidomastoid muscle, left suboccipital muscle and right suboccipital muscle; these muscles were within normal limits except for her left trapezius muscle, right trapezius muscle, left levator scapulae muscle and right levator scapulae muscle that exhibited limited flexibility and were hypertonic at rest.     (Thoracic Spine) Thoracic spine facet joint function is within normal limits except for her T8, T10 and T12 facet joints that exhibited limited passive range of motion and segmental restriction and tenderness upon palpation.      ORTHOPEDIC/NEUROLOGICAL TESTS:     Tenderness with palpation is noted over bilateral rhomboid muscles, bilateral levator scapulae  "        Who to contact     If you have questions or need follow up information about today's clinic visit or your schedule please contact [facility] [place] directly at [phone].  Normal or non-critical lab and imaging results will be communicated to you by MyChart, letter or phone within 4 business days after the clinic has received the results. If you do not hear from us within 7 days, please contact the clinic through MyChart or phone. If you have a critical or abnormal lab result, we will notify you by phone as soon as possible.  Submit refill requests through "[facility]" or call your pharmacy and they will forward the refill request to us. Please allow 3 business days for your refill to be completed.          Additional Information About Your Visit        [facility] Information     "[facility]" lets you send messages to your doctor, view your test results, renew your prescriptions, schedule appointments and more. To sign up, go to [email or web address]" . Click on \"Log in\" on the left side of the screen, which will take you to the Welcome page. Then click on \"Sign up Now\" on the right side of the page.     You will be asked to enter the access code listed below, as well as some personal information. Please follow the directions to create your username and password.     Your access code is: [email or web address]  Expires: 2017 10:42 AM     Your access code will  in 90 days. If you need help or a new code, please call your [facility] clinic or [phone].        Care EveryWhere ID     This is your Care EveryWhere ID. This could be used by other organizations to access your [facility] medical records  [id number]        Your Vitals Were     Pulse Height BMI (Body Mass Index)             64 1.676 m (5' 6\") 44.39 kg/m2          Blood Pressure from Last 3 Encounters:   17 140/76   17 122/76   17 118/74    Weight from Last 3 Encounters:   17 124.7 kg (275 lb) " muscles    Assessment:    Cervical spine pain, Cervical somatic dysfunction and Thoracic region somatic dysfunction    Complicating factors:  deconditioning     Plan:    Patient was evaluated and then treated with manipulation to the cervical spine and thoracic spine via diversified manipulation technique to improve function and passive range of motion of facet joints.  Patient also treated with contract/relax stretch to muscle noted as taut in objective findings to improve flexibility and decrease strain to spinal structures.        Rehabilitation/Modalities:  None    Goal of care is to improve muscular and skeletal function and provide symptom relief. Patient responded as expected to the treatment today. Patient is to return in3 week(s) for continued care and treatment.     The documentation recorded by the scribe accurately and completely reflects the service(s) I personally performed and the decisions made by me.       04/03/17 123.7 kg (272 lb 12.8 oz)   03/20/17 124 kg (273 lb 6.4 oz)              We Performed the Following     Follow-Up with Cardiac Advanced Practice Provider          Today's Medication Changes          These changes are accurate as of: 4/19/17  1:56 PM.  If you have any questions, ask your nurse or doctor.               These medicines have changed or have updated prescriptions.        Dose/Directions    furosemide 40 MG tablet   Commonly known as:  LASIX   This may have changed:    - how much to take  - how to take this  - when to take this  - additional instructions   Used for:  Localized edema   Changed by:  Laura Wooten APRN CNP        One tablet in am; 1/2 tablet at noon   Quantity:  135 tablet   Refills:  3            Where to get your medicines      These medications were sent to [facility] [address] [place] [address] & [address], [place] [address]     Phone:  [phone]     furosemide 40 MG tablet                Primary Care Provider Office Phone # Fax #    Heriberto Haile [phone]       [facility] [address] [place] [address]        Thank you!     Thank you for choosing [facility] [place]  for your care. Our goal is always to provide you with excellent care. Hearing back from our patients is one way we can continue to improve our services. Please take a few minutes to complete the written survey that you may receive in the mail after your visit with us. Thank you!             Your Updated Medication List - Protect others around you: Learn how to safely use, store and throw away your medicines at www.disposemymeds.org.          This list is accurate as of: 4/19/17  1:56 PM.  Always use your most recent med list.                   Brand Name Dispense Instructions for use    allopurinol 300 MG tablet    ZYLOPRIM    90 tablet    TAKE 1 TABLET BY MOUTH  DAILY       ASPIRIN NOT PRESCRIBED    INTENTIONAL    0 each    Reported on 4/19/2017       digoxin 125 MCG tablet    LANOXIN    90 tablet    Take 1 tablet (125 mcg) by mouth daily       furosemide 40 MG tablet    LASIX    135 tablet    One tablet in am; 1/2 tablet at noon       ipratropium 0.06 % spray    ATROVENT    45 mL    USE 2 SPRAYS IN EACH NOSTRIL FOUR TIMES DAILY AS NEEDED FOR RHINITIS       lisinopril 10 MG tablet    PRINIVIL/ZESTRIL    90 tablet    Take 0.5 tablets (5 mg) by mouth 2 times daily       metoprolol 50 MG tablet    LOPRESSOR    360 tablet    Take 2 tablets (100 mg) by mouth 2 times daily       Multi-vitamin Tabs tablet   Generic drug:  multivitamin, therapeutic with minerals     100 tablet    Take 1 tablet by mouth daily.       simvastatin 10 MG tablet    ZOCOR    90 tablet    TAKE 1 TABLET(10 MG) BY MOUTH AT BEDTIME       warfarin 2 MG tablet    COUMADIN    135 tablet    1 1/2 pills per day

## 2024-06-06 ENCOUNTER — TELEPHONE (OUTPATIENT)
Dept: CARDIOLOGY | Facility: CLINIC | Age: 89
End: 2024-06-06

## 2024-06-06 DIAGNOSIS — I10 ESSENTIAL HYPERTENSION: Primary | ICD-10-CM

## 2024-06-06 DIAGNOSIS — Z79.01 LONG TERM CURRENT USE OF ANTICOAGULANT THERAPY: ICD-10-CM

## 2024-06-06 DIAGNOSIS — I48.20 CHRONIC ATRIAL FIBRILLATION (H): ICD-10-CM

## 2024-06-06 LAB
ANION GAP SERPL CALCULATED.3IONS-SCNC: 11 MMOL/L (ref 7–15)
BUN SERPL-MCNC: 34.4 MG/DL (ref 8–23)
CALCIUM SERPL-MCNC: 9.6 MG/DL (ref 8.8–10.2)
CHLORIDE SERPL-SCNC: 104 MMOL/L (ref 98–107)
CREAT SERPL-MCNC: 1.23 MG/DL (ref 0.67–1.17)
DEPRECATED HCO3 PLAS-SCNC: 26 MMOL/L (ref 22–29)
EGFRCR SERPLBLD CKD-EPI 2021: 56 ML/MIN/1.73M2
FASTING STATUS PATIENT QL REPORTED: ABNORMAL
GLUCOSE SERPL-MCNC: 105 MG/DL (ref 70–99)
INR PPP: 2.6 (ref 2–3)
POTASSIUM SERPL-SCNC: 4 MMOL/L (ref 3.4–5.3)
SODIUM SERPL-SCNC: 141 MMOL/L (ref 135–145)

## 2024-06-06 PROCEDURE — 36415 COLL VENOUS BLD VENIPUNCTURE: CPT

## 2024-06-06 PROCEDURE — 82565 ASSAY OF CREATININE: CPT | Mod: ORL | Performed by: FAMILY MEDICINE

## 2024-06-06 PROCEDURE — 85610 PROTHROMBIN TIME: CPT

## 2024-06-06 PROCEDURE — 82374 ASSAY BLOOD CARBON DIOXIDE: CPT | Mod: ORL | Performed by: FAMILY MEDICINE

## 2024-06-06 NOTE — TELEPHONE ENCOUNTER
BMP for review:    Component      Latest Ref Rng 6/6/2024  9:31 AM   Sodium      135 - 145 mmol/L 141    Potassium      3.4 - 5.3 mmol/L 4.0    Chloride      98 - 107 mmol/L 104    Carbon Dioxide (CO2)      22 - 29 mmol/L 26    Anion Gap      7 - 15 mmol/L 11    Urea Nitrogen      8.0 - 23.0 mg/dL 34.4 (H)    Creatinine      0.67 - 1.17 mg/dL 1.23 (H)    GFR Estimate      >60 mL/min/1.73m2 56 (L)    Calcium      8.8 - 10.2 mg/dL 9.6    Glucose      70 - 99 mg/dL 105 (H)    Patient Fasting? Unknown     Pt informed of results. VERNELL Reed RN

## 2024-06-25 DIAGNOSIS — I10 ESSENTIAL HYPERTENSION: ICD-10-CM

## 2024-06-25 RX ORDER — LOSARTAN POTASSIUM 50 MG/1
50 TABLET ORAL DAILY
Qty: 90 TABLET | Refills: 3 | Status: SHIPPED | OUTPATIENT
Start: 2024-06-25

## 2024-06-25 NOTE — TELEPHONE ENCOUNTER
Med: Losartan        LOV (related): 4/15/24 -cpx    Last Lab: 6/6/24      Due for F/U around: 1 year for CPX/bp check       Next Appt: 7/10/24 lab only         BP Readings from Last 3 Encounters:   06/04/24 (!) 140/58   05/01/24 (!) 149/78   05/01/24 (!) 145/81       Last Comprehensive Metabolic Panel:  Lab Results   Component Value Date     06/06/2024    POTASSIUM 4.0 06/06/2024    CHLORIDE 104 06/06/2024    CO2 26 06/06/2024    ANIONGAP 11 06/06/2024     (H) 06/06/2024    BUN 34.4 (H) 06/06/2024    CR 1.23 (H) 06/06/2024    GFRESTIMATED 56 (L) 06/06/2024    EDUARD 9.6 06/06/2024

## 2024-07-10 DIAGNOSIS — Z79.01 LONG TERM CURRENT USE OF ANTICOAGULANT THERAPY: ICD-10-CM

## 2024-07-10 DIAGNOSIS — I48.20 CHRONIC ATRIAL FIBRILLATION (H): ICD-10-CM

## 2024-07-10 LAB — INR PPP: 3.9 (ref 2–3)

## 2024-07-10 PROCEDURE — 99213 OFFICE O/P EST LOW 20 MIN: CPT | Performed by: FAMILY MEDICINE

## 2024-07-10 PROCEDURE — 36416 COLLJ CAPILLARY BLOOD SPEC: CPT | Performed by: FAMILY MEDICINE

## 2024-07-10 PROCEDURE — G2211 COMPLEX E/M VISIT ADD ON: HCPCS | Performed by: FAMILY MEDICINE

## 2024-07-10 PROCEDURE — 85610 PROTHROMBIN TIME: CPT | Performed by: FAMILY MEDICINE

## 2024-07-25 DIAGNOSIS — Z76.0 ENCOUNTER FOR MEDICATION REFILL: ICD-10-CM

## 2024-07-25 RX ORDER — FUROSEMIDE 40 MG
TABLET ORAL
Qty: 135 TABLET | Refills: 3 | Status: SHIPPED | OUTPATIENT
Start: 2024-07-25

## 2024-08-07 ENCOUNTER — OFFICE VISIT (OUTPATIENT)
Dept: FAMILY MEDICINE | Facility: CLINIC | Age: 89
End: 2024-08-07

## 2024-08-07 ENCOUNTER — TELEPHONE (OUTPATIENT)
Dept: FAMILY MEDICINE | Facility: CLINIC | Age: 89
End: 2024-08-07

## 2024-08-07 VITALS
WEIGHT: 282.8 LBS | BODY MASS INDEX: 46.34 KG/M2 | OXYGEN SATURATION: 98 % | HEART RATE: 80 BPM | SYSTOLIC BLOOD PRESSURE: 137 MMHG | DIASTOLIC BLOOD PRESSURE: 63 MMHG

## 2024-08-07 DIAGNOSIS — I48.20 CHRONIC ATRIAL FIBRILLATION (H): ICD-10-CM

## 2024-08-07 DIAGNOSIS — Z79.01 LONG TERM CURRENT USE OF ANTICOAGULANT THERAPY: ICD-10-CM

## 2024-08-07 LAB — INR PPP: 3.7 (ref 2–3)

## 2024-08-07 PROCEDURE — 36416 COLLJ CAPILLARY BLOOD SPEC: CPT | Performed by: FAMILY MEDICINE

## 2024-08-07 PROCEDURE — 85610 PROTHROMBIN TIME: CPT | Performed by: FAMILY MEDICINE

## 2024-08-07 PROCEDURE — G2211 COMPLEX E/M VISIT ADD ON: HCPCS | Performed by: FAMILY MEDICINE

## 2024-08-07 PROCEDURE — 99213 OFFICE O/P EST LOW 20 MIN: CPT | Performed by: FAMILY MEDICINE

## 2024-08-07 RX ORDER — WARFARIN SODIUM 2 MG/1
TABLET ORAL
Qty: 135 TABLET | Refills: 3 | Status: SHIPPED | OUTPATIENT
Start: 2024-08-07 | End: 2024-08-13

## 2024-08-07 NOTE — PROGRESS NOTES
Vasiliy Corbin is a 89 year old male here for an INR check.  Feeling pretty well, no signs of bleeding.    /63 (BP Location: Right arm, Patient Position: Sitting, Cuff Size: Adult Large)   Pulse 80   Wt 128.3 kg (282 lb 12.8 oz)   SpO2 98%   BMI 46.34 kg/m       Todays and previous results are:    Lab Results   Component Value Date    INR 3.7 08/07/2024    INR 3.9 07/10/2024    INR 2.6 06/06/2024    INR 2.2 05/15/2024    INR 1.8 05/01/2024     ASSESSMENT:                                                      Vasiliy was seen today for inr followup.    Diagnoses and all orders for this visit:    Long term current use of anticoagulant therapy  -     Prothrombin - INR ([facility])    Chronic atrial fibrillation (H)  -     Prothrombin - INR ([facility])          PLAN:                                                    The Warfarin (Coumadin) current dose of 18 mg weekly .... Will change to 2 mg daily for total of 14 mg weekly and Recheck next INR in Follow up in 3 weeks      The longitudinal plan of care for the diagnosis(es)/condition(s) as documented were addressed during this visit. Due to the added complexity in care, I will continue to support Vasiliy in the subsequent management and with ongoing continuity of care.  Malvin Rosen MD  [facility]

## 2024-08-07 NOTE — PATIENT INSTRUCTIONS
PLAN:                                                    The Warfarin (Coumadin) current dose of 18 mg weekly .... Will change to 2 mg daily for total of 14 mg weekly and Recheck next INR in Follow up in 3 weeks

## 2024-08-07 NOTE — TELEPHONE ENCOUNTER
Sent current directions of warfarin to [facility] pharmacist by fax [phone]  Tried by phone but they are on meal break  Sweetie Diaz MA August 7, 2024 1:49 PM      Warfarin per [facility] notes of today's encounter is 2 mg - one tab po daily  Return to clinic in 3 wks  Sweetie Diaz MA August 7, 2024 1:50 PM

## 2024-08-13 DIAGNOSIS — I48.20 CHRONIC ATRIAL FIBRILLATION (H): ICD-10-CM

## 2024-08-13 RX ORDER — WARFARIN SODIUM 2 MG/1
TABLET ORAL
Qty: 90 TABLET | Refills: 3 | Status: SHIPPED | OUTPATIENT
Start: 2024-08-13

## 2024-08-13 NOTE — TELEPHONE ENCOUNTER
Pharmacy sent fax requesting updated sig on warfarin rx.   Refills were sent last week but had a directions that were changed on 8/7/24 by Dr. Rosen based on his INR of 3.7.   On 8/7/24 Dr. Rosen decreased warfarin dose to 2mg QD and recheck INR in 3 weeks.   Patient has schedule INR for 8/28/24.   Plan: updated rx sent to pharmacy per patient and pharmacy request.     Demi Wilson RN

## 2024-08-28 DIAGNOSIS — I48.20 CHRONIC ATRIAL FIBRILLATION (H): ICD-10-CM

## 2024-08-28 DIAGNOSIS — Z79.01 LONG TERM CURRENT USE OF ANTICOAGULANT THERAPY: ICD-10-CM

## 2024-08-28 LAB — INR PPP: 1.7 (ref 2–3)

## 2024-08-28 PROCEDURE — 36416 COLLJ CAPILLARY BLOOD SPEC: CPT

## 2024-08-28 PROCEDURE — 85610 PROTHROMBIN TIME: CPT

## 2024-08-29 DIAGNOSIS — R06.02 SOB (SHORTNESS OF BREATH): ICD-10-CM

## 2024-08-29 DIAGNOSIS — I50.33 ACUTE ON CHRONIC DIASTOLIC CONGESTIVE HEART FAILURE (H): ICD-10-CM

## 2024-08-29 DIAGNOSIS — I25.10 CORONARY ARTERY DISEASE INVOLVING NATIVE CORONARY ARTERY OF NATIVE HEART WITHOUT ANGINA PECTORIS: ICD-10-CM

## 2024-08-29 RX ORDER — CARVEDILOL 12.5 MG/1
12.5 TABLET ORAL 2 TIMES DAILY WITH MEALS
Qty: 180 TABLET | Refills: 3 | Status: SHIPPED | OUTPATIENT
Start: 2024-08-29

## 2024-09-12 DIAGNOSIS — M1A.09X0 IDIOPATHIC CHRONIC GOUT OF MULTIPLE SITES WITHOUT TOPHUS: ICD-10-CM

## 2024-09-12 RX ORDER — ALLOPURINOL 300 MG/1
TABLET ORAL
Qty: 90 TABLET | Refills: 0 | Status: SHIPPED | OUTPATIENT
Start: 2024-09-12

## 2024-09-18 DIAGNOSIS — Z79.01 LONG TERM CURRENT USE OF ANTICOAGULANT THERAPY: ICD-10-CM

## 2024-09-18 DIAGNOSIS — I48.20 CHRONIC ATRIAL FIBRILLATION (H): ICD-10-CM

## 2024-09-18 LAB — INR PPP: 2.6 (ref 2–3)

## 2024-09-18 PROCEDURE — 36416 COLLJ CAPILLARY BLOOD SPEC: CPT

## 2024-09-18 PROCEDURE — 85610 PROTHROMBIN TIME: CPT

## 2024-10-16 DIAGNOSIS — Z23 NEEDS FLU SHOT: ICD-10-CM

## 2024-10-16 DIAGNOSIS — I48.20 CHRONIC ATRIAL FIBRILLATION (H): Primary | ICD-10-CM

## 2024-10-16 DIAGNOSIS — Z23 NEED FOR COVID-19 VACCINE: ICD-10-CM

## 2024-10-16 DIAGNOSIS — Z79.01 LONG TERM CURRENT USE OF ANTICOAGULANT THERAPY: ICD-10-CM

## 2024-10-16 LAB — INR PPP: 2.3 (ref 2–3)

## 2024-10-16 PROCEDURE — 85610 PROTHROMBIN TIME: CPT

## 2024-10-16 PROCEDURE — G0008 ADMIN INFLUENZA VIRUS VAC: HCPCS

## 2024-10-16 PROCEDURE — 36416 COLLJ CAPILLARY BLOOD SPEC: CPT

## 2024-10-16 PROCEDURE — 91320 SARSCV2 VAC 30MCG TRS-SUC IM: CPT

## 2024-10-16 PROCEDURE — 90653 IIV ADJUVANT VACCINE IM: CPT

## 2024-10-16 PROCEDURE — 90480 ADMN SARSCOV2 VAC 1/ONLY CMP: CPT

## 2024-11-13 DIAGNOSIS — Z79.01 LONG TERM CURRENT USE OF ANTICOAGULANT THERAPY: ICD-10-CM

## 2024-11-13 DIAGNOSIS — I48.20 CHRONIC ATRIAL FIBRILLATION (H): ICD-10-CM

## 2024-11-13 LAB — INR PPP: 1.8 (ref 2–3)

## 2024-11-13 PROCEDURE — 36416 COLLJ CAPILLARY BLOOD SPEC: CPT

## 2024-11-13 PROCEDURE — 85610 PROTHROMBIN TIME: CPT

## 2024-12-04 DIAGNOSIS — I48.20 CHRONIC ATRIAL FIBRILLATION (H): ICD-10-CM

## 2024-12-04 DIAGNOSIS — Z79.01 LONG TERM CURRENT USE OF ANTICOAGULANT THERAPY: ICD-10-CM

## 2024-12-04 LAB — INR PPP: 2.2 (ref 2–3)

## 2024-12-04 PROCEDURE — 36416 COLLJ CAPILLARY BLOOD SPEC: CPT

## 2024-12-04 PROCEDURE — 85610 PROTHROMBIN TIME: CPT

## 2024-12-12 DIAGNOSIS — J98.4 RESTRICTIVE AIRWAY DISEASE: ICD-10-CM

## 2024-12-15 RX ORDER — BUDESONIDE AND FORMOTEROL FUMARATE DIHYDRATE 80; 4.5 UG/1; UG/1
2 AEROSOL RESPIRATORY (INHALATION) 2 TIMES DAILY
Qty: 30.6 G | Refills: 11 | Status: SHIPPED | OUTPATIENT
Start: 2024-12-15

## 2024-12-17 DIAGNOSIS — M1A.09X0 IDIOPATHIC CHRONIC GOUT OF MULTIPLE SITES WITHOUT TOPHUS: ICD-10-CM

## 2024-12-17 RX ORDER — ALLOPURINOL 300 MG/1
TABLET ORAL
Qty: 90 TABLET | Refills: 0 | Status: SHIPPED | OUTPATIENT
Start: 2024-12-17

## 2025-01-02 DIAGNOSIS — I48.20 CHRONIC ATRIAL FIBRILLATION (H): ICD-10-CM

## 2025-01-02 DIAGNOSIS — Z79.01 LONG TERM CURRENT USE OF ANTICOAGULANT THERAPY: ICD-10-CM

## 2025-01-02 LAB — INR PPP: 2 (ref 2–3)

## 2025-01-21 DIAGNOSIS — N40.0 BENIGN PROSTATIC HYPERPLASIA WITHOUT LOWER URINARY TRACT SYMPTOMS: ICD-10-CM

## 2025-01-21 RX ORDER — FINASTERIDE 5 MG/1
5 TABLET, FILM COATED ORAL DAILY
Qty: 90 TABLET | Refills: 3 | Status: SHIPPED | OUTPATIENT
Start: 2025-01-21

## 2025-01-30 DIAGNOSIS — Z79.01 LONG TERM CURRENT USE OF ANTICOAGULANT THERAPY: ICD-10-CM

## 2025-01-30 DIAGNOSIS — I48.20 CHRONIC ATRIAL FIBRILLATION (H): ICD-10-CM

## 2025-01-30 LAB — INR PPP: 2.3 (ref 2–3)

## 2025-02-24 DIAGNOSIS — Z79.899 ENCOUNTER FOR LONG-TERM (CURRENT) USE OF MEDICATIONS: ICD-10-CM

## 2025-02-24 RX ORDER — IPRATROPIUM BROMIDE 42 UG/1
SPRAY, METERED NASAL
Qty: 45 ML | Refills: 6 | Status: SHIPPED | OUTPATIENT
Start: 2025-02-24

## 2025-02-24 NOTE — CONFIDENTIAL NOTE
Med: IPRATROPIUM NASAL SPRAY    LOV (related): 4/15/24 - CPX      Due for F/U around: 4/2025 FOR CPX    Next Appt: NONE

## 2025-02-27 DIAGNOSIS — Z79.01 LONG TERM CURRENT USE OF ANTICOAGULANT THERAPY: ICD-10-CM

## 2025-02-27 DIAGNOSIS — I48.20 CHRONIC ATRIAL FIBRILLATION (H): ICD-10-CM

## 2025-02-27 LAB — INR PPP: 2 (ref 2–3)

## 2025-02-27 PROCEDURE — 85610 PROTHROMBIN TIME: CPT

## 2025-02-27 PROCEDURE — 36416 COLLJ CAPILLARY BLOOD SPEC: CPT

## 2025-02-27 ASSESSMENT — ASTHMA QUESTIONNAIRES
QUESTION_3 LAST FOUR WEEKS HOW OFTEN DID YOUR ASTHMA SYMPTOMS (WHEEZING, COUGHING, SHORTNESS OF BREATH, CHEST TIGHTNESS OR PAIN) WAKE YOU UP AT NIGHT OR EARLIER THAN USUAL IN THE MORNING: NOT AT ALL
QUESTION_2 LAST FOUR WEEKS HOW OFTEN HAVE YOU HAD SHORTNESS OF BREATH: NOT AT ALL
QUESTION_5 LAST FOUR WEEKS HOW WOULD YOU RATE YOUR ASTHMA CONTROL: COMPLETELY CONTROLLED
ACT_TOTALSCORE: 25
ACT_TOTALSCORE: 25
QUESTION_1 LAST FOUR WEEKS HOW MUCH OF THE TIME DID YOUR ASTHMA KEEP YOU FROM GETTING AS MUCH DONE AT WORK, SCHOOL OR AT HOME: NONE OF THE TIME
QUESTION_4 LAST FOUR WEEKS HOW OFTEN HAVE YOU USED YOUR RESCUE INHALER OR NEBULIZER MEDICATION (SUCH AS ALBUTEROL): NOT AT ALL

## 2025-03-27 DIAGNOSIS — I48.20 CHRONIC ATRIAL FIBRILLATION (H): ICD-10-CM

## 2025-03-27 DIAGNOSIS — Z79.01 LONG TERM CURRENT USE OF ANTICOAGULANT THERAPY: ICD-10-CM

## 2025-03-27 LAB — INR PPP: 1.7 (ref 2–3)

## 2025-03-27 PROCEDURE — 36416 COLLJ CAPILLARY BLOOD SPEC: CPT

## 2025-03-27 PROCEDURE — 85610 PROTHROMBIN TIME: CPT

## 2025-04-10 DIAGNOSIS — I48.20 CHRONIC ATRIAL FIBRILLATION (H): ICD-10-CM

## 2025-04-10 DIAGNOSIS — Z79.01 LONG TERM CURRENT USE OF ANTICOAGULANT THERAPY: ICD-10-CM

## 2025-04-10 LAB — INR PPP: 2.3 (ref 2–3)

## 2025-04-10 PROCEDURE — 85610 PROTHROMBIN TIME: CPT

## 2025-04-10 PROCEDURE — 36416 COLLJ CAPILLARY BLOOD SPEC: CPT

## 2025-04-21 DIAGNOSIS — E78.5 HYPERLIPIDEMIA WITH TARGET LDL LESS THAN 100: ICD-10-CM

## 2025-04-21 RX ORDER — SIMVASTATIN 10 MG
TABLET ORAL
Qty: 90 TABLET | Refills: 0 | Status: SHIPPED | OUTPATIENT
Start: 2025-04-21

## 2025-04-21 NOTE — TELEPHONE ENCOUNTER
Med: Simvastatin    LOV (related): 4/14/24    Last Lab: 4/15/24      Due for F/U around: Around 4/2025 for AWV    Next Appt: 5/5/25 (AWV)        Cholesterol   Date Value Ref Range Status   04/15/2024 106 100 - 199 mg/dL Final   12/12/2022 140 100 - 199 mg/dL Final   07/29/2021 116 100 - 199 mg/dL Final     HDL Cholesterol   Date Value Ref Range Status   12/12/2022 43 >39 mg/dL Final   07/29/2021 42 >39 mg/dL Final     HDL   Date Value Ref Range Status   04/15/2024 40 >=40 mg/dL Final     LDL Cholesterol Calculated   Date Value Ref Range Status   12/12/2022 69 0 - 99 mg/dL Final   07/29/2021 52 0 - 99 mg/dL Final     LDL CALCULATED ([facility])   Date Value Ref Range Status   04/15/2024 49 0 - 130 mg/dL Final     Triglycerides   Date Value Ref Range Status   04/15/2024 86 0 - 149 mg/dL Final   12/12/2022 162 (H) 0 - 149 mg/dL Final   07/29/2021 126 0 - 149 mg/dL Final     Cholesterol/HDL Ratio   Date Value Ref Range Status   02/01/2012 3.4 <4.4 CALC Final   08/23/2011 3.6 <4.4 CALC Final

## 2025-05-05 ENCOUNTER — OFFICE VISIT (OUTPATIENT)
Dept: FAMILY MEDICINE | Facility: CLINIC | Age: OVER 89
End: 2025-05-05

## 2025-05-05 VITALS
HEIGHT: 65 IN | DIASTOLIC BLOOD PRESSURE: 61 MMHG | BODY MASS INDEX: 47.58 KG/M2 | HEART RATE: 51 BPM | SYSTOLIC BLOOD PRESSURE: 150 MMHG | OXYGEN SATURATION: 94 % | WEIGHT: 285.6 LBS

## 2025-05-05 DIAGNOSIS — N18.31 CHRONIC KIDNEY DISEASE, STAGE 3A (H): Primary | ICD-10-CM

## 2025-05-05 DIAGNOSIS — I48.20 CHRONIC ATRIAL FIBRILLATION (H): ICD-10-CM

## 2025-05-05 DIAGNOSIS — I77.89 AORTIC ROOT ENLARGEMENT: ICD-10-CM

## 2025-05-05 DIAGNOSIS — I10 ESSENTIAL HYPERTENSION: ICD-10-CM

## 2025-05-05 DIAGNOSIS — M1A.09X0 IDIOPATHIC CHRONIC GOUT OF MULTIPLE SITES WITHOUT TOPHUS: ICD-10-CM

## 2025-05-05 DIAGNOSIS — I42.9 CARDIOMYOPATHY, UNSPECIFIED TYPE (H): ICD-10-CM

## 2025-05-05 DIAGNOSIS — J45.20 MILD INTERMITTENT REACTIVE AIRWAY DISEASE WITHOUT COMPLICATION: ICD-10-CM

## 2025-05-05 DIAGNOSIS — K22.2 ESOPHAGEAL STRICTURE: ICD-10-CM

## 2025-05-05 DIAGNOSIS — I50.32 CHRONIC DIASTOLIC CONGESTIVE HEART FAILURE (H): ICD-10-CM

## 2025-05-05 DIAGNOSIS — Z79.01 LONG TERM CURRENT USE OF ANTICOAGULANT THERAPY: ICD-10-CM

## 2025-05-05 DIAGNOSIS — E66.01 MORBID OBESITY DUE TO EXCESS CALORIES (H): ICD-10-CM

## 2025-05-05 LAB
% GRANULOCYTES: 71.8 % (ref 42.2–75.2)
ALBUMIN SERPL BCG-MCNC: 4 G/DL (ref 3.5–5.2)
ALP SERPL-CCNC: 143 U/L (ref 40–150)
ALT SERPL W P-5'-P-CCNC: 15 U/L (ref 0–70)
ANION GAP SERPL CALCULATED.3IONS-SCNC: 12 MMOL/L (ref 7–15)
AST SERPL W P-5'-P-CCNC: 23 U/L (ref 0–45)
BILIRUB SERPL-MCNC: 0.9 MG/DL
BUN SERPL-MCNC: 38.8 MG/DL (ref 8–23)
CALCIUM SERPL-MCNC: 9.6 MG/DL (ref 8.8–10.4)
CHLORIDE SERPL-SCNC: 102 MMOL/L (ref 98–107)
CHOLEST SERPL-MCNC: 98 MG/DL
CREAT SERPL-MCNC: 1.26 MG/DL (ref 0.67–1.17)
EGFRCR SERPLBLD CKD-EPI 2021: 54 ML/MIN/1.73M2
FASTING STATUS PATIENT QL REPORTED: NO
FASTING STATUS PATIENT QL REPORTED: NO
GLUCOSE SERPL-MCNC: 100 MG/DL (ref 70–99)
HCO3 SERPL-SCNC: 24 MMOL/L (ref 22–29)
HCT VFR BLD AUTO: 34.5 % (ref 39–51)
HDLC SERPL-MCNC: 39 MG/DL
HEMOGLOBIN: 11.8 G/DL (ref 13.4–17.5)
INR PPP: 2.6 (ref 2–3)
LDLC SERPL CALC-MCNC: 37 MG/DL
LYMPHOCYTES NFR BLD AUTO: 21.3 % (ref 20.5–51.1)
MCH RBC QN AUTO: 32.3 PG (ref 27–31)
MCHC RBC AUTO-ENTMCNC: 34.1 G/DL (ref 33–37)
MCV RBC AUTO: 94.5 FL (ref 80–100)
MONOCYTES NFR BLD AUTO: 6.9 % (ref 1.7–9.3)
NONHDLC SERPL-MCNC: 59 MG/DL
PLATELET # BLD AUTO: 163 K/UL (ref 140–450)
POTASSIUM SERPL-SCNC: 4.3 MMOL/L (ref 3.4–5.3)
PROT SERPL-MCNC: 7.4 G/DL (ref 6.4–8.3)
RBC # BLD AUTO: 3.65 X10/CMM (ref 4.2–5.9)
SODIUM SERPL-SCNC: 138 MMOL/L (ref 135–145)
TRIGL SERPL-MCNC: 108 MG/DL
URATE SERPL-MCNC: 4.6 MG/DL (ref 3.4–7)
WBC # BLD AUTO: 6.8 X10/CMM (ref 3.8–11)

## 2025-05-05 PROCEDURE — 99214 OFFICE O/P EST MOD 30 MIN: CPT | Mod: 25 | Performed by: FAMILY MEDICINE

## 2025-05-05 PROCEDURE — 80061 LIPID PANEL: CPT | Mod: ORL | Performed by: FAMILY MEDICINE

## 2025-05-05 PROCEDURE — 80053 COMPREHEN METABOLIC PANEL: CPT | Mod: ORL | Performed by: FAMILY MEDICINE

## 2025-05-05 PROCEDURE — 3077F SYST BP >= 140 MM HG: CPT | Performed by: FAMILY MEDICINE

## 2025-05-05 PROCEDURE — 3078F DIAST BP <80 MM HG: CPT | Performed by: FAMILY MEDICINE

## 2025-05-05 PROCEDURE — G0439 PPPS, SUBSEQ VISIT: HCPCS | Performed by: FAMILY MEDICINE

## 2025-05-05 PROCEDURE — 36415 COLL VENOUS BLD VENIPUNCTURE: CPT | Performed by: FAMILY MEDICINE

## 2025-05-05 PROCEDURE — 84550 ASSAY OF BLOOD/URIC ACID: CPT | Mod: ORL | Performed by: FAMILY MEDICINE

## 2025-05-05 PROCEDURE — 85610 PROTHROMBIN TIME: CPT | Performed by: FAMILY MEDICINE

## 2025-05-05 PROCEDURE — 85025 COMPLETE CBC W/AUTO DIFF WBC: CPT | Performed by: FAMILY MEDICINE

## 2025-05-05 SDOH — HEALTH STABILITY: PHYSICAL HEALTH: ON AVERAGE, HOW MANY DAYS PER WEEK DO YOU ENGAGE IN MODERATE TO STRENUOUS EXERCISE (LIKE A BRISK WALK)?: 2 DAYS

## 2025-05-05 NOTE — PROGRESS NOTES
Preventive Care Visit  [facility]  Malvin Rosen MD, Family Medicine  May 5, 2025    Assessment & Plan     Chronic atrial fibrillation (H)doing well,  Chads vasc 5 on coumadina and stable  - CBC with Diff/Plt ([facility])  - Prothrombin - INR ([facility])    Long term current use of anticoagulant therapy    - VENOUS COLLECTION  - Prothrombin - INR ([facility])    Chronic kidney disease, stage 3a (H)  Chronic kidney disease level 3a (eGFR of 45-59).  Check urine for protein as per routine.   Patient is on ACE/ARB.  Patient is on a statin.  Told the patient to avoid NSAIDs if at all possible.   Will monitor closely and send to Nephrologist if renal function continues to decline.      - CBC with Diff/Plt ([facility])  - Lipid Profile ([facility])    Chronic diastolic congestive heart failure (H)    - HEART FAILURE ACTION PLAN    BMI 45.0-49.9, adult (H)  Long discussion    Morbid obesity due to excess calories (H)      Cardiomyopathy, unspecified type (H)  No current dyspnea.    Aortic root enlargement 4.0 cm on Echo 2021  Rechecked in 24 tariq not enlarging in     Esophageal stricture  Starting to be a problem  - Comprehensive metabolic panel  - Adult GI  Referral - Consult Only    Idiopathic chronic gout of multiple sites without tophus  Due for  - Uric acid    mild chronic reactvie cough  Long term, tolerating current inhalers see list.  Last asthma Control Test (ACT) > or equal to 20  Have rescue inhaler available and use all throughout the year as needed.  Call us if any worsening.    - Asthma Action Plan (AAP)The longitudinal plan of care for the diagnosis(es)/condition(s) as documented were addressed during this visit.    Due to the added complexity in care, I will continue to support Vasiliy in the subsequent management and with ongoing continuity of care.      Patient has been advised of split billing requirements and indicates understanding: Yes        BMI  Estimated body mass index is 47.53 kg/m  as calculated from the  "following:    Height as of this encounter: 1.651 m (5' 5\").    Weight as of this encounter: 129.5 kg (285 lb 9.6 oz).   Weight management plan: Discussed healthy diet and exercise guidelines    Counseling  Appropriate preventive services were addressed with this patient via screening, questionnaire, or discussion as appropriate for fall prevention, nutrition, physical activity, Tobacco-use cessation, social engagement, weight loss and cognition.  Checklist reviewing preventive services available has been given to the patient.  Reviewed patient's diet, addressing concerns and/or questions.   He is at risk for lack of exercise and has been provided with information to increase physical activity for the benefit of his well-being.   The patient was instructed to see the dentist every 6 months.   The patient was provided with written information regarding signs of hearing loss.           Nikkie Amanda is a 89 year old, presenting for the following:  Physical (Nonfasting/) and INR Followup (2.6 today )          Health Care Directive  Patient does not have a Health Care Directive: known DNR/DNI    HPI  Here for awv and to follow up on the above.        5/5/2025   General Health   How would you rate your overall physical health? Good   Feel stress (tense, anxious, or unable to sleep) Not at all         5/5/2025   Nutrition   Diet: Regular (no restrictions)         5/5/2025   Exercise   Days per week of moderate/strenous exercise 2 days   (!) EXERCISE CONCERN      5/5/2025   Social Factors   Worry food won't last until get money to buy more No   Food not last or not have enough money for food? No   Do you have housing? (Housing is defined as stable permanent housing and does not include staying outside in a car, in a tent, in an abandoned building, in an overnight shelter, or couch-surfing.) Yes   Are you worried about losing your housing? No   Lack of transportation? No   Unable to get utilities (heat,electricity)? No "         2025   Fall Risk   Fallen 2 or more times in the past year? No   Trouble with walking or balance? No          2025   Activities of Daily Living- Home Safety   Needs help with the following daily activites None of the above   Safety concerns in the home None of the above         2025   Dental   Dentist two times every year? (!) NO         2025   Hearing Screening   Hearing concerns? (!) TROUBLE UNDESTANDING A SPEAKER IN A PUBLIC MEETING OR [employer] SERVICE.   Patient wears hearing aids, screening not reccommended        2025   Driving Risk Screening   Patient/family members have concerns about driving No         2025   General Alertness/Fatigue Screening   Have you been more tired than usual lately? No         2025   Urinary Incontinence Screening   Bothered by leaking urine in past 6 months No         Today's PHQ-2 Score:       2025    10:42 AM   PHQ-2 (  [employer])   Q1: Little interest or pleasure in doing things 0   Q2: Feeling down, depressed or hopeless 0   PHQ-2 Score 0    Q1: Little interest or pleasure in doing things Not at all   Q2: Feeling down, depressed or hopeless Not at all   PHQ-2 Score 0       Patient-reported           2025   Substance Use   Alcohol more than 3/day or more than 7/wk Not Applicable   Do you have a current opioid prescription? No   How severe/bad is pain from 1 to 10? 0/10 (No Pain)   Do you use any other substances recreationally? No     Social History     Tobacco Use    Smoking status: Former     Current packs/day: 0.00     Average packs/day: 1 pack/day for 5.0 years (5.0 ttl pk-yrs)     Types: Cigarettes     Start date: 4/3/1972     Quit date: 4/3/1977     Years since quittin.1    Smokeless tobacco: Never   Substance Use Topics    Alcohol use: No     Alcohol/week: 0.0 standard drinks of alcohol    Drug use: No             Reviewed and updated as needed this visit by Provider      Problems               Lab work is in  "process  Current providers sharing in care for this patient include:  Patient Care Team:  Malvin Rosen MD as PCP - General (Family Practice)  Malvin Rosen MD as Assigned PCP  Diomedes Menendez MD as Assigned Heart and Vascular Provider  Diomedes Menendez MD as MD (Cardiovascular Disease)    The following health maintenance items are reviewed in Epic and correct as of today:  Health Maintenance   Topic Date Due    MICROALBUMIN  04/16/2020    URIC ACID  04/16/2020    DTAP/TDAP/TD IMMUNIZATION (2 - Td or Tdap) 06/11/2022    BMP  12/06/2024    ALT  04/15/2025    LIPID  04/15/2025    CBC  04/15/2025    COVID-19 Vaccine (6 - 2024-25 season) 04/16/2025    ADVANCE CARE PLANNING  05/01/2025    MEDICARE ANNUAL WELLNESS VISIT  04/15/2025    HEMOGLOBIN  04/15/2025    ASTHMA CONTROL TEST  08/27/2025    ASTHMA ACTION PLAN  05/05/2026    FALL RISK ASSESSMENT  05/05/2026    HF ACTION PLAN  05/05/2028    TSH W/FREE T4 REFLEX  Completed    PHQ-2 (once per calendar year)  Completed    INFLUENZA VACCINE  Completed    Pneumococcal Vaccine: 50+ Years  Completed    URINALYSIS  Completed    ZOSTER IMMUNIZATION  Completed    RSV VACCINE  Completed    HPV IMMUNIZATION  Aged Out    MENINGITIS IMMUNIZATION  Aged Out    COLORECTAL CANCER SCREENING  Discontinued         Review of Systems  Constitutional, HEENT, cardiovascular, pulmonary, GI, , musculoskeletal, neuro, skin, endocrine and psych systems are negative, except as otherwise noted.     Objective    Exam  BP (!) 150/61   Pulse 51   Ht 1.651 m (5' 5\")   Wt 129.5 kg (285 lb 9.6 oz)   SpO2 94%   BMI 47.53 kg/m     Estimated body mass index is 47.53 kg/m  as calculated from the following:    Height as of this encounter: 1.651 m (5' 5\").    Weight as of this encounter: 129.5 kg (285 lb 9.6 oz).   5/5/2025  10:42 AM   Vitals - Patient Reported    Systolic (Patient Reported) 120    Diastolic (Patient Reported) 70        Physical Exam  GENERAL: alert and no " distress  EYES: Eyes grossly normal to inspection, PERRL and conjunctivae and sclerae normal  HENT: ear canals and TM's normal, nose and mouth without ulcers or lesions  NECK: no adenopathy, no asymmetry, masses, or scars  RESP: lungs clear to auscultation - no rales, rhonchi or wheezes  CV: regular rate and rhythm, normal S1 S2, no S3 or S4, no murmur, click or rub, no peripheral edema  ABDOMEN: soft, nontender, no hepatosplenomegaly, no masses and bowel sounds normal  MS: no gross musculoskeletal defects noted, no edema  SKIN: no suspicious lesions or rashes  NEURO: Normal strength and tone, mentation intact and speech normal  PSYCH: mentation appears normal, affect normal/bright         5/5/2025   Mini Cog   Clock Draw Score 2 Normal    2 Normal   3 Item Recall 3 objects recalled    3 objects recalled   Mini Cog Total Score 5    5       Multiple values from one day are sorted in reverse-chronological order            Signed Electronically by: Malvin Rosen MD

## 2025-05-05 NOTE — LETTER
May 6, 2025        Vasiliy Corbin  [address]  [facility] [address]        Dear Vasiliy,     I am writing to report that your included test results are as expected.    Many labs contain some results that are slightly outside of the normal range, I have reviewed any of these results and they require no changes at this time.     Malvin Rosen MD     Resulted Orders   CBC with Diff/Plt ([facility])   Result Value Ref Range    WBC x10/cmm 6.8 3.8 - 11.0 x10/cmm    % Lymphocytes 21.3 20.5 - 51.1 %    % Monocytes 6.9 1.7 - 9.3 %    % Granulocytes 71.8 42.2 - 75.2 %    RBC x10/cmm 3.65 (A) 4.2 - 5.9 x10/cmm    Hemoglobin 11.8 (A) 13.4 - 17.5 g/dl    Hematocrit 34.5 (A) 39 - 51 %    MCV 94.5 80 - 100 fL    MCH 32.3 (A) 27.0 - 31.0 pg    MCHC 34.1 33.0 - 37.0 g/dL    Platelet Count 163 140 - 450 K/uL   Prothrombin - INR ([facility])   Result Value Ref Range    INR 2.6 2.0 - 3.0   Comprehensive metabolic panel   Result Value Ref Range    Sodium 138 135 - 145 mmol/L    Potassium 4.3 3.4 - 5.3 mmol/L    Carbon Dioxide (CO2) 24 22 - 29 mmol/L    Anion Gap 12 7 - 15 mmol/L    Urea Nitrogen 38.8 (H) 8.0 - 23.0 mg/dL    Creatinine 1.26 (H) 0.67 - 1.17 mg/dL    GFR Estimate 54 (L) >60 mL/min/1.73m2      Comment:      eGFR calculated using 2021 CKD-EPI equation.    Calcium 9.6 8.8 - 10.4 mg/dL    Chloride 102 98 - 107 mmol/L    Glucose 100 (H) 70 - 99 mg/dL    Alkaline Phosphatase 143 40 - 150 U/L    AST 23 0 - 45 U/L    ALT 15 0 - 70 U/L    Protein Total 7.4 6.4 - 8.3 g/dL    Albumin 4.0 3.5 - 5.2 g/dL    Bilirubin Total 0.9 <=1.2 mg/dL    Patient Fasting > 8hrs? No    Uric acid   Result Value Ref Range    Uric Acid 4.6 3.4 - 7.0 mg/dL       If you have any questions or concerns, please call the clinic at the number listed above.       Sincerely,      Malvin Rosen MD    Electronically signed

## 2025-06-02 DIAGNOSIS — I48.20 CHRONIC ATRIAL FIBRILLATION (H): ICD-10-CM

## 2025-06-02 DIAGNOSIS — Z79.01 LONG TERM CURRENT USE OF ANTICOAGULANT THERAPY: ICD-10-CM

## 2025-06-02 LAB — INR PPP: 2.7 (ref 2–3)

## 2025-06-02 PROCEDURE — 85610 PROTHROMBIN TIME: CPT

## 2025-06-02 PROCEDURE — 36416 COLLJ CAPILLARY BLOOD SPEC: CPT

## 2025-06-06 DIAGNOSIS — M1A.09X0 IDIOPATHIC CHRONIC GOUT OF MULTIPLE SITES WITHOUT TOPHUS: ICD-10-CM

## 2025-06-09 RX ORDER — ALLOPURINOL 300 MG/1
1 TABLET ORAL
Qty: 90 TABLET | Refills: 3 | Status: SHIPPED | OUTPATIENT
Start: 2025-06-09

## 2025-06-09 NOTE — TELEPHONE ENCOUNTER
Med: Allopurinol    LOV (related): 5/5/2025    Due for F/U around: 5/5/2026 for AWV    Next Appt: none scheduled

## 2025-06-23 DIAGNOSIS — I10 ESSENTIAL HYPERTENSION: ICD-10-CM

## 2025-06-23 RX ORDER — LOSARTAN POTASSIUM 50 MG/1
50 TABLET ORAL DAILY
Qty: 90 TABLET | Refills: 3 | Status: SHIPPED | OUTPATIENT
Start: 2025-06-23

## 2025-06-23 NOTE — TELEPHONE ENCOUNTER
Med: Losartan    LOV (related): 5/5/25    Last Lab: 5/5/25      Due for F/U around: 5/2026 for CPX    Next Appt: 6/30/25        BP Readings from Last 3 Encounters:   05/05/25 (!) 150/61   08/07/24 137/63   06/04/24 (!) 140/58       Last Comprehensive Metabolic Panel:  Lab Results   Component Value Date     05/05/2025    POTASSIUM 4.3 05/05/2025    CHLORIDE 102 05/05/2025    CO2 24 05/05/2025    ANIONGAP 12 05/05/2025     (H) 05/05/2025    BUN 38.8 (H) 05/05/2025    CR 1.26 (H) 05/05/2025    GFRESTIMATED 54 (L) 05/05/2025    EDUARD 9.6 05/05/2025

## 2025-06-30 DIAGNOSIS — I48.20 CHRONIC ATRIAL FIBRILLATION (H): ICD-10-CM

## 2025-06-30 DIAGNOSIS — Z79.01 LONG TERM CURRENT USE OF ANTICOAGULANT THERAPY: ICD-10-CM

## 2025-06-30 LAB — INR PPP: 1.9 (ref 2–3)

## 2025-06-30 PROCEDURE — 36416 COLLJ CAPILLARY BLOOD SPEC: CPT

## 2025-06-30 PROCEDURE — 85610 PROTHROMBIN TIME: CPT

## 2025-07-07 DIAGNOSIS — Z79.01 LONG TERM CURRENT USE OF ANTICOAGULANT THERAPY: ICD-10-CM

## 2025-07-07 DIAGNOSIS — I48.20 CHRONIC ATRIAL FIBRILLATION (H): ICD-10-CM

## 2025-07-07 LAB — INR PPP: 2.2 (ref 2–3)

## 2025-07-07 PROCEDURE — 85610 PROTHROMBIN TIME: CPT

## 2025-07-07 PROCEDURE — 36416 COLLJ CAPILLARY BLOOD SPEC: CPT

## 2025-07-09 DIAGNOSIS — E78.5 HYPERLIPIDEMIA WITH TARGET LDL LESS THAN 100: ICD-10-CM

## 2025-07-09 RX ORDER — SIMVASTATIN 10 MG
10 TABLET ORAL AT BEDTIME
Qty: 90 TABLET | Refills: 3 | Status: SHIPPED | OUTPATIENT
Start: 2025-07-09

## 2025-07-09 NOTE — CONFIDENTIAL NOTE
Med: SIMVASTATIN    LOV (related): 5/5/25 - CPX    Last Lab: 5/5/25      Due for F/U around: 5/2026 FOR CPX - 8/7/25 FOR INR    Next Appt: 8/7/25 INR        Cholesterol   Date Value Ref Range Status   05/05/2025 98 <200 mg/dL Final   04/15/2024 106 100 - 199 mg/dL Final   12/12/2022 140 100 - 199 mg/dL Final   07/29/2021 116 100 - 199 mg/dL Final     HDL Cholesterol   Date Value Ref Range Status   12/12/2022 43 >39 mg/dL Final   07/29/2021 42 >39 mg/dL Final     HDL   Date Value Ref Range Status   04/15/2024 40 >=40 mg/dL Final     Direct Measure HDL   Date Value Ref Range Status   05/05/2025 39 (L) >=40 mg/dL Final     LDL Cholesterol Calculated   Date Value Ref Range Status   05/05/2025 37 <100 mg/dL Final   12/12/2022 69 0 - 99 mg/dL Final   07/29/2021 52 0 - 99 mg/dL Final     LDL CALCULATED ([facility])   Date Value Ref Range Status   04/15/2024 49 0 - 130 mg/dL Final     Triglycerides   Date Value Ref Range Status   05/05/2025 108 <150 mg/dL Final   04/15/2024 86 0 - 149 mg/dL Final   12/12/2022 162 (H) 0 - 149 mg/dL Final   07/29/2021 126 0 - 149 mg/dL Final     Cholesterol/HDL Ratio   Date Value Ref Range Status   02/01/2012 3.4 <4.4 CALC Final   08/23/2011 3.6 <4.4 CALC Final

## 2025-07-17 DIAGNOSIS — Z76.0 ENCOUNTER FOR MEDICATION REFILL: ICD-10-CM

## 2025-07-17 RX ORDER — FUROSEMIDE 40 MG/1
TABLET ORAL
Qty: 135 TABLET | Refills: 3 | Status: SHIPPED | OUTPATIENT
Start: 2025-07-17

## 2025-07-23 ENCOUNTER — TELEPHONE (OUTPATIENT)
Dept: CARDIOLOGY | Facility: CLINIC | Age: OVER 89
End: 2025-07-23
Payer: MEDICARE

## 2025-07-23 DIAGNOSIS — I50.33 ACUTE ON CHRONIC DIASTOLIC CONGESTIVE HEART FAILURE (H): ICD-10-CM

## 2025-07-23 DIAGNOSIS — R06.02 SOB (SHORTNESS OF BREATH): ICD-10-CM

## 2025-07-23 DIAGNOSIS — I25.10 CORONARY ARTERY DISEASE INVOLVING NATIVE CORONARY ARTERY OF NATIVE HEART WITHOUT ANGINA PECTORIS: ICD-10-CM

## 2025-07-23 RX ORDER — CARVEDILOL 12.5 MG/1
12.5 TABLET ORAL 2 TIMES DAILY WITH MEALS
Qty: 180 TABLET | Refills: 0 | Status: SHIPPED | OUTPATIENT
Start: 2025-07-23

## 2025-08-07 DIAGNOSIS — Z79.01 LONG TERM CURRENT USE OF ANTICOAGULANT THERAPY: ICD-10-CM

## 2025-08-07 DIAGNOSIS — I48.20 CHRONIC ATRIAL FIBRILLATION (H): ICD-10-CM

## 2025-08-07 LAB — INR PPP: 2.3 (ref 2–3)

## 2025-08-07 PROCEDURE — 85610 PROTHROMBIN TIME: CPT

## 2025-08-07 PROCEDURE — 36416 COLLJ CAPILLARY BLOOD SPEC: CPT

## (undated) DEVICE — SOL NACL 0.9% IRRIG 1000ML BOTTLE 2F7124

## (undated) DEVICE — SOL ADH LIQUID BENZOIN SWAB 0.6ML C1544

## (undated) DEVICE — CATH FOLEY 3WAY 20FR 30ML LATEX 0167SI20

## (undated) DEVICE — ESU ELEC LOOP 27FR 27050F

## (undated) DEVICE — LINEN TOWEL PACK X5 5464

## (undated) DEVICE — ESU GROUND PAD UNIVERSAL W/O CORD

## (undated) DEVICE — TOURNIQUET CUFF 24" STERILE

## (undated) DEVICE — SOL WATER IRRIG 3000ML BAG 2B7117

## (undated) DEVICE — ESU ELEC LOOP 24FR 20750G

## (undated) DEVICE — SU VICRYL 3-0 RB-1 27" UND J215H

## (undated) DEVICE — DRSG ABDOMINAL 07 1/2X8" 7197D

## (undated) DEVICE — ESU CORD MONOPOLAR HIGH FREQUENCY 26006M-D/10

## (undated) DEVICE — BAG URINARY DRAIN 4000ML LF 153509

## (undated) DEVICE — DECANTER BAG 2002S

## (undated) DEVICE — DRAPE GYN/UROLOGY FLUID POUCH TUR 29455

## (undated) DEVICE — Device

## (undated) DEVICE — DRSG KERLIX 4 1/2"X4YDS ROLL 6715

## (undated) DEVICE — CATH FOLEY 3WAY 24FR 30ML LATEX 0167SI24

## (undated) DEVICE — SOL NACL 0.9% IRRIG 3000ML BAG 2B7477

## (undated) DEVICE — SOL WATER IRRIG 1000ML BOTTLE 2F7114

## (undated) DEVICE — GLOVE PROTEXIS W/NEU-THERA 7.5  2D73TE75

## (undated) DEVICE — BNDG ELASTIC 4"X5YDS UNSTERILE 6611-40

## (undated) DEVICE — PACK TUR CUSTOM SBA15RUFSE

## (undated) DEVICE — ESU ELEC ROLLERBALL 24FR 3MM  27050N

## (undated) DEVICE — PACK HAND WRIST SOP15HWFSP

## (undated) DEVICE — CAST PADDING 4" UNSTERILE 9044

## (undated) DEVICE — PAD CHUX UNDERPAD 23X24" 7136

## (undated) DEVICE — DRSG GAUZE 4X4" 3033

## (undated) DEVICE — SOL GLYCINE 1.5% 3000ML BAG 2B7317

## (undated) DEVICE — SU ETHIBOND 0 OS-4 30" X517H

## (undated) DEVICE — VESSEL LOOPS RED MAXI

## (undated) DEVICE — CABLE HIGH FREQEUCY STERILE 8MM PLUG 300CM 277KB-D/10

## (undated) DEVICE — GLOVE PROTEXIS MICRO 6.5  2D73PM65

## (undated) DEVICE — GLOVE PROTEXIS BLUE W/NEU-THERA 6.5  2D73EB65

## (undated) DEVICE — SU MONOCRYL 4-0 PC-3 18" UND Y845G

## (undated) DEVICE — CAST PLASTER SPLINT 4X15" 7394

## (undated) DEVICE — SUCTION CANISTER MEDIVAC LINER 3000ML W/LID 65651-530

## (undated) DEVICE — DRSG STERI STRIP 1/2X4" R1547

## (undated) RX ORDER — REGADENOSON 0.08 MG/ML
INJECTION, SOLUTION INTRAVENOUS
Status: DISPENSED
Start: 2024-06-04

## (undated) RX ORDER — FENTANYL CITRATE 50 UG/ML
INJECTION, SOLUTION INTRAMUSCULAR; INTRAVENOUS
Status: DISPENSED
Start: 2019-11-29

## (undated) RX ORDER — DEXAMETHASONE SODIUM PHOSPHATE 4 MG/ML
INJECTION, SOLUTION INTRA-ARTICULAR; INTRALESIONAL; INTRAMUSCULAR; INTRAVENOUS; SOFT TISSUE
Status: DISPENSED
Start: 2018-01-16

## (undated) RX ORDER — HYDROMORPHONE HYDROCHLORIDE 1 MG/ML
INJECTION, SOLUTION INTRAMUSCULAR; INTRAVENOUS; SUBCUTANEOUS
Status: DISPENSED
Start: 2020-02-25

## (undated) RX ORDER — DEXAMETHASONE SODIUM PHOSPHATE 4 MG/ML
INJECTION, SOLUTION INTRA-ARTICULAR; INTRALESIONAL; INTRAMUSCULAR; INTRAVENOUS; SOFT TISSUE
Status: DISPENSED
Start: 2020-02-25

## (undated) RX ORDER — FUROSEMIDE 10 MG/ML
INJECTION INTRAMUSCULAR; INTRAVENOUS
Status: DISPENSED
Start: 2018-01-16

## (undated) RX ORDER — LIDOCAINE HYDROCHLORIDE 20 MG/ML
INJECTION, SOLUTION EPIDURAL; INFILTRATION; INTRACAUDAL; PERINEURAL
Status: DISPENSED
Start: 2018-01-16

## (undated) RX ORDER — NEOSTIGMINE METHYLSULFATE 1 MG/ML
VIAL (ML) INJECTION
Status: DISPENSED
Start: 2020-02-25

## (undated) RX ORDER — FUROSEMIDE 10 MG/ML
INJECTION INTRAMUSCULAR; INTRAVENOUS
Status: DISPENSED
Start: 2020-02-25

## (undated) RX ORDER — LIDOCAINE HYDROCHLORIDE 20 MG/ML
INJECTION, SOLUTION EPIDURAL; INFILTRATION; INTRACAUDAL; PERINEURAL
Status: DISPENSED
Start: 2020-02-25

## (undated) RX ORDER — GENTAMICIN SULFATE 80 MG/100ML
INJECTION, SOLUTION INTRAVENOUS
Status: DISPENSED
Start: 2020-02-25

## (undated) RX ORDER — LIDOCAINE HYDROCHLORIDE 20 MG/ML
INJECTION, SOLUTION EPIDURAL; INFILTRATION; INTRACAUDAL; PERINEURAL
Status: DISPENSED
Start: 2019-11-29

## (undated) RX ORDER — GLYCOPYRROLATE 0.2 MG/ML
INJECTION, SOLUTION INTRAMUSCULAR; INTRAVENOUS
Status: DISPENSED
Start: 2020-02-25

## (undated) RX ORDER — ONDANSETRON 2 MG/ML
INJECTION INTRAMUSCULAR; INTRAVENOUS
Status: DISPENSED
Start: 2020-02-25

## (undated) RX ORDER — GLYCOPYRROLATE 0.2 MG/ML
INJECTION, SOLUTION INTRAMUSCULAR; INTRAVENOUS
Status: DISPENSED
Start: 2019-11-29

## (undated) RX ORDER — PROPOFOL 10 MG/ML
INJECTION, EMULSION INTRAVENOUS
Status: DISPENSED
Start: 2020-02-25

## (undated) RX ORDER — GENTAMICIN SULFATE 80 MG/100ML
INJECTION, SOLUTION INTRAVENOUS
Status: DISPENSED
Start: 2018-01-16

## (undated) RX ORDER — ONDANSETRON 2 MG/ML
INJECTION INTRAMUSCULAR; INTRAVENOUS
Status: DISPENSED
Start: 2018-01-16

## (undated) RX ORDER — FENTANYL CITRATE 50 UG/ML
INJECTION, SOLUTION INTRAMUSCULAR; INTRAVENOUS
Status: DISPENSED
Start: 2018-01-16

## (undated) RX ORDER — LIDOCAINE HYDROCHLORIDE 20 MG/ML
JELLY TOPICAL
Status: DISPENSED
Start: 2020-02-25

## (undated) RX ORDER — ALBUTEROL SULFATE 90 UG/1
AEROSOL, METERED RESPIRATORY (INHALATION)
Status: DISPENSED
Start: 2018-01-16

## (undated) RX ORDER — PROPOFOL 10 MG/ML
INJECTION, EMULSION INTRAVENOUS
Status: DISPENSED
Start: 2018-01-16

## (undated) RX ORDER — GLYCOPYRROLATE 0.2 MG/ML
INJECTION, SOLUTION INTRAMUSCULAR; INTRAVENOUS
Status: DISPENSED
Start: 2018-01-16

## (undated) RX ORDER — FENTANYL CITRATE 50 UG/ML
INJECTION, SOLUTION INTRAMUSCULAR; INTRAVENOUS
Status: DISPENSED
Start: 2020-02-25

## (undated) RX ORDER — PROPOFOL 10 MG/ML
INJECTION, EMULSION INTRAVENOUS
Status: DISPENSED
Start: 2019-11-29

## (undated) RX ORDER — ATROPA BELLADONNA AND OPIUM 16.2; 3 MG/1; MG/1
SUPPOSITORY RECTAL
Status: DISPENSED
Start: 2020-02-25

## (undated) RX ORDER — NEOSTIGMINE METHYLSULFATE 1 MG/ML
VIAL (ML) INJECTION
Status: DISPENSED
Start: 2018-01-16